# Patient Record
Sex: MALE | Race: WHITE | NOT HISPANIC OR LATINO | URBAN - METROPOLITAN AREA
[De-identification: names, ages, dates, MRNs, and addresses within clinical notes are randomized per-mention and may not be internally consistent; named-entity substitution may affect disease eponyms.]

---

## 2023-09-17 ENCOUNTER — EMERGENCY (EMERGENCY)
Facility: HOSPITAL | Age: 83
LOS: 1 days | Discharge: HOME | End: 2023-09-17
Attending: EMERGENCY MEDICINE | Admitting: EMERGENCY MEDICINE
Payer: MEDICARE

## 2023-09-17 VITALS
SYSTOLIC BLOOD PRESSURE: 107 MMHG | TEMPERATURE: 98 F | DIASTOLIC BLOOD PRESSURE: 67 MMHG | HEART RATE: 93 BPM | OXYGEN SATURATION: 99 % | RESPIRATION RATE: 16 BRPM

## 2023-09-17 VITALS — SYSTOLIC BLOOD PRESSURE: 98 MMHG | DIASTOLIC BLOOD PRESSURE: 67 MMHG

## 2023-09-17 DIAGNOSIS — Z95.0 PRESENCE OF CARDIAC PACEMAKER: Chronic | ICD-10-CM

## 2023-09-17 LAB
ALBUMIN SERPL ELPH-MCNC: 4 G/DL — SIGNIFICANT CHANGE UP (ref 3.3–5)
ALP SERPL-CCNC: 91 U/L — SIGNIFICANT CHANGE UP (ref 40–120)
ALT FLD-CCNC: 13 U/L — SIGNIFICANT CHANGE UP (ref 4–41)
ANION GAP SERPL CALC-SCNC: 12 MMOL/L — SIGNIFICANT CHANGE UP (ref 7–14)
APPEARANCE UR: CLEAR — SIGNIFICANT CHANGE UP
AST SERPL-CCNC: 20 U/L — SIGNIFICANT CHANGE UP (ref 4–40)
BACTERIA # UR AUTO: NEGATIVE /HPF — SIGNIFICANT CHANGE UP
BASOPHILS # BLD AUTO: 0.06 K/UL — SIGNIFICANT CHANGE UP (ref 0–0.2)
BASOPHILS NFR BLD AUTO: 0.8 % — SIGNIFICANT CHANGE UP (ref 0–2)
BILIRUB SERPL-MCNC: 1.1 MG/DL — SIGNIFICANT CHANGE UP (ref 0.2–1.2)
BILIRUB UR-MCNC: NEGATIVE — SIGNIFICANT CHANGE UP
BUN SERPL-MCNC: 14 MG/DL — SIGNIFICANT CHANGE UP (ref 7–23)
CALCIUM SERPL-MCNC: 9.3 MG/DL — SIGNIFICANT CHANGE UP (ref 8.4–10.5)
CAST: 0 /LPF — SIGNIFICANT CHANGE UP (ref 0–4)
CHLORIDE SERPL-SCNC: 103 MMOL/L — SIGNIFICANT CHANGE UP (ref 98–107)
CO2 SERPL-SCNC: 29 MMOL/L — SIGNIFICANT CHANGE UP (ref 22–31)
COLOR SPEC: YELLOW — SIGNIFICANT CHANGE UP
CREAT SERPL-MCNC: 1.05 MG/DL — SIGNIFICANT CHANGE UP (ref 0.5–1.3)
DIFF PNL FLD: NEGATIVE — SIGNIFICANT CHANGE UP
EGFR: 70 ML/MIN/1.73M2 — SIGNIFICANT CHANGE UP
EOSINOPHIL # BLD AUTO: 0.44 K/UL — SIGNIFICANT CHANGE UP (ref 0–0.5)
EOSINOPHIL NFR BLD AUTO: 5.9 % — SIGNIFICANT CHANGE UP (ref 0–6)
GLUCOSE SERPL-MCNC: 111 MG/DL — HIGH (ref 70–99)
GLUCOSE UR QL: >=1000 MG/DL
HCT VFR BLD CALC: 36 % — LOW (ref 39–50)
HGB BLD-MCNC: 11.6 G/DL — LOW (ref 13–17)
IANC: 4.29 K/UL — SIGNIFICANT CHANGE UP (ref 1.8–7.4)
IMM GRANULOCYTES NFR BLD AUTO: 0.3 % — SIGNIFICANT CHANGE UP (ref 0–0.9)
KETONES UR-MCNC: NEGATIVE MG/DL — SIGNIFICANT CHANGE UP
LEUKOCYTE ESTERASE UR-ACNC: NEGATIVE — SIGNIFICANT CHANGE UP
LYMPHOCYTES # BLD AUTO: 1.99 K/UL — SIGNIFICANT CHANGE UP (ref 1–3.3)
LYMPHOCYTES # BLD AUTO: 26.9 % — SIGNIFICANT CHANGE UP (ref 13–44)
MCHC RBC-ENTMCNC: 32.2 GM/DL — SIGNIFICANT CHANGE UP (ref 32–36)
MCHC RBC-ENTMCNC: 32.4 PG — SIGNIFICANT CHANGE UP (ref 27–34)
MCV RBC AUTO: 100.6 FL — HIGH (ref 80–100)
MONOCYTES # BLD AUTO: 0.61 K/UL — SIGNIFICANT CHANGE UP (ref 0–0.9)
MONOCYTES NFR BLD AUTO: 8.2 % — SIGNIFICANT CHANGE UP (ref 2–14)
NEUTROPHILS # BLD AUTO: 4.29 K/UL — SIGNIFICANT CHANGE UP (ref 1.8–7.4)
NEUTROPHILS NFR BLD AUTO: 57.9 % — SIGNIFICANT CHANGE UP (ref 43–77)
NITRITE UR-MCNC: NEGATIVE — SIGNIFICANT CHANGE UP
NRBC # BLD: 0 /100 WBCS — SIGNIFICANT CHANGE UP (ref 0–0)
NRBC # FLD: 0 K/UL — SIGNIFICANT CHANGE UP (ref 0–0)
PH UR: 7 — SIGNIFICANT CHANGE UP (ref 5–8)
PLATELET # BLD AUTO: 184 K/UL — SIGNIFICANT CHANGE UP (ref 150–400)
POTASSIUM SERPL-MCNC: 3.9 MMOL/L — SIGNIFICANT CHANGE UP (ref 3.5–5.3)
POTASSIUM SERPL-SCNC: 3.9 MMOL/L — SIGNIFICANT CHANGE UP (ref 3.5–5.3)
PROT SERPL-MCNC: 6.7 G/DL — SIGNIFICANT CHANGE UP (ref 6–8.3)
PROT UR-MCNC: NEGATIVE MG/DL — SIGNIFICANT CHANGE UP
RBC # BLD: 3.58 M/UL — LOW (ref 4.2–5.8)
RBC # FLD: 16.1 % — HIGH (ref 10.3–14.5)
RBC CASTS # UR COMP ASSIST: 0 /HPF — SIGNIFICANT CHANGE UP (ref 0–4)
SODIUM SERPL-SCNC: 144 MMOL/L — SIGNIFICANT CHANGE UP (ref 135–145)
SP GR SPEC: 1.02 — SIGNIFICANT CHANGE UP (ref 1–1.03)
SQUAMOUS # UR AUTO: 0 /HPF — SIGNIFICANT CHANGE UP (ref 0–5)
TSH SERPL-MCNC: 3.76 UIU/ML — SIGNIFICANT CHANGE UP (ref 0.27–4.2)
UROBILINOGEN FLD QL: 2 MG/DL (ref 0.2–1)
WBC # BLD: 7.41 K/UL — SIGNIFICANT CHANGE UP (ref 3.8–10.5)
WBC # FLD AUTO: 7.41 K/UL — SIGNIFICANT CHANGE UP (ref 3.8–10.5)
WBC UR QL: 0 /HPF — SIGNIFICANT CHANGE UP (ref 0–5)

## 2023-09-17 PROCEDURE — 93010 ELECTROCARDIOGRAM REPORT: CPT

## 2023-09-17 PROCEDURE — 99284 EMERGENCY DEPT VISIT MOD MDM: CPT

## 2023-09-17 NOTE — ED PROVIDER NOTE - NS ED ATTENDING STATEMENT MOD
This was a shared visit with the RAKAN. I reviewed and verified the documentation and independently performed the documented:

## 2023-09-17 NOTE — ED PROVIDER NOTE - NS ED MD DISPO DISCHARGE
Xerosis Normal Treatment: I recommended application of Cetaphil or CeraVe numerous times a day going to bed to all dry areas. Home

## 2023-09-17 NOTE — ED PROVIDER NOTE - PATIENT PORTAL LINK FT
You can access the FollowMyHealth Patient Portal offered by [facility] by registering at the following website: [email or web address]. By joining [employer]’s FollowMyHealth portal, you will also be able to view your health information using other applications (apps) compatible with our system.

## 2023-09-17 NOTE — ED PROVIDER NOTE - MDM ORDERS SUBMITTED SELECTION
[address] Cardiology - [address] [facility] [address]  [address] [place] [address]  [address] [address]  Phone: [phone]  Fax: [phone]    Dimitrios Wilkinson MD        November 30, 2017     [facility] [id number]    Patient: Alaina Giron  MR Number: [id number]  YOB: 1932  Date of Visit: 11/27/2017    Dear Dr. Evangelina Verduzco:    CELSO Giron is here at the request of the [facility] for evaluation of hypotension and falls. He was seen in the ED 11/20/17 after falling. His BB dosage was reduced. Today he denies chest pain, palpitations, syncope, worsening leg swelling and worsening dyspnea. He is participating in cardiac rehab. Assessment:       CAD (coronary artery disease)      s/p prox RCA BMS 08, severe prox Diag 1, normal LVEF. Cath 5/2017 50% LAD, 80%  Diagonal, 40% LCx, 70% mid PDA, patent RCA stent, LVEF 30-35%, moderate-severe MR, severe AS.  Diabetes      managed by PCP    Hyperlipidemia      LDL 61    HTN (hypertension)      Controlled. BP at [facility] 93/69. BB dose previously reduced        Dizziness likely secondary to medications. Mild orthostatic changes noted. Cardiomyopathy- Echo 5/2017 LVEF 25-30%, mod-severely reduced RVSF, severe MR, AS, mild TR. Aortic stenosis. S/p S/P BAV with Impella 6/8/17. TAVR 8/1/17- repeat ECHO 9/2017 EF 25-30%, mod MR,  bioprosthetic valve. On ASA. (off of plavix)       Atrial fib. EKG 5/10/17 Atrial fib with CVR, PVC, LVH, NS ST changes. On coumadin. DEMARCUS Cr 1.8 11/2017       Plan:      Mild orthostatic changes noted today in office. In view of recent fall, will stop plavix and continue ASA and coumadin. Recommend DECLAN lee. Will stop lisinopril and reduce lasix to 40mg daily. Will also reduce Toprol to 50mg bid. No clinical evidence of CHF. No angina. Atrial fib controlled. Fasting lipid profile, CMP, proBNP prior to next visit.
Labs/EKG

## 2023-09-17 NOTE — ED ADULT TRIAGE NOTE - CHIEF COMPLAINT QUOTE
pt sent for geriatric eval  son states pt is being aggressive delusional and aguementive  pt from independent living  called the residence  pt had pacemaker placed in august/  hx chf  dm

## 2023-09-17 NOTE — ED PROVIDER NOTE - OBJECTIVE STATEMENT
Patient is an 83-year-old male with past medical history of congestive heart failure, status post pacemaker 1 month ago brought to the emergency department for delusions x1 month.  Patient reports he has had dyspnea on exertion for 1 to 2 months which has improved over time.  He otherwise has no other somatic complaints.  Spoke with patient's son, Jose, who states that patient was brought to the emergency department because patient has had memory loss and delusions for the past 1 month, since having a pacemaker placed.  He states that patient moved to [place] from [place] 2 months ago.  He states that the past month, patient got lost driving.  He states that patient is not aware of the exact date.  Son states that patient has a delusion that is described as follows "he is worried that people will take his money."  Son states that patient was recently discharged after a 2-week admission for heart failure exacerbation.  He states that patient's dyspnea and lower extremity edema has significantly improved since discharge.  Son reports that patient has not reported any thoughts of self-harm, harming other people or auditory/visual hallucinations.  Son states that patient has not physically harmed anyone.  He otherwise denies any fevers, chills, chest pain, palpitations, lightheadedness, headache, abdominal pain, nausea, vomiting, dysuria, melena, bright blood per rectum, illicit drug use, alcohol abuse, suicidal ideation, homicidal ideation, auditory/visual hallucinations.

## 2023-09-17 NOTE — ED ADULT NURSE NOTE - OBJECTIVE STATEMENT
Pt received with son at bedside. Per son pt has been behaving aggressively, and is argumentative, which is uncharacteristic of him. Pt has no complaints at this time and is currently calm and cooperative, pt is alert and oriented x 2-3 at present, Pt knows he is at hospital but is unsure why. Pt denies complaints at this time. Endorses multiple recent falls, denies head-strike, pt states he landed on his R shoulder. Pt states he is on blood thinners but cannot remember what it is called. Pacemaker placed in August. History of DM and CHF. 20g IV placed in L wrist, labs drawn as ordered. Safety maintained. Stretcher in lowest position and locked in placed, appropriate side rails up, call bell within reach. Urinal provided. Son at bedside. Awaiting further orders.

## 2023-09-17 NOTE — ED PROVIDER NOTE - CLINICAL SUMMARY MEDICAL DECISION MAKING FREE TEXT BOX
Patient is an 83-year-old male with past medical history of congestive heart failure, status post pacemaker 1 month ago brought to the emergency department for delusions x1 month.  Patient reports he has had dyspnea on exertion for 1 to 2 months which has improved over time.  He otherwise has no other somatic complaints.  Spoke with patient's son, Jose, who states that patient was brought to the emergency department because patient has had memory loss and delusions for the past 1 month, since having a pacemaker placed.  He states that patient moved to [place] from [place] 2 months ago.  He states that the past month, patient got lost driving.  He states that patient is not aware of the exact date.  Son states that patient has a delusion that is described as follows "he is worried that people will take his money."  Son states that patient was recently discharged after a 2-week admission for heart failure exacerbation.  He states that patient's dyspnea and lower extremity edema has significantly improved since discharge.  Son reports that patient has not reported any thoughts of self-harm, harming other people or auditory/visual hallucinations.  Son states that patient has not physically harmed anyone.  He otherwise denies any fevers, chills, chest pain, palpitations, lightheadedness, headache, abdominal pain, nausea, vomiting, dysuria, melena, bright blood per rectum, illicit drug use, alcohol abuse, suicidal ideation, homicidal ideation, auditory/visual hallucinations.  Plan to order labs, TSH, urinalysis.  Not clinically concerning for heart failure exacerbation at this time.  There is no evidence at this time that patient is at risk of harming himself or other people.  At this time, there is no clinical indication for any emergent psychiatric consultation.

## 2023-09-17 NOTE — ED PROVIDER NOTE - NSFOLLOWUPINSTRUCTIONS_ED_ALL_ED_FT
Advance activity as tolerated.  Continue all previously prescribed medications as directed unless otherwise instructed.  Follow up with your primary care physician in 48-72 hours- bring copies of your results.  Return to the ER for worsening or persistent symptoms, including but not limited to worsening/persistent chest pain, shortness of breath, harming others, harming self, thoughts of harming others or self, hallucinations, and/or ANY NEW OR CONCERNING SYMPTOMS. If you have issues obtaining follow up, please call: [phone] to obtain a doctor or specialist who takes your insurance in your area.  You may call [phone] to make an appointment with the internal medicine clinic.     For treatment and/or connection to a community Psychiatrist, follow up at the [facility]: Monday - Friday between 9 AM - 3 PM at [phone] [place] [id number], [phone].    Or, call for an appointment with:     Ted Alexandra (psychologist)  [address] [place] [address] (Lower Level [address])  [place] [address] [phone]

## 2023-09-17 NOTE — ED PROVIDER NOTE - ATTENDING APP SHARED VISIT CONTRIBUTION OF CARE
I performed a face-to-face evaluation of the patient and performed a history and physical examination. I agree with the history and physical examination. If this was a PA visit, I personally saw the patient with the PA and performed a substantive portion of the visit including all aspects of the medical decision making.    CHF, pacemaker, HTN, HL. Recent dc from [facility]. BIB family for memory loss and delusions (that someone is trying to take his money) for the past 1 month, since having a pacemaker placed. Was placed on olanzapine at [facility]. No clinical e/o infection, trauma, or acute vascular event that might explain this. No SI/HI. Will check labs. Dc w/ [facility] f/u.

## 2023-09-17 NOTE — ED ADULT NURSE NOTE - NSFALLHARMRISKINTERV_ED_ALL_ED

## 2023-09-18 ENCOUNTER — OUTPATIENT (OUTPATIENT)
Dept: OUTPATIENT SERVICES | Facility: HOSPITAL | Age: 83
LOS: 1 days | Discharge: STILL ADMITTED OR ADMINISTRATIVE | End: 2023-09-18
Payer: COMMERCIAL

## 2023-09-18 DIAGNOSIS — Z95.0 PRESENCE OF CARDIAC PACEMAKER: Chronic | ICD-10-CM

## 2023-09-18 LAB
CULTURE RESULTS: SIGNIFICANT CHANGE UP
SPECIMEN SOURCE: SIGNIFICANT CHANGE UP

## 2023-09-18 PROCEDURE — 90833 PSYTX W PT W E/M 30 MIN: CPT

## 2023-09-18 PROCEDURE — 99214 OFFICE O/P EST MOD 30 MIN: CPT

## 2023-09-19 DIAGNOSIS — F05 DELIRIUM DUE TO KNOWN PHYSIOLOGICAL CONDITION: ICD-10-CM

## 2023-09-20 ENCOUNTER — INPATIENT (INPATIENT)
Facility: HOSPITAL | Age: 83
LOS: 18 days | Discharge: HOSPICE | DRG: 884 | End: 2023-10-09
Attending: INTERNAL MEDICINE | Admitting: INTERNAL MEDICINE
Payer: MEDICARE

## 2023-09-20 VITALS
HEIGHT: 70 IN | TEMPERATURE: 98 F | OXYGEN SATURATION: 97 % | HEART RATE: 100 BPM | SYSTOLIC BLOOD PRESSURE: 112 MMHG | RESPIRATION RATE: 18 BRPM | DIASTOLIC BLOOD PRESSURE: 76 MMHG | WEIGHT: 188.94 LBS

## 2023-09-20 DIAGNOSIS — E11.9 TYPE 2 DIABETES MELLITUS WITHOUT COMPLICATIONS: ICD-10-CM

## 2023-09-20 DIAGNOSIS — I50.20 UNSPECIFIED SYSTOLIC (CONGESTIVE) HEART FAILURE: ICD-10-CM

## 2023-09-20 DIAGNOSIS — Z29.9 ENCOUNTER FOR PROPHYLACTIC MEASURES, UNSPECIFIED: ICD-10-CM

## 2023-09-20 DIAGNOSIS — F22 DELUSIONAL DISORDERS: ICD-10-CM

## 2023-09-20 DIAGNOSIS — E78.5 HYPERLIPIDEMIA, UNSPECIFIED: ICD-10-CM

## 2023-09-20 DIAGNOSIS — I10 ESSENTIAL (PRIMARY) HYPERTENSION: ICD-10-CM

## 2023-09-20 DIAGNOSIS — Z95.0 PRESENCE OF CARDIAC PACEMAKER: Chronic | ICD-10-CM

## 2023-09-20 DIAGNOSIS — Z79.899 OTHER LONG TERM (CURRENT) DRUG THERAPY: ICD-10-CM

## 2023-09-20 DIAGNOSIS — R45.1 RESTLESSNESS AND AGITATION: ICD-10-CM

## 2023-09-20 LAB
ANION GAP SERPL CALC-SCNC: 6 MMOL/L — SIGNIFICANT CHANGE UP (ref 5–17)
APAP SERPL-MCNC: 6 UG/ML — LOW (ref 10–30)
BASOPHILS # BLD AUTO: 0.09 K/UL — SIGNIFICANT CHANGE UP (ref 0–0.2)
BASOPHILS NFR BLD AUTO: 1.1 % — SIGNIFICANT CHANGE UP (ref 0–2)
BUN SERPL-MCNC: 13 MG/DL — SIGNIFICANT CHANGE UP (ref 7–23)
CALCIUM SERPL-MCNC: 9 MG/DL — SIGNIFICANT CHANGE UP (ref 8.5–10.1)
CHLORIDE SERPL-SCNC: 104 MMOL/L — SIGNIFICANT CHANGE UP (ref 96–108)
CO2 SERPL-SCNC: 29 MMOL/L — SIGNIFICANT CHANGE UP (ref 22–31)
CREAT SERPL-MCNC: 1 MG/DL — SIGNIFICANT CHANGE UP (ref 0.5–1.3)
EGFR: 75 ML/MIN/1.73M2 — SIGNIFICANT CHANGE UP
EOSINOPHIL # BLD AUTO: 0.56 K/UL — HIGH (ref 0–0.5)
EOSINOPHIL NFR BLD AUTO: 7.1 % — HIGH (ref 0–6)
ETHANOL SERPL-MCNC: <10 MG/DL — SIGNIFICANT CHANGE UP (ref 0–10)
GLUCOSE BLDC GLUCOMTR-MCNC: 132 MG/DL — HIGH (ref 70–99)
GLUCOSE SERPL-MCNC: 111 MG/DL — HIGH (ref 70–99)
HCT VFR BLD CALC: 37.5 % — LOW (ref 39–50)
HGB BLD-MCNC: 12.1 G/DL — LOW (ref 13–17)
IMM GRANULOCYTES NFR BLD AUTO: 0.3 % — SIGNIFICANT CHANGE UP (ref 0–0.9)
LYMPHOCYTES # BLD AUTO: 1.87 K/UL — SIGNIFICANT CHANGE UP (ref 1–3.3)
LYMPHOCYTES # BLD AUTO: 23.9 % — SIGNIFICANT CHANGE UP (ref 13–44)
MCHC RBC-ENTMCNC: 32.3 GM/DL — SIGNIFICANT CHANGE UP (ref 32–36)
MCHC RBC-ENTMCNC: 32.3 PG — SIGNIFICANT CHANGE UP (ref 27–34)
MCV RBC AUTO: 100 FL — SIGNIFICANT CHANGE UP (ref 80–100)
MONOCYTES # BLD AUTO: 0.52 K/UL — SIGNIFICANT CHANGE UP (ref 0–0.9)
MONOCYTES NFR BLD AUTO: 6.6 % — SIGNIFICANT CHANGE UP (ref 2–14)
NEUTROPHILS # BLD AUTO: 4.78 K/UL — SIGNIFICANT CHANGE UP (ref 1.8–7.4)
NEUTROPHILS NFR BLD AUTO: 61 % — SIGNIFICANT CHANGE UP (ref 43–77)
NRBC # BLD: 0 /100 WBCS — SIGNIFICANT CHANGE UP (ref 0–0)
PCP SPEC-MCNC: SIGNIFICANT CHANGE UP
PLATELET # BLD AUTO: 211 K/UL — SIGNIFICANT CHANGE UP (ref 150–400)
POTASSIUM SERPL-MCNC: 4.1 MMOL/L — SIGNIFICANT CHANGE UP (ref 3.5–5.3)
POTASSIUM SERPL-SCNC: 4.1 MMOL/L — SIGNIFICANT CHANGE UP (ref 3.5–5.3)
RBC # BLD: 3.75 M/UL — LOW (ref 4.2–5.8)
RBC # FLD: 15.8 % — HIGH (ref 10.3–14.5)
SALICYLATES SERPL-MCNC: <1.7 MG/DL — LOW (ref 2.8–20)
SARS-COV-2 RNA SPEC QL NAA+PROBE: SIGNIFICANT CHANGE UP
SODIUM SERPL-SCNC: 139 MMOL/L — SIGNIFICANT CHANGE UP (ref 135–145)
TSH SERPL-MCNC: 2.66 UIU/ML — SIGNIFICANT CHANGE UP (ref 0.36–3.74)
WBC # BLD: 7.84 K/UL — SIGNIFICANT CHANGE UP (ref 3.8–10.5)
WBC # FLD AUTO: 7.84 K/UL — SIGNIFICANT CHANGE UP (ref 3.8–10.5)

## 2023-09-20 PROCEDURE — 99285 EMERGENCY DEPT VISIT HI MDM: CPT

## 2023-09-20 PROCEDURE — 93010 ELECTROCARDIOGRAM REPORT: CPT

## 2023-09-20 PROCEDURE — 71045 X-RAY EXAM CHEST 1 VIEW: CPT | Mod: 26

## 2023-09-20 RX ORDER — DEXTROSE 50 % IN WATER 50 %
25 SYRINGE (ML) INTRAVENOUS ONCE
Refills: 0 | Status: DISCONTINUED | OUTPATIENT
Start: 2023-09-20 | End: 2023-10-09

## 2023-09-20 RX ORDER — SPIRONOLACTONE 25 MG/1
25 TABLET, FILM COATED ORAL DAILY
Refills: 0 | Status: DISCONTINUED | OUTPATIENT
Start: 2023-09-20 | End: 2023-09-24

## 2023-09-20 RX ORDER — OLANZAPINE 15 MG/1
5 TABLET, FILM COATED ORAL ONCE
Refills: 0 | Status: COMPLETED | OUTPATIENT
Start: 2023-09-20 | End: 2023-09-20

## 2023-09-20 RX ORDER — ALBUTEROL 90 UG/1
2.5 AEROSOL, METERED ORAL EVERY 6 HOURS
Refills: 0 | Status: DISCONTINUED | OUTPATIENT
Start: 2023-09-20 | End: 2023-10-09

## 2023-09-20 RX ORDER — FUROSEMIDE 40 MG
20 TABLET ORAL
Refills: 0 | Status: DISCONTINUED | OUTPATIENT
Start: 2023-09-20 | End: 2023-09-21

## 2023-09-20 RX ORDER — SODIUM CHLORIDE 9 MG/ML
1000 INJECTION, SOLUTION INTRAVENOUS
Refills: 0 | Status: DISCONTINUED | OUTPATIENT
Start: 2023-09-20 | End: 2023-10-09

## 2023-09-20 RX ORDER — INSULIN LISPRO 100/ML
VIAL (ML) SUBCUTANEOUS
Refills: 0 | Status: DISCONTINUED | OUTPATIENT
Start: 2023-09-20 | End: 2023-10-09

## 2023-09-20 RX ORDER — ATORVASTATIN CALCIUM 80 MG/1
40 TABLET, FILM COATED ORAL AT BEDTIME
Refills: 0 | Status: DISCONTINUED | OUTPATIENT
Start: 2023-09-20 | End: 2023-10-09

## 2023-09-20 RX ORDER — DULOXETINE HYDROCHLORIDE 30 MG/1
30 CAPSULE, DELAYED RELEASE ORAL
Refills: 0 | Status: DISCONTINUED | OUTPATIENT
Start: 2023-09-20 | End: 2023-10-09

## 2023-09-20 RX ORDER — INSULIN LISPRO 100/ML
VIAL (ML) SUBCUTANEOUS AT BEDTIME
Refills: 0 | Status: DISCONTINUED | OUTPATIENT
Start: 2023-09-21 | End: 2023-10-09

## 2023-09-20 RX ORDER — METOPROLOL TARTRATE 50 MG
25 TABLET ORAL DAILY
Refills: 0 | Status: DISCONTINUED | OUTPATIENT
Start: 2023-09-20 | End: 2023-09-24

## 2023-09-20 RX ORDER — DEXTROSE 50 % IN WATER 50 %
15 SYRINGE (ML) INTRAVENOUS ONCE
Refills: 0 | Status: DISCONTINUED | OUTPATIENT
Start: 2023-09-20 | End: 2023-10-09

## 2023-09-20 RX ORDER — GLUCAGON INJECTION, SOLUTION 0.5 MG/.1ML
1 INJECTION, SOLUTION SUBCUTANEOUS ONCE
Refills: 0 | Status: DISCONTINUED | OUTPATIENT
Start: 2023-09-20 | End: 2023-10-09

## 2023-09-20 RX ORDER — TIOTROPIUM BROMIDE 18 UG/1
2 CAPSULE ORAL; RESPIRATORY (INHALATION) DAILY
Refills: 0 | Status: DISCONTINUED | OUTPATIENT
Start: 2023-09-20 | End: 2023-10-09

## 2023-09-20 RX ORDER — SOD,AMMONIUM,POTASSIUM LACTATE
1 CREAM (GRAM) TOPICAL
Refills: 0 | Status: DISCONTINUED | OUTPATIENT
Start: 2023-09-20 | End: 2023-10-09

## 2023-09-20 RX ORDER — HEPARIN SODIUM 5000 [USP'U]/ML
5000 INJECTION INTRAVENOUS; SUBCUTANEOUS EVERY 8 HOURS
Refills: 0 | Status: DISCONTINUED | OUTPATIENT
Start: 2023-09-20 | End: 2023-09-30

## 2023-09-20 RX ORDER — DIVALPROEX SODIUM 500 MG/1
125 TABLET, DELAYED RELEASE ORAL THREE TIMES A DAY
Refills: 0 | Status: DISCONTINUED | OUTPATIENT
Start: 2023-09-20 | End: 2023-09-21

## 2023-09-20 RX ORDER — AMITRIPTYLINE HCL 25 MG
10 TABLET ORAL DAILY
Refills: 0 | Status: DISCONTINUED | OUTPATIENT
Start: 2023-09-20 | End: 2023-09-22

## 2023-09-20 RX ORDER — DEXTROSE 50 % IN WATER 50 %
12.5 SYRINGE (ML) INTRAVENOUS ONCE
Refills: 0 | Status: DISCONTINUED | OUTPATIENT
Start: 2023-09-20 | End: 2023-10-09

## 2023-09-20 RX ORDER — SACUBITRIL AND VALSARTAN 24; 26 MG/1; MG/1
1 TABLET, FILM COATED ORAL
Refills: 0 | Status: DISCONTINUED | OUTPATIENT
Start: 2023-09-20 | End: 2023-09-21

## 2023-09-20 RX ORDER — BUDESONIDE AND FORMOTEROL FUMARATE DIHYDRATE 160; 4.5 UG/1; UG/1
2 AEROSOL RESPIRATORY (INHALATION)
Refills: 0 | Status: DISCONTINUED | OUTPATIENT
Start: 2023-09-20 | End: 2023-10-09

## 2023-09-20 RX ORDER — OLANZAPINE 15 MG/1
2.5 TABLET, FILM COATED ORAL AT BEDTIME
Refills: 0 | Status: DISCONTINUED | OUTPATIENT
Start: 2023-09-20 | End: 2023-09-21

## 2023-09-20 RX ORDER — DAPAGLIFLOZIN 10 MG/1
10 TABLET, FILM COATED ORAL EVERY 24 HOURS
Refills: 0 | Status: DISCONTINUED | OUTPATIENT
Start: 2023-09-20 | End: 2023-10-04

## 2023-09-20 RX ORDER — MIDODRINE HYDROCHLORIDE 2.5 MG/1
5 TABLET ORAL THREE TIMES A DAY
Refills: 0 | Status: DISCONTINUED | OUTPATIENT
Start: 2023-09-20 | End: 2023-10-01

## 2023-09-20 RX ORDER — ACETAMINOPHEN 500 MG
650 TABLET ORAL ONCE
Refills: 0 | Status: COMPLETED | OUTPATIENT
Start: 2023-09-20 | End: 2023-09-20

## 2023-09-20 RX ORDER — ACETAMINOPHEN 500 MG
650 TABLET ORAL EVERY 6 HOURS
Refills: 0 | Status: DISCONTINUED | OUTPATIENT
Start: 2023-09-20 | End: 2023-10-09

## 2023-09-20 RX ADMIN — Medication 650 MILLIGRAM(S): at 20:00

## 2023-09-20 RX ADMIN — OLANZAPINE 5 MILLIGRAM(S): 15 TABLET, FILM COATED ORAL at 19:10

## 2023-09-20 RX ADMIN — Medication 650 MILLIGRAM(S): at 19:14

## 2023-09-20 NOTE — ED PROVIDER NOTE - OBJECTIVE STATEMENT
Patient with past medical history of dementia, cardiac disease is presenting with concern for agitation.  Patient is coming from a [facility] and older community where he has been having sundowning episodes.  Recently traveled here from [place].  Became agitated today, so staff called 911 for patient to be evaluated.  Patient at this time denies any complaints.  Facility feels like patient needs assistance with medications and would not be safe to go back there at this time.

## 2023-09-20 NOTE — H&P ADULT - PROBLEM SELECTOR PLAN 1
Acute paranoia in setting of hx of dementia, states people are following him and trying to take his money  - Recent hospitalization [facility] for similar problem  - continue home zyprexa, depakote, amitriptyline, duloxetine    - Psych (Dr. Strange) consulted, f/u recs  - SW consulted Acute paranoia in setting of hx of dementia, states people are following him and trying to take his money  - Recent hospitalization [facility] for similar problem  - continue home ZYPREXA, Depakote, amitriptyline, duloxetine    - Psych (Dr. Strange) consulted, f/u recs  - SW consulted

## 2023-09-20 NOTE — H&P ADULT - CARDIOVASCULAR
negative +PPM, +B/L LE edema w/ crusting/normal/regular rate and rhythm/S1 S2 present/no gallops/no rub/no murmur/peripheral edema +PPM, +B/L LE edema w/ crusting/normal/regular rate and rhythm/S1 S2 present/no gallops/no rub/no murmur/no JVD/peripheral edema/vascular details…

## 2023-09-20 NOTE — H&P ADULT - PROBLEM SELECTOR PLAN 5
BP stable on admission   - Per med dispensary hx patient is on midodrine as well for possible orthostatic hypotension  -Continue home metoprolol and midodrine   -Monitor hemodynamics Chronic  - Hold home metformin inpatient  -Low ISS  -Regular finger sticks  -DASH/TLC Consistent carb diet  -Hypoglycemic protocol.  - F/u AM A1c

## 2023-09-20 NOTE — H&P ADULT - HISTORY OF PRESENT ILLNESS
82y/o M PMH dementia, CHF s/p PPM, HTN, HLD 1month ago presents with increased agitation.     He is coming from a [facility] and older community where he has been having sundowning episodes.  Recently traveled here from [place] ~2months ago.  Became agitated today, so staff called 911 for patient to be evaluated.  Patient at this time denies any complaints.  Facility feels like patient needs assistance with medications and would not be safe to go back there at this time.    Patient went to [facility] ED on 9/17 for increased paranoia  and was discharged home and told to follow up with___.    He was recently hospitalized at [facility] for CHF exacerbation. Was placed on zyprexa at [facility].     ED Course   T 97.4 F  /79 RR 18 SpO2 95% on RA    Labs H/H 12.1/37.5 Glu   UA: >1000 glucose  UTox: negaitve     ?furosemide 82y/o M PMH dementia, HFrEF s/p PPM, HTN, HLD  presents with increased agitation. History obtained from patient's daughter Arianne over phone.   Patient moved from [place] to [place] ~2months ago and has been having issues with increased paranoia for the past month. He stays at a [facility] and older assisted living community where he has been having several episodes of sundowning.   Recently he was hospitalized at [facility] for same issues from 9/1-914/23. Daughter was unable to provide sufficient history regarding hospitalization.   Patient's son, Jose has been taking care of him and knows more about his medical hx however patient's daughter states she is taking over now and did not want to bother Jose at this time.  Patient was also recently seen at [facility] ED for episodes of agitation and was told to follow up with outpatient Psychiatrist but daughter states that likely did not happened because of his increased agitation.  Patient became agitated today and staff had to call 911 for further evaluation. The facility as well as his children feel that his current psych medication regimen is not sufficient to keep him calm.   Per review of recent dispensary history, he was seeing a psychiatric NP (Barbara Bravo) in [place], [place].   Of note, he had placement of PPM in late August at [facility]. Per his daughter, his LVEF at that time was 20% before and after PPM placement.  Currently, patient states his b/l legs feel itchy but otherwise he feels fine and is agreeable.  Denies fevers, chills. sob, cp, n/v/d.    ED Course   T 97.4 F  /79 RR 18 SpO2 95% on RA  Labs H/H 12.1/37.5 Glu   UA: >1000 glucose  UTox: negaitve     In the ED, given tylenol 650mg x1 , zyprexa 5mg IVP x1

## 2023-09-20 NOTE — H&P ADULT - PROBLEM SELECTOR PLAN 2
Chronic s/p PPM (paced ~end of Aug 2023 at [place] per daughter )  - TTE (8/2023) per daughter showed EF ~20%  - Continue entresto, trellegy, metoprolol, spironolactone, Farxiga   - Increase home lasix to IV lasix 20mg q12h  - Dash/ TLC Diet Chronic s/p PPM (paced ~end of Aug 2023 at [facility] per daughter )  - TTE (8/2023) per daughter showed EF ~20%  - Continue entresto, trellegy, metoprolol, spironolactone, Farxiga   - Increase home lasix to IV lasix 20mg q12h  - AM TSH, T3 , T4  - Dash/ TLC Diet Chronic s/p PPM (paced ~end of Aug 2023 at [facility] per daughter )  - TTE (8/2023) per daughter showed EF ~20%  - Continue Entresto, Trelegy, metoprolol, spironolactone, Farxiga   - Increase home Lasix to IV lasix 20mg IVP  q12h  - AM TSH, T3 , T4  - Dash/ TLC Diet

## 2023-09-20 NOTE — H&P ADULT - NEUROLOGICAL
negative normal/sensation intact/responds to pain/responds to verbal commands details… normal/cranial nerves II-XII intact/sensation intact/responds to pain/responds to verbal commands/cranial nerves intact/no spontaneous movement

## 2023-09-20 NOTE — H&P ADULT - NSICDXPASTMEDICALHX_GEN_ALL_CORE_FT
PAST MEDICAL HISTORY:  HFrEF (heart failure with reduced ejection fraction)     HLD (hyperlipidemia)     HTN (hypertension)     Type 2 diabetes mellitus

## 2023-09-20 NOTE — ED PROVIDER NOTE - PROGRESS NOTE DETAILS
zyprexa given for agitation and to help facilitate workup. Lamont Harvey MD. Case discussed with telepsychiatry, recommend continuing patient on one-to-one and having patient be seen by inpatient psychiatry team tomorrow morning.  Discussed with Dr. Bauer, will accept patient for admission at this time for likely assisted living placement.  Lamont Harvey MD.

## 2023-09-20 NOTE — H&P ADULT - PROBLEM SELECTOR PLAN 4
Chronic  - Hold home metformin inpatient  -Low ISS  -Regular finger sticks  -DASH/TLC Consistent carb diet  -Hypoglycemic protocol.  - F/u AM A1c Discussed patient medication list with daughter (Arianne) who confirmed most medications found on surescripts recent dispensary history) however she is not sure of any changes that may have happened when he was hospitalized or when he went to the ED  - Patient has list with him, copy in chart however unclear if that list is up to date  - Current med rec completed based on recent dispensary history

## 2023-09-20 NOTE — H&P ADULT - PROBLEM SELECTOR PLAN 3
Discussed patient medication list with daughter (Arianne) who confirmed most medications found on surescripts recent dispensary history) however she is not sure of any changes that may have happened when he was hospitalized or when he went to the ED  - Patient has list with him, copy in chart however unclear if that list is up to date  - Current med rec completed based on recent dispensary history H/H 12.1/37.5 on admission  -Folate, B12  -Target Hb>7.0  -Trend hb.

## 2023-09-20 NOTE — H&P ADULT - GASTROINTESTINAL
negative normal/soft/nontender/nondistended/normal active bowel sounds details… Obese/normal/soft/nontender/nondistended/normal active bowel sounds/no guarding/no rigidity/no organomegaly/no palpable hema/no masses palpable

## 2023-09-20 NOTE — H&P ADULT - SKIN
b/l LE edema, ulcerations/swelling b/l LE edema, ulcerations on legs & hand/warm and dry/swelling/no rashes

## 2023-09-20 NOTE — ED ADULT NURSE NOTE - NSFALLUNIVINTERV_ED_ALL_ED
Bed/Stretcher in lowest position, wheels locked, appropriate side rails in place/Call bell, personal items and telephone in reach/Instruct patient to call for assistance before getting out of bed/chair/stretcher/Non-slip footwear applied when patient is off stretcher/[place] to call system/Physically safe environment - no spills, clutter or unnecessary equipment/Purposeful proactive rounding/Room/bathroom lighting operational, light cord in reach

## 2023-09-20 NOTE — ED PROVIDER NOTE - CLINICAL SUMMARY MEDICAL DECISION MAKING FREE TEXT BOX
Patient likely with dementia though concern for worsening agitation here.  Will assess for toxic/metabolic abnormalities.  Do not suspect infection as he is afebrile and otherwise without complaints.  We will check lab work here, consult psychiatry anticipate admission.  I spoke with the  from patient's facility and they are recommending evaluation for possible assisted living placement. ED RN spoke with patient's family member.

## 2023-09-20 NOTE — H&P ADULT - NSHPSOCIALHISTORY_GEN_ALL_CORE
Tobacco: former , quit >30 years ago  EtOH: social drinker  Recreational drug use: denies   Lives with: Assisted Living Facility  Ambulates: without assistance  ADLs: needs assistance

## 2023-09-20 NOTE — H&P ADULT - PROBLEM SELECTOR PLAN 6
Chronic  - Continue home statin BP stable on admission   - Per med dispensary hx patient is on midodrine as well for possible orthostatic hypotension  -Continue home metoprolol and midodrine   -Monitor hemodynamics

## 2023-09-20 NOTE — H&P ADULT - ATTENDING COMMENTS
84y/o M PMH dementia, DM2, HFrEF s/p PPM, HTN, HLD presents with increased agitation.  Admit for increased paranoia & Placement .  pt seen, examined, case & care plan d/w pt, residents at detail. d/w dtr   Consult;  Psych Dr Strange  Social service  PT Eval.  PO diet  AM labs   On 1:1 to continue

## 2023-09-20 NOTE — ED PROVIDER NOTE - PHYSICAL EXAMINATION
Constitutional: Awake, Alert, non-toxic. No acute distress.  HEAD: Normocephalic, atraumatic.   EYES: PERRL, EOM intact, conjunctiva and sclera are clear bilaterally.  ENT: External ears normal. No rhinorrhea, no tracheal deviation   NECK: Supple, non-tender  CARDIOVASCULAR: regular rate and rhythm.  RESPIRATORY: Normal respiratory effort; breath sounds CTAB, no wheezes, rhonchi, or rales. Speaking in full sentences. No accessory muscle use.   ABDOMEN: Soft; non-tender, non-distended. No rebound or guarding.   MSK:  no lower extremity edema, no deformities  SKIN: Warm, dry  NEURO: A&O x2. Sensory and motor functions are grossly intact. Speech is normal. No facial droop.  PSYCH: intermittently agitated

## 2023-09-20 NOTE — H&P ADULT - RESPIRATORY
normal/clear to auscultation bilaterally/no wheezes/no rales/no rhonchi normal/clear to auscultation bilaterally/no wheezes/no rales/no rhonchi/breath sounds equal/good air movement/respirations non-labored

## 2023-09-20 NOTE — H&P ADULT - NSHPPHYSICALEXAM_GEN_ALL_CORE
Vital Signs Last 24 Hrs  T(C): 36.6 (20 Sep 2023 23:20), Max: 36.7 (20 Sep 2023 17:11)  T(F): 97.8 (20 Sep 2023 23:20), Max: 98 (20 Sep 2023 17:11)  HR: 94 (20 Sep 2023 23:20) (94 - 100)  BP: 97/61 (20 Sep 2023 23:20) (97/61 - 114/79)  BP(mean): --  RR: 20 (20 Sep 2023 23:20) (18 - 20)  SpO2: 97% (20 Sep 2023 23:20) (95% - 97%)    Parameters below as of 20 Sep 2023 23:20  Patient On (Oxygen Delivery Method): room air

## 2023-09-20 NOTE — ED ADULT NURSE NOTE - OBJECTIVE STATEMENT
Pt. received alert/awake and confused w/ chief complaint as per daughter of increased confusion w/ agitation worsening over last 2 months. Pt. called 911 today stating "someone was stalking me and trying to take my money." As per darcie the  at [facility] she states that they are unable to care for patient w/ his incresing dementia and have been trying to place him in a facility to care for him properly. Pt. refusing all treatment in hospital. MD Harvey notified and instructed to place patient on 1:1 observation.

## 2023-09-20 NOTE — H&P ADULT - ASSESSMENT
84y/o M PMH dementia, DM2, HFrEF s/p PPM, HTN, HLD presents with increased agitation.  Admit for increased paranoia 84y/o M PMH dementia, DM2, HFrEF s/p PPM, HTN, HLD presents with increased agitation.  Admit for increased paranoia & Placement .

## 2023-09-21 ENCOUNTER — TRANSCRIPTION ENCOUNTER (OUTPATIENT)
Age: 83
End: 2023-09-21

## 2023-09-21 DIAGNOSIS — D64.9 ANEMIA, UNSPECIFIED: ICD-10-CM

## 2023-09-21 LAB
A1C WITH ESTIMATED AVERAGE GLUCOSE RESULT: 6.5 % — HIGH (ref 4–5.6)
ALBUMIN SERPL ELPH-MCNC: 3.3 G/DL — SIGNIFICANT CHANGE UP (ref 3.3–5)
ALP SERPL-CCNC: 88 U/L — SIGNIFICANT CHANGE UP (ref 40–120)
ALT FLD-CCNC: 19 U/L — SIGNIFICANT CHANGE UP (ref 12–78)
ANION GAP SERPL CALC-SCNC: 6 MMOL/L — SIGNIFICANT CHANGE UP (ref 5–17)
AST SERPL-CCNC: 17 U/L — SIGNIFICANT CHANGE UP (ref 15–37)
BASOPHILS # BLD AUTO: 0.07 K/UL — SIGNIFICANT CHANGE UP (ref 0–0.2)
BASOPHILS NFR BLD AUTO: 1.3 % — SIGNIFICANT CHANGE UP (ref 0–2)
BILIRUB SERPL-MCNC: 1.2 MG/DL — SIGNIFICANT CHANGE UP (ref 0.2–1.2)
BUN SERPL-MCNC: 11 MG/DL — SIGNIFICANT CHANGE UP (ref 7–23)
CALCIUM SERPL-MCNC: 9.1 MG/DL — SIGNIFICANT CHANGE UP (ref 8.5–10.1)
CHLORIDE SERPL-SCNC: 107 MMOL/L — SIGNIFICANT CHANGE UP (ref 96–108)
CO2 SERPL-SCNC: 30 MMOL/L — SIGNIFICANT CHANGE UP (ref 22–31)
CREAT SERPL-MCNC: 0.87 MG/DL — SIGNIFICANT CHANGE UP (ref 0.5–1.3)
EGFR: 86 ML/MIN/1.73M2 — SIGNIFICANT CHANGE UP
EOSINOPHIL # BLD AUTO: 0.52 K/UL — HIGH (ref 0–0.5)
EOSINOPHIL NFR BLD AUTO: 9.3 % — HIGH (ref 0–6)
ESTIMATED AVERAGE GLUCOSE: 140 MG/DL — HIGH (ref 68–114)
FOLATE SERPL-MCNC: 17.1 NG/ML — SIGNIFICANT CHANGE UP
GLUCOSE BLDC GLUCOMTR-MCNC: 110 MG/DL — HIGH (ref 70–99)
GLUCOSE BLDC GLUCOMTR-MCNC: 136 MG/DL — HIGH (ref 70–99)
GLUCOSE BLDC GLUCOMTR-MCNC: 142 MG/DL — HIGH (ref 70–99)
GLUCOSE BLDC GLUCOMTR-MCNC: 98 MG/DL — SIGNIFICANT CHANGE UP (ref 70–99)
GLUCOSE SERPL-MCNC: 114 MG/DL — HIGH (ref 70–99)
HCT VFR BLD CALC: 38 % — LOW (ref 39–50)
HGB BLD-MCNC: 12.2 G/DL — LOW (ref 13–17)
IMM GRANULOCYTES NFR BLD AUTO: 0.4 % — SIGNIFICANT CHANGE UP (ref 0–0.9)
LYMPHOCYTES # BLD AUTO: 1.51 K/UL — SIGNIFICANT CHANGE UP (ref 1–3.3)
LYMPHOCYTES # BLD AUTO: 27.1 % — SIGNIFICANT CHANGE UP (ref 13–44)
MCHC RBC-ENTMCNC: 32.1 GM/DL — SIGNIFICANT CHANGE UP (ref 32–36)
MCHC RBC-ENTMCNC: 32.3 PG — SIGNIFICANT CHANGE UP (ref 27–34)
MCV RBC AUTO: 100.5 FL — HIGH (ref 80–100)
MONOCYTES # BLD AUTO: 0.49 K/UL — SIGNIFICANT CHANGE UP (ref 0–0.9)
MONOCYTES NFR BLD AUTO: 8.8 % — SIGNIFICANT CHANGE UP (ref 2–14)
NEUTROPHILS # BLD AUTO: 2.96 K/UL — SIGNIFICANT CHANGE UP (ref 1.8–7.4)
NEUTROPHILS NFR BLD AUTO: 53.1 % — SIGNIFICANT CHANGE UP (ref 43–77)
NRBC # BLD: 0 /100 WBCS — SIGNIFICANT CHANGE UP (ref 0–0)
PLATELET # BLD AUTO: 173 K/UL — SIGNIFICANT CHANGE UP (ref 150–400)
POTASSIUM SERPL-MCNC: 3.5 MMOL/L — SIGNIFICANT CHANGE UP (ref 3.5–5.3)
POTASSIUM SERPL-SCNC: 3.5 MMOL/L — SIGNIFICANT CHANGE UP (ref 3.5–5.3)
PROT SERPL-MCNC: 6.9 G/DL — SIGNIFICANT CHANGE UP (ref 6–8.3)
RBC # BLD: 3.78 M/UL — LOW (ref 4.2–5.8)
RBC # FLD: 15.9 % — HIGH (ref 10.3–14.5)
SODIUM SERPL-SCNC: 143 MMOL/L — SIGNIFICANT CHANGE UP (ref 135–145)
T3 SERPL-MCNC: 104 NG/DL — SIGNIFICANT CHANGE UP (ref 80–200)
T4 AB SER-ACNC: 6.5 UG/DL — SIGNIFICANT CHANGE UP (ref 4.6–12)
TSH SERPL-MCNC: 2.74 UIU/ML — SIGNIFICANT CHANGE UP (ref 0.36–3.74)
VIT B12 SERPL-MCNC: 510 PG/ML — SIGNIFICANT CHANGE UP (ref 232–1245)
WBC # BLD: 5.57 K/UL — SIGNIFICANT CHANGE UP (ref 3.8–10.5)
WBC # FLD AUTO: 5.57 K/UL — SIGNIFICANT CHANGE UP (ref 3.8–10.5)

## 2023-09-21 PROCEDURE — 99221 1ST HOSP IP/OBS SF/LOW 40: CPT

## 2023-09-21 RX ORDER — PANTOPRAZOLE SODIUM 20 MG/1
40 TABLET, DELAYED RELEASE ORAL
Refills: 0 | Status: DISCONTINUED | OUTPATIENT
Start: 2023-09-22 | End: 2023-10-09

## 2023-09-21 RX ORDER — SIMETHICONE 80 MG/1
80 TABLET, CHEWABLE ORAL ONCE
Refills: 0 | Status: COMPLETED | OUTPATIENT
Start: 2023-09-21 | End: 2023-09-21

## 2023-09-21 RX ORDER — FUROSEMIDE 40 MG
40 TABLET ORAL ONCE
Refills: 0 | Status: COMPLETED | OUTPATIENT
Start: 2023-09-21 | End: 2023-09-21

## 2023-09-21 RX ORDER — SACUBITRIL AND VALSARTAN 24; 26 MG/1; MG/1
1 TABLET, FILM COATED ORAL
Refills: 0 | Status: DISCONTINUED | OUTPATIENT
Start: 2023-09-21 | End: 2023-09-25

## 2023-09-21 RX ORDER — PANTOPRAZOLE SODIUM 20 MG/1
40 TABLET, DELAYED RELEASE ORAL ONCE
Refills: 0 | Status: COMPLETED | OUTPATIENT
Start: 2023-09-21 | End: 2023-09-21

## 2023-09-21 RX ORDER — PANTOPRAZOLE SODIUM 20 MG/1
40 TABLET, DELAYED RELEASE ORAL
Refills: 0 | Status: DISCONTINUED | OUTPATIENT
Start: 2023-09-21 | End: 2023-09-21

## 2023-09-21 RX ORDER — LANOLIN ALCOHOL/MO/W.PET/CERES
3 CREAM (GRAM) TOPICAL ONCE
Refills: 0 | Status: COMPLETED | OUTPATIENT
Start: 2023-09-21 | End: 2023-09-21

## 2023-09-21 RX ORDER — FUROSEMIDE 40 MG
40 TABLET ORAL EVERY 12 HOURS
Refills: 0 | Status: DISCONTINUED | OUTPATIENT
Start: 2023-09-22 | End: 2023-09-24

## 2023-09-21 RX ORDER — FAMOTIDINE 10 MG/ML
20 INJECTION INTRAVENOUS ONCE
Refills: 0 | Status: DISCONTINUED | OUTPATIENT
Start: 2023-09-21 | End: 2023-09-21

## 2023-09-21 RX ORDER — OLANZAPINE 15 MG/1
5 TABLET, FILM COATED ORAL AT BEDTIME
Refills: 0 | Status: DISCONTINUED | OUTPATIENT
Start: 2023-09-21 | End: 2023-09-25

## 2023-09-21 RX ORDER — DIVALPROEX SODIUM 500 MG/1
125 TABLET, DELAYED RELEASE ORAL THREE TIMES A DAY
Refills: 0 | Status: DISCONTINUED | OUTPATIENT
Start: 2023-09-21 | End: 2023-09-22

## 2023-09-21 RX ORDER — OLANZAPINE 15 MG/1
2.5 TABLET, FILM COATED ORAL DAILY
Refills: 0 | Status: DISCONTINUED | OUTPATIENT
Start: 2023-09-21 | End: 2023-09-25

## 2023-09-21 RX ADMIN — OLANZAPINE 5 MILLIGRAM(S): 15 TABLET, FILM COATED ORAL at 20:42

## 2023-09-21 RX ADMIN — BUDESONIDE AND FORMOTEROL FUMARATE DIHYDRATE 2 PUFF(S): 160; 4.5 AEROSOL RESPIRATORY (INHALATION) at 18:33

## 2023-09-21 RX ADMIN — Medication 650 MILLIGRAM(S): at 01:34

## 2023-09-21 RX ADMIN — Medication 650 MILLIGRAM(S): at 14:41

## 2023-09-21 RX ADMIN — HEPARIN SODIUM 5000 UNIT(S): 5000 INJECTION INTRAVENOUS; SUBCUTANEOUS at 21:42

## 2023-09-21 RX ADMIN — Medication 650 MILLIGRAM(S): at 21:30

## 2023-09-21 RX ADMIN — HEPARIN SODIUM 5000 UNIT(S): 5000 INJECTION INTRAVENOUS; SUBCUTANEOUS at 14:41

## 2023-09-21 RX ADMIN — ATORVASTATIN CALCIUM 40 MILLIGRAM(S): 80 TABLET, FILM COATED ORAL at 20:42

## 2023-09-21 RX ADMIN — Medication 1 APPLICATION(S): at 17:17

## 2023-09-21 RX ADMIN — Medication 40 MILLIGRAM(S): at 16:28

## 2023-09-21 RX ADMIN — Medication 3 MILLIGRAM(S): at 00:55

## 2023-09-21 RX ADMIN — DIVALPROEX SODIUM 125 MILLIGRAM(S): 500 TABLET, DELAYED RELEASE ORAL at 20:42

## 2023-09-21 RX ADMIN — MIDODRINE HYDROCHLORIDE 5 MILLIGRAM(S): 2.5 TABLET ORAL at 17:17

## 2023-09-21 RX ADMIN — Medication 1 APPLICATION(S): at 00:34

## 2023-09-21 RX ADMIN — SACUBITRIL AND VALSARTAN 1 TABLET(S): 24; 26 TABLET, FILM COATED ORAL at 17:22

## 2023-09-21 RX ADMIN — Medication 650 MILLIGRAM(S): at 15:41

## 2023-09-21 RX ADMIN — DAPAGLIFLOZIN 10 MILLIGRAM(S): 10 TABLET, FILM COATED ORAL at 11:36

## 2023-09-21 RX ADMIN — PANTOPRAZOLE SODIUM 40 MILLIGRAM(S): 20 TABLET, DELAYED RELEASE ORAL at 11:36

## 2023-09-21 RX ADMIN — SIMETHICONE 80 MILLIGRAM(S): 80 TABLET, CHEWABLE ORAL at 09:34

## 2023-09-21 RX ADMIN — Medication 10 MILLIGRAM(S): at 11:36

## 2023-09-21 RX ADMIN — DULOXETINE HYDROCHLORIDE 30 MILLIGRAM(S): 30 CAPSULE, DELAYED RELEASE ORAL at 16:28

## 2023-09-21 RX ADMIN — Medication 650 MILLIGRAM(S): at 00:34

## 2023-09-21 RX ADMIN — MIDODRINE HYDROCHLORIDE 5 MILLIGRAM(S): 2.5 TABLET ORAL at 11:41

## 2023-09-21 RX ADMIN — Medication 650 MILLIGRAM(S): at 20:42

## 2023-09-21 NOTE — PROGRESS NOTE ADULT - PROBLEM SELECTOR PLAN 6
BP stable on admission   - Per med dispensary hx patient is on midodrine as well for possible orthostatic hypotension  -Continue home metoprolol and midodrine   -Monitor hemodynamics

## 2023-09-21 NOTE — CARE COORDINATION ASSESSMENT. - NSPASTMEDSURGHISTORY_GEN_ALL_CORE_FT
PAST MEDICAL & SURGICAL HISTORY:  Pacemaker      Type 2 diabetes mellitus      HLD (hyperlipidemia)      HTN (hypertension)      HFrEF (heart failure with reduced ejection fraction)

## 2023-09-21 NOTE — PROGRESS NOTE ADULT - ASSESSMENT
82y/o M PMH dementia, DM2, HFrEF s/p PPM, HTN, HLD presents with increased agitation.  Admit for increased paranoia

## 2023-09-21 NOTE — PROGRESS NOTE ADULT - PROBLEM SELECTOR PLAN 4
Discussed patient medication list with daughter (Arianne) who confirmed most medications found on surescripts recent dispensary history) however she is not sure of any changes that may have happened when he was hospitalized or when he went to the ED  - Patient has list with him, copy in chart however unclear if that list is up to date  - Current med rec completed based on recent dispensary history  - [facility] pharmacy called: additional medications from pharmacy include allopurinol 300qD, lisinopril 20mg qD, mirabegron 25mg qD, trazodone 50mg qD at bedtime. medications not mentioned by daughter. daughter notes pt has been to numerous different hospitals/ERs and has come back with numerous different medications with no follow up  - plan to hold these additional meds, pending psych consult for use of trazodone

## 2023-09-21 NOTE — SOCIAL WORK PROGRESS NOTE - NSSWPROGRESSNOTE_GEN_ALL_CORE
SW attempted to reach pt daughter Arianne to obtain assessment info; pt daughter Arianne advised SW calls her brother Toan. SW attempted to call pt son Toan, amairani went busy and unable to leave . SW will re attempt to reach daughter and/or son. Continuing to follow.

## 2023-09-21 NOTE — DISCHARGE NOTE PROVIDER - NSDCMRMEDTOKEN_GEN_ALL_CORE_FT
ALBUTEROL 0.083%(2.5MG/3ML) 30X3ML: USE 3 ML VIA NEBULIZER EVERY 6 HOURS AS NEEDED  AMITRIPTYLINE 10MG TABLETS: TAKE 1/2 TABLET BY MOUTH EVERY NIGHT AT BEDTIME. MAY INCREASE TO WHOLE TABLET THE 2 ND WEEK  ATORVASTATIN 40MG TABLETS: TAKE 1 TABLET BY MOUTH EVERY DAY  DIVALPROEX DELAYED RELEASE 125MG TB: TAKE 1 TABLET BY MOUTH THREE TIMES DAILY  DULOXETINE DR 30MG CAPSULES: TAKE 1 CAPSULE BY MOUTH WITH BREAKFAST AND 1 CAPSULE IN THE AFTER NOON NO LATER THAN 4 PM  ENTRESTO 24-26MG TABLETS: TAKE 1 TABLET BY MOUTH TWICE DAILY  FARXIGA 10MG TABLETS:   FUROSEMIDE 40MG TABLETS: TAKE 1 TABLET BY MOUTH EVERY DAY  METFORMIN 500MG TABLETS: TAKE 1 TABLET BY MOUTH EVERY MORNING AND EVERY EVENING WITH MEALS.  METOPROLOL ER SUCCINATE 25MG TABS: TAKE 1 TABLET BY MOUTH EVERY DAY  MIDODRINE 5MG TABLETS: TAKE 1 TABLET BY MOUTH THREE TIMES DAILY  OLANZAPINE 2.5MG TABLETS: TAKE 1 TABLET BY MOUTH AT BEDTIME  POTASSIUM CL 20MEQ ER TABLETS: TAKE 1 TABLET BY MOUTH EVERY DAY WITH FOOD FOR 4 DAYS  SPIRONOLACTONE 25MG TABLETS:   TRELEGY ELLIPTA 100-62.5MCG INH 30P: INHALE 1 PUFF BY MOUTH EVERY DAY   ALBUTEROL 0.083%(2.5MG/3ML) 30X3ML: USE 3 ML VIA NEBULIZER EVERY 6 HOURS AS NEEDED  AMITRIPTYLINE 10MG TABLETS: TAKE 1/2 TABLET BY MOUTH EVERY NIGHT AT BEDTIME. MAY INCREASE TO WHOLE TABLET THE 2 ND WEEK  ATORVASTATIN 40MG TABLETS: TAKE 1 TABLET BY MOUTH EVERY DAY  divalproex sodium 500 mg oral delayed release tablet: 1 tab(s) orally 2 times a day  DULOXETINE DR 30MG CAPSULES: TAKE 1 CAPSULE BY MOUTH WITH BREAKFAST AND 1 CAPSULE IN THE AFTER NOON NO LATER THAN 4 PM  ENTRESTO 24-26MG TABLETS: TAKE 1 TABLET BY MOUTH TWICE DAILY  FARXIGA 10MG TABLETS:   FUROSEMIDE 40MG TABLETS: TAKE 1 TABLET BY MOUTH EVERY DAY  METFORMIN 500MG TABLETS: TAKE 1 TABLET BY MOUTH EVERY MORNING AND EVERY EVENING WITH MEALS.  METOPROLOL ER SUCCINATE 25MG TABS: TAKE 1 TABLET BY MOUTH EVERY DAY  MIDODRINE 5MG TABLETS: TAKE 1 TABLET BY MOUTH THREE TIMES DAILY  OLANZAPINE 2.5MG TABLETS: TAKE 1 TABLET BY MOUTH AT BEDTIME  POTASSIUM CL 20MEQ ER TABLETS: TAKE 1 TABLET BY MOUTH EVERY DAY WITH FOOD FOR 4 DAYS  SPIRONOLACTONE 25MG TABLETS:   TRELEGY ELLIPTA 100-62.5MCG INH 30P: INHALE 1 PUFF BY MOUTH EVERY DAY   ALBUTEROL 0.083%(2.5MG/3ML) 30X3ML: USE 3 ML VIA NEBULIZER EVERY 6 HOURS AS NEEDED  ammonium lactate 12% topical lotion: 1 Apply topically to affected area 2 times a day  ATORVASTATIN 40MG TABLETS: TAKE 1 TABLET BY MOUTH EVERY DAY  dapagliflozin 10 mg oral tablet: 1 tab(s) orally every 24 hours  divalproex sodium 500 mg oral delayed release tablet: 1 tab(s) orally 2 times a day  DULOXETINE DR 30MG CAPSULES: TAKE 1 CAPSULE BY MOUTH WITH BREAKFAST AND 1 CAPSULE IN THE AFTER NOON NO LATER THAN 4 PM  ENTRESTO 24-26MG TABLETS: TAKE 1 TABLET BY MOUTH TWICE DAILY  FUROSEMIDE 40MG TABLETS: TAKE 1 TABLET BY MOUTH EVERY DAY  melatonin 5 mg oral tablet: 1 tab(s) orally once a day (at bedtime) As needed Insomnia  METFORMIN 500MG TABLETS: TAKE 1 TABLET BY MOUTH EVERY MORNING AND EVERY EVENING WITH MEALS.  methyl salicylate-menthol 15%-10% topical cream: 1 Apply topically to affected area 3 times a day  METOPROLOL ER SUCCINATE 25MG TABS: TAKE 1 TABLET BY MOUTH EVERY DAY  MIDODRINE 5MG TABLETS: TAKE 1 TABLET BY MOUTH THREE TIMES DAILY  OLANZapine 5 mg oral tablet: 1 tab(s) orally once a day (at bedtime)  pantoprazole 40 mg oral delayed release tablet: 1 tab(s) orally once a day (before a meal)  POTASSIUM CL 20MEQ ER TABLETS: TAKE 1 TABLET BY MOUTH EVERY DAY WITH FOOD FOR 4 DAYS  spironolactone 25 mg oral tablet: 1 tab(s) orally once a day  TRELEGY ELLIPTA 100-62.5MCG INH 30P: INHALE 1 PUFF BY MOUTH EVERY DAY  ZyPREXA 2.5 mg oral tablet: 1 tab(s) orally once a day   albuterol 2.5 mg/3 mL (0.083%) inhalation solution: 2.5 milligram(s) inhaled every 6 hours as needed for  shortness of breath and/or wheezing  ammonium lactate 12% topical lotion: 1 Apply topically to affected area 2 times a day  atorvastatin 40 mg oral tablet: 1 tab(s) orally once a day (at bedtime)  dapagliflozin 10 mg oral tablet: 1 tab(s) orally every 24 hours  divalproex sodium 500 mg oral delayed release tablet: 1 tab(s) orally 2 times a day  DULoxetine 30 mg oral delayed release capsule: 1 cap(s) orally 2 times a day  furosemide 40 mg oral tablet: 1 tab(s) orally every 12 hours  melatonin 5 mg oral tablet: 1 tab(s) orally once a day (at bedtime) As needed Insomnia  METFORMIN 500MG TABLETS: TAKE 1 TABLET BY MOUTH EVERY MORNING AND EVERY EVENING WITH MEALS.  methyl salicylate-menthol 15%-10% topical cream: 1 Apply topically to affected area 3 times a day  metoprolol succinate 25 mg oral tablet, extended release: 1 tab(s) orally once a day  midodrine 5 mg oral tablet: 1 tab(s) orally 3 times a day  OLANZapine 5 mg oral tablet: 1 tab(s) orally once a day (at bedtime)  pantoprazole 40 mg oral delayed release tablet: 1 tab(s) orally once a day (before a meal)  POTASSIUM CL 20MEQ ER TABLETS: TAKE 1 TABLET BY MOUTH EVERY DAY WITH FOOD FOR 4 DAYS  sacubitril-valsartan 24 mg-26 mg oral tablet: 1 tab(s) orally 2 times a day  spironolactone 25 mg oral tablet: 1 tab(s) orally once a day  TRELEGY ELLIPTA 100-62.5MCG INH 30P: INHALE 1 PUFF BY MOUTH EVERY DAY  ZyPREXA 2.5 mg oral tablet: 1 tab(s) orally once a day   albuterol 2.5 mg/3 mL (0.083%) inhalation solution: 2.5 milligram(s) inhaled every 6 hours as needed for  shortness of breath and/or wheezing  ammonium lactate 12% topical lotion: 1 Apply topically to affected area 2 times a day  atorvastatin 40 mg oral tablet: 1 tab(s) orally once a day (at bedtime)  dapagliflozin 10 mg oral tablet: 1 tab(s) orally every 24 hours  divalproex sodium 500 mg oral delayed release tablet: 1 tab(s) orally 2 times a day  DULoxetine 30 mg oral delayed release capsule: 1 cap(s) orally 2 times a day  furosemide 40 mg oral tablet: 1 tab(s) orally every 12 hours  melatonin 5 mg oral tablet: 1 tab(s) orally once a day (at bedtime) As needed Insomnia  METFORMIN 500MG TABLETS: TAKE 1 TABLET BY MOUTH EVERY MORNING AND EVERY EVENING WITH MEALS.  methyl salicylate-menthol 15%-10% topical cream: 1 Apply topically to affected area 3 times a day  metoprolol succinate 25 mg oral tablet, extended release: 1 tab(s) orally once a day  midodrine 5 mg oral tablet: 1 tab(s) orally 3 times a day  OLANZapine 10 mg oral tablet, disintegratin tab(s) orally once a day (at bedtime)  OLANZapine 5 mg oral tablet, disintegratin tab(s) orally 2 times a day  pantoprazole 40 mg oral delayed release tablet: 1 tab(s) orally once a day (before a meal)  POTASSIUM CL 20MEQ ER TABLETS: TAKE 1 TABLET BY MOUTH EVERY DAY WITH FOOD FOR 4 DAYS  sacubitril-valsartan 24 mg-26 mg oral tablet: 1 tab(s) orally 2 times a day  spironolactone 25 mg oral tablet: 1 tab(s) orally once a day  TRELEGY ELLIPTA 100-62.5MCG INH 30P: INHALE 1 PUFF BY MOUTH EVERY DAY   albuterol 2.5 mg/3 mL (0.083%) inhalation solution: 2.5 milligram(s) inhaled every 6 hours as needed for  shortness of breath and/or wheezing  Amlactin 12% topical lotion: Apply topically to affected area 2 times a day  atorvastatin 40 mg oral tablet: 1 tab(s) orally once a day (at bedtime)  bacitracin 500 units/g topical ointment: Apply topically to affected area 3 times a day  divalproex sodium 500 mg oral delayed release tablet: 1 tab(s) orally 2 times a day  DULoxetine 30 mg oral delayed release capsule: 1 cap(s) orally 2 times a day  melatonin 5 mg oral tablet: 1 tab(s) orally once a day (at bedtime) as needed for Insomnia  metFORMIN 500 mg oral tablet: 1 tab(s) orally  metoprolol succinate 25 mg oral tablet, extended release: 1 tab(s) orally once a day  midodrine 2.5 mg oral tablet: 3 tab(s) orally 3 times a day  pantoprazole 40 mg oral delayed release tablet: 1 tab(s) orally once a day (before a meal)  sacubitril-valsartan 24 mg-26 mg oral tablet: 1 tab(s) orally 2 times a day  Thera-Gesic 1%-15% topical cream: Apply topically to affected area 2 times a day  Trelegy Ellipta 100 mcg-62.5 mcg-25 mcg/inh inhalation powder: 1 puff(s) inhaled  ZyPREXA Zydis 10 mg oral tablet, disintegratin tab(s) orally once a day (at bedtime)  ZyPREXA Zydis 5 mg oral tablet, disintegratin tab(s) orally 2 times a day   albuterol 2.5 mg/3 mL (0.083%) inhalation solution: 2.5 milligram(s) inhaled every 6 hours as needed for  shortness of breath and/or wheezing  Amlactin 12% topical lotion: Apply topically to affected area 2 times a day  atorvastatin 40 mg oral tablet: 1 tab(s) orally once a day (at bedtime)  bacitracin 500 units/g topical ointment: Apply topically to affected area 3 times a day  divalproex sodium 500 mg oral delayed release tablet: 1 tab(s) orally 2 times a day  DULoxetine 30 mg oral delayed release capsule: 1 cap(s) orally 2 times a day  melatonin 5 mg oral tablet: 1 tab(s) orally once a day (at bedtime) as needed for Insomnia  metoprolol succinate 25 mg oral tablet, extended release: 1 tab(s) orally once a day  midodrine 2.5 mg oral tablet: 3 tab(s) orally 3 times a day  pantoprazole 40 mg oral delayed release tablet: 1 tab(s) orally once a day (before a meal)  sacubitril-valsartan 24 mg-26 mg oral tablet: 1 tab(s) orally 2 times a day  Thera-Gesic 1%-15% topical cream: Apply topically to affected area 2 times a day  ZyPREXA Zydis 10 mg oral tablet, disintegratin tab(s) orally once a day (at bedtime)  ZyPREXA Zydis 5 mg oral tablet, disintegratin tab(s) orally 2 times a day   albuterol 2.5 mg/3 mL (0.083%) inhalation solution: 2.5 milligram(s) inhaled every 6 hours as needed for  shortness of breath and/or wheezing  Amlactin 12% topical lotion: Apply topically to affected area 2 times a day  atorvastatin 40 mg oral tablet: 1 tab(s) orally once a day (at bedtime)  bacitracin 500 units/g topical ointment: Apply topically to affected area 3 times a day  divalproex sodium 500 mg oral delayed release tablet: 1 tab(s) orally 2 times a day  DULoxetine 30 mg oral delayed release capsule: 1 cap(s) orally 2 times a day  melatonin 5 mg oral tablet: 1 tab(s) orally once a day (at bedtime) as needed for Insomnia  metFORMIN 500 mg oral tablet: 1 tab(s) orally 2 times a day  metoprolol succinate 25 mg oral tablet, extended release: 1 tab(s) orally once a day  midodrine 2.5 mg oral tablet: 3 tab(s) orally 3 times a day  pantoprazole 40 mg oral delayed release tablet: 1 tab(s) orally once a day (before a meal)  sacubitril-valsartan 24 mg-26 mg oral tablet: 1 tab(s) orally 2 times a day  Thera-Gesic 1%-15% topical cream: Apply topically to affected area 2 times a day  Trelegy Ellipta 100 mcg-62.5 mcg-25 mcg/inh inhalation powder: 1 puff(s) inhaled once a day  ZyPREXA Zydis 10 mg oral tablet, disintegratin tab(s) orally once a day (at bedtime)  ZyPREXA Zydis 5 mg oral tablet, disintegratin tab(s) orally 2 times a day   albuterol 2.5 mg/3 mL (0.083%) inhalation solution: 2.5 milligram(s) inhaled every 6 hours as needed for  shortness of breath and/or wheezing  Amlactin 12% topical lotion: Apply topically to affected area 2 times a day  atorvastatin 40 mg oral tablet: 1 tab(s) orally once a day (at bedtime)  bacitracin 500 units/g topical ointment: Apply topically to affected area 3 times a day  divalproex sodium 500 mg oral delayed release tablet: 1 tab(s) orally 2 times a day  DULoxetine 30 mg oral delayed release capsule: 1 cap(s) orally 2 times a day  melatonin 5 mg oral tablet: 1 tab(s) orally once a day (at bedtime) as needed for Insomnia  metFORMIN 500 mg oral tablet: 1 tab(s) orally 2 times a day  metoprolol succinate 25 mg oral tablet, extended release: 1 tab(s) orally once a day  midodrine 2.5 mg oral tablet: 3 tab(s) orally 3 times a day  pantoprazole 40 mg oral delayed release tablet: 1 tab(s) orally once a day (before a meal)  sacubitril-valsartan 24 mg-26 mg oral tablet: 0.5 tab(s) orally 2 times a day  Thera-Gesic 1%-15% topical cream: Apply topically to affected area 2 times a day  Trelegy Ellipta 100 mcg-62.5 mcg-25 mcg/inh inhalation powder: 1 puff(s) inhaled once a day  ZyPREXA Zydis 10 mg oral tablet, disintegratin tab(s) orally once a day (at bedtime)  ZyPREXA Zydis 5 mg oral tablet, disintegratin tab(s) orally 2 times a day   albuterol 2.5 mg/3 mL (0.083%) inhalation solution: 2.5 milligram(s) inhaled every 6 hours as needed for  shortness of breath and/or wheezing  Amlactin 12% topical lotion: Apply topically to affected area 2 times a day  atorvastatin 40 mg oral tablet: 1 tab(s) orally once a day (at bedtime)  bacitracin 500 units/g topical ointment: Apply topically to affected area 3 times a day  budesonide-formoterol 80 mcg-4.5 mcg/inh inhalation aerosol: 1 puff(s) inhaled 2 times a day MDD: 2 puff  divalproex sodium 500 mg oral delayed release tablet: 1 tab(s) orally 2 times a day  DULoxetine 30 mg oral delayed release capsule: 1 cap(s) orally 2 times a day  melatonin 5 mg oral tablet: 1 tab(s) orally once a day (at bedtime) as needed for Insomnia  metFORMIN 500 mg oral tablet: 1 tab(s) orally 2 times a day  metoprolol succinate 25 mg oral tablet, extended release: 1 tab(s) orally once a day  midodrine 2.5 mg oral tablet: 3 tab(s) orally 3 times a day  pantoprazole 40 mg oral delayed release tablet: 1 tab(s) orally once a day (before a meal)  sacubitril-valsartan 24 mg-26 mg oral tablet: 0.5 tab(s) orally 2 times a day  Thera-Gesic 1%-15% topical cream: Apply topically to affected area 2 times a day  tiotropium 2.5 mcg/inh inhalation aerosol: 2 puff(s) inhaled once a day  Trelegy Ellipta 100 mcg-62.5 mcg-25 mcg/inh inhalation powder: 1 puff(s) inhaled once a day  ZyPREXA Zydis 10 mg oral tablet, disintegratin tab(s) orally once a day (at bedtime)  ZyPREXA Zydis 5 mg oral tablet, disintegratin tab(s) orally 2 times a day

## 2023-09-21 NOTE — DISCHARGE NOTE PROVIDER - NSDCCPCAREPLAN_GEN_ALL_CORE_FT
PRINCIPAL DISCHARGE DIAGNOSIS  Diagnosis: Agitation  Assessment and Plan of Treatment: You came into the hospital because you were acutely agitated in your living community. While in the hospital we worked together to find a safe place for you to return to. PLEASE CONTINUE TO TAKE your DULOXETINE, DIVALPROEX, and OLANZEPINE as previously prescribed. Follow up with Dr. Strange in 1 week for continued management of these medications.      SECONDARY DISCHARGE DIAGNOSES  Diagnosis: HTN (hypertension)  Assessment and Plan of Treatment: You have a chronic history of bakari blood pressure (also known as hypertension). Please continue to take your METOPROLOL and FUROSEMIDE as previously prescribed and follow up with Dr Randolph in 1 week for continued management of this condition.   You also are prescribed a medication called MIDODRINE for likely orthostatic hypotension. This is a condition that can lead to dramatic decreases in blood pressure when you stand up . Please contineu to take your midodrine as previously prescribed and follow up with Dr. Randolph in 1 week for continued management of this condition.    Diagnosis: HLD (hyperlipidemia)  Assessment and Plan of Treatment: You have a chronic history of high cholesterol (also known as hyperlipidemia). Please continue to take your ATORVASTATIN as previously prescribed and follow up with Dr Randolph in 1 week for continued management of this condition.    Diagnosis: DM2 (diabetes mellitus, type 2)  Assessment and Plan of Treatment: You have a chronic history of type 2 diabetes. Please continue to take your METFORMIN and FARXIGA as previously prescribed and follow up with Dr Randolph in 1 week for continued management of this condition.    Diagnosis: HFrEF (heart failure with reduced ejection fraction)  Assessment and Plan of Treatment: You have a chronic history of heart failure, a condition where your heart does not function as it properly should, leading to changes in your blood pressure and fluid balance in your body. Please continue to take your ENTRESTO, TRELLEGY, METOPROLOL, SPIRONOLACTONE, FUROSEMIDE, and FARXIGA as previously prescribed and follow up with Dr Randolph in 1 week for continued management of this condition.     PRINCIPAL DISCHARGE DIAGNOSIS  Diagnosis: Agitation  Assessment and Plan of Treatment: You came into the hospital because you were acutely agitated in your living community. While in the hospital we worked together to find a safe place for you to return to. PLEASE CONTINUE TO TAKE your DULOXETINE, DIVALPROEX, and OLANZEPINE as previously prescribed.   We have increased your DEPAKOTE to 500mg twice a day. Please continue this medication.  Follow up with Dr. Strange in 1 week for continued management of these medications.      SECONDARY DISCHARGE DIAGNOSES  Diagnosis: HFrEF (heart failure with reduced ejection fraction)  Assessment and Plan of Treatment: You have a chronic history of heart failure, a condition where your heart does not function as it properly should, leading to changes in your blood pressure and fluid balance in your body. Please continue to take your ENTRESTO, TRELLEGY, METOPROLOL, SPIRONOLACTONE, FUROSEMIDE, and FARXIGA as previously prescribed and follow up with Dr Randolph in 1 week for continued management of this condition.    Diagnosis: HTN (hypertension)  Assessment and Plan of Treatment: You have a chronic history of bakari blood pressure (also known as hypertension). Please continue to take your METOPROLOL and FUROSEMIDE as previously prescribed and follow up with Dr Randolph in 1 week for continued management of this condition.   You also are prescribed a medication called MIDODRINE for likely orthostatic hypotension. This is a condition that can lead to dramatic decreases in blood pressure when you stand up . Please contineu to take your midodrine as previously prescribed and follow up with Dr. Randolph in 1 week for continued management of this condition.    Diagnosis: HLD (hyperlipidemia)  Assessment and Plan of Treatment: You have a chronic history of high cholesterol (also known as hyperlipidemia). Please continue to take your ATORVASTATIN as previously prescribed and follow up with Dr Randolph in 1 week for continued management of this condition.    Diagnosis: DM2 (diabetes mellitus, type 2)  Assessment and Plan of Treatment: You have a chronic history of type 2 diabetes. Please continue to take your METFORMIN and FARXIGA as previously prescribed and follow up with Dr Randolph in 1 week for continued management of this condition.     PRINCIPAL DISCHARGE DIAGNOSIS  Diagnosis: Agitation  Assessment and Plan of Treatment: You came into the hospital because you were acutely agitated in your living community. While in the hospital we worked together to find a safe place for you to return to.   PLEASE DO NOT take your amitriptyline.  We have increased your DIVALPROEX to 500mg twice a day. Please continue this medication.  PLEASE CONTINUE ZYPREXA 2.5mg once in the morning and 5mg once at bedtime.   PLEASE CONTINUE TO TAKE your DULOXETINE,as previously prescribed.   Follow up with Dr. Strange in 1 week for continued management of these medications.      SECONDARY DISCHARGE DIAGNOSES  Diagnosis: HFrEF (heart failure with reduced ejection fraction)  Assessment and Plan of Treatment: You have a chronic history of heart failure, a condition where your heart does not function as it properly should, leading to changes in your blood pressure and fluid balance in your body. Please continue to take your ENTRESTO, TRELLEGY, METOPROLOL, SPIRONOLACTONE, FUROSEMIDE, and FARXIGA as previously prescribed and follow up with Dr Randolph in 1 week for continued management of this condition.    Diagnosis: HTN (hypertension)  Assessment and Plan of Treatment: You have a chronic history of bakari blood pressure (also known as hypertension). Please continue to take your METOPROLOL and FUROSEMIDE as previously prescribed and follow up with Dr Randolph in 1 week for continued management of this condition.   You also are prescribed a medication called MIDODRINE for likely orthostatic hypotension. This is a condition that can lead to dramatic decreases in blood pressure when you stand up . Please contineu to take your midodrine as previously prescribed and follow up with Dr. Randolph in 1 week for continued management of this condition.    Diagnosis: HLD (hyperlipidemia)  Assessment and Plan of Treatment: You have a chronic history of high cholesterol (also known as hyperlipidemia). Please continue to take your ATORVASTATIN as previously prescribed and follow up with Dr Randolph in 1 week for continued management of this condition.    Diagnosis: DM2 (diabetes mellitus, type 2)  Assessment and Plan of Treatment: You have a chronic history of type 2 diabetes. Please continue to take your METFORMIN and FARXIGA as previously prescribed and follow up with Dr Randolph in 1 week for continued management of this condition.     PRINCIPAL DISCHARGE DIAGNOSIS  Diagnosis: Agitation  Assessment and Plan of Treatment: You came into the hospital because you were acutely agitated in your living community. While in the hospital we worked together to find a safe place for you to return to.   PLEASE DO NOT take your amitriptyline.  We have increased your DIVALPROEX to 500mg twice a day. Please continue this medication.  PLEASE CONTINUE ZYPREXA 5mg twice a day (once in the morning and once at night) and 10mg once at bedtime.   PLEASE CONTINUE TO TAKE your DULOXETINE,as previously prescribed.   Follow up with Dr. Strange in 1 week for continued management of these medications.      SECONDARY DISCHARGE DIAGNOSES  Diagnosis: HFrEF (heart failure with reduced ejection fraction)  Assessment and Plan of Treatment: You have a chronic history of heart failure, a condition where your heart does not function as it properly should, leading to changes in your blood pressure and fluid balance in your body. Please continue to take your ENTRESTO, TRELLEGY, METOPROLOL, SPIRONOLACTONE, FUROSEMIDE, and FARXIGA as previously prescribed and follow up with Dr Randolph in 1 week for continued management of this condition.    Diagnosis: HTN (hypertension)  Assessment and Plan of Treatment: You have a chronic history of bakari blood pressure (also known as hypertension). Please continue to take your METOPROLOL and FUROSEMIDE as previously prescribed and follow up with Dr Randolph in 1 week for continued management of this condition.   You also are prescribed a medication called MIDODRINE for likely orthostatic hypotension. This is a condition that can lead to dramatic decreases in blood pressure when you stand up . Please contineu to take your midodrine as previously prescribed and follow up with Dr. Randolph in 1 week for continued management of this condition.    Diagnosis: HLD (hyperlipidemia)  Assessment and Plan of Treatment: You have a chronic history of high cholesterol (also known as hyperlipidemia). Please continue to take your ATORVASTATIN as previously prescribed and follow up with Dr Randolph in 1 week for continued management of this condition.    Diagnosis: DM2 (diabetes mellitus, type 2)  Assessment and Plan of Treatment: You have a chronic history of type 2 diabetes. Please continue to take your METFORMIN and FARXIGA as previously prescribed and follow up with Dr Randolph in 1 week for continued management of this condition.     PRINCIPAL DISCHARGE DIAGNOSIS  Diagnosis: Agitation  Assessment and Plan of Treatment: You came into the hospital because you were acutely agitated in your living community. While in the hospital we worked together to find a safe place for you to return to.   PLEASE DO NOT take your amitriptyline.  We have increased your DIVALPROEX to 500mg twice a day. Please continue this medication.  PLEASE CONTINUE ZYPREXA 5mg twice a day (once in the morning and once at night) and 10mg once at bedtime.   PLEASE CONTINUE TO TAKE your DULOXETINE,as previously prescribed.   Follow up with Dr. Strange in 1 week for continued management of these medications.      SECONDARY DISCHARGE DIAGNOSES  Diagnosis: HFrEF (heart failure with reduced ejection fraction)  Assessment and Plan of Treatment: You have a chronic history of heart failure, a condition where your heart does not function as it properly should, leading to changes in your blood pressure and fluid balance in your body. We have stopped your spironolactone.   Please continue to take your ENTRESTO, TRELLEGY, METOPROLOL, FUROSEMIDE, and FARXIGA as previously prescribed and follow up with Dr Randolph in 1 week for continued management of this condition.    Diagnosis: HTN (hypertension)  Assessment and Plan of Treatment: You have a chronic history of bakari blood pressure (also known as hypertension). Please continue to take your METOPROLOL and FUROSEMIDE as previously prescribed and follow up with Dr Randolph in 1 week for continued management of this condition.   You also are prescribed a medication called MIDODRINE for likely orthostatic hypotension. This is a condition that can lead to dramatic decreases in blood pressure when you stand up . Please contineu to take your midodrine as previously prescribed and follow up with Dr. Randolph in 1 week for continued management of this condition.    Diagnosis: HLD (hyperlipidemia)  Assessment and Plan of Treatment: You have a chronic history of high cholesterol (also known as hyperlipidemia). Please continue to take your ATORVASTATIN as previously prescribed and follow up with Dr Randolph in 1 week for continued management of this condition.    Diagnosis: DM2 (diabetes mellitus, type 2)  Assessment and Plan of Treatment: You have a chronic history of type 2 diabetes. Please continue to take your METFORMIN and FARXIGA as previously prescribed and follow up with Dr Randolph in 1 week for continued management of this condition.     PRINCIPAL DISCHARGE DIAGNOSIS  Diagnosis: Agitation  Assessment and Plan of Treatment: You came into the hospital because you were acutely agitated in your living community. While in the hospital we worked together to find a safe place for you to return to.   PLEASE DO NOT take your amitriptyline.  We have increased your DIVALPROEX to 500mg twice a day. Please continue this medication.  PLEASE CONTINUE ZYPREXA 5mg twice a day (once in the morning and once at night) and 10mg once at bedtime.   PLEASE CONTINUE TO TAKE your DULOXETINE,as previously prescribed.   Follow up with Dr. Strange in 1 week for continued management of these medications.      SECONDARY DISCHARGE DIAGNOSES  Diagnosis: HFrEF (heart failure with reduced ejection fraction)  Assessment and Plan of Treatment: You have a chronic history of heart failure, a condition where your heart does not function as it properly should, leading to changes in your blood pressure and fluid balance in your body. We have stopped your spironolactone.   Please STOP taking your home Lasix, Farxiga, and spirinolactone  Please continue to take your ENTRESTO andMETOPROLOL as previously prescribed and follow up with Dr Randolph in 1 week for continued management of this condition.    Diagnosis: HTN (hypertension)  Assessment and Plan of Treatment: You have a chronic history of bakari blood pressure (also known as hypertension). Please continue to take your METOPROLOL and ENTRESTOas previously prescribed and follow up with Dr Randolph in 1 week for continued management of this condition.   You also are prescribed a medication called MIDODRINE for likely orthostatic hypotension. This is a condition that can lead to dramatic decreases in blood pressure when you stand up . Please continue to take your midodrine 7.5mg 3 times a day and follow up with Dr. Randolph in 1 week for continued management of this condition.    Diagnosis: HLD (hyperlipidemia)  Assessment and Plan of Treatment: You have a chronic history of high cholesterol (also known as hyperlipidemia). Please continue to take your ATORVASTATIN as previously prescribed and follow up with Dr Randolph in 1 week for continued management of this condition.    Diagnosis: DM2 (diabetes mellitus, type 2)  Assessment and Plan of Treatment: You have a chronic history of type 2 diabetes. Please continue to take your METFORMIN as previously prescribed and follow up with Dr Randolph in 1 week for continued management of this condition.  Please stop your home Farxiga    Diagnosis: Wound, open  Assessment and Plan of Treatment: You have open wounds on your upper and lower extremities.  Please apply creams including bacitracin as prescribed.     PRINCIPAL DISCHARGE DIAGNOSIS  Diagnosis: Agitation  Assessment and Plan of Treatment: You came into the hospital because you were acutely agitated in your living community. While in the hospital we worked together to find a safe place for you to return to.   PLEASE DO NOT take your amitriptyline.  We have increased your DIVALPROEX to 500mg twice a day. Please continue this medication.  PLEASE CONTINUE ZYPREXA 5mg twice a day (once in the morning and once at night) and 10mg once at bedtime.   PLEASE CONTINUE TO TAKE your DULOXETINE,as previously prescribed.   Follow up with Dr. Strange in 1 week for continued management of these medications.      SECONDARY DISCHARGE DIAGNOSES  Diagnosis: HFrEF (heart failure with reduced ejection fraction)  Assessment and Plan of Treatment: You have a chronic history of heart failure, a condition where your heart does not function as it properly should, leading to changes in your blood pressure and fluid balance in your body. We have stopped your spironolactone.   - Please STOP taking your home Lasix, Farxiga, and spirinolactone.  - We lowered your dose of of entresto to 0.5 tabs two times a day.  - Please continue to take your home METOPROLOL as previously prescribed and follow up with Dr Randolph in 1 week for continued management of this condition.    Diagnosis: HTN (hypertension)  Assessment and Plan of Treatment: You have a chronic history of bakari blood pressure (also known as hypertension). Please continue to take your METOPROLOL and ENTRESTOas previously prescribed and follow up with Dr Randolph in 1 week for continued management of this condition.   You also are prescribed a medication called MIDODRINE for likely orthostatic hypotension. This is a condition that can lead to dramatic decreases in blood pressure when you stand up . Please continue to take midodrine 7.5mg 3 times a day and follow up with Dr. Randolph in 1 week for continued management of this condition.    Diagnosis: HLD (hyperlipidemia)  Assessment and Plan of Treatment: You have a chronic history of high cholesterol (also known as hyperlipidemia). Please continue to take your ATORVASTATIN as previously prescribed and follow up with Dr Randolph in 1 week for continued management of this condition.    Diagnosis: DM2 (diabetes mellitus, type 2)  Assessment and Plan of Treatment: You have a chronic history of type 2 diabetes. Please continue to take your METFORMIN as previously prescribed and follow up with Dr Randolph in 1 week for continued management of this condition.  Please stop your home Farxiga    Diagnosis: Wound, open  Assessment and Plan of Treatment: You have open wounds on your upper and lower extremities.  Please apply creams including bacitracin as prescribed.     PRINCIPAL DISCHARGE DIAGNOSIS  Diagnosis: Agitation  Assessment and Plan of Treatment: You came into the hospital because you were acutely agitated in your living community. While in the hospital we worked together to find a safe place for you to return to.   -Behavioral disturbance   We have increased your DIVALPROEX to 500mg twice a day. Please continue this medication.  PLEASE CONTINUE ZYPREXA 5mg twice a day (once in the morning and once at night) and  Zyprexa 10mg once at bedtime.   PLEASE CONTINUE TO TAKE your DULOXETINE 2x day ,as previously prescribed.   Follow up with Dr. Strange in 1 week for continued management of these medications.      SECONDARY DISCHARGE DIAGNOSES  Diagnosis: HFrEF (heart failure with reduced ejection fraction)  Assessment and Plan of Treatment: You have a chronic SYSTOLIC  heart failure, a condition where your heart does not function as it properly should, leading to changes in your blood pressure and fluid balance in your body. We have stopped your spironolactone.   - Continue Entresto to 0.5 tabs two times a day.  - Please continue to take your home METOPROLOL 25 XL  1 tab daily as previously prescribed and follow up with Dr Randolph in 1 week for continued management of this condition.  Fluid restriction 1.2 Lit/ 24 hrs    Diagnosis: HLD (hyperlipidemia)  Assessment and Plan of Treatment: You have a chronic history of high cholesterol (also known as hyperlipidemia). Please continue to take your ATORVASTATIN 40 mg  as previously prescribed and follow up with Dr Randolph in 1 week for continued management of this condition.    Diagnosis: DM2 (diabetes mellitus, type 2)  Assessment and Plan of Treatment: You have a chronic history of type 2 diabetes. Please continue to take your METFORMIN  500 mg 2x dayas previously prescribed and follow up with Dr Randolph in 1 week for continued management of this condition.  Please stop your home Farxiga    Diagnosis: Wound, open  Assessment and Plan of Treatment: You have open wounds on your upper and lower extremities.  LAC Hydrine Lotion apply to b/l lower EXT   Please apply creams including bacitracin on B/L Hand wound 2c dayas prescribed.    Diagnosis: H/O hypotension  Assessment and Plan of Treatment:   You  are prescribed a medication called MIDODRINE 7.5 mg 1 tab 3x day  for likely orthostatic hypotension. This is a condition that can lead to dramatic decreases in blood pressure when you stand up . Please continue to take midodrine 7.5mg 3 times a day and follow up with Dr. Randolph in 1 week for continued management of this condition.    Diagnosis: COPD, mild  Assessment and Plan of Treatment: Continue Albuterol INH  4x day As needed  Continue Trillegy Inh Daily

## 2023-09-21 NOTE — DISCHARGE NOTE PROVIDER - CARE PROVIDER_API CALL
Heriberto Strange  Psychiatry  [address] [place] [address]  Phone: [phone]  Fax: [phone]  Follow Up Time: 1 week    DERRICK PORTILLO  Phone: [phone]  Fax: ()-  Established Patient  Follow Up Time: 1 week   Heriberto Strange  Psychiatry  [address] [place] [address]  Phone: [phone]  Fax: [phone]  Follow Up Time: 1 week    DERRICK PORTILLO  Phone: [phone]  Fax: ()-  Established Patient  Follow Up Time: 1 week    Cardiologist,   Please follow up with your cardiologist at [facility]  Phone: (   )    -  Fax: (   )    -  Follow Up Time:

## 2023-09-21 NOTE — BH CONSULTATION LIAISON ASSESSMENT NOTE - NSBHCHARTREVIEWVS_PSY_A_CORE FT
Vital Signs Last 24 Hrs  T(C): 36.6 (21 Sep 2023 11:42), Max: 36.7 (20 Sep 2023 17:11)  T(F): 97.9 (21 Sep 2023 11:42), Max: 98 (20 Sep 2023 17:11)  HR: 101 (21 Sep 2023 11:42) (94 - 101)  BP: 109/65 (21 Sep 2023 11:42) (97/61 - 114/79)  BP(mean): --  RR: 20 (21 Sep 2023 11:42) (18 - 20)  SpO2: 96% (21 Sep 2023 11:42) (95% - 97%)    Parameters below as of 21 Sep 2023 11:42  Patient On (Oxygen Delivery Method): room air

## 2023-09-21 NOTE — DISCHARGE NOTE PROVIDER - HOSPITAL COURSE
HPI:  82y/o M PMH dementia, HFrEF s/p PPM, HTN, HLD  presents with increased agitation. History obtained from patient's daughter Arianne over phone.   Patient moved from [place] to [place] ~2months ago and has been having issues with increased paranoia for the past month. He stays at a [facility] assisted living community where he has been having several episodes of sundowning.   Recently he was hospitalized at [facility] for same issues from 9/1-914/23. Daughter was unable to provide sufficient history regarding hospitalization.   Patient's son, Jose has been taking care of him and knows more about his medical hx however patient's daughter states she is taking over now and did not want to bother Jose at this time.  Patient was also recently seen at [facility] ED for episodes of agitation and was told to follow up with outpatient Psychiatrist but daughter states that likely did not happened because of his increased agitation.  Patient became agitated today and staff had to call 911 for further evaluation. The facility as well as his children feel that his current psych medication regimen is not sufficient to keep him calm.   Per review of recent dispensary history, he was seeing a psychiatric NP (Barbara Bravo) in [place], [place].   Of note, he had placement of PPM in late August at [facility]. Per his daughter, his LVEF at that time was 20% before and after PPM placement.  Currently, patient states his b/l legs feel itchy but otherwise he feels fine and is agreeable.  Denies fevers, chills. sob, cp, n/v/d.    ED Course   T 97.4 F  /79 RR 18 SpO2 95% on RA  Labs H/H 12.1/37.5 Glu   UA: >1000 glucose  UTox: negaitve     In the ED, given tylenol 650mg x1 , zyprexa 5mg IVP x1  (20 Sep 2023 21:58)      ---  HOSPITAL COURSE: Patient admitted to medicine floor for management of agitation. Psychiatry was consulted who recommended increase in patients olanzapine from 2.5mg at bedtime to 2.5mg in the AM and 5mg at bedtime. Patient had persistent delusions and paranoia during admission, requiring 1 to 1 observation. Patient's prior living arrangement at a [facility] older community respectfully declined acceptance of patient back into their community due to the patient's disruptive behavior. Contact point with patient's family was the daughter. Neither daughter nor pt's sons were comfortable with patient staying with them due to his behavior. ??????    Pt seen and examined on day of discharge. Patient is medically optimized for discharge to home with close outpatient followup.    PHYSICAL EXAM ON DAY OF DISCHARGE:  The patient was seen and examined on the day of discharge. Please see progress note from day of discharge for further information.         ---  CONSULTANTS:   Psych: Dr Alexandr TOM      ---  TIME SPENT:  I, the attending physician, was physically present for the key portions of the evaluation and management (E/M) service provided. The total amount of time spent reviewing the hospital notes, laboratory values, imaging findings, assessing/counseling the patient, discussing with consultant physicians, social work, nursing staff was -- minutes    ---  Primary care provider was made aware of plan for discharge:      [  ] NO     [  ] YES   HPI:  84y/o M PMH dementia, HFrEF s/p PPM, HTN, HLD  presents with increased agitation. History obtained from patient's daughter Arianne over phone.   Patient moved from [place] to [place] ~2months ago and has been having issues with increased paranoia for the past month. He stays at a [facility] assisted living community where he has been having several episodes of sundowning.   Recently he was hospitalized at [facility] for same issues from 9/1-914/23. Daughter was unable to provide sufficient history regarding hospitalization.   Patient's son, Jose has been taking care of him and knows more about his medical hx however patient's daughter states she is taking over now and did not want to bother Jose at this time.  Patient was also recently seen at [facility] ED for episodes of agitation and was told to follow up with outpatient Psychiatrist but daughter states that likely did not happened because of his increased agitation.  Patient became agitated today and staff had to call 911 for further evaluation. The facility as well as his children feel that his current psych medication regimen is not sufficient to keep him calm.   Per review of recent dispensary history, he was seeing a psychiatric NP (Barbara Bravo) in [place], [place].   Of note, he had placement of PPM in late August at [facility]. Per his daughter, his LVEF at that time was 20% before and after PPM placement.  Currently, patient states his b/l legs feel itchy but otherwise he feels fine and is agreeable.  Denies fevers, chills. sob, cp, n/v/d.    ED Course   T 97.4 F  /79 RR 18 SpO2 95% on RA  Labs H/H 12.1/37.5 Glu   UA: >1000 glucose  UTox: negaitve     In the ED, given tylenol 650mg x1 , zyprexa 5mg IVP x1  (20 Sep 2023 21:58)      ---  HOSPITAL COURSE: Patient admitted to medicine floor for management of agitation. Psychiatry was consulted who recommended increase in patients olanzapine from 2.5mg at bedtime to 2.5mg in the AM and 5mg at bedtime. Additionally, depakote was increased to 500mg PO BID and amitriptyline was discontinued. Patient had delusions and paranoia during admission, requiring 1 to 1 observation. Patient's prior living arrangement at a [facility] and older community respectfully declined acceptance of patient back into their community due to the patient's disruptive behavior. Contact point with patient's family was the daughter. Neither daughter nor pt's sons were comfortable with patient staying with them due to his behavior. After increasing dosage of patient's olanzapine, the patient became calm and cooperative. 1 to 1 observation was continued given presenting behavior. Social work was consulted to find the patient suitable living arrangements ????    Pt seen and examined on day of discharge. Patient is medically optimized for discharge to home with close outpatient followup.    PHYSICAL EXAM ON DAY OF DISCHARGE:  The patient was seen and examined on the day of discharge. Please see progress note from day of discharge for further information.         ---  CONSULTANTS:   Psych: Dr Alexandr LOPEZ    ---  TIME SPENT:  I, the attending physician, was physically present for the key portions of the evaluation and management (E/M) service provided. The total amount of time spent reviewing the hospital notes, laboratory values, imaging findings, assessing/counseling the patient, discussing with consultant physicians, social work, nursing staff was -- minutes    ---  Primary care provider was made aware of plan for discharge:      [  ] NO     [  ] YES   HPI:  82y/o M PMH dementia, HFrEF s/p PPM, HTN, HLD  presents with increased agitation. History obtained from patient's daughter Arianne over phone.   Patient moved from [place] to [place] ~2months ago and has been having issues with increased paranoia for the past month. He stays at a [facility] assisted living community where he has been having several episodes of sundowning.   Recently he was hospitalized at [facility] for same issues from 9/1-914/23. Daughter was unable to provide sufficient history regarding hospitalization.   Patient's son, Jose has been taking care of him and knows more about his medical hx however patient's daughter states she is taking over now and did not want to bother Jose at this time.  Patient was also recently seen at [facility] ED for episodes of agitation and was told to follow up with outpatient Psychiatrist but daughter states that likely did not happened because of his increased agitation.  Patient became agitated today and staff had to call 911 for further evaluation. The facility as well as his children feel that his current psych medication regimen is not sufficient to keep him calm.   Per review of recent dispensary history, he was seeing a psychiatric NP (Barbara Bravo) in [place], [place].   Of note, he had placement of PPM in late August at [facility]. Per his daughter, his LVEF at that time was 20% before and after PPM placement.  Currently, patient states his b/l legs feel itchy but otherwise he feels fine and is agreeable.  Denies fevers, chills. sob, cp, n/v/d.    ED Course   T 97.4 F  /79 RR 18 SpO2 95% on RA  Labs H/H 12.1/37.5 Glu   UA: >1000 glucose  UTox: negaitve     In the ED, given tylenol 650mg x1 , zyprexa 5mg IVP x1  (20 Sep 2023 21:58)      ---  HOSPITAL COURSE: Patient admitted to medicine floor for management of agitation. Psychiatry was consulted who recommended increase in patients olanzapine from 2.5mg at bedtime to 2.5mg in the AM and 5mg at bedtime. Additionally, depakote was increased to 500mg PO BID and amitriptyline was discontinued. Patient had delusions and paranoia during admission, requiring 1 to 1 observation. Patient's prior living arrangement at a [facility] and older community respectfully declined acceptance of patient back into their community due to the patient's disruptive behavior. Contact point with patient's family was the daughter. Neither daughter nor pt's sons were comfortable with patient staying with them due to his behavior. After increasing dosage of patient's olanzapine, the patient became calm and cooperative. 1 to 1 observation was continued given presenting behavior. Social work was consulted to find the patient suitable living arrangements. Patient now off 1 to 1 and stable for discharge to ***    Pt seen and examined on day of discharge. Patient is medically optimized for discharge to home with close outpatient followup.    PHYSICAL EXAM ON DAY OF DISCHARGE:    T(C): 36.5 (09-25-23 @ 06:14), Max: 36.6 (09-24-23 @ 21:39)  HR: 97 (09-25-23 @ 06:14) (92 - 99)  BP: 102/68 (09-25-23 @ 06:14) (96/63 - 117/73)  RR: 17 (09-25-23 @ 06:14) (17 - 18)  SpO2: 92% (09-25-23 @ 06:14) (92% - 92%)    GENERAL: patient appears well, no acute distress, appropriate, pleasant  EYES: sclera clear, no exudates  ENMT: oropharynx clear without erythema, moist mucous membranes  NECK: supple, soft  LUNGS: good air entry bilaterally, clear to auscultation, symmetric breath sounds, no wheezing or rhonchi appreciated  HEART: soft S1/S2, regular rate and rhythm, no murmurs noted, no lower extremity edema  GASTROINTESTINAL: abdomen is soft, nontender, nondistended, normoactive bowel sounds  INTEGUMENT: good skin turgor  MUSCULOSKELETAL: no clubbing or cyanosis, no obvious deformity  NEUROLOGIC: awake, alert, oriented x3, good muscle tone in 4 extremities, no obvious sensory deficits  PSYCHIATRIC: mood is good, affect is congruent, linear and logical thought process  HEME/LYMPH: no palpable supraclavicular nodules      ---  CONSULTANTS:   Psych: Dr Alexandr LOPEZ    ---  TIME SPENT:  I, the attending physician, was physically present for the key portions of the evaluation and management (E/M) service provided. The total amount of time spent reviewing the hospital notes, laboratory values, imaging findings, assessing/counseling the patient, discussing with consultant physicians, social work, nursing staff was -- minutes    ---  Primary care provider was made aware of plan for discharge:      [  ] NO     [  ] YES   HPI:  84y/o M PMH dementia, HFrEF s/p PPM, HTN, HLD  presents with increased agitation. History obtained from patient's daughter Arianne over phone.   Patient moved from [place] to [place] ~2months ago and has been having issues with increased paranoia for the past month. He stays at a [facility] older assisted living community where he has been having several episodes of sundowning.   Recently he was hospitalized at [facility] for same issues from 9/1-914/23. Daughter was unable to provide sufficient history regarding hospitalization.   Patient's son, Jose has been taking care of him and knows more about his medical hx however patient's daughter states she is taking over now and did not want to bother Jose at this time.  Patient was also recently seen at [facility] ED for episodes of agitation and was told to follow up with outpatient Psychiatrist but daughter states that likely did not happened because of his increased agitation.  Patient became agitated today and staff had to call 911 for further evaluation. The facility as well as his children feel that his current psych medication regimen is not sufficient to keep him calm.   Per review of recent dispensary history, he was seeing a psychiatric NP (Barbara Bravo) in [place], [place].   Of note, he had placement of PPM in late August at [facility]. Per his daughter, his LVEF at that time was 20% before and after PPM placement.  Currently, patient states his b/l legs feel itchy but otherwise he feels fine and is agreeable.  Denies fevers, chills. sob, cp, n/v/d.    ED Course   T 97.4 F  /79 RR 18 SpO2 95% on RA  Labs H/H 12.1/37.5 Glu   UA: >1000 glucose  UTox: negaitve     In the ED, given tylenol 650mg x1 , zyprexa 5mg IVP x1  (20 Sep 2023 21:58)      ---  HOSPITAL COURSE: Patient admitted to medicine floor for management of agitation. Psychiatry was consulted who recommended increase in patients olanzapine from 2.5mg at bedtime to 2.5mg in the AM and 5mg at bedtime. Additionally, depakote was increased to 500mg PO BID and amitriptyline was discontinued. Patient had delusions and paranoia during admission, requiring 1 to 1 observation. Patient's prior living arrangement at a [facility] older community respectfully declined acceptance of patient back into their community due to the patient's disruptive behavior. Contact point with patient's family was the daughter. Neither daughter nor pt's sons were comfortable with patient staying with them due to his behavior. After increasing dosage of patient's olanzapine, the patient became calm and cooperative. 1 to 1 observation was continued given presenting behavior. Social work was consulted to find the patient suitable living arrangements. Psych increased Pt zyprexa to 5mg BID and 10mg at bedtime. Patient now off 1 to 1 and stable for discharge to [facility].  Pt seen and examined on day of discharge. Patient is medically optimized for discharge to home with close outpatient followup.    PHYSICAL EXAM ON DAY OF DISCHARGE:    T(C): 36.5 (09-25-23 @ 06:14), Max: 36.6 (09-24-23 @ 21:39)  HR: 97 (09-25-23 @ 06:14) (92 - 99)  BP: 102/68 (09-25-23 @ 06:14) (96/63 - 117/73)  RR: 17 (09-25-23 @ 06:14) (17 - 18)  SpO2: 92% (09-25-23 @ 06:14) (92% - 92%)    GENERAL: patient appears well, no acute distress, appropriate, pleasant  EYES: sclera clear, no exudates  ENMT: oropharynx clear without erythema, moist mucous membranes  NECK: supple, soft  LUNGS: good air entry bilaterally, clear to auscultation, symmetric breath sounds, no wheezing or rhonchi appreciated  HEART: soft S1/S2, regular rate and rhythm, no murmurs noted, no lower extremity edema  GASTROINTESTINAL: abdomen is soft, nontender, nondistended, normoactive bowel sounds  INTEGUMENT: good skin turgor  MUSCULOSKELETAL: no clubbing or cyanosis, no obvious deformity  NEUROLOGIC: awake, alert, oriented x3, good muscle tone in 4 extremities, no obvious sensory deficits  PSYCHIATRIC: mood is good, affect is congruent, linear and logical thought process  HEME/LYMPH: no palpable supraclavicular nodules      ---  CONSULTANTS:   Psych: Dr Alexandr LOPEZ    ---  TIME SPENT:  I, the attending physician, was physically present for the key portions of the evaluation and management (E/M) service provided. The total amount of time spent reviewing the hospital notes, laboratory values, imaging findings, assessing/counseling the patient, discussing with consultant physicians, social work, nursing staff was -- minutes    ---  Primary care provider was made aware of plan for discharge:      [  ] NO     [ ] YES   HPI:  82y/o M PMH dementia, HFrEF s/p PPM, HTN, HLD  presents with increased agitation. History obtained from patient's daughter Arianne over phone.   Patient moved from [place] to [place] ~2months ago and has been having issues with increased paranoia for the past month. He stays at a [facility] assisted living community where he has been having several episodes of sundowning.   Recently he was hospitalized at [facility] for same issues from 9/1-914/23. Daughter was unable to provide sufficient history regarding hospitalization.   Patient's son, Jose has been taking care of him and knows more about his medical hx however patient's daughter states she is taking over now and did not want to bother Jose at this time.  Patient was also recently seen at [facility] ED for episodes of agitation and was told to follow up with outpatient Psychiatrist but daughter states that likely did not happened because of his increased agitation.  Patient became agitated today and staff had to call 911 for further evaluation. The facility as well as his children feel that his current psych medication regimen is not sufficient to keep him calm.   Per review of recent dispensary history, he was seeing a psychiatric NP (Barbara Bravo) in [place], [place].   Of note, he had placement of PPM in late August at [facility]. Per his daughter, his LVEF at that time was 20% before and after PPM placement.  Currently, patient states his b/l legs feel itchy but otherwise he feels fine and is agreeable.  Denies fevers, chills. sob, cp, n/v/d.    ED Course   T 97.4 F  /79 RR 18 SpO2 95% on RA  Labs H/H 12.1/37.5 Glu   UA: >1000 glucose  UTox: negaitve     In the ED, given tylenol 650mg x1 , zyprexa 5mg IVP x1  (20 Sep 2023 21:58)      ---  HOSPITAL COURSE: Patient admitted to medicine floor for management of agitation. Psychiatry was consulted who recommended increase in patients olanzapine from 2.5mg at bedtime to 2.5mg in the AM and 5mg at bedtime. Additionally, depakote was increased to 500mg PO BID and amitriptyline was discontinued. Patient had delusions and paranoia during admission, requiring 1 to 1 observation. Patient's prior living arrangement at a [facility] and older community respectfully declined acceptance of patient back into their community due to the patient's disruptive behavior. Contact point with patient's family was the daughter. Neither daughter nor pt's sons were comfortable with patient staying with them due to his behavior. After increasing dosage of patient's olanzapine, the patient became calm and cooperative. 1 to 1 observation was continued given presenting behavior. Social work was consulted to find the patient suitable living arrangements. Psych increased Pt zyprexa to 5mg BID and 10mg at bedtime. Patient now off 1 to 1 and stable for discharge to [facility]. Confirmed Pt meds w/ pharmacy and lasix 40mg QD is home med. Will continue that.   Pt seen and examined on day of discharge. Patient is medically optimized for discharge to home with close outpatient followup.     PHYSICAL EXAM ON DAY OF DISCHARGE:  T(C): 36.3 (10-02-23 @ 05:57), Max: 36.7 (10-01-23 @ 20:47)  HR: 87 (10-02-23 @ 05:57) (87 - 102)  BP: 116/73 (10-02-23 @ 05:57) (89/52 - 116/73)  RR: 17 (10-02-23 @ 05:57) (17 - 17)  SpO2: 91% (10-02-23 @ 05:57) (91% - 92%)    GENERAL: patient appears well, no acute distress, appropriate, pleasant  EYES: sclera clear, no exudates  ENMT: oropharynx clear without erythema, moist mucous membranes  NECK: supple, soft  LUNGS: good air entry bilaterally, clear to auscultation, symmetric breath sounds, no wheezing or rhonchi appreciated  HEART: soft S1/S2, regular rate and rhythm, no murmurs noted, no lower extremity edema  GASTROINTESTINAL: abdomen is soft, nontender, nondistended, normoactive bowel sounds  INTEGUMENT: good skin turgor  MUSCULOSKELETAL: no clubbing or cyanosis, no obvious deformity  NEUROLOGIC: awake, alert, oriented x2, good muscle tone in 4 extremities, no obvious sensory deficits  PSYCHIATRIC: mood is good, affect is congruent, linear and logical thought process  HEME/LYMPH: no palpable supraclavicular nodules      ---  CONSULTANTS:   Psych: Dr Alexandr LOPEZ    ---  TIME SPENT:  I, the attending physician, was physically present for the key portions of the evaluation and management (E/M) service provided. The total amount of time spent reviewing the hospital notes, laboratory values, imaging findings, assessing/counseling the patient, discussing with consultant physicians, social work, nursing staff was -- minutes    ---  Primary care provider was made aware of plan for discharge:      [  ] NO     [ ] YES   HPI:  82y/o M PMH dementia, HFrEF s/p PPM, HTN, HLD  presents with increased agitation. History obtained from patient's daughter Arianne over phone.   Patient moved from [place] to [place] ~2months ago and has been having issues with increased paranoia for the past month. He stays at a [facility] older assisted living community where he has been having several episodes of sundowning.   Recently he was hospitalized at [facility] for same issues from 9/1-914/23. Daughter was unable to provide sufficient history regarding hospitalization.   Patient's son, Jose has been taking care of him and knows more about his medical hx however patient's daughter states she is taking over now and did not want to bother Jose at this time.  Patient was also recently seen at [facility] ED for episodes of agitation and was told to follow up with outpatient Psychiatrist but daughter states that likely did not happened because of his increased agitation.  Patient became agitated today and staff had to call 911 for further evaluation. The facility as well as his children feel that his current psych medication regimen is not sufficient to keep him calm.   Per review of recent dispensary history, he was seeing a psychiatric NP (Barbara Bravo) in [place], [place].   Of note, he had placement of PPM in late August at [facility]. Per his daughter, his LVEF at that time was 20% before and after PPM placement.  Currently, patient states his b/l legs feel itchy but otherwise he feels fine and is agreeable.  Denies fevers, chills. sob, cp, n/v/d.    ED Course   T 97.4 F  /79 RR 18 SpO2 95% on RA  Labs H/H 12.1/37.5 Glu   UA: >1000 glucose  UTox: negaitve     In the ED, given tylenol 650mg x1 , zyprexa 5mg IVP x1  (20 Sep 2023 21:58)      ---  HOSPITAL COURSE: Patient admitted to medicine floor for management of agitation. Psychiatry was consulted who recommended increase in patients olanzapine from 2.5mg at bedtime to 2.5mg in the AM and 5mg at bedtime. Additionally, depakote was increased to 500mg PO BID and amitriptyline was discontinued. Patient had delusions and paranoia during admission, requiring 1 to 1 observation. Patient's prior living arrangement at a [facility] and older community respectfully declined acceptance of patient back into their community due to the patient's disruptive behavior. Contact point with patient's family was the daughter. Neither daughter nor pt's sons were comfortable with patient staying with them due to his behavior. After increasing dosage of patient's olanzapine, the patient became calm and cooperative. 1 to 1 observation was continued given presenting behavior. Social work was consulted to find the patient suitable living arrangements. Psych increased Pt zyprexa to 5mg BID and 10mg at bedtime. Patient now off 1 to 1 and stable for discharge to [facility]. Confirmed Pt meds w/ pharmacy and lasix 40mg QD is home med. Will continue that.   Pt seen and examined on day of discharge. Patient is medically optimized for discharge to home with close outpatient followup.     PHYSICAL EXAM ON DAY OF DISCHARGE:  T(C): 36.3 (10-02-23 @ 05:57), Max: 36.7 (10-01-23 @ 20:47)  HR: 87 (10-02-23 @ 05:57) (87 - 102)  BP: 116/73 (10-02-23 @ 05:57) (89/52 - 116/73)  RR: 17 (10-02-23 @ 05:57) (17 - 17)  SpO2: 91% (10-02-23 @ 05:57) (91% - 92%)    GENERAL: patient appears well, no acute distress, appropriate, pleasant  EYES: sclera clear, no exudates  ENMT: oropharynx clear without erythema, moist mucous membranes  NECK: supple, soft  LUNGS: good air entry bilaterally, clear to auscultation, symmetric breath sounds, no wheezing or rhonchi appreciated  HEART: soft S1/S2, regular rate and rhythm, no murmurs noted, no lower extremity edema  GASTROINTESTINAL: abdomen is soft, nontender, nondistended, normoactive bowel sounds  INTEGUMENT: good skin turgor  MUSCULOSKELETAL: +chronic lower back pain. no clubbing or cyanosis, no obvious deformity  NEUROLOGIC: awake, alert, oriented x2, good muscle tone in 4 extremities, no obvious sensory deficits  PSYCHIATRIC: mood is good, affect is congruent, linear and logical thought process  HEME/LYMPH: no palpable supraclavicular nodules      ---  CONSULTANTS:   Psych: Dr Alexandr LOPEZ    ---  TIME SPENT:  I, the attending physician, was physically present for the key portions of the evaluation and management (E/M) service provided. The total amount of time spent reviewing the hospital notes, laboratory values, imaging findings, assessing/counseling the patient, discussing with consultant physicians, social work, nursing staff was -- minutes    ---  Primary care provider was made aware of plan for discharge:      [  ] NO     [ ] YES   HPI:  82y/o M PMH dementia, HFrEF s/p PPM, HTN, HLD  presents with increased agitation. History obtained from patient's daughter Arianne over phone.   Patient moved from [place] to [place] ~2months ago and has been having issues with increased paranoia for the past month. He stays at a [facility] assisted living community where he has been having several episodes of sundowning.   Recently he was hospitalized at [facility] for same issues from 9/1-914/23. Daughter was unable to provide sufficient history regarding hospitalization.   Patient's son, Jose has been taking care of him and knows more about his medical hx however patient's daughter states she is taking over now and did not want to bother Jose at this time.  Patient was also recently seen at [facility] ED for episodes of agitation and was told to follow up with outpatient Psychiatrist but daughter states that likely did not happened because of his increased agitation.  Patient became agitated today and staff had to call 911 for further evaluation. The facility as well as his children feel that his current psych medication regimen is not sufficient to keep him calm.   Per review of recent dispensary history, he was seeing a psychiatric NP (Barbara Bravo) in [place], [place].   Of note, he had placement of PPM in late August at [facility]. Per his daughter, his LVEF at that time was 20% before and after PPM placement.  Currently, patient states his b/l legs feel itchy but otherwise he feels fine and is agreeable.  Denies fevers, chills. sob, cp, n/v/d.    ED Course   T 97.4 F  /79 RR 18 SpO2 95% on RA  Labs H/H 12.1/37.5 Glu   UA: >1000 glucose  UTox: negaitve     In the ED, given tylenol 650mg x1 , zyprexa 5mg IVP x1  (20 Sep 2023 21:58)      ---  HOSPITAL COURSE: Patient admitted to medicine floor for management of agitation. Psychiatry was consulted who recommended increase in patients olanzapine from 2.5mg at bedtime to 2.5mg in the AM and 5mg at bedtime. Additionally, depakote was increased to 500mg PO BID and amitriptyline was discontinued. Patient had delusions and paranoia during admission, requiring 1 to 1 observation. Patient's prior living arrangement at a [facility] older community respectfully declined acceptance of patient back into their community due to the patient's disruptive behavior. Contact point with patient's family was the daughter. Neither daughter nor pt's sons were comfortable with patient staying with them due to his behavior. After increasing dosage of patient's olanzapine, the patient became calm and cooperative. 1 to 1 observation was continued given presenting behavior. Social work was consulted to find the patient suitable living arrangements. Psych increased Pt zyprexa to 5mg BID and 10mg at bedtime. Patient now off 1 to 1 and stable for discharge to custodial. Confirmed Pt meds w/ pharmacy and lasix 40mg QD is home med. Will continue that. Few episodes of hypotension during hospital  course requiring lowering hold parameters for HF meds. Dced pt spironolactone. Dced entresto and lasix***   Pt seen and examined on day of discharge. Patient is medically optimized for discharge to home with close outpatient followup.      PHYSICAL EXAM ON DAY OF DISCHARGE:  T(C): 36.9 (10-05-23 @ 13:17), Max: 36.9 (10-05-23 @ 13:17)  HR: 84 (10-05-23 @ 13:17) (82 - 85)  BP: 92/52 (10-05-23 @ 13:17) (90/57 - 92/52)  RR: 18 (10-05-23 @ 13:17) (18 - 18)  SpO2: 92% (10-05-23 @ 13:17) (91% - 94%)    GENERAL: patient appears well, no acute distress, appropriate, pleasant  EYES: sclera clear, no exudates  ENMT: oropharynx clear without erythema, moist mucous membranes  NECK: supple, soft  LUNGS: good air entry bilaterally, clear to auscultation, symmetric breath sounds, no wheezing or rhonchi appreciated  HEART: soft S1/S2, regular rate and rhythm, no murmurs noted, no lower extremity edema  GASTROINTESTINAL: abdomen is soft, nontender, nondistended, normoactive bowel sounds  INTEGUMENT: +skin lesions on B/L hands. good skin turgor  MUSCULOSKELETAL: +chronic lower back TTP over paraspinals. no clubbing or cyanosis, no obvious deformity  NEUROLOGIC: awake, alert, oriented x2 (name, place), good muscle tone in 4 extremities, no obvious sensory deficits  PSYCHIATRIC: mood is good, affect is congruent, linear and logical thought process  HEME/LYMPH: no palpable supraclavicular nodules      ---  CONSULTANTS:   Psych: Dr Alexandr TOM      ---  TIME SPENT:  I, the attending physician, was physically present for the key portions of the evaluation and management (E/M) service provided. The total amount of time spent reviewing the hospital notes, laboratory values, imaging findings, assessing/counseling the patient, discussing with consultant physicians, social work, nursing staff was -- minutes    ---  Primary care provider was made aware of plan for discharge:      [  ] NO     [ ] YES   HPI:  82y/o M PMH dementia, HFrEF s/p PPM, HTN, HLD  presents with increased agitation. History obtained from patient's daughter Arianne over phone.   Patient moved from [place] to [place] ~2months ago and has been having issues with increased paranoia for the past month. He stays at a [facility] assisted living community where he has been having several episodes of sundowning.   Recently he was hospitalized at [facility] for same issues from 9/1-914/23. Daughter was unable to provide sufficient history regarding hospitalization.   Patient's son, Jose has been taking care of him and knows more about his medical hx however patient's daughter states she is taking over now and did not want to bother Jose at this time.  Patient was also recently seen at [facility] ED for episodes of agitation and was told to follow up with outpatient Psychiatrist but daughter states that likely did not happened because of his increased agitation.  Patient became agitated today and staff had to call 911 for further evaluation. The facility as well as his children feel that his current psych medication regimen is not sufficient to keep him calm.   Per review of recent dispensary history, he was seeing a psychiatric NP (Barbara Bravo) in [place], [place].   Of note, he had placement of PPM in late August at [facility]. Per his daughter, his LVEF at that time was 20% before and after PPM placement.  Currently, patient states his b/l legs feel itchy but otherwise he feels fine and is agreeable.  Denies fevers, chills. sob, cp, n/v/d.    ED Course   T 97.4 F  /79 RR 18 SpO2 95% on RA  Labs H/H 12.1/37.5 Glu   UA: >1000 glucose  UTox: negaitve     In the ED, given tylenol 650mg x1 , zyprexa 5mg IVP x1  (20 Sep 2023 21:58)      ---  HOSPITAL COURSE: Patient admitted to medicine floor for management of agitation. Psychiatry was consulted who recommended increase in patients olanzapine from 2.5mg at bedtime to 2.5mg in the AM and 5mg at bedtime. Additionally, depakote was increased to 500mg PO BID and amitriptyline was discontinued. Patient had delusions and paranoia during admission, requiring 1 to 1 observation. Patient's prior living arrangement at a [facility] community respectfully declined acceptance of patient back into their community due to the patient's disruptive behavior. Contact point with patient's family was the daughter. Neither daughter nor pt's sons were comfortable with patient staying with them due to his behavior. After increasing dosage of patient's olanzapine, the patient became calm and cooperative. 1 to 1 observation was continued given presenting behavior. Social work was consulted to find the patient suitable living arrangements. Psych increased Pt zyprexa to 5mg BID and 10mg at bedtime. Patient now off 1 to 1 and stable for discharge to [facility]. Confirmed Pt meds w/ pharmacy and lasix 40mg QD is home med. Will continue that. Few episodes of hypotension during hospital  course requiring lowering hold parameters for HF meds. Repeat TTE showed similar results as reduced LVEF of 20%. Orthostatics were negative. Patient continue on home metoprolol and entresto. Home lasix, spirinolactone, and farxiga were stopped.     Pt seen and examined on day of discharge. Patient is medically optimized for discharge to home with close outpatient followup.      PHYSICAL EXAM ON DAY OF DISCHARGE:  T(C): 36.9 (10-05-23 @ 13:17), Max: 36.9 (10-05-23 @ 13:17)  HR: 84 (10-05-23 @ 13:17) (82 - 85)  BP: 92/52 (10-05-23 @ 13:17) (90/57 - 92/52)  RR: 18 (10-05-23 @ 13:17) (18 - 18)  SpO2: 92% (10-05-23 @ 13:17) (91% - 94%)    GENERAL: patient appears well, no acute distress, appropriate, pleasant  EYES: sclera clear, no exudates  ENMT: oropharynx clear without erythema, moist mucous membranes  NECK: supple, soft  LUNGS: good air entry bilaterally, clear to auscultation, symmetric breath sounds, no wheezing or rhonchi appreciated  HEART: soft S1/S2, regular rate and rhythm, no murmurs noted, no lower extremity edema  GASTROINTESTINAL: abdomen is soft, nontender, nondistended, normoactive bowel sounds  INTEGUMENT: +skin lesions on B/L hands. good skin turgor  MUSCULOSKELETAL: +chronic lower back TTP over paraspinals. no clubbing or cyanosis, no obvious deformity  NEUROLOGIC: awake, alert, oriented x2 (name, place), good muscle tone in 4 extremities, no obvious sensory deficits  PSYCHIATRIC: mood is good, affect is congruent, linear and logical thought process  HEME/LYMPH: no palpable supraclavicular nodules      ---  CONSULTANTS:   Psych: Dr Strange  Cardio: Dr. Johnston  [facility]    ---  TIME SPENT:  I, the attending physician, was physically present for the key portions of the evaluation and management (E/M) service provided. The total amount of time spent reviewing the hospital notes, laboratory values, imaging findings, assessing/counseling the patient, discussing with consultant physicians, social work, nursing staff was -- minutes    ---  Primary care provider was made aware of plan for discharge:      [  ] NO     [ ] YES   HPI:  82y/o M PMH dementia, HFrEF s/p PPM, HTN, HLD  presents with increased agitation. History obtained from patient's daughter Arianne over phone.   Patient moved from [place] to [place] ~2months ago and has been having issues with increased paranoia for the past month. He stays at a [facility] assisted living community where he has been having several episodes of sundowning.   Recently he was hospitalized at [facility] for same issues from 9/1-914/23. Daughter was unable to provide sufficient history regarding hospitalization.   Patient's son, Jose has been taking care of him and knows more about his medical hx however patient's daughter states she is taking over now and did not want to bother Jose at this time.  Patient was also recently seen at [facility] ED for episodes of agitation and was told to follow up with outpatient Psychiatrist but daughter states that likely did not happened because of his increased agitation.  Patient became agitated today and staff had to call 911 for further evaluation. The facility as well as his children feel that his current psych medication regimen is not sufficient to keep him calm.   Per review of recent dispensary history, he was seeing a psychiatric NP (Barbara Bravo) in [place], [place].   Of note, he had placement of PPM in late August at [facility]. Per his daughter, his LVEF at that time was 20% before and after PPM placement.  Currently, patient states his b/l legs feel itchy but otherwise he feels fine and is agreeable.  Denies fevers, chills. sob, cp, n/v/d.    ED Course   T 97.4 F  /79 RR 18 SpO2 95% on RA  Labs H/H 12.1/37.5 Glu   UA: >1000 glucose  UTox: negaitve     In the ED, given tylenol 650mg x1 , zyprexa 5mg IVP x1  (20 Sep 2023 21:58)      ---  HOSPITAL COURSE: Patient admitted to medicine floor for management of agitation. Psychiatry was consulted who recommended increase in patients olanzapine from 2.5mg at bedtime to 2.5mg in the AM and 5mg at bedtime. Additionally, depakote was increased to 500mg PO BID and amitriptyline was discontinued. Patient had delusions and paranoia during admission, requiring 1 to 1 observation. Patient's prior living arrangement at a [facility] older community respectfully declined acceptance of patient back into their community due to the patient's disruptive behavior. Contact point with patient's family was the daughter. Neither daughter nor pt's sons were comfortable with patient staying with them due to his behavior. After increasing dosage of patient's olanzapine, the patient became calm and cooperative. 1 to 1 observation was continued given presenting behavior. Social work was consulted to find the patient suitable living arrangements. Psych increased Pt zyprexa to 5mg BID and 10mg at bedtime. Patient now off 1 to 1 and stable for discharge to [facility]. Confirmed Pt meds w/ pharmacy and lasix 40mg QD is home med. Will continue that. Few episodes of hypotension during hospital  course requiring lowering hold parameters for HF meds. Repeat TTE showed similar results as reduced LVEF of 20%. Orthostatics were negative. Patient continue on home metoprolol and entresto. Home lasix, spirinolactone, and farxiga were stopped.     Pt seen and examined on day of discharge. Patient is medically optimized for discharge to [facility] with close outpatient followup.      PHYSICAL EXAM ON DAY OF DISCHARGE:  T(C): 36.9 (10-05-23 @ 13:17), Max: 36.9 (10-05-23 @ 13:17)  HR: 84 (10-05-23 @ 13:17) (82 - 85)  BP: 92/52 (10-05-23 @ 13:17) (90/57 - 92/52)  RR: 18 (10-05-23 @ 13:17) (18 - 18)  SpO2: 92% (10-05-23 @ 13:17) (91% - 94%)    GENERAL: patient appears well, no acute distress, appropriate, pleasant  EYES: sclera clear, no exudates  ENMT: oropharynx clear without erythema, moist mucous membranes  NECK: supple, soft  LUNGS: good air entry bilaterally, clear to auscultation, symmetric breath sounds, no wheezing or rhonchi appreciated  HEART: soft S1/S2, regular rate and rhythm, no murmurs noted, no lower extremity edema  GASTROINTESTINAL: abdomen is soft, nontender, nondistended, normoactive bowel sounds  INTEGUMENT: +skin lesions on B/L hands. good skin turgor  MUSCULOSKELETAL: +chronic lower back TTP over paraspinals. no clubbing or cyanosis, no obvious deformity  NEUROLOGIC: awake, alert, oriented x2 (name, place), good muscle tone in 4 extremities, no obvious sensory deficits  PSYCHIATRIC: mood is good, affect is congruent, linear and logical thought process  HEME/LYMPH: no palpable supraclavicular nodules      ---  CONSULTANTS:   Psych: Dr Strange  Cardio: Dr. Johnston  [facility]    ---  TIME SPENT:  I, the attending physician, was physically present for the key portions of the evaluation and management (E/M) service provided. The total amount of time spent reviewing the hospital notes, laboratory values, imaging findings, assessing/counseling the patient, discussing with consultant physicians, social work, nursing staff was -- minutes    ---  Primary care provider was made aware of plan for discharge:      [  ] NO     [ ] YES   HPI:  84y/o M PMH dementia, HFrEF s/p PPM, HTN, HLD  presents with increased agitation. History obtained from patient's daughter Arianne over phone.   Patient moved from [place] to [place] ~2months ago and has been having issues with increased paranoia for the past month. He stays at a [facility] assisted living community where he has been having several episodes of sundowning.   Recently he was hospitalized at [facility] for same issues from 9/1-914/23. Daughter was unable to provide sufficient history regarding hospitalization.   Patient's son, Jose has been taking care of him and knows more about his medical hx however patient's daughter states she is taking over now and did not want to bother Jose at this time.  Patient was also recently seen at [facility] ED for episodes of agitation and was told to follow up with outpatient Psychiatrist but daughter states that likely did not happened because of his increased agitation.  Patient became agitated today and staff had to call 911 for further evaluation. The facility as well as his children feel that his current psych medication regimen is not sufficient to keep him calm.   Per review of recent dispensary history, he was seeing a psychiatric NP (Barbara Bravo) in [place], [place].   Of note, he had placement of PPM in late August at [facility]. Per his daughter, his LVEF at that time was 20% before and after PPM placement.  Currently, patient states his b/l legs feel itchy but otherwise he feels fine and is agreeable.  Denies fevers, chills. sob, cp, n/v/d.    ED Course   T 97.4 F  /79 RR 18 SpO2 95% on RA  Labs H/H 12.1/37.5 Glu   UA: >1000 glucose  UTox: negaitve     In the ED, given tylenol 650mg x1 , zyprexa 5mg IVP x1  (20 Sep 2023 21:58)      ---  HOSPITAL COURSE: Patient admitted to medicine floor for management of agitation. Psychiatry was consulted who recommended increase in patients olanzapine from 2.5mg at bedtime to 2.5mg in the AM and 5mg at bedtime. Additionally, depakote was increased to 500mg PO BID and amitriptyline was discontinued. Patient had delusions and paranoia during admission, requiring 1 to 1 observation. Patient's prior living arrangement at a [facility] older community respectfully declined acceptance of patient back into their community due to the patient's disruptive behavior. Contact point with patient's family was the daughter. Neither daughter nor pt's sons were comfortable with patient staying with them due to his behavior. After increasing dosage of patient's olanzapine, the patient became calm and cooperative. 1 to 1 observation was continued given presenting behavior. Social work was consulted to find the patient suitable living arrangements. Psych increased Pt zyprexa to 5mg BID and 10mg at bedtime. Patient now off 1 to 1 and stable for discharge to [facility]. Confirmed Pt meds w/ pharmacy and lasix 40mg QD is home med. Will continue that. Few episodes of hypotension during hospital  course requiring lowering hold parameters for HF meds. Repeat TTE showed similar results as reduced LVEF of 20%. Orthostatics were negative. Patient continue on home metoprolol and entresto. Home lasix, spirinolactone, and farxiga were stopped.     Pt seen and examined on day of discharge. Patient is medically optimized for discharge to [facility] with close outpatient followup.      PHYSICAL EXAM ON DAY OF DISCHARGE:  T(C): 36.3 (10-06-23 @ 05:49), Max: 36.9 (10-05-23 @ 13:17)  HR: 91 (10-06-23 @ 05:49) (84 - 95)  BP: 110/68 (10-06-23 @ 05:49) (92/52 - 110/68)  RR: 18 (10-06-23 @ 05:49) (18 - 18)  SpO2: 91% (10-06-23 @ 05:49) (91% - 93%)    GENERAL: patient appears well, no acute distress, appropriate, pleasant  EYES: sclera clear, no exudates  ENMT: oropharynx clear without erythema, moist mucous membranes  NECK: supple, soft  LUNGS: good air entry bilaterally, clear to auscultation, symmetric breath sounds, no wheezing or rhonchi appreciated  HEART: soft S1/S2, regular rate and rhythm, no murmurs noted, no lower extremity edema  GASTROINTESTINAL: abdomen is soft, nontender, nondistended, normoactive bowel sounds  INTEGUMENT: +skin lesions on B/L hands. good skin turgor  MUSCULOSKELETAL: +chronic lower back TTP over paraspinals. no clubbing or cyanosis, no obvious deformity  NEUROLOGIC: awake, alert, oriented x2 (name, place), good muscle tone in 4 extremities, no obvious sensory deficits  PSYCHIATRIC: mood is good, affect is congruent, linear and logical thought process  HEME/LYMPH: no palpable supraclavicular nodules      ---  CONSULTANTS:   Psych: Dr Strange  Cardio: Dr. Johnston  [facility]    ---  TIME SPENT:  I, the attending physician, was physically present for the key portions of the evaluation and management (E/M) service provided. The total amount of time spent reviewing the hospital notes, laboratory values, imaging findings, assessing/counseling the patient, discussing with consultant physicians, social work, nursing staff was -- minutes    ---  Primary care provider was made aware of plan for discharge:      [  ] NO     [ ] YES   HPI:  84y/o M PMH dementia, HFrEF s/p PPM, HTN, HLD  presents with increased agitation. History obtained from patient's daughter Arianne over phone.   Patient moved from [place] to [place] ~2months ago and has been having issues with increased paranoia for the past month. He stays at a [facility] older assisted living community where he has been having several episodes of sundowning.   Recently he was hospitalized at [facility] for same issues from 9/1-914/23. Daughter was unable to provide sufficient history regarding hospitalization.   Patient's son, Jose has been taking care of him and knows more about his medical hx however patient's daughter states she is taking over now and did not want to bother Jose at this time.  Patient was also recently seen at [facility] ED for episodes of agitation and was told to follow up with outpatient Psychiatrist but daughter states that likely did not happened because of his increased agitation.  Patient became agitated today and staff had to call 911 for further evaluation. The facility as well as his children feel that his current psych medication regimen is not sufficient to keep him calm.   Per review of recent dispensary history, he was seeing a psychiatric NP (Barbara Bravo) in [place], [place].   Of note, he had placement of PPM in late August at [facility]. Per his daughter, his LVEF at that time was 20% before and after PPM placement.  Currently, patient states his b/l legs feel itchy but otherwise he feels fine and is agreeable.  Denies fevers, chills. sob, cp, n/v/d.    ED Course   T 97.4 F  /79 RR 18 SpO2 95% on RA  Labs H/H 12.1/37.5 Glu   UA: >1000 glucose  UTox: negaitve     In the ED, given tylenol 650mg x1 , zyprexa 5mg IVP x1  (20 Sep 2023 21:58)      ---  HOSPITAL COURSE: Patient admitted to medicine floor for management of agitation. Psychiatry was consulted who recommended increase in patients olanzapine from 2.5mg at bedtime to 2.5mg in the AM and 5mg at bedtime. Additionally, depakote was increased to 500mg PO BID and amitriptyline was discontinued. Patient had delusions and paranoia during admission, requiring 1 to 1 observation. Patient's prior living arrangement at a [facility] and older community respectfully declined acceptance of patient back into their community due to the patient's disruptive behavior. Contact point with patient's family was the daughter. Neither daughter nor pt's sons were comfortable with patient staying with them due to his behavior. After increasing dosage of patient's olanzapine, the patient became calm and cooperative. 1 to 1 observation was continued given presenting behavior. Social work was consulted to find the patient suitable living arrangements. Psych increased Pt zyprexa to 5mg BID and 10mg at bedtime. Patient now off 1 to 1 and stable for discharge to [facility]. Confirmed Pt meds w/ pharmacy and lasix 40mg QD is home med. Will continue that. Few episodes of hypotension during hospital  course requiring lowering hold parameters for HF meds. Repeat TTE showed similar results as reduced LVEF of 20%. Orthostatics were negative. Patient continue on home metoprolol and was started on 1/2 of his home dose of entresto. Home lasix, spirinolactone, and farxiga were stopped.     Pt seen and examined on day of discharge. Patient is medically optimized for discharge to [facility] with close outpatient followup.      PHYSICAL EXAM ON DAY OF DISCHARGE:  T(C): 36.3 (10-06-23 @ 05:49), Max: 36.9 (10-05-23 @ 13:17)  HR: 91 (10-06-23 @ 05:49) (84 - 95)  BP: 110/68 (10-06-23 @ 05:49) (92/52 - 110/68)  RR: 18 (10-06-23 @ 05:49) (18 - 18)  SpO2: 91% (10-06-23 @ 05:49) (91% - 93%)    GENERAL: patient appears well, no acute distress, appropriate, pleasant  EYES: sclera clear, no exudates  ENMT: oropharynx clear without erythema, moist mucous membranes  NECK: supple, soft  LUNGS: good air entry bilaterally, clear to auscultation, symmetric breath sounds, no wheezing or rhonchi appreciated  HEART: soft S1/S2, regular rate and rhythm, no murmurs noted, no lower extremity edema  GASTROINTESTINAL: abdomen is soft, nontender, nondistended, normoactive bowel sounds  INTEGUMENT: +skin lesions on B/L hands. good skin turgor  MUSCULOSKELETAL: +chronic lower back TTP over paraspinals. no clubbing or cyanosis, no obvious deformity  NEUROLOGIC: awake, alert, oriented x2 (name, place), good muscle tone in 4 extremities, no obvious sensory deficits  PSYCHIATRIC: mood is good, affect is congruent, linear and logical thought process  HEME/LYMPH: no palpable supraclavicular nodules      ---  CONSULTANTS:   Psych: Dr Strange  Cardio: Dr. Vickie TOM      ---  TIME SPENT:  I, the attending physician, was physically present for the key portions of the evaluation and management (E/M) service provided. The total amount of time spent reviewing the hospital notes, laboratory values, imaging findings, assessing/counseling the patient, discussing with consultant physicians, social work, nursing staff was -- minutes    ---  Primary care provider was made aware of plan for discharge:      [  ] NO     [ ] YES   HPI:  84y/o M PMH dementia, HFrEF s/p PPM, HTN, HLD  presents with increased agitation. History obtained from patient's daughter Arianne over phone.   Patient moved from [place] to [place] ~2months ago and has been having issues with increased paranoia for the past month. He stays at a [facility] assisted living community where he has been having several episodes of sundowning.   Recently he was hospitalized at [facility] for same issues from 9/1-914/23. Daughter was unable to provide sufficient history regarding hospitalization.   Patient's son, Jose has been taking care of him and knows more about his medical hx however patient's daughter states she is taking over now and did not want to bother Jose at this time.  Patient was also recently seen at [facility] ED for episodes of agitation and was told to follow up with outpatient Psychiatrist but daughter states that likely did not happened because of his increased agitation.  Patient became agitated today and staff had to call 911 for further evaluation. The facility as well as his children feel that his current psych medication regimen is not sufficient to keep him calm.   Per review of recent dispensary history, he was seeing a psychiatric NP (Barbara Bravo) in [place], [place].   Of note, he had placement of PPM in late August at [facility]. Per his daughter, his LVEF at that time was 20% before and after PPM placement.  Currently, patient states his b/l legs feel itchy but otherwise he feels fine and is agreeable.  Denies fevers, chills. sob, cp, n/v/d.    ED Course   T 97.4 F  /79 RR 18 SpO2 95% on RA  Labs H/H 12.1/37.5 Glu   UA: >1000 glucose  UTox: negaitve     In the ED, given tylenol 650mg x1 , zyprexa 5mg IVP x1  (20 Sep 2023 21:58)      ---  HOSPITAL COURSE: Patient admitted to medicine floor for management of agitation. Psychiatry was consulted who recommended increase in patients olanzapine from 2.5mg at bedtime to 2.5mg in the AM and 5mg at bedtime. Additionally, depakote was increased to 500mg PO BID and amitriptyline was discontinued. Patient had delusions and paranoia during admission, requiring 1 to 1 observation. Patient's prior living arrangement at a [facility] and older community respectfully declined acceptance of patient back into their community due to the patient's disruptive behavior. Contact point with patient's family was the daughter. Neither daughter nor pt's sons were comfortable with patient staying with them due to his behavior. After increasing dosage of patient's olanzapine, the patient became calm and cooperative. 1 to 1 observation was continued given presenting behavior. Social work was consulted to find the patient suitable living arrangements. Psych increased Pt zyprexa to 5mg BID and 10mg at bedtime. Patient now off 1 to 1 and stable for discharge to [facility]. Confirmed Pt meds w/ pharmacy and lasix 40mg QD is home med. Will continue that. Few episodes of hypotension during hospital  course requiring lowering hold parameters for HF meds. Repeat TTE showed similar results as reduced LVEF of 20%. Orthostatics were negative. Patient continue on home metoprolol and was started on 1/2 of his home dose of entresto. Home lasix, spirinolactone, and farxiga were stopped.     Pt seen and examined on day of discharge. Patient is medically optimized for discharge to [facility] with close outpatient followup.      ---  CONSULTANTS:   Psych: Dr Strange  Cardio: Dr. Vickie TOM      ---  TIME SPENT:  I, the attending physician, was physically present for the key portions of the evaluation and management (E/M) service provided. The total amount of time spent reviewing the hospital notes, laboratory values, imaging findings, assessing/counseling the patient, discussing with consultant physicians, social work, nursing staff was 60  minutes    --

## 2023-09-21 NOTE — PATIENT CHOICE NOTE. - NSPTCHOICESTATE_GEN_ALL_CORE

## 2023-09-21 NOTE — BH CONSULTATION LIAISON ASSESSMENT NOTE - CURRENT MEDICATION
MEDICATIONS  (STANDING):  amitriptyline 10 milliGRAM(s) Oral daily  ammonium lactate 12% Lotion 1 Application(s) Topical two times a day  atorvastatin 40 milliGRAM(s) Oral at bedtime  budesonide  80 MICROgram(s)/formoterol 4.5 MICROgram(s) Inhaler 2 Puff(s) Inhalation two times a day  dapagliflozin 10 milliGRAM(s) Oral every 24 hours  dextrose 5%. 1000 milliLiter(s) (100 mL/Hr) IV Continuous <Continuous>  dextrose 5%. 1000 milliLiter(s) (50 mL/Hr) IV Continuous <Continuous>  dextrose 50% Injectable 25 Gram(s) IV Push once  dextrose 50% Injectable 12.5 Gram(s) IV Push once  dextrose 50% Injectable 25 Gram(s) IV Push once  divalproex  milliGRAM(s) Oral three times a day  DULoxetine 30 milliGRAM(s) Oral <User Schedule>  furosemide   Injectable 20 milliGRAM(s) IV Push two times a day  glucagon  Injectable 1 milliGRAM(s) IntraMuscular once  heparin   Injectable 5000 Unit(s) SubCutaneous every 8 hours  insulin lispro (ADMELOG) corrective regimen sliding scale   SubCutaneous at bedtime  insulin lispro (ADMELOG) corrective regimen sliding scale   SubCutaneous three times a day before meals  metoprolol succinate ER 25 milliGRAM(s) Oral daily  midodrine. 5 milliGRAM(s) Oral three times a day  OLANZapine 2.5 milliGRAM(s) Oral at bedtime  sacubitril 24 mG/valsartan 26 mG 1 Tablet(s) Oral two times a day  spironolactone 25 milliGRAM(s) Oral daily  tiotropium 2.5 MICROgram(s) Inhaler 2 Puff(s) Inhalation daily    MEDICATIONS  (PRN):  acetaminophen     Tablet .. 650 milliGRAM(s) Oral every 6 hours PRN Temp greater or equal to 38C (100.4F), Mild Pain (1 - 3)  albuterol    0.083% 2.5 milliGRAM(s) Nebulizer every 6 hours PRN Shortness of Breath and/or Wheezing  dextrose Oral Gel 15 Gram(s) Oral once PRN Blood Glucose LESS THAN 70 milliGRAM(s)/deciliter

## 2023-09-21 NOTE — CARE COORDINATION ASSESSMENT. - OTHER PERTINENT REFERRAL INFORMATION
JOHN spoke with pt daughter Arianne via telephone; unable to engage with pt at bedside due to confusion/agitation. Pt resides at [facility]. Pt has a  there named Mahsa (P#[phone]). Pt daughter reports that she and her siblings are working with an agency called "[facility]" that assists the family with placement in the community. Pt daughter Arianne reports that her and her siblings are trying to find an [facility] with a memory care unit to place pt. Pt daughter reports that pt has become increasingly agitated at his current residence.

## 2023-09-21 NOTE — DISCHARGE NOTE PROVIDER - NSDCCAREPROVSEEN_GEN_ALL_CORE_FT
5/28/2019      Demetrio Ryder  [id number] [place] [address]        To Whom it May Concern,    Demetrio Ryder was in the clinic today for an appointment.  Please excuse him from time missed due to this.              Saul Yañez M.D.  [place] [id number] [phone]  Fax: [phone]    Dr. Andrés Bauer

## 2023-09-21 NOTE — CARE COORDINATION ASSESSMENT. - NSPTRESPFORCHILD_GEN_ALL_CORE
Third Trimester of Pregnancy  prenatal visits  You will have prenatal exams every 2 weeks until week 36. Then you will have weekly prenatal exams. During a routine prenatal prenatal visits.  · Keep all your prenatal No

## 2023-09-21 NOTE — CARE COORDINATION ASSESSMENT. - NSCAREPROVIDERS_GEN_ALL_CORE_FT
CARE PROVIDERS:  Accepting Physician: Andrés Bauer  Administration: Edison Calderón  Admitting: Andrés Bauer  Attending: Andrés Bauer  Clinical Doc. Improvement: Ana Wright  Consultant: Weil, Patricia  Consultant: Kenneth Osman  Covering Team: Raquel Anne  Covering Team: Masood Schreiber  Covering Team: Trey Alexander ED Attending: Lamont Harvey ED Nurse: Nahun Evans  Nurse: Pratibha Alfaro  Nurse: Sandra Whiteside  Nurse: Mray Jansen  Nurse: Brenda Villaseñor  Ordered: ADM, User  Ordered: ServiceAccount, SCMMLM  Override: Katt Brown  Override: Binta Winston  Override: Umm Manuel  Override: Biju Miles  Override: Daniela Richter  Override: Glenn Rodríguez  PCA/Nursing Assistant: Biju Miles  PCA/Nursing Assistant: Umm Manuel  PCA/Nursing Assistant: Katt Brown  Primary Team: Todd Shepherd  Primary Team: Blanca Swanson  Primary Team: Atilio Kaplan  Primary Team: Andrés Bauer  : Amie Mendoza  : Page Gaytan

## 2023-09-21 NOTE — PROGRESS NOTE ADULT - ATTENDING COMMENTS
84y/o M PMH dementia, DM2, HFrEF s/p PPM, HTN, HLD presents with increased agitation.  Admit for increased paranoia & Placement .  pt seen, examined, case & care plan d/w pt, residents at [facility]. d[facility] dtr Arianne [facility] about pt's condition, Care plan Meds   Consult;  Psych Dr Strange D/W Today, Increase Zyprexa -Dr Strange to d/w dtr -  Social service d/w about d/c planning  PT Eval.  PO diet  AM labs   On 1:1 to continue .   Total care time is 60 minutes

## 2023-09-21 NOTE — DISCHARGE NOTE PROVIDER - PROVIDER TOKENS
PROVIDER:[TOKEN:[[id number]],FOLLOWUP:[1 week]],PROVIDER:[TOKEN:[[id number]],FOLLOWUP:[1 week],ESTABLISHEDPATIENT:[T]] PROVIDER:[TOKEN:[[id number]],FOLLOWUP:[1 week]],PROVIDER:[TOKEN:[[id number]],FOLLOWUP:[1 week],ESTABLISHEDPATIENT:[T]],FREE:[LAST:[Cardiologist],PHONE:[(   )    -],FAX:[(   )    -],ADDRESS:[Please follow up with your cardiologist at [place]]]

## 2023-09-21 NOTE — PROGRESS NOTE ADULT - SUBJECTIVE AND OBJECTIVE BOX
Patient is a 83y old  Male who presents with a chief complaint of paranoia (20 Sep 2023 21:58)    HPI:  82y/o M PMH dementia, HFrEF s/p PPM, HTN, HLD  presents with increased agitation. History obtained from patient's daughter Arianne over phone.   Patient moved from [place] to [place] ~2months ago and has been having issues with increased paranoia for the past month. He stays at a [facility] assisted living community where he has been having several episodes of sundowning.   Recently he was hospitalized at [facility] for same issues from 9/1-914/23. Daughter was unable to provide sufficient history regarding hospitalization.   Patient's son, Jose has been taking care of him and knows more about his medical hx however patient's daughter states she is taking over now and did not want to bother Jose at this time.  Patient was also recently seen at [facility] ED for episodes of agitation and was told to follow up with outpatient Psychiatrist but daughter states that likely did not happened because of his increased agitation.  Patient became agitated today and staff had to call 911 for further evaluation. The facility as well as his children feel that his current psych medication regimen is not sufficient to keep him calm.   Per review of recent dispensary history, he was seeing a psychiatric NP (Barbara Bravo) in [place], [place].   Of note, he had placement of PPM in late August at [facility]. Per his daughter, his LVEF at that time was 20% before and after PPM placement.  Currently, patient states his b/l legs feel itchy but otherwise he feels fine and is agreeable.  Denies fevers, chills. sob, cp, n/v/d.    ED Course   T 97.4 F  /79 RR 18 SpO2 95% on RA  Labs H/H 12.1/37.5 Glu   UA: >1000 glucose  UTox: negaitve     In the ED, given tylenol 650mg x1 , zyprexa 5mg IVP x1  (20 Sep 2023 21:58)    INTERVAL HPI:  9/21: Pt seen and examined, sitting in chair with 1 to 1 present. Patient expressed concern about not receiving his pantoprazole, stating he cant digest his food without it, denies n/v, epigastric pain or burning. When asked about why pt came to hospital, states that he was being followed and he called the police to come help him. Says he lives with his son Jose. EMS brought patient in from [facility] community. Additionally states that one pill he was given this morning by staff was "phony" so he didn't take it. Contacted pt's pharmacy [facility], numerous scripts filled in [place] and [place]. Per night team sign out, daughter states that pt has been in and out of many ERs and has been started on many different medications so there are a lot of recent meds that he does not currently take to the best of her knowledge. Per daughter, her brothers are tired of assisting with their father given history of difficulties with him. She is willing to assist but does not want to be too involved and asks us to not contact her brothers. Psychiatry consulted and to see patient. SW/CM updated.     OVERNIGHT EVENTS: no acute ovn events    Home Medications:  ALBUTEROL 0.083%(2.5MG/3ML) 30X3ML: USE 3 ML VIA NEBULIZER EVERY 6 HOURS AS NEEDED (20 Sep 2023 21:58)  AMITRIPTYLINE 10MG TABLETS: TAKE 1/2 TABLET BY MOUTH EVERY NIGHT AT BEDTIME. MAY INCREASE TO WHOLE TABLET THE 2 ND WEEK (20 Sep 2023 21:58)  ATORVASTATIN 40MG TABLETS: TAKE 1 TABLET BY MOUTH EVERY DAY (20 Sep 2023 21:58)  DIVALPROEX DELAYED RELEASE 125MG TB: TAKE 1 TABLET BY MOUTH THREE TIMES DAILY (20 Sep 2023 21:58)  DULOXETINE DR 30MG CAPSULES: TAKE 1 CAPSULE BY MOUTH WITH BREAKFAST AND 1 CAPSULE IN THE AFTER NOON NO LATER THAN 4 PM (20 Sep 2023 21:58)  ENTRESTO 24-26MG TABLETS: TAKE 1 TABLET BY MOUTH TWICE DAILY (20 Sep 2023 21:58)  FARXIGA 10MG TABLETS:  (20 Sep 2023 21:58)  FUROSEMIDE 40MG TABLETS: TAKE 1 TABLET BY MOUTH EVERY DAY (20 Sep 2023 21:58)  METFORMIN 500MG TABLETS: TAKE 1 TABLET BY MOUTH EVERY MORNING AND EVERY EVENING WITH MEALS. (20 Sep 2023 21:58)  METOPROLOL ER SUCCINATE 25MG TABS: TAKE 1 TABLET BY MOUTH EVERY DAY (20 Sep 2023 21:58)  MIDODRINE 5MG TABLETS: TAKE 1 TABLET BY MOUTH THREE TIMES DAILY (20 Sep 2023 21:58)  OLANZAPINE 2.5MG TABLETS: TAKE 1 TABLET BY MOUTH AT BEDTIME (20 Sep 2023 21:58)  POTASSIUM CL 20MEQ ER TABLETS: TAKE 1 TABLET BY MOUTH EVERY DAY WITH FOOD FOR 4 DAYS (20 Sep 2023 21:58)  SPIRONOLACTONE 25MG TABLETS:  (20 Sep 2023 21:58)  TRELEGY ELLIPTA 100-62.5MCG INH 30P: INHALE 1 PUFF BY MOUTH EVERY DAY (20 Sep 2023 21:58)      MEDICATIONS  (STANDING):  amitriptyline 10 milliGRAM(s) Oral daily  ammonium lactate 12% Lotion 1 Application(s) Topical two times a day  atorvastatin 40 milliGRAM(s) Oral at bedtime  budesonide  80 MICROgram(s)/formoterol 4.5 MICROgram(s) Inhaler 2 Puff(s) Inhalation two times a day  dapagliflozin 10 milliGRAM(s) Oral every 24 hours  dextrose 5%. 1000 milliLiter(s) (100 mL/Hr) IV Continuous <Continuous>  dextrose 5%. 1000 milliLiter(s) (50 mL/Hr) IV Continuous <Continuous>  dextrose 50% Injectable 12.5 Gram(s) IV Push once  dextrose 50% Injectable 25 Gram(s) IV Push once  dextrose 50% Injectable 25 Gram(s) IV Push once  divalproex  milliGRAM(s) Oral three times a day  DULoxetine 30 milliGRAM(s) Oral <User Schedule>  furosemide   Injectable 20 milliGRAM(s) IV Push two times a day  glucagon  Injectable 1 milliGRAM(s) IntraMuscular once  heparin   Injectable 5000 Unit(s) SubCutaneous every 8 hours  insulin lispro (ADMELOG) corrective regimen sliding scale   SubCutaneous at bedtime  insulin lispro (ADMELOG) corrective regimen sliding scale   SubCutaneous three times a day before meals  metoprolol succinate ER 25 milliGRAM(s) Oral daily  midodrine. 5 milliGRAM(s) Oral three times a day  OLANZapine 2.5 milliGRAM(s) Oral at bedtime  sacubitril 24 mG/valsartan 26 mG 1 Tablet(s) Oral two times a day  spironolactone 25 milliGRAM(s) Oral daily  tiotropium 2.5 MICROgram(s) Inhaler 2 Puff(s) Inhalation daily    MEDICATIONS  (PRN):  acetaminophen     Tablet .. 650 milliGRAM(s) Oral every 6 hours PRN Temp greater or equal to 38C (100.4F), Mild Pain (1 - 3)  albuterol    0.083% 2.5 milliGRAM(s) Nebulizer every 6 hours PRN Shortness of Breath and/or Wheezing  dextrose Oral Gel 15 Gram(s) Oral once PRN Blood Glucose LESS THAN 70 milliGRAM(s)/deciliter      Allergies    No Known Allergies    Intolerances        Social History:  Tobacco: former , quit >30 years ago  EtOH: social drinker  Recreational drug use: denies   Lives with: Assisted Living Facility  Ambulates: without assistance  ADLs: needs assistance (20 Sep 2023 21:58)      REVIEW OF SYSTEMS:  CONSTITUTIONAL: No fever, No chills, No fatigue, No myalgia, No Body ache, No Weakness  EYES: No eye pain,  No visual disturbances, No discharge, NO Redness  ENMT:  No ear pain, No nose bleed, No vertigo; No sinus pain, NO throat pain, No Congestion  NECK: No pain, No stiffness  RESPIRATORY: No cough, NO wheezing, No  hemoptysis, NO  shortness of breath  CARDIOVASCULAR: No chest pain, palpitations  GASTROINTESTINAL: No abdominal pain, NO epigastric pain. No nausea, No vomiting; No diarrhea, No constipation. [  ] BM  GENITOURINARY: No dysuria, No frequency, No urgency, No hematuria, NO incontinence  NEUROLOGICAL: No headaches, No dizziness, No numbness, No tingling, No tremors, No weakness  EXT: No Swelling, No Pain, No Edema  SKIN:  [  ] No itching, burning, rashes, or lesions   MUSCULOSKELETAL: No joint pain ,No Jt swelling; No muscle pain, No back pain, No extremity pain  PSYCHIATRIC: No depression,  No anxiety,  No mood swings ,No difficulty sleeping at night  PAIN SCALE: [  ] None  [  ] Other-  ROS Unable to obtain due to - [  ] Dementia  [  ] Lethargy [  ] Drowsy [  ] Sedated [  ] non verbal [ x] dismissive and asking provider to get pt his pantoprazole  REST OF REVIEW Of SYSTEM - [  ] Normal     Vital Signs Last 24 Hrs  T(C): 36.6 (21 Sep 2023 11:42), Max: 36.7 (20 Sep 2023 17:11)  T(F): 97.9 (21 Sep 2023 11:42), Max: 98 (20 Sep 2023 17:11)  HR: 101 (21 Sep 2023 11:42) (94 - 101)  BP: 109/65 (21 Sep 2023 11:42) (97/61 - 114/79)  BP(mean): --  RR: 20 (21 Sep 2023 11:42) (18 - 20)  SpO2: 96% (21 Sep 2023 11:42) (95% - 97%)    Parameters below as of 21 Sep 2023 11:42  Patient On (Oxygen Delivery Method): room air      Finger Stick          PHYSICAL EXAM:  GENERAL:  [ x ] NAD , [ x ] well appearing, [ x ] Agitated, [  ] Drowsy,  [  ] Lethargy, [  ] confused   HEAD:  [x  ] Normal, [  ] Other  EYES:  [ x ] EOMI, [ x ] PERRLA, [ x ] conjunctiva and sclera clear normal, [  ] Other,  [  ] Pallor,[  ] Discharge  ENMT:  [x  ] Normal, [  ] Moist mucous membranes, [  ] Good dentition, [  ] No Thrush  NECK:  [ x ] Supple, [x  ] No JVD, [ x ] Normal thyroid, [  ] Lymphadenopathy [  ] Other  CHEST/LUNG:  [x  ] Clear to auscultation bilaterally, [  ] Breath Sounds equal B/L / Decrease, [  ] poor effort  [  ] No rales, [  ] No rhonchi  [  ]  No wheezing,   HEART:  [  x] Regular rate and rhythm, [  ] tachycardia, [  ] Bradycardia,  [  ] irregular  [  ] No murmurs, No rubs, No gallops, [  ] PPM in place (Mfr:  )  ABDOMEN:  [x  ] Soft, [  x] Nontender, [  ] Nondistended, [  ] No mass, [  ] Bowel sounds present, [  ] obese  NERVOUS SYSTEM:  [  ] Alert & Oriented X3, [  ] Nonfocal  [  ] Confusion  [  ] Encephalopathic [  ] Sedated [  ] Unable to assess, [  ] Dementia [ x ] Other- delusions, paranoia  EXTREMITIES: [ x ] 2+ Peripheral Pulses, No clubbing, No cyanosis,  [ x ] edema B/L lower EXT. [  ] PVD stasis skin changes B/L Lower EXT, [  ] wound  LYMPH: No lymphadenopathy noted  SKIN:  [ x ] No rashes or lesions, [  ] Pressure Ulcers, [  ] ecchymosis, [  ] Skin Tears, [  ] Other    DIET: Diet, Regular:   Consistent Carbohydrate Evening Snack  DASH/TLC Sodium & Cholesterol Restricted (09-20-23 @ 22:36)      LABS:                        12.2   5.57  )-----------( 173      ( 21 Sep 2023 06:55 )             38.0     21 Sep 2023 06:55    143    |  107    |  11     ----------------------------<  114    3.5     |  30     |  0.87     Ca    9.1        21 Sep 2023 06:55    TPro  6.9    /  Alb  3.3    /  TBili  1.2    /  DBili  x      /  AST  17     /  ALT  19     /  AlkPhos  88     21 Sep 2023 06:55      Urinalysis Basic - ( 21 Sep 2023 06:55 )    Color: x / Appearance: x / SG: x / pH: x  Gluc: 114 mg/dL / Ketone: x  / Bili: x / Urobili: x   Blood: x / Protein: x / Nitrite: x   Leuk Esterase: x / RBC: x / WBC x   Sq Epi: x / Non Sq Epi: x / Bacteria: x        Culture Results:   <10,000 CFU/mL Normal Urogenital Kayleigh (09-17 @ 13:40)                  Culture - Urine (collected 17 Sep 2023 13:40)  Source: Clean Catch Clean Catch (Midstream)  Final Report (18 Sep 2023 13:53):    <10,000 CFU/mL Normal Urogenital Kayleigh         Anemia Panel:      Thyroid Panel:  Thyroid Stimulating Hormone, Serum: 2.74 uIU/mL (09-21-23 @ 06:55)  Thyroid Stimulating Hormone, Serum: 2.66 uIU/mL (09-20-23 @ 19:50)  Thyroid Stimulating Hormone, Serum: 3.76 uIU/mL (09-17-23 @ 12:43)                RADIOLOGY & ADDITIONAL TESTS:      HEALTH ISSUES - PROBLEM Dx:  Paranoia    HFrEF (heart failure with reduced ejection fraction)    HTN (hypertension)    HLD (hyperlipidemia)    Need for prophylactic measure    Medication management    DM2 (diabetes mellitus, type 2)    Anemia            Consultant(s) Notes Reviewed:  [  ] YES     Care Discussed with [X] Consultants  [x  ] Patient  [  ] Family [  ] HCP [  ]   [  ] Social Service  [  ] RN, [  ] Physical Therapy,[  ] Palliative care team  DVT PPX: [  ] Lovenox, [ x ] S C Heparin, [  ] Coumadin, [  ] Xarelto, [  ] Eliquis, [  ] Pradaxa, [  ] IV Heparin drip, [  ] SCD [  ] Contraindication 2 to GI Bleed,[  ] Ambulation [  ] Contraindicated 2 to  bleed [  ] Contraindicated 2 to Brain Bleed  Advanced directive: [  ] None, [  ] DNR/DNI Patient is a 83y old  Male who presents with a chief complaint of paranoia (20 Sep 2023 21:58)    HPI:  82y/o M PMH dementia, HFrEF s/p PPM, HTN, HLD  presents with increased agitation. History obtained from patient's daughter Arianne over phone.   Patient moved from [place] to [place] ~2months ago and has been having issues with increased paranoia for the past month. He stays at a [facility] older assisted living community where he has been having several episodes of sundowning.   Recently he was hospitalized at [facility] for same issues from 9/1-914/23. Daughter was unable to provide sufficient history regarding hospitalization.   Patient's son, Jose has been taking care of him and knows more about his medical hx however patient's daughter states she is taking over now and did not want to bother Jose at this time.  Patient was also recently seen at [facility] ED for episodes of agitation and was told to follow up with outpatient Psychiatrist but daughter states that likely did not happened because of his increased agitation.  Patient became agitated today and staff had to call 911 for further evaluation. The facility as well as his children feel that his current psych medication regimen is not sufficient to keep him calm.   Per review of recent dispensary history, he was seeing a psychiatric NP (Barbara Bravo) in [place], [place].   Of note, he had placement of PPM in late August at [facility]. Per his daughter, his LVEF at that time was 20% before and after PPM placement.  Currently, patient states his b/l legs feel itchy but otherwise he feels fine and is agreeable.  Denies fevers, chills. sob, cp, n/v/d.    ED Course   T 97.4 F  /79 RR 18 SpO2 95% on RA  Labs H/H 12.1/37.5 Glu   UA: >1000 glucose  UTox: negaitve     In the ED, given tylenol 650mg x1 , zyprexa 5mg IVP x1  (20 Sep 2023 21:58)    INTERVAL HPI:  9/21: Pt seen and examined, sitting in chair with 1 to 1 present. Patient expressed concern about not receiving his pantoprazole, stating he cant digest his food without it, denies n/v, epigastric pain or burning. When asked about why pt came to hospital, states that he was being followed and he called the police to come help him. Says he lives with his son Jose. EMS brought patient in from [facility] community. Additionally states that one pill he was given this morning by staff was "phony" so he didn't take it. Contacted pt's pharmacy [facility], numerous scripts filled in [place] and [place]. Per night team sign out, daughter states that pt has been in and out of many ERs and has been started on many different medications so there are a lot of recent meds that he does not currently take to the best of her knowledge. Per daughter, her brothers are tired of assisting with their father given history of difficulties with him. She is willing to assist but does not want to be too involved and asks us to not contact her brothers. Psychiatry consulted and to see patient. SW/CM updated.   Soren Krishnan [phone]    OVERNIGHT EVENTS: no acute ovn events    Home Medications:  ALBUTEROL 0.083%(2.5MG/3ML) 30X3ML: USE 3 ML VIA NEBULIZER EVERY 6 HOURS AS NEEDED (20 Sep 2023 21:58)  AMITRIPTYLINE 10MG TABLETS: TAKE 1/2 TABLET BY MOUTH EVERY NIGHT AT BEDTIME. MAY INCREASE TO WHOLE TABLET THE 2 ND WEEK (20 Sep 2023 21:58)  ATORVASTATIN 40MG TABLETS: TAKE 1 TABLET BY MOUTH EVERY DAY (20 Sep 2023 21:58)  DIVALPROEX DELAYED RELEASE 125MG TB: TAKE 1 TABLET BY MOUTH THREE TIMES DAILY (20 Sep 2023 21:58)  DULOXETINE DR 30MG CAPSULES: TAKE 1 CAPSULE BY MOUTH WITH BREAKFAST AND 1 CAPSULE IN THE AFTER NOON NO LATER THAN 4 PM (20 Sep 2023 21:58)  ENTRESTO 24-26MG TABLETS: TAKE 1 TABLET BY MOUTH TWICE DAILY (20 Sep 2023 21:58)  FARXIGA 10MG TABLETS:  (20 Sep 2023 21:58)  FUROSEMIDE 40MG TABLETS: TAKE 1 TABLET BY MOUTH EVERY DAY (20 Sep 2023 21:58)  METFORMIN 500MG TABLETS: TAKE 1 TABLET BY MOUTH EVERY MORNING AND EVERY EVENING WITH MEALS. (20 Sep 2023 21:58)  METOPROLOL ER SUCCINATE 25MG TABS: TAKE 1 TABLET BY MOUTH EVERY DAY (20 Sep 2023 21:58)  MIDODRINE 5MG TABLETS: TAKE 1 TABLET BY MOUTH THREE TIMES DAILY (20 Sep 2023 21:58)  OLANZAPINE 2.5MG TABLETS: TAKE 1 TABLET BY MOUTH AT BEDTIME (20 Sep 2023 21:58)  POTASSIUM CL 20MEQ ER TABLETS: TAKE 1 TABLET BY MOUTH EVERY DAY WITH FOOD FOR 4 DAYS (20 Sep 2023 21:58)  SPIRONOLACTONE 25MG TABLETS:  (20 Sep 2023 21:58)  TRELEGY ELLIPTA 100-62.5MCG INH 30P: INHALE 1 PUFF BY MOUTH EVERY DAY (20 Sep 2023 21:58)      MEDICATIONS  (STANDING):  amitriptyline 10 milliGRAM(s) Oral daily  ammonium lactate 12% Lotion 1 Application(s) Topical two times a day  atorvastatin 40 milliGRAM(s) Oral at bedtime  budesonide  80 MICROgram(s)/formoterol 4.5 MICROgram(s) Inhaler 2 Puff(s) Inhalation two times a day  dapagliflozin 10 milliGRAM(s) Oral every 24 hours  dextrose 5%. 1000 milliLiter(s) (100 mL/Hr) IV Continuous <Continuous>  dextrose 5%. 1000 milliLiter(s) (50 mL/Hr) IV Continuous <Continuous>  dextrose 50% Injectable 12.5 Gram(s) IV Push once  dextrose 50% Injectable 25 Gram(s) IV Push once  dextrose 50% Injectable 25 Gram(s) IV Push once  divalproex  milliGRAM(s) Oral three times a day  DULoxetine 30 milliGRAM(s) Oral <User Schedule>  furosemide   Injectable 20 milliGRAM(s) IV Push two times a day  glucagon  Injectable 1 milliGRAM(s) IntraMuscular once  heparin   Injectable 5000 Unit(s) SubCutaneous every 8 hours  insulin lispro (ADMELOG) corrective regimen sliding scale   SubCutaneous at bedtime  insulin lispro (ADMELOG) corrective regimen sliding scale   SubCutaneous three times a day before meals  metoprolol succinate ER 25 milliGRAM(s) Oral daily  midodrine. 5 milliGRAM(s) Oral three times a day  OLANZapine 2.5 milliGRAM(s) Oral at bedtime  sacubitril 24 mG/valsartan 26 mG 1 Tablet(s) Oral two times a day  spironolactone 25 milliGRAM(s) Oral daily  tiotropium 2.5 MICROgram(s) Inhaler 2 Puff(s) Inhalation daily    MEDICATIONS  (PRN):  acetaminophen     Tablet .. 650 milliGRAM(s) Oral every 6 hours PRN Temp greater or equal to 38C (100.4F), Mild Pain (1 - 3)  albuterol    0.083% 2.5 milliGRAM(s) Nebulizer every 6 hours PRN Shortness of Breath and/or Wheezing  dextrose Oral Gel 15 Gram(s) Oral once PRN Blood Glucose LESS THAN 70 milliGRAM(s)/deciliter      Allergies    No Known Allergies    Intolerances        Social History:  Tobacco: former , quit >30 years ago  EtOH: social drinker  Recreational drug use: denies   Lives with: Assisted Living Facility  Ambulates: without assistance  ADLs: needs assistance (20 Sep 2023 21:58)      REVIEW OF SYSTEMS: i want to go home  CONSTITUTIONAL: No fever, No chills, No fatigue, No myalgia, No Body ache, No Weakness  EYES: No eye pain,  No visual disturbances, No discharge, NO Redness  ENMT:  No ear pain, No nose bleed, No vertigo; No sinus pain, NO throat pain, No Congestion  NECK: No pain, No stiffness  RESPIRATORY: No cough, NO wheezing, No  hemoptysis, NO  shortness of breath  CARDIOVASCULAR: No chest pain, palpitations  GASTROINTESTINAL: No abdominal pain, NO epigastric pain. No nausea, No vomiting; No diarrhea, No constipation. [  ] BM  GENITOURINARY: No dysuria, No frequency, No urgency, No hematuria, NO incontinence  NEUROLOGICAL: No headaches, No dizziness, No numbness, No tingling, No tremors, No weakness  EXT: No Swelling, No Pain, No Edema  SKIN:  [ x ] No itching, burning, rashes, or lesions   MUSCULOSKELETAL: No joint pain ,No Jt swelling; No muscle pain, No back pain, No extremity pain  PSYCHIATRIC: No depression,  No anxiety,  No mood swings ,No difficulty sleeping at night  PAIN SCALE: [x  ] None  [  ] Other-  ROS Unable to obtain due to - [  ] Dementia  [  ] Lethargy [  ] Drowsy [  ] Sedated [  ] non verbal [ x] dismissive and asking provider to get pt his pantoprazole  REST OF REVIEW Of SYSTEM - [  ] Normal     Vital Signs Last 24 Hrs  T(C): 36.6 (21 Sep 2023 11:42), Max: 36.7 (20 Sep 2023 17:11)  T(F): 97.9 (21 Sep 2023 11:42), Max: 98 (20 Sep 2023 17:11)  HR: 101 (21 Sep 2023 11:42) (94 - 101)  BP: 109/65 (21 Sep 2023 11:42) (97/61 - 114/79)  BP(mean): --  RR: 20 (21 Sep 2023 11:42) (18 - 20)  SpO2: 96% (21 Sep 2023 11:42) (95% - 97%)    Parameters below as of 21 Sep 2023 11:42  Patient On (Oxygen Delivery Method): room air      Finger Stick          PHYSICAL EXAM:  GENERAL:  [ x ] NAD , [ x ] well appearing, [ x ] Agitated, [  ] Drowsy,  [  ] Lethargy, [  ] confused   HEAD:  [x  ] Normal, [  ] Other  EYES:  [ x ] EOMI, [ x ] PERRLA, [ x ] conjunctiva and sclera clear normal, [  ] Other,  [  ] Pallor,[  ] Discharge  ENMT:  [x  ] Normal, [x  ] Moist mucous membranes, x  ] Good dentition, [x  ] No Thrush  NECK:  [ x ] Supple, [x  ] No JVD, [ x ] Normal thyroid, [  ] Lymphadenopathy [  ] Other  CHEST/LUNG:  [x  ] Clear to auscultation bilaterally, [  ] Breath Sounds equal B/L / Decrease, [  x] poor effort  [ x ] No rales, [ x ] No rhonchi  [  x]  No wheezing,   HEART:  [  x] Regular rate and rhythm, [  ] tachycardia, [  ] Bradycardia,  [  ] irregular  [x  ] No murmurs, No rubs, No gallops, [  ] PPM in place (Mfr:  )  ABDOMEN:  [x  ] Soft, [  x] Nontender, [x  ] Nondistended, [  x] No mass, [x  ] Bowel sounds present, [ x ] obese  NERVOUS SYSTEM:  [  ] Alert & Oriented X3, [  ] Nonfocal  [  ] Confusion  [  ] Encephalopathic [  ] Sedated [  ] Unable to assess, [  ] Dementia [ x ] Other- delusions, paranoia  EXTREMITIES: [ x ] 2+ Peripheral Pulses, No clubbing, No cyanosis,  [ x ]  2 +edema B/L lower EXT. [ x ] PVD stasis skin changes B/L Lower EXT, [x  ] wound-superficial wounds, Scab  on Hand & legs   LYMPH: No lymphadenopathy noted  SKIN:  [ x ] No rashes or lesions, [  ] Pressure Ulcers, [  ] ecchymosis, [  ] Skin Tears, [  ] Other    DIET: Diet, Regular:   Consistent Carbohydrate Evening Snack  DASH/TLC Sodium & Cholesterol Restricted (09-20-23 @ 22:36)      LABS:                        12.2   5.57  )-----------( 173      ( 21 Sep 2023 06:55 )             38.0     21 Sep 2023 06:55    143    |  107    |  11     ----------------------------<  114    3.5     |  30     |  0.87     Ca    9.1        21 Sep 2023 06:55    TPro  6.9    /  Alb  3.3    /  TBili  1.2    /  DBili  x      /  AST  17     /  ALT  19     /  AlkPhos  88     21 Sep 2023 06:55      Urinalysis Basic - ( 21 Sep 2023 06:55 )    Color: x / Appearance: x / SG: x / pH: x  Gluc: 114 mg/dL / Ketone: x  / Bili: x / Urobili: x   Blood: x / Protein: x / Nitrite: x   Leuk Esterase: x / RBC: x / WBC x   Sq Epi: x / Non Sq Epi: x / Bacteria: x        Culture Results:   <10,000 CFU/mL Normal Urogenital Kayleigh (09-17 @ 13:40)      Culture - Urine (collected 17 Sep 2023 13:40)  Source: Clean Catch Clean Catch (Midstream)  Final Report (18 Sep 2023 13:53):    <10,000 CFU/mL Normal Urogenital Kayleigh       Thyroid Panel:  Thyroid Stimulating Hormone, Serum: 2.74 uIU/mL (09-21-23 @ 06:55)  Thyroid Stimulating Hormone, Serum: 2.66 uIU/mL (09-20-23 @ 19:50)  Thyroid Stimulating Hormone, Serum: 3.76 uIU/mL (09-17-23 @ 12:43)    RADIOLOGY & ADDITIONAL TESTS: NONE      HEALTH ISSUES - PROBLEM Dx:  Paranoia    HFrEF (heart failure with reduced ejection fraction)    HTN (hypertension)    HLD (hyperlipidemia)    Need for prophylactic measure    Medication management    DM2 (diabetes mellitus, type 2)    Anemia            Consultant(s) Notes Reviewed:  [x  ] YES     Care Discussed with [X] Consultants  [x  ] Patient  [x  ] Family dtr  [  ] HCP [  ]   [  x] Social Service  [x  ] RN, [ x ] Physical Therapy,[  ] Palliative care team  DVT PPX: [  ] Lovenox, [ x ] S C Heparin, [  ] Coumadin, [  ] Xarelto, [  ] Eliquis, [  ] Pradaxa, [  ] IV Heparin drip, [  ] SCD [  ] Contraindication 2 to GI Bleed,[  ] Ambulation [  ] Contraindicated 2 to  bleed [  ] Contraindicated 2 to Brain Bleed  Advanced directive: [ x ] None, [  ] DNR/DNI

## 2023-09-21 NOTE — PROGRESS NOTE ADULT - PROBLEM SELECTOR PLAN 5
Chronic  - Hold home metformin, farxiga inpatient  -Low ISS  -Regular finger sticks  -DASH/TLC Consistent carb diet  -Hypoglycemic protocol.  - F/u AM A1c

## 2023-09-21 NOTE — BH CONSULTATION LIAISON ASSESSMENT NOTE - NSBHCHARTREVIEWLAB_PSY_A_CORE FT
12.2   5.57  )-----------( 173      ( 21 Sep 2023 06:55 )             38.0   09-21    143  |  107  |  11  ----------------------------<  114<H>  3.5   |  30  |  0.87    Ca    9.1      21 Sep 2023 06:55    TPro  6.9  /  Alb  3.3  /  TBili  1.2  /  DBili  x   /  AST  17  /  ALT  19  /  AlkPhos  88  09-21

## 2023-09-21 NOTE — BH CONSULTATION LIAISON ASSESSMENT NOTE - HPI (INCLUDE ILLNESS QUALITY, SEVERITY, DURATION, TIMING, CONTEXT, MODIFYING FACTORS, ASSOCIATED SIGNS AND SYMPTOMS)
Patient seen, evaluated and chart reviewed. Patient is an 84y/o WM PMH dementia, HFrEF s/p PPM, HTN, HLD  presents with increased agitation. History obtained from patient's daughter Arianne over phone. Patient moved from [place] to [place] ~2months ago and has been having issues with increased paranoia for the past month. He stays at a [facility] assisted living community where he has been having several episodes of sundowning.   Recently he was hospitalized at [facility] for same issues from 9/1-914/23. Daughter was unable to provide sufficient history regarding hospitalization.   Patient's son, Jose has been taking care of him and knows more about his medical hx however patient's daughter states she is taking over now and did not want to bother Jose at this time. Patient was also recently seen at [facility] ED for episodes of agitation and was told to follow up with outpatient Psychiatrist but daughter states that likely did not happened because of his increased agitation. Patient became agitated today and staff had to call 911 for further evaluation. The facility as well as his children feel that his current psych medication regimen is not sufficient to keep him calm. Patient presented with agitation requiring PRNs. Currently lethargic and confused,

## 2023-09-21 NOTE — PROGRESS NOTE ADULT - PROBLEM SELECTOR PLAN 1
Acute paranoia in setting of hx of dementia, states people are following him and trying to take his money  - Recent hospitalization [facility] for similar problem  - continue home zyprexa, depakote, amitriptyline, duloxetine    - Psych (Dr. Strange) consulted, f/u recs  - SW consulted Acute paranoia in setting of hx of dementia, states people are following him and trying to take his money  - Recent hospitalization [facility] for similar problem  - continue home ZYPREXA 5 mg HS  2.5 mg q AM , Depakote, amitriptyline, duloxetine    - Psych (Dr. Strange) consulted, f/u recs  - SW consulted for d/c plan   ? Psych Hx- ?Psychosis/ Agitation

## 2023-09-22 LAB
ALBUMIN SERPL ELPH-MCNC: 3.3 G/DL — SIGNIFICANT CHANGE UP (ref 3.3–5)
ALP SERPL-CCNC: 87 U/L — SIGNIFICANT CHANGE UP (ref 40–120)
ALT FLD-CCNC: 20 U/L — SIGNIFICANT CHANGE UP (ref 12–78)
ANION GAP SERPL CALC-SCNC: 5 MMOL/L — SIGNIFICANT CHANGE UP (ref 5–17)
AST SERPL-CCNC: 16 U/L — SIGNIFICANT CHANGE UP (ref 15–37)
BILIRUB SERPL-MCNC: 1.5 MG/DL — HIGH (ref 0.2–1.2)
BUN SERPL-MCNC: 16 MG/DL — SIGNIFICANT CHANGE UP (ref 7–23)
CALCIUM SERPL-MCNC: 9.6 MG/DL — SIGNIFICANT CHANGE UP (ref 8.5–10.1)
CHLORIDE SERPL-SCNC: 103 MMOL/L — SIGNIFICANT CHANGE UP (ref 96–108)
CO2 SERPL-SCNC: 35 MMOL/L — HIGH (ref 22–31)
CREAT SERPL-MCNC: 1 MG/DL — SIGNIFICANT CHANGE UP (ref 0.5–1.3)
EGFR: 75 ML/MIN/1.73M2 — SIGNIFICANT CHANGE UP
FOLATE SERPL-MCNC: 11.8 NG/ML — SIGNIFICANT CHANGE UP
GLUCOSE BLDC GLUCOMTR-MCNC: 104 MG/DL — HIGH (ref 70–99)
GLUCOSE BLDC GLUCOMTR-MCNC: 117 MG/DL — HIGH (ref 70–99)
GLUCOSE BLDC GLUCOMTR-MCNC: 120 MG/DL — HIGH (ref 70–99)
GLUCOSE BLDC GLUCOMTR-MCNC: 142 MG/DL — HIGH (ref 70–99)
GLUCOSE SERPL-MCNC: 117 MG/DL — HIGH (ref 70–99)
HCT VFR BLD CALC: 36.8 % — LOW (ref 39–50)
HGB BLD-MCNC: 12.3 G/DL — LOW (ref 13–17)
MCHC RBC-ENTMCNC: 33.2 PG — SIGNIFICANT CHANGE UP (ref 27–34)
MCHC RBC-ENTMCNC: 33.4 GM/DL — SIGNIFICANT CHANGE UP (ref 32–36)
MCV RBC AUTO: 99.2 FL — SIGNIFICANT CHANGE UP (ref 80–100)
NRBC # BLD: 0 /100 WBCS — SIGNIFICANT CHANGE UP (ref 0–0)
PLATELET # BLD AUTO: 196 K/UL — SIGNIFICANT CHANGE UP (ref 150–400)
POTASSIUM SERPL-MCNC: 3.4 MMOL/L — LOW (ref 3.5–5.3)
POTASSIUM SERPL-SCNC: 3.4 MMOL/L — LOW (ref 3.5–5.3)
PROT SERPL-MCNC: 6.8 G/DL — SIGNIFICANT CHANGE UP (ref 6–8.3)
RBC # BLD: 3.71 M/UL — LOW (ref 4.2–5.8)
RBC # FLD: 15.6 % — HIGH (ref 10.3–14.5)
SODIUM SERPL-SCNC: 143 MMOL/L — SIGNIFICANT CHANGE UP (ref 135–145)
VIT B12 SERPL-MCNC: 517 PG/ML — SIGNIFICANT CHANGE UP (ref 232–1245)
WBC # BLD: 6.5 K/UL — SIGNIFICANT CHANGE UP (ref 3.8–10.5)
WBC # FLD AUTO: 6.5 K/UL — SIGNIFICANT CHANGE UP (ref 3.8–10.5)

## 2023-09-22 PROCEDURE — 99231 SBSQ HOSP IP/OBS SF/LOW 25: CPT

## 2023-09-22 RX ORDER — LANOLIN ALCOHOL/MO/W.PET/CERES
5 CREAM (GRAM) TOPICAL AT BEDTIME
Refills: 0 | Status: DISCONTINUED | OUTPATIENT
Start: 2023-09-22 | End: 2023-10-09

## 2023-09-22 RX ORDER — POTASSIUM CHLORIDE 20 MEQ
20 PACKET (EA) ORAL ONCE
Refills: 0 | Status: COMPLETED | OUTPATIENT
Start: 2023-09-22 | End: 2023-09-22

## 2023-09-22 RX ORDER — POTASSIUM CHLORIDE 20 MEQ
20 PACKET (EA) ORAL ONCE
Refills: 0 | Status: DISCONTINUED | OUTPATIENT
Start: 2023-09-22 | End: 2023-09-22

## 2023-09-22 RX ORDER — LANOLIN ALCOHOL/MO/W.PET/CERES
3 CREAM (GRAM) TOPICAL ONCE
Refills: 0 | Status: COMPLETED | OUTPATIENT
Start: 2023-09-22 | End: 2023-09-22

## 2023-09-22 RX ORDER — DIVALPROEX SODIUM 500 MG/1
500 TABLET, DELAYED RELEASE ORAL
Refills: 0 | Status: DISCONTINUED | OUTPATIENT
Start: 2023-09-22 | End: 2023-10-09

## 2023-09-22 RX ADMIN — Medication 650 MILLIGRAM(S): at 21:25

## 2023-09-22 RX ADMIN — BUDESONIDE AND FORMOTEROL FUMARATE DIHYDRATE 2 PUFF(S): 160; 4.5 AEROSOL RESPIRATORY (INHALATION) at 06:18

## 2023-09-22 RX ADMIN — MIDODRINE HYDROCHLORIDE 5 MILLIGRAM(S): 2.5 TABLET ORAL at 17:18

## 2023-09-22 RX ADMIN — Medication 1 APPLICATION(S): at 06:17

## 2023-09-22 RX ADMIN — DIVALPROEX SODIUM 125 MILLIGRAM(S): 500 TABLET, DELAYED RELEASE ORAL at 06:16

## 2023-09-22 RX ADMIN — HEPARIN SODIUM 5000 UNIT(S): 5000 INJECTION INTRAVENOUS; SUBCUTANEOUS at 21:27

## 2023-09-22 RX ADMIN — TIOTROPIUM BROMIDE 2 PUFF(S): 18 CAPSULE ORAL; RESPIRATORY (INHALATION) at 06:18

## 2023-09-22 RX ADMIN — Medication 3 MILLIGRAM(S): at 00:24

## 2023-09-22 RX ADMIN — SACUBITRIL AND VALSARTAN 1 TABLET(S): 24; 26 TABLET, FILM COATED ORAL at 06:17

## 2023-09-22 RX ADMIN — Medication 650 MILLIGRAM(S): at 07:06

## 2023-09-22 RX ADMIN — MIDODRINE HYDROCHLORIDE 5 MILLIGRAM(S): 2.5 TABLET ORAL at 12:10

## 2023-09-22 RX ADMIN — DULOXETINE HYDROCHLORIDE 30 MILLIGRAM(S): 30 CAPSULE, DELAYED RELEASE ORAL at 06:16

## 2023-09-22 RX ADMIN — Medication 650 MILLIGRAM(S): at 06:17

## 2023-09-22 RX ADMIN — Medication 1 APPLICATION(S): at 17:18

## 2023-09-22 RX ADMIN — Medication 650 MILLIGRAM(S): at 13:06

## 2023-09-22 RX ADMIN — DULOXETINE HYDROCHLORIDE 30 MILLIGRAM(S): 30 CAPSULE, DELAYED RELEASE ORAL at 16:43

## 2023-09-22 RX ADMIN — OLANZAPINE 5 MILLIGRAM(S): 15 TABLET, FILM COATED ORAL at 21:25

## 2023-09-22 RX ADMIN — DAPAGLIFLOZIN 10 MILLIGRAM(S): 10 TABLET, FILM COATED ORAL at 12:09

## 2023-09-22 RX ADMIN — ATORVASTATIN CALCIUM 40 MILLIGRAM(S): 80 TABLET, FILM COATED ORAL at 21:26

## 2023-09-22 RX ADMIN — SPIRONOLACTONE 25 MILLIGRAM(S): 25 TABLET, FILM COATED ORAL at 06:16

## 2023-09-22 RX ADMIN — Medication 20 MILLIEQUIVALENT(S): at 12:09

## 2023-09-22 RX ADMIN — OLANZAPINE 2.5 MILLIGRAM(S): 15 TABLET, FILM COATED ORAL at 12:10

## 2023-09-22 RX ADMIN — HEPARIN SODIUM 5000 UNIT(S): 5000 INJECTION INTRAVENOUS; SUBCUTANEOUS at 06:17

## 2023-09-22 RX ADMIN — Medication 5 MILLIGRAM(S): at 21:26

## 2023-09-22 RX ADMIN — HEPARIN SODIUM 5000 UNIT(S): 5000 INJECTION INTRAVENOUS; SUBCUTANEOUS at 13:03

## 2023-09-22 RX ADMIN — Medication 40 MILLIGRAM(S): at 06:17

## 2023-09-22 RX ADMIN — MIDODRINE HYDROCHLORIDE 5 MILLIGRAM(S): 2.5 TABLET ORAL at 06:16

## 2023-09-22 RX ADMIN — Medication 650 MILLIGRAM(S): at 22:20

## 2023-09-22 RX ADMIN — Medication 650 MILLIGRAM(S): at 14:06

## 2023-09-22 RX ADMIN — DIVALPROEX SODIUM 500 MILLIGRAM(S): 500 TABLET, DELAYED RELEASE ORAL at 17:18

## 2023-09-22 RX ADMIN — PANTOPRAZOLE SODIUM 40 MILLIGRAM(S): 20 TABLET, DELAYED RELEASE ORAL at 06:16

## 2023-09-22 RX ADMIN — Medication 25 MILLIGRAM(S): at 06:16

## 2023-09-22 NOTE — SOCIAL WORK PROGRESS NOTE - NSSWPROGRESSNOTE_GEN_ALL_CORE
JOHN spoke with Karine Vasquez [phone] at [facility] to discuss pt., referral sent to her email of documents requested to [email or web address] for review.

## 2023-09-22 NOTE — PROGRESS NOTE ADULT - PROBLEM SELECTOR PLAN 5
Chronic  - Hold home metformin, farxiga inpatient  -Low ISS  -Regular finger sticks  -DASH/TLC Consistent carb diet  -Hypoglycemic protocol.  - F/u AM A1c: 6.5

## 2023-09-22 NOTE — SOCIAL WORK PROGRESS NOTE - NSSWPROGRESSNOTE_GEN_ALL_CORE
JOHN spoke with pt. son Toan to discuss d/c plan and was encouraged to speak with his sister, Arianne.  JOHN left a voicemail for pt. daughter Arianne to follow up on d/c plan for pt.

## 2023-09-22 NOTE — PROGRESS NOTE ADULT - SUBJECTIVE AND OBJECTIVE BOX
Patient is a 83y old  Male who presents with a chief complaint of paranoia (21 Sep 2023 16:19)    HPI:  82y/o M PMH dementia, HFrEF s/p PPM, HTN, HLD  presents with increased agitation. History obtained from patient's daughter Arianne over phone.   Patient moved from [place] to [place] ~2months ago and has been having issues with increased paranoia for the past month. He stays at a [facility] older assisted living community where he has been having several episodes of sundowning.   Recently he was hospitalized at [facility] for same issues from 9/1-914/23. Daughter was unable to provide sufficient history regarding hospitalization.   Patient's son, Jose has been taking care of him and knows more about his medical hx however patient's daughter states she is taking over now and did not want to bother Jose at this time.  Patient was also recently seen at [facility] ED for episodes of agitation and was told to follow up with outpatient Psychiatrist but daughter states that likely did not happened because of his increased agitation.  Patient became agitated today and staff had to call 911 for further evaluation. The facility as well as his children feel that his current psych medication regimen is not sufficient to keep him calm.   Per review of recent dispensary history, he was seeing a psychiatric NP (Barbara Bravo) in [place], [place].   Of note, he had placement of PPM in late August at [facility]. Per his daughter, his LVEF at that time was 20% before and after PPM placement.  Currently, patient states his b/l legs feel itchy but otherwise he feels fine and is agreeable.  Denies fevers, chills. sob, cp, n/v/d.    ED Course   T 97.4 F  /79 RR 18 SpO2 95% on RA  Labs H/H 12.1/37.5 Glu   UA: >1000 glucose  UTox: negaitve     In the ED, given tylenol 650mg x1 , zyprexa 5mg IVP x1  (20 Sep 2023 21:58)    INTERVAL HPI:  9/21: Pt seen and examined, sitting in chair with 1 to 1 present. Patient expressed concern about not receiving his pantoprazole, stating he cant digest his food without it, denies n/v, epigastric pain or burning. When asked about why pt came to hospital, states that he was being followed and he called the police to come help him. Says he lives with his son Jose. EMS brought patient in from  and over [facility]. Additionally states that one pill he was given this morning by staff was "phony" so he didn't take it. Contacted pt's pharmacy [facility], numerous scripts filled in [place] and [place]. Per night team sign out, daughter states that pt has been in and out of many ERs and has been started on many different medications so there are a lot of recent meds that he does not currently take to the best of her knowledge. Per daughter, her brothers are tired of assisting with their father given history of difficulties with him. She is willing to assist but does not want to be too involved and asks us to not contact her brothers. Psychiatry consulted and to see patient. JOHN/CM updated.   Soren Krishnan [phone]  9/22: Pt seen and examined at bedside. xyprexa dose increased to 5mg bedtime and 2.5mg in AM starting yesterday. Marked improvement in demeanor. A&Ox3 (person, time, place). States he is in a "police hospital" and that he came here because he asked some man in their car to call the police because he was being followed by an aide that his son hired. Said he came from "[facility]" [facility] and over [facility]. No complaints or concerns today. States he has to get things ready to move back to [place]. Psych is following. DSC planning pending placement as his previous community does not want pt to return and family does not want to take patient.    OVERNIGHT EVENTS: no acute ovn events      Home Medications:  ALBUTEROL 0.083%(2.5MG/3ML) 30X3ML: USE 3 ML VIA NEBULIZER EVERY 6 HOURS AS NEEDED (20 Sep 2023 21:58)  AMITRIPTYLINE 10MG TABLETS: TAKE 1/2 TABLET BY MOUTH EVERY NIGHT AT BEDTIME. MAY INCREASE TO WHOLE TABLET THE 2 ND WEEK (20 Sep 2023 21:58)  ATORVASTATIN 40MG TABLETS: TAKE 1 TABLET BY MOUTH EVERY DAY (20 Sep 2023 21:58)  DIVALPROEX DELAYED RELEASE 125MG TB: TAKE 1 TABLET BY MOUTH THREE TIMES DAILY (20 Sep 2023 21:58)  DULOXETINE DR 30MG CAPSULES: TAKE 1 CAPSULE BY MOUTH WITH BREAKFAST AND 1 CAPSULE IN THE AFTER NOON NO LATER THAN 4 PM (20 Sep 2023 21:58)  ENTRESTO 24-26MG TABLETS: TAKE 1 TABLET BY MOUTH TWICE DAILY (20 Sep 2023 21:58)  FARXIGA 10MG TABLETS:  (20 Sep 2023 21:58)  FUROSEMIDE 40MG TABLETS: TAKE 1 TABLET BY MOUTH EVERY DAY (20 Sep 2023 21:58)  METFORMIN 500MG TABLETS: TAKE 1 TABLET BY MOUTH EVERY MORNING AND EVERY EVENING WITH MEALS. (20 Sep 2023 21:58)  METOPROLOL ER SUCCINATE 25MG TABS: TAKE 1 TABLET BY MOUTH EVERY DAY (20 Sep 2023 21:58)  MIDODRINE 5MG TABLETS: TAKE 1 TABLET BY MOUTH THREE TIMES DAILY (20 Sep 2023 21:58)  OLANZAPINE 2.5MG TABLETS: TAKE 1 TABLET BY MOUTH AT BEDTIME (20 Sep 2023 21:58)  POTASSIUM CL 20MEQ ER TABLETS: TAKE 1 TABLET BY MOUTH EVERY DAY WITH FOOD FOR 4 DAYS (20 Sep 2023 21:58)  SPIRONOLACTONE 25MG TABLETS:  (20 Sep 2023 21:58)  TRELEGY ELLIPTA 100-62.5MCG INH 30P: INHALE 1 PUFF BY MOUTH EVERY DAY (20 Sep 2023 21:58)      MEDICATIONS  (STANDING):  ammonium lactate 12% Lotion 1 Application(s) Topical two times a day  atorvastatin 40 milliGRAM(s) Oral at bedtime  budesonide  80 MICROgram(s)/formoterol 4.5 MICROgram(s) Inhaler 2 Puff(s) Inhalation two times a day  dapagliflozin 10 milliGRAM(s) Oral every 24 hours  dextrose 5%. 1000 milliLiter(s) (100 mL/Hr) IV Continuous <Continuous>  dextrose 5%. 1000 milliLiter(s) (50 mL/Hr) IV Continuous <Continuous>  dextrose 50% Injectable 12.5 Gram(s) IV Push once  dextrose 50% Injectable 25 Gram(s) IV Push once  dextrose 50% Injectable 25 Gram(s) IV Push once  divalproex  milliGRAM(s) Oral two times a day  DULoxetine 30 milliGRAM(s) Oral <User Schedule>  furosemide    Tablet 40 milliGRAM(s) Oral every 12 hours  glucagon  Injectable 1 milliGRAM(s) IntraMuscular once  heparin   Injectable 5000 Unit(s) SubCutaneous every 8 hours  insulin lispro (ADMELOG) corrective regimen sliding scale   SubCutaneous three times a day before meals  insulin lispro (ADMELOG) corrective regimen sliding scale   SubCutaneous at bedtime  metoprolol succinate ER 25 milliGRAM(s) Oral daily  midodrine. 5 milliGRAM(s) Oral three times a day  OLANZapine 5 milliGRAM(s) Oral at bedtime  OLANZapine 2.5 milliGRAM(s) Oral daily  pantoprazole    Tablet 40 milliGRAM(s) Oral before breakfast  potassium chloride   Solution 20 milliEquivalent(s) Oral once  sacubitril 24 mG/valsartan 26 mG 1 Tablet(s) Oral two times a day  spironolactone 25 milliGRAM(s) Oral daily  tiotropium 2.5 MICROgram(s) Inhaler 2 Puff(s) Inhalation daily    MEDICATIONS  (PRN):  acetaminophen     Tablet .. 650 milliGRAM(s) Oral every 6 hours PRN Temp greater or equal to 38C (100.4F), Mild Pain (1 - 3)  albuterol    0.083% 2.5 milliGRAM(s) Nebulizer every 6 hours PRN Shortness of Breath and/or Wheezing  dextrose Oral Gel 15 Gram(s) Oral once PRN Blood Glucose LESS THAN 70 milliGRAM(s)/deciliter      Allergies    No Known Allergies    Intolerances        Social History:  Tobacco: former , quit >30 years ago  EtOH: social drinker  Recreational drug use: denies   Lives with: Assisted Living Facility  Ambulates: without assistance  ADLs: needs assistance (20 Sep 2023 21:58)      REVIEW OF SYSTEMS:  CONSTITUTIONAL: No fever, No chills, No fatigue, No myalgia, No Body ache, No Weakness  EYES: No eye pain,  No visual disturbances, No discharge, NO Redness  ENMT:  No ear pain, No nose bleed, No vertigo; No sinus pain, NO throat pain, No Congestion  NECK: No pain, No stiffness  RESPIRATORY: No cough, NO wheezing, No  hemoptysis, NO  shortness of breath  CARDIOVASCULAR: No chest pain, palpitations  GASTROINTESTINAL: No abdominal pain, NO epigastric pain. No nausea, No vomiting; No diarrhea, No constipation. [  ] BM  GENITOURINARY: No dysuria, No frequency, No urgency, No hematuria, NO incontinence  NEUROLOGICAL: No headaches, No dizziness, No numbness, No tingling, No tremors, No weakness  EXT: No Swelling, No Pain, No Edema  SKIN:  [ x ] No itching, burning, rashes, or lesions   MUSCULOSKELETAL: No joint pain ,No Jt swelling; No muscle pain, No back pain, No extremity pain  PSYCHIATRIC: No depression,  No anxiety,  No mood swings ,No difficulty sleeping at night  PAIN SCALE: [x  ] None  [  ] Other-  ROS Unable to obtain due to - [  ] Dementia  [  ] Lethargy [  ] Drowsy [  ] Sedated [  ] non verbal [ x] dismissive and asking provider to get pt his pantoprazole  REST OF REVIEW Of SYSTEM - [  ] Normal       Vital Signs Last 24 Hrs  T(C): 36.6 (22 Sep 2023 05:14), Max: 36.7 (21 Sep 2023 21:36)  T(F): 97.8 (22 Sep 2023 05:14), Max: 98 (21 Sep 2023 21:36)  HR: 98 (22 Sep 2023 05:14) (96 - 101)  BP: 110/72 (22 Sep 2023 05:14) (108/69 - 115/65)  BP(mean): --  RR: 18 (22 Sep 2023 05:14) (18 - 20)  SpO2: 96% (22 Sep 2023 05:14) (94% - 96%)    Parameters below as of 22 Sep 2023 05:14  Patient On (Oxygen Delivery Method): room air      Finger Stick        09-21 @ 07:01  -  09-22 @ 07:00  --------------------------------------------------------  IN: 0 mL / OUT: 1000 mL / NET: -1000 mL        PHYSICAL EXAM:  GENERAL:  [ x ] NAD , [ x ] well appearing, [ x ] Agitated, [  ] Drowsy,  [  ] Lethargy, [  ] confused   HEAD:  [x  ] Normal, [  ] Other  EYES:  [ x ] EOMI, [ x ] PERRLA, [ x ] conjunctiva and sclera clear normal, [  ] Other,  [  ] Pallor,[  ] Discharge  ENMT:  [x  ] Normal, [x  ] Moist mucous membranes, x  ] Good dentition, [x  ] No Thrush  NECK:  [ x ] Supple, [x  ] No JVD, [ x ] Normal thyroid, [  ] Lymphadenopathy [  ] Other  CHEST/LUNG:  [x  ] Clear to auscultation bilaterally, [  ] Breath Sounds equal B/L / Decrease, [  x] poor effort  [ x ] No rales, [ x ] No rhonchi  [  x]  No wheezing,   HEART:  [  x] Regular rate and rhythm, [  ] tachycardia, [  ] Bradycardia,  [  ] irregular  [x  ] No murmurs, No rubs, No gallops, [  ] PPM in place (Mfr:  )  ABDOMEN:  [x  ] Soft, [  x] Nontender, [x  ] Nondistended, [  x] No mass, [x  ] Bowel sounds present, [ x ] obese  NERVOUS SYSTEM:  [  ] Alert & Oriented X3, [  ] Nonfocal  [  ] Confusion  [  ] Encephalopathic [  ] Sedated [  ] Unable to assess, [  ] Dementia [ x ] Other- delusions, paranoia  EXTREMITIES: [ x ] 2+ Peripheral Pulses, No clubbing, No cyanosis,  [ x ]  2 +edema B/L lower EXT. [ x ] PVD stasis skin changes B/L Lower EXT, [x  ] wound-superficial wounds, Scab  on Hand & legs   LYMPH: No lymphadenopathy noted  SKIN:  [ x ] No rashes or lesions, [  ] Pressure Ulcers, [  ] ecchymosis, [  ] Skin Tears, [  ] Other      DIET: Diet, Regular:   Consistent Carbohydrate Evening Snack  DASH/TLC Sodium & Cholesterol Restricted (09-20-23 @ 22:36)      LABS:                        12.3   6.50  )-----------( 196      ( 22 Sep 2023 06:42 )             36.8     22 Sep 2023 06:42    143    |  103    |  16     ----------------------------<  117    3.4     |  35     |  1.00     Ca    9.6        22 Sep 2023 06:42    TPro  6.8    /  Alb  3.3    /  TBili  1.5    /  DBili  x      /  AST  16     /  ALT  20     /  AlkPhos  87     22 Sep 2023 06:42      Urinalysis Basic - ( 22 Sep 2023 06:42 )    Color: x / Appearance: x / SG: x / pH: x  Gluc: 117 mg/dL / Ketone: x  / Bili: x / Urobili: x   Blood: x / Protein: x / Nitrite: x   Leuk Esterase: x / RBC: x / WBC x   Sq Epi: x / Non Sq Epi: x / Bacteria: x        Culture Results:   <10,000 CFU/mL Normal Urogenital Kayleigh (09-17 @ 13:40)                  Culture - Urine (collected 17 Sep 2023 13:40)  Source: Clean Catch Clean Catch (Midstream)  Final Report (18 Sep 2023 13:53):    <10,000 CFU/mL Normal Urogenital Kayleigh         Anemia Panel:  Vitamin B12, Serum: 510 pg/mL (09-21-23 @ 06:55)  Folate, Serum: 17.1 ng/mL (09-21-23 @ 06:55)      Thyroid Panel:  T4, Serum: 6.5 ug/dL (09-21-23 @ 06:55)  Thyroid Stimulating Hormone, Serum: 2.74 uIU/mL (09-21-23 @ 06:55)  Thyroid Stimulating Hormone, Serum: 2.66 uIU/mL (09-20-23 @ 19:50)  Thyroid Stimulating Hormone, Serum: 3.76 uIU/mL (09-17-23 @ 12:43)                RADIOLOGY & ADDITIONAL TESTS:      HEALTH ISSUES - PROBLEM Dx:  Paranoia    Need for prophylactic measure    Medication management    Anemia    HFrEF (heart failure with reduced ejection fraction)    HTN (hypertension)    HLD (hyperlipidemia)    DM2 (diabetes mellitus, type 2)            Consultant(s) Notes Reviewed:  [ x ] YES     Care Discussed with [X] Consultants  [ x ] Patient  [x  ] Family [  ] HCP [  ]   [  ] Social Service  [  ] RN, [  ] Physical Therapy,[  ] Palliative care team  DVT PPX: [  ] Lovenox, [  x] S C Heparin, [  ] Coumadin, [  ] Xarelto, [  ] Eliquis, [  ] Pradaxa, [  ] IV Heparin drip, [  ] SCD [  ] Contraindication 2 to GI Bleed,[  ] Ambulation [  ] Contraindicated 2 to  bleed [  ] Contraindicated 2 to Brain Bleed  Advanced directive: [ x ] None, [  ] DNR/DNI Patient is a 83y old  Male who presents with a chief complaint of paranoia (21 Sep 2023 16:19)    HPI:  82y/o M PMH dementia, HFrEF s/p PPM, HTN, HLD  presents with increased agitation. History obtained from patient's daughter Arianne over phone.   Patient moved from [place] to [place] ~2months ago and has been having issues with increased paranoia for the past month. He stays at a 55 [facility] older assisted living community where he has been having several episodes of sundowning.   Recently he was hospitalized at [facility] for same issues from 9/1-914/23. Daughter was unable to provide sufficient history regarding hospitalization.   Patient's son, Jose has been taking care of him and knows more about his medical hx however patient's daughter states she is taking over now and did not want to bother Jose at this time.  Patient was also recently seen at [facility] ED for episodes of agitation and was told to follow up with outpatient Psychiatrist but daughter states that likely did not happened because of his increased agitation.  Patient became agitated today and staff had to call 911 for further evaluation. The facility as well as his children feel that his current psych medication regimen is not sufficient to keep him calm.   Per review of recent dispensary history, he was seeing a psychiatric NP (Barbara Bravo) in [place], [place].   Of note, he had placement of PPM in late August at [facility]. Per his daughter, his LVEF at that time was 20% before and after PPM placement.  Currently, patient states his b/l legs feel itchy but otherwise he feels fine and is agreeable.  Denies fevers, chills. sob, cp, n/v/d.    ED Course   T 97.4 F  /79 RR 18 SpO2 95% on RA  Labs H/H 12.1/37.5 Glu   UA: >1000 glucose  UTox: negaitve     In the ED, given tylenol 650mg x1 , zyprexa 5mg IVP x1  (20 Sep 2023 21:58)    INTERVAL HPI:  9/21: Pt seen and examined, sitting in chair with 1 to 1 present. Patient expressed concern about not receiving his pantoprazole, stating he cant digest his food without it, denies n/v, epigastric pain or burning. When asked about why pt came to hospital, states that he was being followed and he called the police to come help him. Says he lives with his son Jose. EMS brought patient in from [facility]. Additionally states that one pill he was given this morning by staff was "phony" so he didn't take it. Contacted pt's pharmacy [facility], numerous scripts filled in [place] and [place]. Per night team sign out, daughter states that pt has been in and out of many ERs and has been started on many different medications so there are a lot of recent meds that he does not currently take to the best of her knowledge. Per daughter, her brothers are tired of assisting with their father given history of difficulties with him. She is willing to assist but does not want to be too involved and asks us to not contact her brothers. Psychiatry consulted and to see patient. JOHN/CM updated.   Soren Krishnan [phone]  9/22: Pt seen and examined at bedside. xyprexa dose increased to 5mg bedtime and 2.5mg in AM starting yesterday. Marked improvement in demeanor. A&Ox3 (person, time, place). States he is in a "police hospital" and that he came here because he asked some man in their car to call the police because he was being followed by an aide that his son hired. Said he came from "[facility]" [facility]. No complaints or concerns today. States he has to get things ready to move back to [place]. Psych is following. DSC planning pending placement as his previous community does not want pt to return and family does not want to take patient. Replace PO K     OVERNIGHT EVENTS: no acute ovn events      Home Medications:  ALBUTEROL 0.083%(2.5MG/3ML) 30X3ML: USE 3 ML VIA NEBULIZER EVERY 6 HOURS AS NEEDED (20 Sep 2023 21:58)  AMITRIPTYLINE 10MG TABLETS: TAKE 1/2 TABLET BY MOUTH EVERY NIGHT AT BEDTIME. MAY INCREASE TO WHOLE TABLET THE 2 ND WEEK (20 Sep 2023 21:58)  ATORVASTATIN 40MG TABLETS: TAKE 1 TABLET BY MOUTH EVERY DAY (20 Sep 2023 21:58)  DIVALPROEX DELAYED RELEASE 125MG TB: TAKE 1 TABLET BY MOUTH THREE TIMES DAILY (20 Sep 2023 21:58)  DULOXETINE DR 30MG CAPSULES: TAKE 1 CAPSULE BY MOUTH WITH BREAKFAST AND 1 CAPSULE IN THE AFTER NOON NO LATER THAN 4 PM (20 Sep 2023 21:58)  ENTRESTO 24-26MG TABLETS: TAKE 1 TABLET BY MOUTH TWICE DAILY (20 Sep 2023 21:58)  FARXIGA 10MG TABLETS:  (20 Sep 2023 21:58)  FUROSEMIDE 40MG TABLETS: TAKE 1 TABLET BY MOUTH EVERY DAY (20 Sep 2023 21:58)  METFORMIN 500MG TABLETS: TAKE 1 TABLET BY MOUTH EVERY MORNING AND EVERY EVENING WITH MEALS. (20 Sep 2023 21:58)  METOPROLOL ER SUCCINATE 25MG TABS: TAKE 1 TABLET BY MOUTH EVERY DAY (20 Sep 2023 21:58)  MIDODRINE 5MG TABLETS: TAKE 1 TABLET BY MOUTH THREE TIMES DAILY (20 Sep 2023 21:58)  OLANZAPINE 2.5MG TABLETS: TAKE 1 TABLET BY MOUTH AT BEDTIME (20 Sep 2023 21:58)  POTASSIUM CL 20MEQ ER TABLETS: TAKE 1 TABLET BY MOUTH EVERY DAY WITH FOOD FOR 4 DAYS (20 Sep 2023 21:58)  SPIRONOLACTONE 25MG TABLETS:  (20 Sep 2023 21:58)  TRELEGY ELLIPTA 100-62.5MCG INH 30P: INHALE 1 PUFF BY MOUTH EVERY DAY (20 Sep 2023 21:58)      MEDICATIONS  (STANDING):  ammonium lactate 12% Lotion 1 Application(s) Topical two times a day  atorvastatin 40 milliGRAM(s) Oral at bedtime  budesonide  80 MICROgram(s)/formoterol 4.5 MICROgram(s) Inhaler 2 Puff(s) Inhalation two times a day  dapagliflozin 10 milliGRAM(s) Oral every 24 hours  dextrose 5%. 1000 milliLiter(s) (100 mL/Hr) IV Continuous <Continuous>  dextrose 5%. 1000 milliLiter(s) (50 mL/Hr) IV Continuous <Continuous>  dextrose 50% Injectable 12.5 Gram(s) IV Push once  dextrose 50% Injectable 25 Gram(s) IV Push once  dextrose 50% Injectable 25 Gram(s) IV Push once  divalproex  milliGRAM(s) Oral two times a day  DULoxetine 30 milliGRAM(s) Oral <User Schedule>  furosemide    Tablet 40 milliGRAM(s) Oral every 12 hours  glucagon  Injectable 1 milliGRAM(s) IntraMuscular once  heparin   Injectable 5000 Unit(s) SubCutaneous every 8 hours  insulin lispro (ADMELOG) corrective regimen sliding scale   SubCutaneous three times a day before meals  insulin lispro (ADMELOG) corrective regimen sliding scale   SubCutaneous at bedtime  metoprolol succinate ER 25 milliGRAM(s) Oral daily  midodrine. 5 milliGRAM(s) Oral three times a day  OLANZapine 5 milliGRAM(s) Oral at bedtime  OLANZapine 2.5 milliGRAM(s) Oral daily  pantoprazole    Tablet 40 milliGRAM(s) Oral before breakfast  potassium chloride   Solution 20 milliEquivalent(s) Oral once  sacubitril 24 mG/valsartan 26 mG 1 Tablet(s) Oral two times a day  spironolactone 25 milliGRAM(s) Oral daily  tiotropium 2.5 MICROgram(s) Inhaler 2 Puff(s) Inhalation daily    MEDICATIONS  (PRN):  acetaminophen     Tablet .. 650 milliGRAM(s) Oral every 6 hours PRN Temp greater or equal to 38C (100.4F), Mild Pain (1 - 3)  albuterol    0.083% 2.5 milliGRAM(s) Nebulizer every 6 hours PRN Shortness of Breath and/or Wheezing  dextrose Oral Gel 15 Gram(s) Oral once PRN Blood Glucose LESS THAN 70 milliGRAM(s)/deciliter      Allergies    No Known Allergies    Intolerances        Social History:  Tobacco: former , quit >30 years ago  EtOH: social drinker  Recreational drug use: denies   Lives with: Assisted Living Facility  Ambulates: without assistance  ADLs: needs assistance (20 Sep 2023 21:58)      REVIEW OF SYSTEMS: i am OK  CONSTITUTIONAL: No fever, No chills, No fatigue, No myalgia, No Body ache, No Weakness  EYES: No eye pain,  No visual disturbances, No discharge, NO Redness  ENMT:  No ear pain, No nose bleed, No vertigo; No sinus pain, NO throat pain, No Congestion  NECK: No pain, No stiffness  RESPIRATORY: No cough, NO wheezing, No  hemoptysis, NO  shortness of breath  CARDIOVASCULAR: No chest pain, palpitations  GASTROINTESTINAL: No abdominal pain, NO epigastric pain. No nausea, No vomiting; No diarrhea, No constipation. [  ] BM  GENITOURINARY: No dysuria, No frequency, No urgency, No hematuria, NO incontinence  NEUROLOGICAL: No headaches, No dizziness, No numbness, No tingling, No tremors, No weakness  EXT: No Swelling, No Pain, No Edema  SKIN:  [ x ] No itching, burning, rashes, or lesions   MUSCULOSKELETAL: No joint pain ,No Jt swelling; No muscle pain, No back pain, No extremity pain  PSYCHIATRIC: No depression,  No anxiety,  No mood swings ,No difficulty sleeping at night  PAIN SCALE: [x  ] None  [  ] Other-  ROS Unable to obtain due to - [  ] Dementia  [  ] Lethargy [  ] Drowsy [  ] Sedated [  ] non verbal [ x] dismissive and asking provider to get pt his pantoprazole  REST OF REVIEW Of SYSTEM - [ x ] Normal       Vital Signs Last 24 Hrs  T(C): 36.6 (22 Sep 2023 05:14), Max: 36.7 (21 Sep 2023 21:36)  T(F): 97.8 (22 Sep 2023 05:14), Max: 98 (21 Sep 2023 21:36)  HR: 98 (22 Sep 2023 05:14) (96 - 101)  BP: 110/72 (22 Sep 2023 05:14) (108/69 - 115/65)  BP(mean): --  RR: 18 (22 Sep 2023 05:14) (18 - 20)  SpO2: 96% (22 Sep 2023 05:14) (94% - 96%)    Parameters below as of 22 Sep 2023 05:14  Patient On (Oxygen Delivery Method): room air      Finger Stick        09-21 @ 07:01  -  09-22 @ 07:00  --------------------------------------------------------  IN: 0 mL / OUT: 1000 mL / NET: -1000 mL        PHYSICAL EXAM:  GENERAL:  [ x ] NAD , [ x ] well appearing, [ x ] Agitated, [  ] Drowsy,  [  ] Lethargy, [  ] confused   HEAD:  [x  ] Normal, [  ] Other  EYES:  [ x ] EOMI, [ x ] PERRLA, [ x ] conjunctiva and sclera clear normal, [  ] Other,  [  ] Pallor,[  ] Discharge  ENMT:  [x  ] Normal, [x  ] Moist mucous membranes, x  ] Good dentition, [x  ] No Thrush  NECK:  [ x ] Supple, [x  ] No JVD, [ x ] Normal thyroid, [  ] Lymphadenopathy [  ] Other  CHEST/LUNG:  [x  ] Clear to auscultation bilaterally, [  ] Breath Sounds equal B/L / Decrease, [  x] poor effort  [ x ] No rales, [ x ] No rhonchi  [  x]  No wheezing,   HEART:  [  x] Regular rate and rhythm, [  ] tachycardia, [  ] Bradycardia,  [  ] irregular  [x  ] No murmurs, No rubs, No gallops, [  ] PPM in place (Mfr:  )  ABDOMEN:  [x  ] Soft, [  x] Nontender, [x  ] Nondistended, [  x] No mass, [x  ] Bowel sounds present, [ x ] obese  NERVOUS SYSTEM:  [  ] Alert & Oriented X3, [x  ] Nonfocal  [  ] Confusion  [  ] Encephalopathic [  ] Sedated [  ] Unable to assess, [  ] Dementia [ x ] Other- delusions, paranoia  EXTREMITIES: [ x ] 2+ Peripheral Pulses, No clubbing, No cyanosis,  [ x ]  2 +edema B/L lower EXT. [ x ] PVD stasis skin changes B/L Lower EXT, [x  ] wound-superficial wounds, Scab  on Hand & legs   LYMPH: No lymphadenopathy noted  SKIN:  [ x ] No rashes or lesions, [  ] Pressure Ulcers, [  ] ecchymosis, [  ] Skin Tears, [  ] Other      DIET: Diet, Regular:   Consistent Carbohydrate Evening Snack  DASH/TLC Sodium & Cholesterol Restricted (09-20-23 @ 22:36)      LABS:                        12.3   6.50  )-----------( 196      ( 22 Sep 2023 06:42 )             36.8     22 Sep 2023 06:42    143    |  103    |  16     ----------------------------<  117    3.4     |  35     |  1.00     Ca    9.6        22 Sep 2023 06:42    TPro  6.8    /  Alb  3.3    /  TBili  1.5    /  DBili  x      /  AST  16     /  ALT  20     /  AlkPhos  87     22 Sep 2023 06:42      Urinalysis Basic - ( 22 Sep 2023 06:42 )    Color: x / Appearance: x / SG: x / pH: x  Gluc: 117 mg/dL / Ketone: x  / Bili: x / Urobili: x   Blood: x / Protein: x / Nitrite: x   Leuk Esterase: x / RBC: x / WBC x   Sq Epi: x / Non Sq Epi: x / Bacteria: x        Culture Results:   <10,000 CFU/mL Normal Urogenital Kayleigh (09-17 @ 13:40)      Culture - Urine (collected 17 Sep 2023 13:40)  Source: Clean Catch Clean Catch (Midstream)  Final Report (18 Sep 2023 13:53):    <10,000 CFU/mL Normal Urogenital Kayleigh         Anemia Panel:  Vitamin B12, Serum: 510 pg/mL (09-21-23 @ 06:55)  Folate, Serum: 17.1 ng/mL (09-21-23 @ 06:55)      Thyroid Panel:  T4, Serum: 6.5 ug/dL (09-21-23 @ 06:55)  Thyroid Stimulating Hormone, Serum: 2.74 uIU/mL (09-21-23 @ 06:55)  Thyroid Stimulating Hormone, Serum: 2.66 uIU/mL (09-20-23 @ 19:50)  Thyroid Stimulating Hormone, Serum: 3.76 uIU/mL (09-17-23 @ 12:43)      RADIOLOGY & ADDITIONAL TESTS: NONE      HEALTH ISSUES - PROBLEM Dx:  Paranoia    Need for prophylactic measure    Medication management    Anemia    HFrEF (heart failure with reduced ejection fraction)    HTN (hypertension)    HLD (hyperlipidemia)    DM2 (diabetes mellitus, type 2)            Consultant(s) Notes Reviewed:  [ x ] YES     Care Discussed with [X] Consultants  [ x ] Patient  [x  ] Family [  ] HCP [  ]   [ x ] Social Service  [ x ] RN, [  ] Physical Therapy,[  ] Palliative care team  DVT PPX: [  ] Lovenox, [  x] S C Heparin, [  ] Coumadin, [  ] Xarelto, [  ] Eliquis, [  ] Pradaxa, [  ] IV Heparin drip, [  ] SCD [  ] Contraindication 2 to GI Bleed,[  ] Ambulation [  ] Contraindicated 2 to  bleed [  ] Contraindicated 2 to Brain Bleed  Advanced directive: [ x ] None, [  ] DNR/DNI

## 2023-09-22 NOTE — PROGRESS NOTE ADULT - PROBLEM SELECTOR PLAN 4
Discussed patient medication list with daughter (Arianne) who confirmed most medications found on surescripts recent dispensary history) however she is not sure of any changes that may have happened when he was hospitalized or when he went to the ED  - Patient has list with him, copy in chart however unclear if that list is up to date  - Current med rec completed based on recent dispensary history  - [facility] pharmacy called: additional medications from pharmacy include allopurinol 300qD, lisinopril 20mg qD, mirabegron 25mg qD, trazodone 50mg qD at bedtime. medications not mentioned by daughter. daughter notes pt has been to numerous different hospitals/ERs and has come back with numerous different medications with no follow up  - plan to hold these additional meds, pending psych consult for use of trazodone

## 2023-09-22 NOTE — PROGRESS NOTE ADULT - ATTENDING COMMENTS
82y/o M PMH dementia, DM2, HFrEF s/p PPM, HTN, HLD presents with increased agitation.  Admit for increased paranoia & Placement .  pt seen, examined, case & care plan d/w pt, residents at detail. d/w dtr Arianne on 9/21 at [facility] about pt's condition, Care plan Meds   Consult;  Psych Dr Strange D/W Today, -Dr Strange to d/w dtr -& adjusted Psych meds   Social service d/w about d/c planning to assisted living   PT Eval.  PO diet  AM labs   On 1:1 to continue .   Total care time is 60 minutes .

## 2023-09-22 NOTE — PROGRESS NOTE ADULT - PROBLEM SELECTOR PLAN 6
BP stable on admission   - Per med dispensary hx patient is on midodrine as well for possible orthostatic hypotension  -Continue home metoprolol and midodrine   -Monitor hemodynamics BP stable on admission   - Per med dispensary hx patient is on midodrine as well for possible orthostatic hypotension  -Continue home metoprolol, Entresto, Spironolactone  and midodrine   -Monitor hemodynamics

## 2023-09-22 NOTE — PROGRESS NOTE ADULT - PROBLEM SELECTOR PLAN 2
Chronic s/p PPM (paced ~end of Aug 2023 at [place] per daughter )  - TTE (8/2023) per daughter showed EF ~20%  - Continue Entresto, Trelegy, metoprolol, spironolactone, Farxiga   - Increase home Lasix to IV Lasix 20mg q12h  - AM TSH, T3 , T4  - Dash/ TLC Diet Chronic s/p PPM (paced ~end of Aug 2023 at [place] per daughter )  - TTE (8/2023) per daughter showed EF ~20%  - Continue Entresto, , metoprolol XL , spironolactone, Farxiga   - Continue  Lasix 40 mg 2x day   - AM TSH, T3 , T4  - Dash/ TLC Diet

## 2023-09-22 NOTE — SOCIAL WORK PROGRESS NOTE - NSSWPROGRESSNOTE_GEN_ALL_CORE
SW spoke with pt. daughter Arianne to discuss d/c plan for pt. As per daughter, they continue to work with Laith at [facility] ([phone]) and are seeing a few ALFs this weekend for potential admission. Pt. daughter reports that she does not feel comfortable with pt. dc nor will he be accepted to an [facility] until his medications stabilize his behavior. SW to follow for support and dc planning.

## 2023-09-23 LAB
ALBUMIN SERPL ELPH-MCNC: 3.6 G/DL — SIGNIFICANT CHANGE UP (ref 3.3–5)
ALP SERPL-CCNC: 90 U/L — SIGNIFICANT CHANGE UP (ref 40–120)
ALT FLD-CCNC: 22 U/L — SIGNIFICANT CHANGE UP (ref 12–78)
ANION GAP SERPL CALC-SCNC: 8 MMOL/L — SIGNIFICANT CHANGE UP (ref 5–17)
AST SERPL-CCNC: 22 U/L — SIGNIFICANT CHANGE UP (ref 15–37)
BILIRUB SERPL-MCNC: 1.2 MG/DL — SIGNIFICANT CHANGE UP (ref 0.2–1.2)
BUN SERPL-MCNC: 17 MG/DL — SIGNIFICANT CHANGE UP (ref 7–23)
CALCIUM SERPL-MCNC: 10.1 MG/DL — SIGNIFICANT CHANGE UP (ref 8.5–10.1)
CHLORIDE SERPL-SCNC: 104 MMOL/L — SIGNIFICANT CHANGE UP (ref 96–108)
CO2 SERPL-SCNC: 32 MMOL/L — HIGH (ref 22–31)
CREAT SERPL-MCNC: 1 MG/DL — SIGNIFICANT CHANGE UP (ref 0.5–1.3)
EGFR: 75 ML/MIN/1.73M2 — SIGNIFICANT CHANGE UP
GLUCOSE BLDC GLUCOMTR-MCNC: 103 MG/DL — HIGH (ref 70–99)
GLUCOSE BLDC GLUCOMTR-MCNC: 105 MG/DL — HIGH (ref 70–99)
GLUCOSE BLDC GLUCOMTR-MCNC: 129 MG/DL — HIGH (ref 70–99)
GLUCOSE BLDC GLUCOMTR-MCNC: 84 MG/DL — SIGNIFICANT CHANGE UP (ref 70–99)
GLUCOSE SERPL-MCNC: 101 MG/DL — HIGH (ref 70–99)
HCT VFR BLD CALC: 41.8 % — SIGNIFICANT CHANGE UP (ref 39–50)
HGB BLD-MCNC: 13.7 G/DL — SIGNIFICANT CHANGE UP (ref 13–17)
MAGNESIUM SERPL-MCNC: 2 MG/DL — SIGNIFICANT CHANGE UP (ref 1.6–2.6)
MCHC RBC-ENTMCNC: 32.5 PG — SIGNIFICANT CHANGE UP (ref 27–34)
MCHC RBC-ENTMCNC: 32.8 GM/DL — SIGNIFICANT CHANGE UP (ref 32–36)
MCV RBC AUTO: 99.1 FL — SIGNIFICANT CHANGE UP (ref 80–100)
NRBC # BLD: 0 /100 WBCS — SIGNIFICANT CHANGE UP (ref 0–0)
PLATELET # BLD AUTO: 229 K/UL — SIGNIFICANT CHANGE UP (ref 150–400)
POTASSIUM SERPL-MCNC: 3.6 MMOL/L — SIGNIFICANT CHANGE UP (ref 3.5–5.3)
POTASSIUM SERPL-SCNC: 3.6 MMOL/L — SIGNIFICANT CHANGE UP (ref 3.5–5.3)
PROT SERPL-MCNC: 7.5 G/DL — SIGNIFICANT CHANGE UP (ref 6–8.3)
RBC # BLD: 4.22 M/UL — SIGNIFICANT CHANGE UP (ref 4.2–5.8)
RBC # FLD: 15.7 % — HIGH (ref 10.3–14.5)
SODIUM SERPL-SCNC: 144 MMOL/L — SIGNIFICANT CHANGE UP (ref 135–145)
WBC # BLD: 6.85 K/UL — SIGNIFICANT CHANGE UP (ref 3.8–10.5)
WBC # FLD AUTO: 6.85 K/UL — SIGNIFICANT CHANGE UP (ref 3.8–10.5)

## 2023-09-23 RX ORDER — POTASSIUM CHLORIDE 20 MEQ
40 PACKET (EA) ORAL ONCE
Refills: 0 | Status: COMPLETED | OUTPATIENT
Start: 2023-09-23 | End: 2023-09-23

## 2023-09-23 RX ORDER — MENTHOL AND METHYL SALICYLATE 10; 30 G/100G; G/100G
1 CREAM TOPICAL THREE TIMES A DAY
Refills: 0 | Status: DISCONTINUED | OUTPATIENT
Start: 2023-09-23 | End: 2023-10-09

## 2023-09-23 RX ADMIN — Medication 40 MILLIEQUIVALENT(S): at 17:36

## 2023-09-23 RX ADMIN — TIOTROPIUM BROMIDE 2 PUFF(S): 18 CAPSULE ORAL; RESPIRATORY (INHALATION) at 06:57

## 2023-09-23 RX ADMIN — SACUBITRIL AND VALSARTAN 1 TABLET(S): 24; 26 TABLET, FILM COATED ORAL at 06:58

## 2023-09-23 RX ADMIN — Medication 650 MILLIGRAM(S): at 20:01

## 2023-09-23 RX ADMIN — Medication 650 MILLIGRAM(S): at 10:27

## 2023-09-23 RX ADMIN — DAPAGLIFLOZIN 10 MILLIGRAM(S): 10 TABLET, FILM COATED ORAL at 12:02

## 2023-09-23 RX ADMIN — DULOXETINE HYDROCHLORIDE 30 MILLIGRAM(S): 30 CAPSULE, DELAYED RELEASE ORAL at 17:36

## 2023-09-23 RX ADMIN — OLANZAPINE 5 MILLIGRAM(S): 15 TABLET, FILM COATED ORAL at 21:05

## 2023-09-23 RX ADMIN — BUDESONIDE AND FORMOTEROL FUMARATE DIHYDRATE 2 PUFF(S): 160; 4.5 AEROSOL RESPIRATORY (INHALATION) at 06:57

## 2023-09-23 RX ADMIN — MENTHOL AND METHYL SALICYLATE 1 APPLICATION(S): 10; 30 CREAM TOPICAL at 21:06

## 2023-09-23 RX ADMIN — Medication 650 MILLIGRAM(S): at 19:31

## 2023-09-23 RX ADMIN — PANTOPRAZOLE SODIUM 40 MILLIGRAM(S): 20 TABLET, DELAYED RELEASE ORAL at 06:58

## 2023-09-23 RX ADMIN — Medication 1 APPLICATION(S): at 06:57

## 2023-09-23 RX ADMIN — HEPARIN SODIUM 5000 UNIT(S): 5000 INJECTION INTRAVENOUS; SUBCUTANEOUS at 14:27

## 2023-09-23 RX ADMIN — HEPARIN SODIUM 5000 UNIT(S): 5000 INJECTION INTRAVENOUS; SUBCUTANEOUS at 06:59

## 2023-09-23 RX ADMIN — Medication 1 APPLICATION(S): at 18:50

## 2023-09-23 RX ADMIN — DULOXETINE HYDROCHLORIDE 30 MILLIGRAM(S): 30 CAPSULE, DELAYED RELEASE ORAL at 06:58

## 2023-09-23 RX ADMIN — HEPARIN SODIUM 5000 UNIT(S): 5000 INJECTION INTRAVENOUS; SUBCUTANEOUS at 21:05

## 2023-09-23 RX ADMIN — Medication 650 MILLIGRAM(S): at 09:27

## 2023-09-23 RX ADMIN — SPIRONOLACTONE 25 MILLIGRAM(S): 25 TABLET, FILM COATED ORAL at 06:59

## 2023-09-23 RX ADMIN — MIDODRINE HYDROCHLORIDE 5 MILLIGRAM(S): 2.5 TABLET ORAL at 17:36

## 2023-09-23 RX ADMIN — ATORVASTATIN CALCIUM 40 MILLIGRAM(S): 80 TABLET, FILM COATED ORAL at 21:05

## 2023-09-23 RX ADMIN — MIDODRINE HYDROCHLORIDE 5 MILLIGRAM(S): 2.5 TABLET ORAL at 12:02

## 2023-09-23 RX ADMIN — BUDESONIDE AND FORMOTEROL FUMARATE DIHYDRATE 2 PUFF(S): 160; 4.5 AEROSOL RESPIRATORY (INHALATION) at 19:13

## 2023-09-23 RX ADMIN — MIDODRINE HYDROCHLORIDE 5 MILLIGRAM(S): 2.5 TABLET ORAL at 06:58

## 2023-09-23 RX ADMIN — Medication 25 MILLIGRAM(S): at 06:59

## 2023-09-23 RX ADMIN — DIVALPROEX SODIUM 500 MILLIGRAM(S): 500 TABLET, DELAYED RELEASE ORAL at 17:37

## 2023-09-23 RX ADMIN — Medication 5 MILLIGRAM(S): at 21:13

## 2023-09-23 RX ADMIN — Medication 40 MILLIGRAM(S): at 06:58

## 2023-09-23 RX ADMIN — DIVALPROEX SODIUM 500 MILLIGRAM(S): 500 TABLET, DELAYED RELEASE ORAL at 06:58

## 2023-09-23 RX ADMIN — OLANZAPINE 2.5 MILLIGRAM(S): 15 TABLET, FILM COATED ORAL at 12:02

## 2023-09-23 NOTE — PROGRESS NOTE ADULT - ATTENDING COMMENTS
82y/o M PMH dementia, DM2, HFrEF s/p PPM, HTN, HLD presents with increased agitation.  Admit for increased paranoia & Placement .  pt seen, examined, case & care plan d/w pt, residents at detail. d/w dtr Arianne on 9/21 at [facility] about pt's condition, Care plan Meds   Consult;  Psych Dr Strange on case , -Dr Strange  adjusted Psych meds   Social service d/w about d/c planning to assisted living as per dtr   PT Eval.  PO diet  AM labs   On 1:1 to continue .   Total care time is 50 minutes .

## 2023-09-23 NOTE — PROGRESS NOTE ADULT - PROBLEM SELECTOR PLAN 6
BP stable on admission   - Per med dispensary hx patient is on midodrine as well for possible orthostatic hypotension  -Continue home metoprolol, Entresto, Spironolactone  and midodrine   -Monitor hemodynamics

## 2023-09-23 NOTE — PROGRESS NOTE ADULT - PROBLEM SELECTOR PLAN 2
Chronic s/p PPM (paced ~end of Aug 2023 at [place] per daughter )  - TTE (8/2023) per daughter showed EF ~20%  - Continue Entresto, , metoprolol XL , spironolactone, Farxiga   - Continue  Lasix 40 mg 2x day   - AM TSH, T3 , T4  - Dash/ TLC Diet

## 2023-09-23 NOTE — PROGRESS NOTE ADULT - PROBLEM SELECTOR PLAN 4
Discussed patient medication list with daughter (Arianne) who confirmed most medications found on surescripts recent dispensary history) however she is not sure of any changes that may have happened when he was hospitalized or when he went to the ED  - Patient has list with him, copy in chart however unclear if that list is up to date  - Current med rec completed based on recent dispensary history  - [facility] pharmacy called: additional medications from pharmacy include allopurinol 300qD, lisinopril 20mg qD, mirabegron 25mg qD, trazodone 50mg qD at bedtime. medications not mentioned by daughter. daughter notes pt has been to numerous different hospitals/ERs and has come back with numerous different medications with no follow up  - plan to hold these additional meds, pending psych consult for use of trazodone

## 2023-09-23 NOTE — PROGRESS NOTE ADULT - PROBLEM SELECTOR PLAN 8
-heparin 5000 q8h      ##DSC planning: discussed with SW, pending recs from : referral sent to [facility] Yes

## 2023-09-23 NOTE — PROGRESS NOTE ADULT - SUBJECTIVE AND OBJECTIVE BOX
Patient is a 83y old  Male who presents with a chief complaint of paranoia (22 Sep 2023 11:24)    HPI:  84y/o M PMH dementia, HFrEF s/p PPM, HTN, HLD  presents with increased agitation. History obtained from patient's daughter Arianne over phone.   Patient moved from [place] to [place] ~2months ago and has been having issues with increased paranoia for the past month. He stays at a [facility] assisted living community where he has been having several episodes of sundowning.   Recently he was hospitalized at [facility] for same issues from 9/1-914/23. Daughter was unable to provide sufficient history regarding hospitalization.   Patient's son, Jose has been taking care of him and knows more about his medical hx however patient's daughter states she is taking over now and did not want to bother Jose at this time.  Patient was also recently seen at [facility] ED for episodes of agitation and was told to follow up with outpatient Psychiatrist but daughter states that likely did not happened because of his increased agitation.  Patient became agitated today and staff had to call 911 for further evaluation. The facility as well as his children feel that his current psych medication regimen is not sufficient to keep him calm.   Per review of recent dispensary history, he was seeing a psychiatric NP (Barbara Bravo) in [place], [place].   Of note, he had placement of PPM in late August at [facility]. Per his daughter, his LVEF at that time was 20% before and after PPM placement.  Currently, patient states his b/l legs feel itchy but otherwise he feels fine and is agreeable.  Denies fevers, chills. sob, cp, n/v/d.    ED Course   T 97.4 F  /79 RR 18 SpO2 95% on RA  Labs H/H 12.1/37.5 Glu   UA: >1000 glucose  UTox: negaitve     In the ED, given tylenol 650mg x1 , zyprexa 5mg IVP x1  (20 Sep 2023 21:58)    INTERVAL HPI:  9/21: Pt seen and examined, sitting in chair with 1 to 1 present. Patient expressed concern about not receiving his pantoprazole, stating he cant digest his food without it, denies n/v, epigastric pain or burning. When asked about why pt came to hospital, states that he was being followed and he called the police to come help him. Says he lives with his son Jose. EMS brought patient in from  and over [facility]. Additionally states that one pill he was given this morning by staff was "phony" so he didn't take it. Contacted pt's pharmacy [facility], numerous scripts filled in [place] and [place]. Per night team sign out, daughter states that pt has been in and out of many ERs and has been started on many different medications so there are a lot of recent meds that he does not currently take to the best of her knowledge. Per daughter, her brothers are tired of assisting with their father given history of difficulties with him. She is willing to assist but does not want to be too involved and asks us to not contact her brothers. Psychiatry consulted and to see patient. JOHN/VAISHALI updated.   Soren Krishnan [phone]  9/22: Pt seen and examined at bedside. xyprexa dose increased to 5mg bedtime and 2.5mg in AM starting yesterday. Marked improvement in demeanor. A&Ox3 (person, time, place). States he is in a "police hospital" and that he came here because he asked some man in their car to call the police because he was being followed by an aide that his son hired. Said he came from "[place] [id number] and over [facility]. No complaints or concerns today. States he has to get things ready to move back to [place]. Psych is following. DSC planning pending placement as his previous community does not want pt to return and family does not want to take patient. Replace PO K   9/23: Pt seen and examined at bedside. Depakote increased to 500mg PO BID, amitriptyline stopped as per psych recs. Pt with no complaints or concerns. Pleasant on encounter, says he is doing well and thanks provider for checking in on him.  Reports that he is waiting to go home. Patient informed we are working to get him out of the hospital. DSC planning pending placement. JOHN sent referral to [facility]. Awaiting decision.     OVERNIGHT EVENTS:    Home Medications:  ALBUTEROL 0.083%(2.5MG/3ML) 30X3ML: USE 3 ML VIA NEBULIZER EVERY 6 HOURS AS NEEDED (20 Sep 2023 21:58)  AMITRIPTYLINE 10MG TABLETS: TAKE 1/2 TABLET BY MOUTH EVERY NIGHT AT BEDTIME. MAY INCREASE TO WHOLE TABLET THE 2 ND WEEK (20 Sep 2023 21:58)  ATORVASTATIN 40MG TABLETS: TAKE 1 TABLET BY MOUTH EVERY DAY (20 Sep 2023 21:58)  DIVALPROEX DELAYED RELEASE 125MG TB: TAKE 1 TABLET BY MOUTH THREE TIMES DAILY (20 Sep 2023 21:58)  DULOXETINE DR 30MG CAPSULES: TAKE 1 CAPSULE BY MOUTH WITH BREAKFAST AND 1 CAPSULE IN THE AFTER NOON NO LATER THAN 4 PM (20 Sep 2023 21:58)  ENTRESTO 24-26MG TABLETS: TAKE 1 TABLET BY MOUTH TWICE DAILY (20 Sep 2023 21:58)  FARXIGA 10MG TABLETS:  (20 Sep 2023 21:58)  FUROSEMIDE 40MG TABLETS: TAKE 1 TABLET BY MOUTH EVERY DAY (20 Sep 2023 21:58)  METFORMIN 500MG TABLETS: TAKE 1 TABLET BY MOUTH EVERY MORNING AND EVERY EVENING WITH MEALS. (20 Sep 2023 21:58)  METOPROLOL ER SUCCINATE 25MG TABS: TAKE 1 TABLET BY MOUTH EVERY DAY (20 Sep 2023 21:58)  MIDODRINE 5MG TABLETS: TAKE 1 TABLET BY MOUTH THREE TIMES DAILY (20 Sep 2023 21:58)  OLANZAPINE 2.5MG TABLETS: TAKE 1 TABLET BY MOUTH AT BEDTIME (20 Sep 2023 21:58)  POTASSIUM CL 20MEQ ER TABLETS: TAKE 1 TABLET BY MOUTH EVERY DAY WITH FOOD FOR 4 DAYS (20 Sep 2023 21:58)  SPIRONOLACTONE 25MG TABLETS:  (20 Sep 2023 21:58)  TRELEGY ELLIPTA 100-62.5MCG INH 30P: INHALE 1 PUFF BY MOUTH EVERY DAY (20 Sep 2023 21:58)      MEDICATIONS  (STANDING):  ammonium lactate 12% Lotion 1 Application(s) Topical two times a day  atorvastatin 40 milliGRAM(s) Oral at bedtime  budesonide  80 MICROgram(s)/formoterol 4.5 MICROgram(s) Inhaler 2 Puff(s) Inhalation two times a day  dapagliflozin 10 milliGRAM(s) Oral every 24 hours  dextrose 5%. 1000 milliLiter(s) (100 mL/Hr) IV Continuous <Continuous>  dextrose 5%. 1000 milliLiter(s) (50 mL/Hr) IV Continuous <Continuous>  dextrose 50% Injectable 12.5 Gram(s) IV Push once  dextrose 50% Injectable 25 Gram(s) IV Push once  dextrose 50% Injectable 25 Gram(s) IV Push once  divalproex  milliGRAM(s) Oral two times a day  DULoxetine 30 milliGRAM(s) Oral <User Schedule>  furosemide    Tablet 40 milliGRAM(s) Oral every 12 hours  glucagon  Injectable 1 milliGRAM(s) IntraMuscular once  heparin   Injectable 5000 Unit(s) SubCutaneous every 8 hours  insulin lispro (ADMELOG) corrective regimen sliding scale   SubCutaneous at bedtime  insulin lispro (ADMELOG) corrective regimen sliding scale   SubCutaneous three times a day before meals  metoprolol succinate ER 25 milliGRAM(s) Oral daily  midodrine. 5 milliGRAM(s) Oral three times a day  OLANZapine 2.5 milliGRAM(s) Oral daily  OLANZapine 5 milliGRAM(s) Oral at bedtime  pantoprazole    Tablet 40 milliGRAM(s) Oral before breakfast  sacubitril 24 mG/valsartan 26 mG 1 Tablet(s) Oral two times a day  spironolactone 25 milliGRAM(s) Oral daily  tiotropium 2.5 MICROgram(s) Inhaler 2 Puff(s) Inhalation daily    MEDICATIONS  (PRN):  acetaminophen     Tablet .. 650 milliGRAM(s) Oral every 6 hours PRN Temp greater or equal to 38C (100.4F), Mild Pain (1 - 3)  albuterol    0.083% 2.5 milliGRAM(s) Nebulizer every 6 hours PRN Shortness of Breath and/or Wheezing  dextrose Oral Gel 15 Gram(s) Oral once PRN Blood Glucose LESS THAN 70 milliGRAM(s)/deciliter  melatonin 5 milliGRAM(s) Oral at bedtime PRN Insomnia      Allergies    No Known Allergies    Intolerances        Social History:  Tobacco: former , quit >30 years ago  EtOH: social drinker  Recreational drug use: denies   Lives with: Assisted Living Facility  Ambulates: without assistance  ADLs: needs assistance (20 Sep 2023 21:58)      REVIEW OF SYSTEMS:   CONSTITUTIONAL: No fever, No chills, No fatigue, No myalgia, No Body ache, No Weakness  EYES: No eye pain,  No visual disturbances, No discharge, NO Redness  ENMT:  No ear pain, No nose bleed, No vertigo; No sinus pain, NO throat pain, No Congestion  NECK: No pain, No stiffness  RESPIRATORY: No cough, NO wheezing, No  hemoptysis, NO  shortness of breath  CARDIOVASCULAR: No chest pain, palpitations  GASTROINTESTINAL: No abdominal pain, NO epigastric pain. No nausea, No vomiting; No diarrhea, No constipation. [  ] BM  GENITOURINARY: No dysuria, No frequency, No urgency, No hematuria, NO incontinence  NEUROLOGICAL: No headaches, No dizziness, No numbness, No tingling, No tremors, No weakness  EXT: No Swelling, No Pain, No Edema  SKIN:  [ x ] No itching, burning, rashes, or lesions   MUSCULOSKELETAL: No joint pain ,No Jt swelling; No muscle pain, No back pain, No extremity pain  PSYCHIATRIC: No depression,  No anxiety,  No mood swings ,No difficulty sleeping at night  PAIN SCALE: [x  ] None  [  ] Other-  ROS Unable to obtain due to - [  ] Dementia  [  ] Lethargy [  ] Drowsy [  ] Sedated [  ] non verbal   REST OF REVIEW Of SYSTEM - [ x ] Normal     Vital Signs Last 24 Hrs  T(C): 36.6 (23 Sep 2023 06:28), Max: 36.6 (23 Sep 2023 06:28)  T(F): 97.8 (23 Sep 2023 06:28), Max: 97.8 (23 Sep 2023 06:28)  HR: 97 (23 Sep 2023 06:28) (86 - 97)  BP: 106/64 (23 Sep 2023 06:28) (94/54 - 106/64)  BP(mean): --  RR: 17 (23 Sep 2023 06:28) (17 - 18)  SpO2: 92% (23 Sep 2023 06:28) (92% - 97%)    Parameters below as of 23 Sep 2023 06:28  Patient On (Oxygen Delivery Method): room air      Finger Stick          PHYSICAL EXAM:  GENERAL:  [ x ] NAD , [ x ] well appearing, [ ] Agitated, [  ] Drowsy,  [  ] Lethargy, [  ] confused   HEAD:  [x  ] Normal, [  ] Other  EYES:  [ x ] EOMI, [ x ] PERRLA, [ x ] conjunctiva and sclera clear normal, [  ] Other,  [  ] Pallor,[  ] Discharge  ENMT:  [x  ] Normal, [x  ] Moist mucous membranes, x  ] Good dentition, [x  ] No Thrush  NECK:  [ x ] Supple, [x  ] No JVD, [ x ] Normal thyroid, [  ] Lymphadenopathy [  ] Other  CHEST/LUNG:  [x  ] Clear to auscultation bilaterally, [  ] Breath Sounds equal B/L / Decrease, [  x] poor effort  [ x ] No rales, [ x ] No rhonchi  [  x]  No wheezing,   HEART:  [  x] Regular rate and rhythm, [  ] tachycardia, [  ] Bradycardia,  [  ] irregular  [x  ] No murmurs, No rubs, No gallops, [x  ] PPM in place (Mfr:  )  ABDOMEN:  [x  ] Soft, [  x] Nontender, [x  ] Nondistended, [  x] No mass, [x  ] Bowel sounds present, [ x ] obese  NERVOUS SYSTEM:  [  ] Alert & Oriented X3, [x  ] Nonfocal  [  ] Confusion  [  ] Encephalopathic [  ] Sedated [  ] Unable to assess, [  ] Dementia [ x ] Other- delusions, paranoia  EXTREMITIES: [ x ] 2+ Peripheral Pulses, No clubbing, No cyanosis,  [ x ]  2 +edema B/L lower EXT. [ x ] PVD stasis skin changes B/L Lower EXT, [x  ] wound-superficial wounds, Scab  on Hand & legs   LYMPH: No lymphadenopathy noted  SKIN:  [ x ] No rashes or lesions, [  ] Pressure Ulcers, [  ] ecchymosis, [  ] Skin Tears, [  ] Other    DIET: Diet, Regular:   Consistent Carbohydrate Evening Snack  DASH/TLC Sodium & Cholesterol Restricted (09-20-23 @ 22:36)      LABS:      Ca    9.6        22 Sep 2023 06:42        Urinalysis Basic - ( 22 Sep 2023 06:42 )    Color: x / Appearance: x / SG: x / pH: x  Gluc: 117 mg/dL / Ketone: x  / Bili: x / Urobili: x   Blood: x / Protein: x / Nitrite: x   Leuk Esterase: x / RBC: x / WBC x   Sq Epi: x / Non Sq Epi: x / Bacteria: x        Culture Results:   <10,000 CFU/mL Normal Urogenital Kayleigh (09-17 @ 13:40)                  Culture - Urine (collected 17 Sep 2023 13:40)  Source: Clean Catch Clean Catch (Midstream)  Final Report (18 Sep 2023 13:53):    <10,000 CFU/mL Normal Urogenital Kayleigh         Anemia Panel:  Folate, Serum: 11.8 ng/mL (09-22-23 @ 06:42)  Vitamin B12, Serum: 517 pg/mL (09-22-23 @ 06:42)  Vitamin B12, Serum: 510 pg/mL (09-21-23 @ 06:55)  Folate, Serum: 17.1 ng/mL (09-21-23 @ 06:55)      Thyroid Panel:  T4, Serum: 6.5 ug/dL (09-21-23 @ 06:55)  Thyroid Stimulating Hormone, Serum: 2.74 uIU/mL (09-21-23 @ 06:55)  Thyroid Stimulating Hormone, Serum: 2.66 uIU/mL (09-20-23 @ 19:50)  Thyroid Stimulating Hormone, Serum: 3.76 uIU/mL (09-17-23 @ 12:43)                RADIOLOGY & ADDITIONAL TESTS:      HEALTH ISSUES - PROBLEM Dx:  Paranoia    Need for prophylactic measure    Medication management    Anemia    HFrEF (heart failure with reduced ejection fraction)    HTN (hypertension)    HLD (hyperlipidemia)    DM2 (diabetes mellitus, type 2)            Consultant(s) Notes Reviewed:  [ x ] YES     Care Discussed with [X] Consultants  [x  ] Patient  [  ] Family [  ] HCP [  ]   [  ] Social Service  [  ] RN, [  ] Physical Therapy,[  ] Palliative care team  DVT PPX: [  ] Lovenox, [x  ] S C Heparin, [  ] Coumadin, [  ] Xarelto, [  ] Eliquis, [  ] Pradaxa, [  ] IV Heparin drip, [  ] SCD [  ] Contraindication 2 to GI Bleed,[  ] Ambulation [  ] Contraindicated 2 to  bleed [  ] Contraindicated 2 to Brain Bleed  Advanced directive: [ x ] None, [  ] DNR/DNI Patient is a 83y old  Male who presents with a chief complaint of paranoia (22 Sep 2023 11:24)    HPI:  84y/o M PMH dementia, HFrEF s/p PPM, HTN, HLD  presents with increased agitation. History obtained from patient's daughter Arianne over phone.   Patient moved from [place] to [place] ~2months ago and has been having issues with increased paranoia for the past month. He stays at a 55 [facility] older assisted living community where he has been having several episodes of sundowning.   Recently he was hospitalized at [facility] for same issues from 9/1-914/23. Daughter was unable to provide sufficient history regarding hospitalization.   Patient's son, Jose has been taking care of him and knows more about his medical hx however patient's daughter states she is taking over now and did not want to bother Jose at this time.  Patient was also recently seen at [facility] ED for episodes of agitation and was told to follow up with outpatient Psychiatrist but daughter states that likely did not happened because of his increased agitation.  Patient became agitated today and staff had to call 911 for further evaluation. The facility as well as his children feel that his current psych medication regimen is not sufficient to keep him calm.   Per review of recent dispensary history, he was seeing a psychiatric NP (Barbara Bravo) in [place], [place].   Of note, he had placement of PPM in late August at [facility]. Per his daughter, his LVEF at that time was 20% before and after PPM placement.  Currently, patient states his b/l legs feel itchy but otherwise he feels fine and is agreeable.  Denies fevers, chills. sob, cp, n/v/d.    ED Course   T 97.4 F  /79 RR 18 SpO2 95% on RA  Labs H/H 12.1/37.5 Glu   UA: >1000 glucose  UTox: negaitve     In the ED, given tylenol 650mg x1 , zyprexa 5mg IVP x1  (20 Sep 2023 21:58)    INTERVAL HPI:  9/21: Pt seen and examined, sitting in chair with 1 to 1 present. Patient expressed concern about not receiving his pantoprazole, stating he cant digest his food without it, denies n/v, epigastric pain or burning. When asked about why pt came to hospital, states that he was being followed and he called the police to come help him. Says he lives with his son Jose. EMS brought patient in from  and over [facility]. Additionally states that one pill he was given this morning by staff was "phony" so he didn't take it. Contacted pt's pharmacy [facility], numerous scripts filled in [place] and [place]. Per night team sign out, daughter states that pt has been in and out of many ERs and has been started on many different medications so there are a lot of recent meds that he does not currently take to the best of her knowledge. Per daughter, her brothers are tired of assisting with their father given history of difficulties with him. She is willing to assist but does not want to be too involved and asks us to not contact her brothers. Psychiatry consulted and to see patient. JOHN/VAISHALI updated.   Soren Krishnan [phone]  9/22: Pt seen and examined at bedside. xyprexa dose increased to 5mg bedtime and 2.5mg in AM starting yesterday. Marked improvement in demeanor. A&Ox3 (person, time, place). States he is in a "police hospital" and that he came here because he asked some man in their car to call the police because he was being followed by an aide that his son hired. Said he came from "[place] [id number] and over [facility]. No complaints or concerns today. States he has to get things ready to move back to [place]. Psych is following. DSC planning pending placement as his previous community does not want pt to return and family does not want to take patient. Replace PO K   9/23: Pt seen and examined at bedside. Depakote increased to 500mg PO BID, amitriptyline stopped as per psych recs. Pt with no complaints or concerns. Pleasant on encounter, says he is doing well and thanks provider for checking in on him.  Reports that he is waiting to go home. Patient informed we are working to get him out of the hospital. DSC planning pending placement. JOHN sent referral to [facility]. Awaiting decision.     OVERNIGHT EVENTS:  NONE    Home Medications:  ALBUTEROL 0.083%(2.5MG/3ML) 30X3ML: USE 3 ML VIA NEBULIZER EVERY 6 HOURS AS NEEDED (20 Sep 2023 21:58)  AMITRIPTYLINE 10MG TABLETS: TAKE 1/2 TABLET BY MOUTH EVERY NIGHT AT BEDTIME. MAY INCREASE TO WHOLE TABLET THE 2 ND WEEK (20 Sep 2023 21:58)  ATORVASTATIN 40MG TABLETS: TAKE 1 TABLET BY MOUTH EVERY DAY (20 Sep 2023 21:58)  DIVALPROEX DELAYED RELEASE 125MG TB: TAKE 1 TABLET BY MOUTH THREE TIMES DAILY (20 Sep 2023 21:58)  DULOXETINE DR 30MG CAPSULES: TAKE 1 CAPSULE BY MOUTH WITH BREAKFAST AND 1 CAPSULE IN THE AFTER NOON NO LATER THAN 4 PM (20 Sep 2023 21:58)  ENTRESTO 24-26MG TABLETS: TAKE 1 TABLET BY MOUTH TWICE DAILY (20 Sep 2023 21:58)  FARXIGA 10MG TABLETS:  (20 Sep 2023 21:58)  FUROSEMIDE 40MG TABLETS: TAKE 1 TABLET BY MOUTH EVERY DAY (20 Sep 2023 21:58)  METFORMIN 500MG TABLETS: TAKE 1 TABLET BY MOUTH EVERY MORNING AND EVERY EVENING WITH MEALS. (20 Sep 2023 21:58)  METOPROLOL ER SUCCINATE 25MG TABS: TAKE 1 TABLET BY MOUTH EVERY DAY (20 Sep 2023 21:58)  MIDODRINE 5MG TABLETS: TAKE 1 TABLET BY MOUTH THREE TIMES DAILY (20 Sep 2023 21:58)  OLANZAPINE 2.5MG TABLETS: TAKE 1 TABLET BY MOUTH AT BEDTIME (20 Sep 2023 21:58)  POTASSIUM CL 20MEQ ER TABLETS: TAKE 1 TABLET BY MOUTH EVERY DAY WITH FOOD FOR 4 DAYS (20 Sep 2023 21:58)  SPIRONOLACTONE 25MG TABLETS:  (20 Sep 2023 21:58)  TRELEGY ELLIPTA 100-62.5MCG INH 30P: INHALE 1 PUFF BY MOUTH EVERY DAY (20 Sep 2023 21:58)      MEDICATIONS  (STANDING):  ammonium lactate 12% Lotion 1 Application(s) Topical two times a day  atorvastatin 40 milliGRAM(s) Oral at bedtime  budesonide  80 MICROgram(s)/formoterol 4.5 MICROgram(s) Inhaler 2 Puff(s) Inhalation two times a day  dapagliflozin 10 milliGRAM(s) Oral every 24 hours  dextrose 5%. 1000 milliLiter(s) (100 mL/Hr) IV Continuous <Continuous>  dextrose 5%. 1000 milliLiter(s) (50 mL/Hr) IV Continuous <Continuous>  dextrose 50% Injectable 12.5 Gram(s) IV Push once  dextrose 50% Injectable 25 Gram(s) IV Push once  dextrose 50% Injectable 25 Gram(s) IV Push once  divalproex  milliGRAM(s) Oral two times a day  DULoxetine 30 milliGRAM(s) Oral <User Schedule>  furosemide    Tablet 40 milliGRAM(s) Oral every 12 hours  glucagon  Injectable 1 milliGRAM(s) IntraMuscular once  heparin   Injectable 5000 Unit(s) SubCutaneous every 8 hours  insulin lispro (ADMELOG) corrective regimen sliding scale   SubCutaneous at bedtime  insulin lispro (ADMELOG) corrective regimen sliding scale   SubCutaneous three times a day before meals  metoprolol succinate ER 25 milliGRAM(s) Oral daily  midodrine. 5 milliGRAM(s) Oral three times a day  OLANZapine 2.5 milliGRAM(s) Oral daily  OLANZapine 5 milliGRAM(s) Oral at bedtime  pantoprazole    Tablet 40 milliGRAM(s) Oral before breakfast  sacubitril 24 mG/valsartan 26 mG 1 Tablet(s) Oral two times a day  spironolactone 25 milliGRAM(s) Oral daily  tiotropium 2.5 MICROgram(s) Inhaler 2 Puff(s) Inhalation daily    MEDICATIONS  (PRN):  acetaminophen     Tablet .. 650 milliGRAM(s) Oral every 6 hours PRN Temp greater or equal to 38C (100.4F), Mild Pain (1 - 3)  albuterol    0.083% 2.5 milliGRAM(s) Nebulizer every 6 hours PRN Shortness of Breath and/or Wheezing  dextrose Oral Gel 15 Gram(s) Oral once PRN Blood Glucose LESS THAN 70 milliGRAM(s)/deciliter  melatonin 5 milliGRAM(s) Oral at bedtime PRN Insomnia      Allergies    No Known Allergies    Intolerances        Social History:  Tobacco: former , quit >30 years ago  EtOH: social drinker  Recreational drug use: denies   Lives with: Assisted Living Facility  Ambulates: without assistance  ADLs: needs assistance (20 Sep 2023 21:58)      REVIEW OF SYSTEMS: i am fine dear  CONSTITUTIONAL: No fever, No chills, No fatigue, No myalgia, No Body ache, No Weakness  EYES: No eye pain,  No visual disturbances, No discharge, NO Redness  ENMT:  No ear pain, No nose bleed, No vertigo; No sinus pain, NO throat pain, No Congestion  NECK: No pain, No stiffness  RESPIRATORY: No cough, NO wheezing, No  hemoptysis, NO  shortness of breath  CARDIOVASCULAR: No chest pain, palpitations  GASTROINTESTINAL: No abdominal pain, NO epigastric pain. No nausea, No vomiting; No diarrhea, No constipation. [  ] BM  GENITOURINARY: No dysuria, No frequency, No urgency, No hematuria, NO incontinence  NEUROLOGICAL: No headaches, No dizziness, No numbness, No tingling, No tremors, No weakness  EXT: No Swelling, No Pain, No Edema  SKIN:  [ x ] No itching, burning, rashes, or lesions   MUSCULOSKELETAL: No joint pain ,No Jt swelling; No muscle pain, No back pain, No extremity pain  PSYCHIATRIC: No depression,  No anxiety,  No mood swings ,No difficulty sleeping at night  PAIN SCALE: [x  ] None  [  ] Other-  ROS Unable to obtain due to - [  ] Dementia  [  ] Lethargy [  ] Drowsy [  ] Sedated [  ] non verbal   REST OF REVIEW Of SYSTEM - [ x ] Normal     Vital Signs Last 24 Hrs  T(C): 36.6 (23 Sep 2023 06:28), Max: 36.6 (23 Sep 2023 06:28)  T(F): 97.8 (23 Sep 2023 06:28), Max: 97.8 (23 Sep 2023 06:28)  HR: 97 (23 Sep 2023 06:28) (86 - 97)  BP: 106/64 (23 Sep 2023 06:28) (94/54 - 106/64)  BP(mean): --  RR: 17 (23 Sep 2023 06:28) (17 - 18)  SpO2: 92% (23 Sep 2023 06:28) (92% - 97%)    Parameters below as of 23 Sep 2023 06:28  Patient On (Oxygen Delivery Method): room air      Finger Stick          PHYSICAL EXAM:  GENERAL:  [ x ] NAD , [ x ] well appearing, [ ] Agitated, [  ] Drowsy,  [  ] Lethargy, [  ] confused   HEAD:  [x  ] Normal, [  ] Other  EYES:  [ x ] EOMI, [ x ] PERRLA, [ x ] conjunctiva and sclera clear normal, [  ] Other,  [  ] Pallor,[  ] Discharge  ENMT:  [x  ] Normal, [x  ] Moist mucous membranes, x  ] Good dentition, [x  ] No Thrush  NECK:  [ x ] Supple, [x  ] No JVD, [ x ] Normal thyroid, [  ] Lymphadenopathy [  ] Other  CHEST/LUNG:  [x  ] Clear to auscultation bilaterally, [  ] Breath Sounds equal B/L / Decrease, [  x] poor effort  [ x ] No rales, [ x ] No rhonchi  [  x]  No wheezing,   HEART:  [  x] Regular rate and rhythm, [  ] tachycardia, [  ] Bradycardia,  [  ] irregular  [x  ] No murmurs, No rubs, No gallops, [x  ] PPM in place (Mfr:  )  ABDOMEN:  [x  ] Soft, [  x] Nontender, [x  ] Nondistended, [  x] No mass, [x  ] Bowel sounds present, [ x ] obese  NERVOUS SYSTEM:  [  ] Alert & Oriented X3, [x  ] Nonfocal  [  ] Confusion  [  ] Encephalopathic [  ] Sedated [  ] Unable to assess, [  ] Dementia [ x ] Other- delusions, paranoia  EXTREMITIES: [ x ] 2+ Peripheral Pulses, No clubbing, No cyanosis,  [ x ]  2 +edema B/L lower EXT. [ x ] PVD stasis skin changes B/L Lower EXT, [x  ] wound-superficial wounds, Scab  on Hand & legs   LYMPH: No lymphadenopathy noted  SKIN:  [ x ] No rashes or lesions, [  ] Pressure Ulcers, [  ] ecchymosis, [  ] Skin Tears, [  ] Other    DIET: Diet, Regular:   Consistent Carbohydrate Evening Snack  DASH/TLC Sodium & Cholesterol Restricted (09-20-23 @ 22:36)      LABS:                        13.7   6.85  )-----------( 229      ( 23 Sep 2023 08:50 )             41.8     23 Sep 2023 08:50    144    |  104    |  17     ----------------------------<  101    3.6     |  32     |  1.00     Ca    10.1       23 Sep 2023 08:50  Mg     2.0       23 Sep 2023 08:50    TPro  7.5    /  Alb  3.6    /  TBili  1.2    /  DBili  x      /  AST  22     /  ALT  22     /  AlkPhos  90     23 Sep 2023 08:50      Ca    9.6        22 Sep 2023 06:42        Urinalysis Basic - ( 22 Sep 2023 06:42 )    Color: x / Appearance: x / SG: x / pH: x  Gluc: 117 mg/dL / Ketone: x  / Bili: x / Urobili: x   Blood: x / Protein: x / Nitrite: x   Leuk Esterase: x / RBC: x / WBC x   Sq Epi: x / Non Sq Epi: x / Bacteria: x        Culture Results:   <10,000 CFU/mL Normal Urogenital Kayleigh (09-17 @ 13:40)    Culture - Urine (collected 17 Sep 2023 13:40)  Source: Clean Catch Clean Catch (Midstream)  Final Report (18 Sep 2023 13:53):    <10,000 CFU/mL Normal Urogenital Kayleigh         Anemia Panel:  Folate, Serum: 11.8 ng/mL (09-22-23 @ 06:42)  Vitamin B12, Serum: 517 pg/mL (09-22-23 @ 06:42)  Vitamin B12, Serum: 510 pg/mL (09-21-23 @ 06:55)  Folate, Serum: 17.1 ng/mL (09-21-23 @ 06:55)      Thyroid Panel:  T4, Serum: 6.5 ug/dL (09-21-23 @ 06:55)  Thyroid Stimulating Hormone, Serum: 2.74 uIU/mL (09-21-23 @ 06:55)  Thyroid Stimulating Hormone, Serum: 2.66 uIU/mL (09-20-23 @ 19:50)  Thyroid Stimulating Hormone, Serum: 3.76 uIU/mL (09-17-23 @ 12:43)      RADIOLOGY & ADDITIONAL TESTS:      HEALTH ISSUES - PROBLEM Dx:  Paranoia    Need for prophylactic measure    Medication management    Anemia    HFrEF (heart failure with reduced ejection fraction)    HTN (hypertension)    HLD (hyperlipidemia)    DM2 (diabetes mellitus, type 2)            Consultant(s) Notes Reviewed:  [ x ] YES     Care Discussed with [X] Consultants  [x  ] Patient  [  ] Family [  ] HCP [  ]   [x  ] Social Service  [ x] RN, [  ] Physical Therapy,[  ] Palliative care team  DVT PPX: [  ] Lovenox, [x  ] S C Heparin, [  ] Coumadin, [  ] Xarelto, [  ] Eliquis, [  ] Pradaxa, [  ] IV Heparin drip, [  ] SCD [  ] Contraindication 2 to GI Bleed,[  ] Ambulation [  ] Contraindicated 2 to  bleed [  ] Contraindicated 2 to Brain Bleed  Advanced directive: [ x ] None, [  ] DNR/DNI

## 2023-09-23 NOTE — PROGRESS NOTE ADULT - PROBLEM SELECTOR PLAN 1
Acute paranoia in setting of hx of dementia, states people are following him and trying to take his money  - Recent hospitalization [facility] for similar problem  - continue home ZYPREXA 5 mg HS  2.5 mg q AM , Depakote increased to 500mg BID,   -continue duloxetine 30 mg 2x day, STOP amitriptyline, as per psych recs  - Psych (Dr. Strange) consulted, f/u recs  - SW consulted for d/c plan: referral sent to [facility], awaiting response  ? Psych Hx- ?Psychosis/ Agitation: unclear hx Acute paranoia in setting of hx of dementia, states people are following him and trying to take his money  - Recent hospitalization [facility] for similar problem  - continue home ZYPREXA 5 mg HS  2.5 mg q AM , Depakote increased to 500mg BID,   -continue duloxetine 30 mg 2x day, STOP amitriptyline, as per psych recs  - Psych (Dr. Strange) consulted, f/u recs  - SW consulted for d/c plan: referral sent to [facility], awaiting response  ? Psych Hx- ?Psychosis/ Agitation: unclear hx  - daily renewal of constant observation while admitted

## 2023-09-24 LAB
ALBUMIN SERPL ELPH-MCNC: 3.4 G/DL — SIGNIFICANT CHANGE UP (ref 3.3–5)
ALP SERPL-CCNC: 83 U/L — SIGNIFICANT CHANGE UP (ref 40–120)
ALT FLD-CCNC: 19 U/L — SIGNIFICANT CHANGE UP (ref 12–78)
ANION GAP SERPL CALC-SCNC: 6 MMOL/L — SIGNIFICANT CHANGE UP (ref 5–17)
AST SERPL-CCNC: 15 U/L — SIGNIFICANT CHANGE UP (ref 15–37)
BILIRUB SERPL-MCNC: 1.1 MG/DL — SIGNIFICANT CHANGE UP (ref 0.2–1.2)
BUN SERPL-MCNC: 22 MG/DL — SIGNIFICANT CHANGE UP (ref 7–23)
CALCIUM SERPL-MCNC: 9.5 MG/DL — SIGNIFICANT CHANGE UP (ref 8.5–10.1)
CHLORIDE SERPL-SCNC: 105 MMOL/L — SIGNIFICANT CHANGE UP (ref 96–108)
CO2 SERPL-SCNC: 31 MMOL/L — SIGNIFICANT CHANGE UP (ref 22–31)
CREAT SERPL-MCNC: 1 MG/DL — SIGNIFICANT CHANGE UP (ref 0.5–1.3)
EGFR: 75 ML/MIN/1.73M2 — SIGNIFICANT CHANGE UP
GLUCOSE BLDC GLUCOMTR-MCNC: 113 MG/DL — HIGH (ref 70–99)
GLUCOSE BLDC GLUCOMTR-MCNC: 149 MG/DL — HIGH (ref 70–99)
GLUCOSE BLDC GLUCOMTR-MCNC: 154 MG/DL — HIGH (ref 70–99)
GLUCOSE BLDC GLUCOMTR-MCNC: 98 MG/DL — SIGNIFICANT CHANGE UP (ref 70–99)
GLUCOSE SERPL-MCNC: 117 MG/DL — HIGH (ref 70–99)
HCT VFR BLD CALC: 38.8 % — LOW (ref 39–50)
HGB BLD-MCNC: 12.8 G/DL — LOW (ref 13–17)
MAGNESIUM SERPL-MCNC: 1.9 MG/DL — SIGNIFICANT CHANGE UP (ref 1.6–2.6)
MCHC RBC-ENTMCNC: 33 GM/DL — SIGNIFICANT CHANGE UP (ref 32–36)
MCHC RBC-ENTMCNC: 33 PG — SIGNIFICANT CHANGE UP (ref 27–34)
MCV RBC AUTO: 100 FL — SIGNIFICANT CHANGE UP (ref 80–100)
NRBC # BLD: 0 /100 WBCS — SIGNIFICANT CHANGE UP (ref 0–0)
PLATELET # BLD AUTO: 166 K/UL — SIGNIFICANT CHANGE UP (ref 150–400)
POTASSIUM SERPL-MCNC: 3.8 MMOL/L — SIGNIFICANT CHANGE UP (ref 3.5–5.3)
POTASSIUM SERPL-SCNC: 3.8 MMOL/L — SIGNIFICANT CHANGE UP (ref 3.5–5.3)
PROT SERPL-MCNC: 7.1 G/DL — SIGNIFICANT CHANGE UP (ref 6–8.3)
RBC # BLD: 3.88 M/UL — LOW (ref 4.2–5.8)
RBC # FLD: 15.4 % — HIGH (ref 10.3–14.5)
SODIUM SERPL-SCNC: 142 MMOL/L — SIGNIFICANT CHANGE UP (ref 135–145)
WBC # BLD: 7.1 K/UL — SIGNIFICANT CHANGE UP (ref 3.8–10.5)
WBC # FLD AUTO: 7.1 K/UL — SIGNIFICANT CHANGE UP (ref 3.8–10.5)

## 2023-09-24 RX ORDER — FUROSEMIDE 40 MG
40 TABLET ORAL EVERY 12 HOURS
Refills: 0 | Status: DISCONTINUED | OUTPATIENT
Start: 2023-09-24 | End: 2023-10-02

## 2023-09-24 RX ORDER — SPIRONOLACTONE 25 MG/1
25 TABLET, FILM COATED ORAL DAILY
Refills: 0 | Status: DISCONTINUED | OUTPATIENT
Start: 2023-09-24 | End: 2023-10-03

## 2023-09-24 RX ORDER — SOD,AMMONIUM,POTASSIUM LACTATE
1 CREAM (GRAM) TOPICAL EVERY 12 HOURS
Refills: 0 | Status: DISCONTINUED | OUTPATIENT
Start: 2023-09-24 | End: 2023-09-24

## 2023-09-24 RX ORDER — METOPROLOL TARTRATE 50 MG
25 TABLET ORAL DAILY
Refills: 0 | Status: DISCONTINUED | OUTPATIENT
Start: 2023-09-24 | End: 2023-10-03

## 2023-09-24 RX ADMIN — MENTHOL AND METHYL SALICYLATE 1 APPLICATION(S): 10; 30 CREAM TOPICAL at 13:22

## 2023-09-24 RX ADMIN — ATORVASTATIN CALCIUM 40 MILLIGRAM(S): 80 TABLET, FILM COATED ORAL at 21:34

## 2023-09-24 RX ADMIN — MENTHOL AND METHYL SALICYLATE 1 APPLICATION(S): 10; 30 CREAM TOPICAL at 21:37

## 2023-09-24 RX ADMIN — HEPARIN SODIUM 5000 UNIT(S): 5000 INJECTION INTRAVENOUS; SUBCUTANEOUS at 21:34

## 2023-09-24 RX ADMIN — DIVALPROEX SODIUM 500 MILLIGRAM(S): 500 TABLET, DELAYED RELEASE ORAL at 17:38

## 2023-09-24 RX ADMIN — Medication 1 APPLICATION(S): at 18:10

## 2023-09-24 RX ADMIN — Medication 1: at 08:38

## 2023-09-24 RX ADMIN — PANTOPRAZOLE SODIUM 40 MILLIGRAM(S): 20 TABLET, DELAYED RELEASE ORAL at 06:04

## 2023-09-24 RX ADMIN — HEPARIN SODIUM 5000 UNIT(S): 5000 INJECTION INTRAVENOUS; SUBCUTANEOUS at 13:23

## 2023-09-24 RX ADMIN — DULOXETINE HYDROCHLORIDE 30 MILLIGRAM(S): 30 CAPSULE, DELAYED RELEASE ORAL at 06:03

## 2023-09-24 RX ADMIN — BUDESONIDE AND FORMOTEROL FUMARATE DIHYDRATE 2 PUFF(S): 160; 4.5 AEROSOL RESPIRATORY (INHALATION) at 18:32

## 2023-09-24 RX ADMIN — DIVALPROEX SODIUM 500 MILLIGRAM(S): 500 TABLET, DELAYED RELEASE ORAL at 06:04

## 2023-09-24 RX ADMIN — MIDODRINE HYDROCHLORIDE 5 MILLIGRAM(S): 2.5 TABLET ORAL at 06:03

## 2023-09-24 RX ADMIN — TIOTROPIUM BROMIDE 2 PUFF(S): 18 CAPSULE ORAL; RESPIRATORY (INHALATION) at 06:04

## 2023-09-24 RX ADMIN — BUDESONIDE AND FORMOTEROL FUMARATE DIHYDRATE 2 PUFF(S): 160; 4.5 AEROSOL RESPIRATORY (INHALATION) at 06:04

## 2023-09-24 RX ADMIN — MIDODRINE HYDROCHLORIDE 5 MILLIGRAM(S): 2.5 TABLET ORAL at 11:43

## 2023-09-24 RX ADMIN — DULOXETINE HYDROCHLORIDE 30 MILLIGRAM(S): 30 CAPSULE, DELAYED RELEASE ORAL at 17:39

## 2023-09-24 RX ADMIN — HEPARIN SODIUM 5000 UNIT(S): 5000 INJECTION INTRAVENOUS; SUBCUTANEOUS at 06:03

## 2023-09-24 RX ADMIN — Medication 40 MILLIGRAM(S): at 17:39

## 2023-09-24 RX ADMIN — MENTHOL AND METHYL SALICYLATE 1 APPLICATION(S): 10; 30 CREAM TOPICAL at 06:05

## 2023-09-24 RX ADMIN — Medication 5 MILLIGRAM(S): at 21:34

## 2023-09-24 RX ADMIN — OLANZAPINE 2.5 MILLIGRAM(S): 15 TABLET, FILM COATED ORAL at 11:44

## 2023-09-24 RX ADMIN — DAPAGLIFLOZIN 10 MILLIGRAM(S): 10 TABLET, FILM COATED ORAL at 11:43

## 2023-09-24 RX ADMIN — MIDODRINE HYDROCHLORIDE 5 MILLIGRAM(S): 2.5 TABLET ORAL at 17:38

## 2023-09-24 RX ADMIN — Medication 1 APPLICATION(S): at 06:05

## 2023-09-24 RX ADMIN — OLANZAPINE 5 MILLIGRAM(S): 15 TABLET, FILM COATED ORAL at 21:37

## 2023-09-24 NOTE — PROGRESS NOTE ADULT - PROBLEM SELECTOR PLAN 2
Chronic s/p PPM (paced ~end of Aug 2023 at [place] per daughter )  - TTE (8/2023) per daughter showed EF ~20%  - No volume overload.   - Continue Entresto, metoprolol XL, spironolactone, Farxiga   - Continue Lasix 40 mg 2x day   - Dash/ TLC Diet Chronic s/p PPM (paced ~end of Aug 2023 at [place] per daughter )  - TTE (8/2023) per daughter showed EF ~20%  - Pt on Lasix 40 mg 2x day Entresto, metoprolol XL, spironolactone, Farxiga. Due to soft BP and med hold parameters pt has not received all doses.    - Dash/ TLC Diet

## 2023-09-24 NOTE — PROGRESS NOTE ADULT - PROBLEM SELECTOR PLAN 4
Discussed patient medication list with daughter (Arianne) who confirmed most medications found on surescripts recent dispensary history) however she is not sure of any changes that may have happened when he was hospitalized or when he went to the ED  - Patient has list with him, copy in chart however unclear if that list is up to date  - Current med rec completed based on recent dispensary history  - [facility] pharmacy called: additional medications from pharmacy include allopurinol 300qD, lisinopril 20mg qD, mirabegron 25mg qD, trazodone 50mg qD at bedtime. medications not mentioned by daughter. daughter notes pt has been to numerous different hospitals/ERs and has come back with numerous different medications with no follow up  - plan to hold these additional meds, per psych consult for use of trazodone

## 2023-09-24 NOTE — PROGRESS NOTE ADULT - PROBLEM SELECTOR PLAN 1
OFFICE VISIT    Patient: Maria Teresa Flores   : 10/17/1933 MRN: [id number]    SUBJECTIVE:  Chief Complaint   Patient presents with   • Office Visit     Hip pain   Knot on knees  Lab work needed  Medication concern     PRIMARY CARE PROVIDER NOTE     Note to the patient: The  Cure Act makes medical notes like this available to patients in the interest of transparency. However, be advised, this is a medical document. It is intended as peer to peer communication and is written in medical language and may contain abbreviations of verbiage that are unfamiliar for general public. It may appear blunt or direct. Medical documents are intended to carry relevant information, facts as evident, and the clinical opinion of the practitioner. This note may have been transcribed using a voice dictation system. Voice recognition errors may occur. This not be taken to alter the content or meaning of this note.    Patient has given consent to record this visit for documentation in their clinical record.    A 89 year old female presents for a follow-up of hip pain.      HISTORY OF PRESENT ILLNESS:  Historian: Self, and accompanied by her daughter    89yoF with HTN, HLD, HFrEF, DM2, alzheimer's dementia, hx of CVA who presents for follow up. She is accompanied by her daughter today.  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 Presents today to followup chronic right hip pain. Underwent right hip replacement several years ago. States pain has been chronic for several years. She periodically sees her orthopedist. Her last Xray on chart note is  showed heterotopic ossification. Prosthesis is normal at that time. Denies recent falls or injuries. Denies numbness, tingling,  paresthesia, loss of sensation, chest pain, shortness of breath. Patient has severe dementia she is accompanied by her daughter. Patient is ambulatory with cane, and with a walker. She has posture impairment, osteoporosis, and kyphosis. Tries TYLENOL rarely as needed. Blood pressure measured today is 161/76 mmHg.        PAST MEDICAL HISTORY:  Past Medical History:   Diagnosis Date   • Advance directive in chart 02/08/2017   • Cataract    • Congestive cardiac failure (CMD)    • Contusion of scalp 9/14/2020   • Diabetes mellitus (CMD)    • Essential (primary) hypertension    • Fall 9/14/2020     MEDICATIONS:  Current Outpatient Medications   Medication Sig Dispense Refill   • acetaminophen (TYLENOL) 500 MG tablet Take 2 tablets by mouth 3 times daily as needed for Pain. 60 tablet 1   • donepezil (ARICEPT) 10 MG tablet Take 1 tablet by mouth nightly. 90 tablet 3   • amLODIPine (NORVASC) 10 MG tablet TAKE 1 TABLET BY MOUTH DAILY 90 tablet 3   • potassium chloride (K-TAB) 20 MEQ ER tablet Take 2 tablets by mouth daily 60 tablet 3   • metoPROLOL succinate (TOPROL-XL) 100 MG 24 hr tablet TAKE 1 TABLET BY MOUTH DAILY 30 tablet 5   • furosemide (LASIX) 20 MG tablet Take 1 tablet by mouth daily. 90 tablet 3     No current facility-administered medications for this visit.     ALLERGIES:  Allergies as of 05/11/2023   • (No Known Allergies)     FAMILY HISTORY:  Family History   Problem Relation Age of Onset   • Heart disease Mother    • Hypertension Mother    • Diabetes Sister    • Heart disease Brother      SOCIAL HISTORY:  Social History     Tobacco Use   • Smoking status: Never   • Smokeless tobacco: Never   Substance Use Topics   • Alcohol use: No   • Drug use: No     Past Surgical HISTORY  Past Surgical History:   Procedure Laterality Date   • Eye surgery     • Total hip replacement Right        REVIEW OF SYSTEMS:  Respiratory: As Per HPI.   Cardiovascular: As Per HPI.   Musculoskeletal: As Per HPI.   ROS was obtained as  per above and HPI and all other systems reviewed otherwise negative.     OBJECTIVE:  Visit Vitals  BP (!) 161/76 (BP Location: LUE - Left upper extremity, Patient Position: Sitting)   Pulse 63   Temp 97.1 °F (36.2 °C)   Ht 5' 5\"   Wt 52.5 kg (115 lb 11.9 oz)   SpO2 100%   BMI 19.26 kg/m²       PHYSICAL EXAM:  Constitutional: In no acute distress. Well-developed.  Eyes: The conjunctiva exhibited no abnormalities. The sclera was normal.  Musculoskeletal: On exam her right hip is with decent range of motion. Normal pulses. No tenderness.  Patient is concerned about bony knees. We discuss with her that, this is arthritis. Rest of exam is generally normal.  Psychiatric: Oriented to time, place, and person. The mood was normal. The effect was normal. The memory was unimpaired.   Skin: Skin moisture and turgor normal.      DIAGNOSTIC STUDIES:  LAB RESULTS:  No visits with results within 1 Month(s) from this visit.   Latest known visit with results is:   Lab Services on 02/28/2023   Component Date Value Ref Range Status   • Sodium 02/28/2023 140  135 - 145 mmol/L Final   • Potassium 02/28/2023 4.2  3.4 - 5.1 mmol/L Final   • Chloride 02/28/2023 105  97 - 110 mmol/L Final   • Carbon Dioxide 02/28/2023 30  21 - 32 mmol/L Final   • Anion Gap 02/28/2023 9  7 - 19 mmol/L Final   • Glucose 02/28/2023 100 (H)  70 - 99 mg/dL Final   • BUN 02/28/2023 14  6 - 20 mg/dL Final   • Creatinine 02/28/2023 1.17 (H)  0.51 - 0.95 mg/dL Final   • Glomerular Filtration Rate 02/28/2023 45 (L)  >=60 Final   • BUN/Cr 02/28/2023 12  7 - 25 Final   • Calcium 02/28/2023 9.8  8.4 - 10.2 mg/dL Final       ASSESSMENT AND PLAN:  This 89 year old female presents with :  1. Chronic right hip pain    2. Status post right hip replacement        Orders Placed This Encounter   • XRAY HIP 2 VW RIGHT   • SERVICE TO ORTHOPEDICS   • SERVICE TO PHYSICAL THERAPY   • acetaminophen (TYLENOL) 500 MG tablet       PLAN:  Chronic right hip pain, and status post right hip  replacement:  Reviewed, and discussed previous lab reports.  Ordered XRAY HIP 2 VW RIGHT; Future.  She is going to see pharm D today, to followup CHF.  Discussed orthopedic surgery in this age, pain is likely to be expected. So, she needs to follow up with her orthopedist. However, PT can be helpful.   Referred SERVICE TO ORTHOPEDICS  Referred SERVICE TO PHYSICAL THERAPY, 10 sessions.  Prescribed Acetaminophen (TYLENOL) 500 MG tablet; Take 2 tablets by mouth 3 times daily as needed for Pain.  Dispense: 60 tablet; Refill: 1      Follow-up with her PCP, or sooner if needed.    Refer to orders.  Medical compliance with plan discussed and risks of non-compliance reviewed.  Patient education completed on disease process, etiology & prognosis.  Proper usage and side effects of medications reviewed & discussed.  Return to clinic as clinically indicated as discussed with the patient who verbalized understanding of the plan and is in agreement with the plan.    Time spent 20 minutes, including medication and chart review, patient assessment and counseling.    I,  Dr. Shelia Oviedo, have created a visit summary document based on the audio recording between Dr. Liseth Vo CNP and this patient for the physician to review, edit as needed, and authenticate.    Creation Date: 5/12/2023     Scribe Attestation Addendum-Brief   I personally performed the patient's care, the medical history, the physical exam, the medical decision making.  I have reviewed and edited the visit summary above and attest that it is accurate.      Liseth Vo, ANP       Acute paranoia in setting of hx of dementia, ?baseline mental disorder ? Psych Hx- ?Psychosis/ Agitation: unclear hx  - Recent hospitalization at [facility] for similar problem. Unknown if patient was taking his medications prior to admission.    - No infectious or metabolic concomitant disorder. Normal thyroid profile.   - Pt currently A&O x3, stable.   - Continue home ZYPREXA 5 mg qhs and 2.5 mg qAM.   - Continue Depakote, increased to 500mg BID yesterday per Dr. Strange.    - Continue duloxetine 30 mg 2x day,   - STOP amitriptyline, as per psych recs  - Psych (Dr. Strange) consulted, f/u recs  - SW consulted for d/c plan: referral sent to [facility], awaiting response  - Constant observation while admitted Acute paranoia in setting of hx of dementia, ?baseline mental disorder ? Psych Hx- ?Psychosis/ Agitation: unclear hx  - Recent hospitalization at [facility] for similar problem. Unknown if patient was taking his medications prior to admission.    - No infectious or metabolic concomitant disorder. Normal thyroid profile.   - Continue home ZYPREXA 5 mg qhs and 2.5 mg qAM.   - Continue Depakote, increased to 500 mg BID yesterday per Dr. Strange.    - Continue duloxetine 30 mg 2x day,   - STOP amitriptyline, as per psych recs  - Psych (Dr. Strange) follow up  -  consulted for d/c plan: referral sent to [facility], awaiting response  - Constant observation while admitted

## 2023-09-24 NOTE — PROGRESS NOTE ADULT - PROBLEM SELECTOR PLAN 8
-heparin 5000 q8h    #DSC planning: discussed with SW, pending recs from SW: referral sent to [facility]

## 2023-09-24 NOTE — PROGRESS NOTE ADULT - PROBLEM SELECTOR PLAN 5
Chronic, controlled with A1c 6.5  - Hold home metformin, farxiga inpatient  - Low ISS  - Regular finger sticks  - DASH/TLC Consistent carb diet  - Hypoglycemic protocol. Chronic, controlled with A1c 6.5  - Hold home metformin, FARXIGA inpatient  - Low ISS  - Regular finger sticks  - DASH/TLC Consistent carb diet  - Hypoglycemic protocol.

## 2023-09-24 NOTE — PROGRESS NOTE ADULT - PROBLEM SELECTOR PLAN 6
BP stable on admission   - Per med dispensary hx patient is on midodrine as well for possible orthostatic hypotension  - Continue home metoprolol, Entresto, Spironolactone and midodrine    -Monitor hemodynamics BP stable on admission   - Per med dispensary hx patient is on midodrine as well for possible orthostatic hypotension  - BP has been soft, pt asymptomatic.   - Continue home Lasix, metoprolol, Entresto, Spironolactone, hold parameters changed.    - Continue home midodrine   - Monitor hemodynamics

## 2023-09-24 NOTE — PROGRESS NOTE ADULT - SUBJECTIVE AND OBJECTIVE BOX
Patient is a 83y old  Male who presents with a chief complaint of paranoia (23 Sep 2023 10:01)    HPI:  84y/o M PMH dementia, HFrEF s/p PPM, HTN, HLD  presents with increased agitation. History obtained from patient's daughter Arianne over phone.   Patient moved from [place] to [place] ~2months ago and has been having issues with increased paranoia for the past month. He stays at a [facility] older assisted living community where he has been having several episodes of sundowning.   Recently he was hospitalized at [facility] for same issues from 9/1-914/23. Daughter was unable to provide sufficient history regarding hospitalization.   Patient's son, Jose has been taking care of him and knows more about his medical hx however patient's daughter states she is taking over now and did not want to bother Jose at this time.  Patient was also recently seen at [facility] ED for episodes of agitation and was told to follow up with outpatient Psychiatrist but daughter states that likely did not happened because of his increased agitation.  Patient became agitated today and staff had to call 911 for further evaluation. The facility as well as his children feel that his current psych medication regimen is not sufficient to keep him calm.   Per review of recent dispensary history, he was seeing a psychiatric NP (Barbara Bravo) in [place], [place].   Of note, he had placement of PPM in late August at [facility]. Per his daughter, his LVEF at that time was 20% before and after PPM placement.  Currently, patient states his b/l legs feel itchy but otherwise he feels fine and is agreeable.  Denies fevers, chills. sob, cp, n/v/d.    ED Course   T 97.4 F  /79 RR 18 SpO2 95% on RA  Labs H/H 12.1/37.5 Glu   UA: >1000 glucose  UTox: negaitve     In the ED, given tylenol 650mg x1 , zyprexa 5mg IVP x1  (20 Sep 2023 21:58)    INTERVAL HPI:  9/21: Pt seen and examined, sitting in chair with 1 to 1 present. Patient expressed concern about not receiving his pantoprazole, stating he cant digest his food without it, denies n/v, epigastric pain or burning. When asked about why pt came to hospital, states that he was being followed and he called the police to come help him. Says he lives with his son Jose. EMS brought patient in from  and over [facility]. Additionally states that one pill he was given this morning by staff was "phony" so he didn't take it. Contacted pt's pharmacy [facility], numerous scripts filled in [place] and [place]. Per night team sign out, daughter states that pt has been in and out of many ERs and has been started on many different medications so there are a lot of recent meds that he does not currently take to the best of her knowledge. Per daughter, her brothers are tired of assisting with their father given history of difficulties with him. She is willing to assist but does not want to be too involved and asks us to not contact her brothers. Psychiatry consulted and to see patient. JOHN/VAISHALI updated.   Soren Krishnan [phone]  9/22: Pt seen and examined at bedside. xyprexa dose increased to 5mg bedtime and 2.5mg in AM starting yesterday. Marked improvement in demeanor. A&Ox3 (person, time, place). States he is in a "police hospital" and that he came here because he asked some man in their car to call the police because he was being followed by an aide that his son hired. Said he came from "[place] [id number] and over [facility]. No complaints or concerns today. States he has to get things ready to move back to [place]. Psych is following. DSC planning pending placement as his previous community does not want pt to return and family does not want to take patient. Replace PO K   9/23: Pt seen and examined at bedside. Depakote increased to 500mg PO BID, amitriptyline stopped as per psych recs. Pt with no complaints or concerns. Pleasant on encounter, says he is doing well and thanks provider for checking in on him.  Reports that he is waiting to go home. Patient informed we are working to get him out of the hospital. DSC planning pending placement. JOHN sent referral to [facility]. Awaiting decision.   9/24:  Pt seen and examined at bedside. Pt reports that is feeling well and does not have any symptoms, pain or concern. Pt is tolerating PO and eating well.  Pt is cooperative with evaluation and pleasant.  Waiting for placement for dc.       OVERNIGHT EVENTS: no overnight events.     Home Medications:  ALBUTEROL 0.083%(2.5MG/3ML) 30X3ML: USE 3 ML VIA NEBULIZER EVERY 6 HOURS AS NEEDED (20 Sep 2023 21:58)  AMITRIPTYLINE 10MG TABLETS: TAKE 1/2 TABLET BY MOUTH EVERY NIGHT AT BEDTIME. MAY INCREASE TO WHOLE TABLET THE 2 ND WEEK (20 Sep 2023 21:58)  ATORVASTATIN 40MG TABLETS: TAKE 1 TABLET BY MOUTH EVERY DAY (20 Sep 2023 21:58)  DIVALPROEX DELAYED RELEASE 125MG TB: TAKE 1 TABLET BY MOUTH THREE TIMES DAILY (20 Sep 2023 21:58)  DULOXETINE DR 30MG CAPSULES: TAKE 1 CAPSULE BY MOUTH WITH BREAKFAST AND 1 CAPSULE IN THE AFTER NOON NO LATER THAN 4 PM (20 Sep 2023 21:58)  ENTRESTO 24-26MG TABLETS: TAKE 1 TABLET BY MOUTH TWICE DAILY (20 Sep 2023 21:58)  FARXIGA 10MG TABLETS:  (20 Sep 2023 21:58)  FUROSEMIDE 40MG TABLETS: TAKE 1 TABLET BY MOUTH EVERY DAY (20 Sep 2023 21:58)  METFORMIN 500MG TABLETS: TAKE 1 TABLET BY MOUTH EVERY MORNING AND EVERY EVENING WITH MEALS. (20 Sep 2023 21:58)  METOPROLOL ER SUCCINATE 25MG TABS: TAKE 1 TABLET BY MOUTH EVERY DAY (20 Sep 2023 21:58)  MIDODRINE 5MG TABLETS: TAKE 1 TABLET BY MOUTH THREE TIMES DAILY (20 Sep 2023 21:58)  OLANZAPINE 2.5MG TABLETS: TAKE 1 TABLET BY MOUTH AT BEDTIME (20 Sep 2023 21:58)  POTASSIUM CL 20MEQ ER TABLETS: TAKE 1 TABLET BY MOUTH EVERY DAY WITH FOOD FOR 4 DAYS (20 Sep 2023 21:58)  SPIRONOLACTONE 25MG TABLETS:  (20 Sep 2023 21:58)  TRELEGY ELLIPTA 100-62.5MCG INH 30P: INHALE 1 PUFF BY MOUTH EVERY DAY (20 Sep 2023 21:58)      MEDICATIONS  (STANDING):  ammonium lactate 12% Lotion 1 Application(s) Topical two times a day  atorvastatin 40 milliGRAM(s) Oral at bedtime  budesonide  80 MICROgram(s)/formoterol 4.5 MICROgram(s) Inhaler 2 Puff(s) Inhalation two times a day  dapagliflozin 10 milliGRAM(s) Oral every 24 hours  dextrose 5%. 1000 milliLiter(s) (100 mL/Hr) IV Continuous <Continuous>  dextrose 5%. 1000 milliLiter(s) (50 mL/Hr) IV Continuous <Continuous>  dextrose 50% Injectable 12.5 Gram(s) IV Push once  dextrose 50% Injectable 25 Gram(s) IV Push once  dextrose 50% Injectable 25 Gram(s) IV Push once  divalproex  milliGRAM(s) Oral two times a day  DULoxetine 30 milliGRAM(s) Oral <User Schedule>  furosemide    Tablet 40 milliGRAM(s) Oral every 12 hours  glucagon  Injectable 1 milliGRAM(s) IntraMuscular once  heparin   Injectable 5000 Unit(s) SubCutaneous every 8 hours  insulin lispro (ADMELOG) corrective regimen sliding scale   SubCutaneous at bedtime  insulin lispro (ADMELOG) corrective regimen sliding scale   SubCutaneous three times a day before meals  methyl salicylate 15%/menthol 10% Topical Cream 1 Application(s) Topical three times a day  metoprolol succinate ER 25 milliGRAM(s) Oral daily  midodrine. 5 milliGRAM(s) Oral three times a day  OLANZapine 2.5 milliGRAM(s) Oral daily  OLANZapine 5 milliGRAM(s) Oral at bedtime  pantoprazole    Tablet 40 milliGRAM(s) Oral before breakfast  sacubitril 24 mG/valsartan 26 mG 1 Tablet(s) Oral two times a day  spironolactone 25 milliGRAM(s) Oral daily  tiotropium 2.5 MICROgram(s) Inhaler 2 Puff(s) Inhalation daily    MEDICATIONS  (PRN):  acetaminophen     Tablet .. 650 milliGRAM(s) Oral every 6 hours PRN Temp greater or equal to 38C (100.4F), Mild Pain (1 - 3)  albuterol    0.083% 2.5 milliGRAM(s) Nebulizer every 6 hours PRN Shortness of Breath and/or Wheezing  dextrose Oral Gel 15 Gram(s) Oral once PRN Blood Glucose LESS THAN 70 milliGRAM(s)/deciliter  melatonin 5 milliGRAM(s) Oral at bedtime PRN Insomnia      Allergies  No Known Allergies    Intolerances      Social History:  Tobacco: former , quit >30 years ago  EtOH: social drinker  Recreational drug use: denies   Lives with: Assisted Living Facility  Ambulates: without assistance  ADLs: needs assistance (20 Sep 2023 21:58)      REVIEW OF SYSTEMS:  CONSTITUTIONAL: No fever, No chills, No fatigue, No myalgia, No Body ache, No Weakness  EYES: No eye pain,  No visual disturbances, No discharge, NO Redness  ENMT:  No ear pain, No nose bleed, No vertigo; No sinus pain, NO throat pain, No Congestion  NECK: No pain, No stiffness  RESPIRATORY: No cough, NO wheezing, No  hemoptysis, NO  shortness of breath  CARDIOVASCULAR: No chest pain, palpitations  GASTROINTESTINAL: No abdominal pain, NO epigastric pain. No nausea, No vomiting; No diarrhea, No constipation. [  ] BM  GENITOURINARY: No dysuria, No frequency, No urgency, No hematuria, NO incontinence  NEUROLOGICAL: No headaches, No dizziness, No numbness, No tingling, No tremors, No weakness  EXT: No Swelling, No Pain, No Edema  SKIN:  [ x ] No itching, burning, rashes, or lesions   MUSCULOSKELETAL: No joint pain ,No Jt swelling; No muscle pain, No back pain, No extremity pain  PSYCHIATRIC: No depression,  No anxiety,  No mood swings ,No difficulty sleeping at night  PAIN SCALE: [x  ] None  [  ] Other-  ROS Unable to obtain due to - [  ] Dementia  [  ] Lethargy [  ] Drowsy [  ] Sedated [  ] non verbal   REST OF REVIEW Of SYSTEM - [ x ] Normal       Vital Signs Last 24 Hrs  T(C): 36.6 (24 Sep 2023 05:51), Max: 37 (23 Sep 2023 20:18)  T(F): 97.9 (24 Sep 2023 05:51), Max: 98.6 (23 Sep 2023 20:18)  HR: 95 (24 Sep 2023 05:51) (91 - 95)  BP: 102/64 (24 Sep 2023 05:51) (93/57 - 102/64)  RR: 17 (24 Sep 2023 05:51) (17 - 17)  SpO2: 92% (24 Sep 2023 05:51) (92% - 95%)    Parameters below as of 24 Sep 2023 05:51  Patient On (Oxygen Delivery Method): room air      PHYSICAL EXAM:  GENERAL:  [ x ] NAD , [ x ] well appearing, [ ] Agitated, [  ] Drowsy,  [  ] Lethargy, [  ] confused   HEAD:  [x  ] Normal, [  ] Other  EYES:  [ x ] EOMI, [ x ] PERRLA, [ x ] conjunctiva and sclera clear normal, [  ] Other,  [  ] Pallor,[  ] Discharge  ENMT:  [x  ] Normal, [x  ] Moist mucous membranes, x  ] Good dentition, [x  ] No Thrush  NECK:  [ x ] Supple, [x  ] No JVD, [ x ] Normal thyroid, [  ] Lymphadenopathy [  ] Other  CHEST/LUNG:  [x  ] Clear to auscultation bilaterally, [ x ] Breath Sounds equal B/L, [  x] poor effort  [ x ] No rales, [ x ] No rhonchi  [  x]  No wheezing,   HEART:  [  x] Regular rate and rhythm, [  ] tachycardia, [  ] Bradycardia,  [  ] irregular  [x  ] No murmurs, No rubs, No gallops, [x  ] PPM in place (Mfr:  )  ABDOMEN:  [x  ] Soft, [  x] Nontender, [x  ] Nondistended, [  x] No mass, [x  ] Bowel sounds present, [ x ] obese  NERVOUS SYSTEM:  [ x ] Alert & Oriented X3, [x  ] Nonfocal  [  ] Confusion  [  ] Encephalopathic [  ] Sedated [  ] Unable to assess, [  ] Dementia [ x ] Other- delusions, paranoia  EXTREMITIES: [ x ] 2+ Peripheral Pulses, No clubbing, No cyanosis,  [ x ]  2 +edema B/L lower EXT. [ x ] PVD stasis skin changes B/L Lower EXT, [x  ] wound-superficial wounds, Scab  on Hand & legs   LYMPH: No lymphadenopathy noted  SKIN:  [ x ] No rashes or lesions, [  ] Pressure Ulcers, [  ] ecchymosis, [  ] Skin Tears, [  ] Other    DIET: Diet, Regular:   Consistent Carbohydrate Evening Snack  DASH/TLC Sodium & Cholesterol Restricted (09-20-23 @ 22:36)      LABS:                        12.8   7.10  )-----------( 166      ( 24 Sep 2023 09:40 )             38.8     24 Sep 2023 09:40    142    |  105    |  22     ----------------------------<  117    3.8     |  31     |  1.00     Ca    9.5        24 Sep 2023 09:40  Mg     1.9       24 Sep 2023 09:40    TPro  7.1    /  Alb  3.4    /  TBili  1.1    /  DBili  x      /  AST  15     /  ALT  19     /  AlkPhos  83     24 Sep 2023 09:40      Urinalysis Basic - ( 24 Sep 2023 09:40 )    Color: x / Appearance: x / SG: x / pH: x  Gluc: 117 mg/dL / Ketone: x  / Bili: x / Urobili: x   Blood: x / Protein: x / Nitrite: x   Leuk Esterase: x / RBC: x / WBC x   Sq Epi: x / Non Sq Epi: x / Bacteria: x        Culture Results:   <10,000 CFU/mL Normal Urogenital Kayleigh (09-17 @ 13:40)        Culture - Urine (collected 17 Sep 2023 13:40)  Source: Clean Catch Clean Catch (Midstream)  Final Report (18 Sep 2023 13:53):    <10,000 CFU/mL Normal Urogenital Kayleigh         Anemia Panel:  Folate, Serum: 11.8 ng/mL (09-22-23 @ 06:42)  Vitamin B12, Serum: 517 pg/mL (09-22-23 @ 06:42)  Vitamin B12, Serum: 510 pg/mL (09-21-23 @ 06:55)  Folate, Serum: 17.1 ng/mL (09-21-23 @ 06:55)      Thyroid Panel:  T4, Serum: 6.5 ug/dL (09-21-23 @ 06:55)  Thyroid Stimulating Hormone, Serum: 2.74 uIU/mL (09-21-23 @ 06:55)  Thyroid Stimulating Hormone, Serum: 2.66 uIU/mL (09-20-23 @ 19:50)        HEALTH ISSUES - PROBLEM Dx:  Paranoia    HFrEF (heart failure with reduced ejection fraction)    HTN (hypertension)    HLD (hyperlipidemia)    Need for prophylactic measure    Medication management    DM2 (diabetes mellitus, type 2)    Anemia        Consultant(s) Notes Reviewed:  [  ] YES     Care Discussed with [X] Consultants  [  ] Patient  [  ] Family [  ] HCP [  ]   [  ] Social Service  [  ] RN, [  ] Physical Therapy,[  ] Palliative care team  DVT PPX: [  ] Lovenox, [ x ] S C Heparin, [  ] Coumadin, [  ] Xarelto, [  ] Eliquis, [  ] Pradaxa, [  ] IV Heparin drip, [  ] SCD [  ] Contraindication 2 to GI Bleed,[  ] Ambulation [  ] Contraindicated 2 to  bleed [  ] Contraindicated 2 to Brain Bleed  Advanced directive: [  ] None, [  ] DNR/DNI Patient is a 83y old  Male who presents with a chief complaint of paranoia (23 Sep 2023 10:01)    HPI:  84y/o M PMH dementia, HFrEF s/p PPM, HTN, HLD  presents with increased agitation. History obtained from patient's daughter Arianne over phone.   Patient moved from [place] to [place] ~2months ago and has been having issues with increased paranoia for the past month. He stays at a 55 [facility] older assisted living community where he has been having several episodes of sundowning.   Recently he was hospitalized at [facility] for same issues from 9/1-914/23. Daughter was unable to provide sufficient history regarding hospitalization.   Patient's son, Jose has been taking care of him and knows more about his medical hx however patient's daughter states she is taking over now and did not want to bother Jose at this time.  Patient was also recently seen at [facility] ED for episodes of agitation and was told to follow up with outpatient Psychiatrist but daughter states that likely did not happened because of his increased agitation.  Patient became agitated today and staff had to call 911 for further evaluation. The facility as well as his children feel that his current psych medication regimen is not sufficient to keep him calm.   Per review of recent dispensary history, he was seeing a psychiatric NP (Barbara Bravo) in [place], [place].   Of note, he had placement of PPM in late August at [facility]. Per his daughter, his LVEF at that time was 20% before and after PPM placement.  Currently, patient states his b/l legs feel itchy but otherwise he feels fine and is agreeable.  Denies fevers, chills. sob, cp, n/v/d.    ED Course   T 97.4 F  /79 RR 18 SpO2 95% on RA  Labs H/H 12.1/37.5 Glu   UA: >1000 glucose  UTox: negaitve     In the ED, given tylenol 650mg x1 , zyprexa 5mg IVP x1  (20 Sep 2023 21:58)    INTERVAL HPI:  9/21: Pt seen and examined, sitting in chair with 1 to 1 present. Patient expressed concern about not receiving his pantoprazole, stating he cant digest his food without it, denies n/v, epigastric pain or burning. When asked about why pt came to hospital, states that he was being followed and he called the police to come help him. Says he lives with his son Jose. EMS brought patient in from  and over [facility]. Additionally states that one pill he was given this morning by staff was "phony" so he didn't take it. Contacted pt's pharmacy [facility], numerous scripts filled in [place] and [place]. Per night team sign out, daughter states that pt has been in and out of many ERs and has been started on many different medications so there are a lot of recent meds that he does not currently take to the best of her knowledge. Per daughter, her brothers are tired of assisting with their father given history of difficulties with him. She is willing to assist but does not want to be too involved and asks us to not contact her brothers. Psychiatry consulted and to see patient. JOHN/VAISHALI updated.   Soren Krishnan [phone]  9/22: Pt seen and examined at bedside. xyprexa dose increased to 5mg bedtime and 2.5mg in AM starting yesterday. Marked improvement in demeanor. A&Ox3 (person, time, place). States he is in a "police hospital" and that he came here because he asked some man in their car to call the police because he was being followed by an aide that his son hired. Said he came from "[place] [id number] and over [facility]. No complaints or concerns today. States he has to get things ready to move back to [place]. Psych is following. DSC planning pending placement as his previous community does not want pt to return and family does not want to take patient. Replace PO K   9/23: Pt seen and examined at bedside. Depakote increased to 500mg PO BID, amitriptyline stopped as per psych recs. Pt with no complaints or concerns. Pleasant on encounter, says he is doing well and thanks provider for checking in on him.  Reports that he is waiting to go home. Patient informed we are working to get him out of the hospital. DSC planning pending placement. JOHN sent referral to [facility]. Awaiting decision.   9/24:  Pt seen and examined at bedside. Pt reports that is feeling well and does not have any symptoms, pain or concern. Pt is tolerating PO and eating well.  Pt is cooperative with evaluation and pleasant.  Waiting for placement for dc. Awaiting placement.       OVERNIGHT EVENTS: no overnight events.     Home Medications:  ALBUTEROL 0.083%(2.5MG/3ML) 30X3ML: USE 3 ML VIA NEBULIZER EVERY 6 HOURS AS NEEDED (20 Sep 2023 21:58)  AMITRIPTYLINE 10MG TABLETS: TAKE 1/2 TABLET BY MOUTH EVERY NIGHT AT BEDTIME. MAY INCREASE TO WHOLE TABLET THE 2 ND WEEK (20 Sep 2023 21:58)  ATORVASTATIN 40MG TABLETS: TAKE 1 TABLET BY MOUTH EVERY DAY (20 Sep 2023 21:58)  DIVALPROEX DELAYED RELEASE 125MG TB: TAKE 1 TABLET BY MOUTH THREE TIMES DAILY (20 Sep 2023 21:58)  DULOXETINE DR 30MG CAPSULES: TAKE 1 CAPSULE BY MOUTH WITH BREAKFAST AND 1 CAPSULE IN THE AFTER NOON NO LATER THAN 4 PM (20 Sep 2023 21:58)  ENTRESTO 24-26MG TABLETS: TAKE 1 TABLET BY MOUTH TWICE DAILY (20 Sep 2023 21:58)  FARXIGA 10MG TABLETS:  (20 Sep 2023 21:58)  FUROSEMIDE 40MG TABLETS: TAKE 1 TABLET BY MOUTH EVERY DAY (20 Sep 2023 21:58)  METFORMIN 500MG TABLETS: TAKE 1 TABLET BY MOUTH EVERY MORNING AND EVERY EVENING WITH MEALS. (20 Sep 2023 21:58)  METOPROLOL ER SUCCINATE 25MG TABS: TAKE 1 TABLET BY MOUTH EVERY DAY (20 Sep 2023 21:58)  MIDODRINE 5MG TABLETS: TAKE 1 TABLET BY MOUTH THREE TIMES DAILY (20 Sep 2023 21:58)  OLANZAPINE 2.5MG TABLETS: TAKE 1 TABLET BY MOUTH AT BEDTIME (20 Sep 2023 21:58)  POTASSIUM CL 20MEQ ER TABLETS: TAKE 1 TABLET BY MOUTH EVERY DAY WITH FOOD FOR 4 DAYS (20 Sep 2023 21:58)  SPIRONOLACTONE 25MG TABLETS:  (20 Sep 2023 21:58)  TRELEGY ELLIPTA 100-62.5MCG INH 30P: INHALE 1 PUFF BY MOUTH EVERY DAY (20 Sep 2023 21:58)      MEDICATIONS  (STANDING):  ammonium lactate 12% Lotion 1 Application(s) Topical two times a day  atorvastatin 40 milliGRAM(s) Oral at bedtime  budesonide  80 MICROgram(s)/formoterol 4.5 MICROgram(s) Inhaler 2 Puff(s) Inhalation two times a day  dapagliflozin 10 milliGRAM(s) Oral every 24 hours  dextrose 5%. 1000 milliLiter(s) (100 mL/Hr) IV Continuous <Continuous>  dextrose 5%. 1000 milliLiter(s) (50 mL/Hr) IV Continuous <Continuous>  dextrose 50% Injectable 12.5 Gram(s) IV Push once  dextrose 50% Injectable 25 Gram(s) IV Push once  dextrose 50% Injectable 25 Gram(s) IV Push once  divalproex  milliGRAM(s) Oral two times a day  DULoxetine 30 milliGRAM(s) Oral <User Schedule>  furosemide    Tablet 40 milliGRAM(s) Oral every 12 hours  glucagon  Injectable 1 milliGRAM(s) IntraMuscular once  heparin   Injectable 5000 Unit(s) SubCutaneous every 8 hours  insulin lispro (ADMELOG) corrective regimen sliding scale   SubCutaneous at bedtime  insulin lispro (ADMELOG) corrective regimen sliding scale   SubCutaneous three times a day before meals  methyl salicylate 15%/menthol 10% Topical Cream 1 Application(s) Topical three times a day  metoprolol succinate ER 25 milliGRAM(s) Oral daily  midodrine. 5 milliGRAM(s) Oral three times a day  OLANZapine 2.5 milliGRAM(s) Oral daily  OLANZapine 5 milliGRAM(s) Oral at bedtime  pantoprazole    Tablet 40 milliGRAM(s) Oral before breakfast  sacubitril 24 mG/valsartan 26 mG 1 Tablet(s) Oral two times a day  spironolactone 25 milliGRAM(s) Oral daily  tiotropium 2.5 MICROgram(s) Inhaler 2 Puff(s) Inhalation daily    MEDICATIONS  (PRN):  acetaminophen     Tablet .. 650 milliGRAM(s) Oral every 6 hours PRN Temp greater or equal to 38C (100.4F), Mild Pain (1 - 3)  albuterol    0.083% 2.5 milliGRAM(s) Nebulizer every 6 hours PRN Shortness of Breath and/or Wheezing  dextrose Oral Gel 15 Gram(s) Oral once PRN Blood Glucose LESS THAN 70 milliGRAM(s)/deciliter  melatonin 5 milliGRAM(s) Oral at bedtime PRN Insomnia      Allergies  No Known Allergies    Intolerances      Social History:  Tobacco: former , quit >30 years ago  EtOH: social drinker  Recreational drug use: denies   Lives with: Assisted Living Facility  Ambulates: without assistance  ADLs: needs assistance (20 Sep 2023 21:58)      REVIEW OF SYSTEMS: i feel fine  CONSTITUTIONAL: No fever, No chills, No fatigue, No myalgia, No Body ache, No Weakness  EYES: No eye pain,  No visual disturbances, No discharge, NO Redness  ENMT:  No ear pain, No nose bleed, No vertigo; No sinus pain, NO throat pain, No Congestion  NECK: No pain, No stiffness  RESPIRATORY: No cough, NO wheezing, No  hemoptysis, NO  shortness of breath  CARDIOVASCULAR: No chest pain, palpitations  GASTROINTESTINAL: No abdominal pain, NO epigastric pain. No nausea, No vomiting; No diarrhea, No constipation. [  ] BM  GENITOURINARY: No dysuria, No frequency, No urgency, No hematuria, NO incontinence  NEUROLOGICAL: No headaches, No dizziness, No numbness, No tingling, No tremors, No weakness  EXT: No Swelling, No Pain, No Edema  SKIN:  [ x ] No itching, burning, rashes, or lesions   MUSCULOSKELETAL: No joint pain ,No Jt swelling; No muscle pain, No back pain, No extremity pain  PSYCHIATRIC: No depression,  No anxiety,  No mood swings ,No difficulty sleeping at night  PAIN SCALE: [x  ] None  [  ] Other-  ROS Unable to obtain due to - [  ] Dementia  [  ] Lethargy [  ] Drowsy [  ] Sedated [  ] non verbal   REST OF REVIEW Of SYSTEM - [ x ] Normal       Vital Signs Last 24 Hrs  T(C): 36.6 (24 Sep 2023 05:51), Max: 37 (23 Sep 2023 20:18)  T(F): 97.9 (24 Sep 2023 05:51), Max: 98.6 (23 Sep 2023 20:18)  HR: 95 (24 Sep 2023 05:51) (91 - 95)  BP: 102/64 (24 Sep 2023 05:51) (93/57 - 102/64)  RR: 17 (24 Sep 2023 05:51) (17 - 17)  SpO2: 92% (24 Sep 2023 05:51) (92% - 95%)    Parameters below as of 24 Sep 2023 05:51  Patient On (Oxygen Delivery Method): room air      PHYSICAL EXAM:  GENERAL:  [ x ] NAD , [ x ] well appearing, [ ] Agitated, [  ] Drowsy,  [  ] Lethargy, [  ] confused   HEAD:  [x  ] Normal, [  ] Other  EYES:  [ x ] EOMI, [ x ] PERRLA, [ x ] conjunctiva and sclera clear normal, [  ] Other,  [  ] Pallor,[  ] Discharge  ENMT:  [x  ] Normal, [x  ] Moist mucous membranes, x  ] Good dentition, [x  ] No Thrush  NECK:  [ x ] Supple, [x  ] No JVD, [ x ] Normal thyroid, [  ] Lymphadenopathy [  ] Other  CHEST/LUNG:  [x  ] Clear to auscultation bilaterally, [ x ] Breath Sounds equal B/L, [  x] poor effort  [ x ] No rales, [ x ] No rhonchi  [  x]  No wheezing,   HEART:  [  x] Regular rate and rhythm, [  ] tachycardia, [  ] Bradycardia,  [  ] irregular  [x  ] No murmurs, No rubs, No gallops, [x  ] PPM in place (Mfr:  )  ABDOMEN:  [x  ] Soft, [  x] Nontender, [x  ] Nondistended, [  x] No mass, [x  ] Bowel sounds present, [ x ] obese  NERVOUS SYSTEM:  [ x ] Alert & Oriented X3, [x  ] Nonfocal  [  ] Confusion  [  ] Encephalopathic [  ] Sedated [  ] Unable to assess, [  ] Dementia [ x ] Other- delusions, paranoia  EXTREMITIES: [ x ] 2+ Peripheral Pulses, No clubbing, No cyanosis,  [ x ]  2 +edema B/L lower EXT. [ x ] PVD stasis skin changes B/L Lower EXT, [x  ] wound-superficial wounds, Scab  on Hand & legs   LYMPH: No lymphadenopathy noted  SKIN:  [ x ] No rashes or lesions, [  ] Pressure Ulcers, [  ] ecchymosis, [  ] Skin Tears, [  ] Other    DIET: Diet, Regular:   Consistent Carbohydrate Evening Snack  DASH/TLC Sodium & Cholesterol Restricted (09-20-23 @ 22:36)      LABS:                        12.8   7.10  )-----------( 166      ( 24 Sep 2023 09:40 )             38.8     24 Sep 2023 09:40    142    |  105    |  22     ----------------------------<  117    3.8     |  31     |  1.00     Ca    9.5        24 Sep 2023 09:40  Mg     1.9       24 Sep 2023 09:40    TPro  7.1    /  Alb  3.4    /  TBili  1.1    /  DBili  x      /  AST  15     /  ALT  19     /  AlkPhos  83     24 Sep 2023 09:40      Urinalysis Basic - ( 24 Sep 2023 09:40 )    Color: x / Appearance: x / SG: x / pH: x  Gluc: 117 mg/dL / Ketone: x  / Bili: x / Urobili: x   Blood: x / Protein: x / Nitrite: x   Leuk Esterase: x / RBC: x / WBC x   Sq Epi: x / Non Sq Epi: x / Bacteria: x      Culture Results:   <10,000 CFU/mL Normal Urogenital Kayleigh (09-17 @ 13:40)        Culture - Urine (collected 17 Sep 2023 13:40)  Source: Clean Catch Clean Catch (Midstream)  Final Report (18 Sep 2023 13:53):    <10,000 CFU/mL Normal Urogenital Kayleigh      Anemia Panel:  Folate, Serum: 11.8 ng/mL (09-22-23 @ 06:42)  Vitamin B12, Serum: 517 pg/mL (09-22-23 @ 06:42)  Vitamin B12, Serum: 510 pg/mL (09-21-23 @ 06:55)  Folate, Serum: 17.1 ng/mL (09-21-23 @ 06:55)      Thyroid Panel:  T4, Serum: 6.5 ug/dL (09-21-23 @ 06:55)  Thyroid Stimulating Hormone, Serum: 2.74 uIU/mL (09-21-23 @ 06:55)  Thyroid Stimulating Hormone, Serum: 2.66 uIU/mL (09-20-23 @ 19:50)        HEALTH ISSUES - PROBLEM Dx:  Paranoia    HFrEF (heart failure with reduced ejection fraction)    HTN (hypertension)    HLD (hyperlipidemia)    Need for prophylactic measure    Medication management    DM2 (diabetes mellitus, type 2)    Anemia        Consultant(s) Notes Reviewed:  [x  ] YES     Care Discussed with [X] Consultants  [ x ] Patient  [  ] Family [  ] HCP [  ]   [ x ] Social Service  [ x ] RN, [  ] Physical Therapy,[  ] Palliative care team  DVT PPX: [  ] Lovenox, [ x ] S C Heparin, [  ] Coumadin, [  ] Xarelto, [  ] Eliquis, [  ] Pradaxa, [  ] IV Heparin drip, [  ] SCD [  ] Contraindication 2 to GI Bleed,[  ] Ambulation [  ] Contraindicated 2 to  bleed [  ] Contraindicated 2 to Brain Bleed  Advanced directive: [ x ] None, [  ] DNR/DNI

## 2023-09-24 NOTE — PROGRESS NOTE ADULT - ATTENDING COMMENTS
84y/o M PMH dementia, DM2, HFrEF s/p PPM, HTN, HLD presents with increased agitation.  Admit for increased paranoia & Placement .  pt seen, examined, case & care plan d/w pt, residents at detail. d/w dtr Arianne on 9/21 at [facility] about pt's condition, Care plan Meds   Consult;  Psych Dr Strange on case , -Dr Strange  adjusted Psych meds   Social service d/w about d/c planning to assisted living as per dtr   PT Eval.  PO diet  AM labs   On 1:1 to continue .   Total care time is 50 minutes.

## 2023-09-24 NOTE — PROGRESS NOTE ADULT - ASSESSMENT
84y/o M PMH dementia, DM2, HFrEF s/p PPM, HTN, HLD presents with increased agitation.  Admit for increased paranoia

## 2023-09-25 LAB
ANION GAP SERPL CALC-SCNC: 7 MMOL/L — SIGNIFICANT CHANGE UP (ref 5–17)
BUN SERPL-MCNC: 21 MG/DL — SIGNIFICANT CHANGE UP (ref 7–23)
CALCIUM SERPL-MCNC: 9.7 MG/DL — SIGNIFICANT CHANGE UP (ref 8.5–10.1)
CHLORIDE SERPL-SCNC: 103 MMOL/L — SIGNIFICANT CHANGE UP (ref 96–108)
CO2 SERPL-SCNC: 31 MMOL/L — SIGNIFICANT CHANGE UP (ref 22–31)
CREAT SERPL-MCNC: 1.1 MG/DL — SIGNIFICANT CHANGE UP (ref 0.5–1.3)
EGFR: 67 ML/MIN/1.73M2 — SIGNIFICANT CHANGE UP
GLUCOSE BLDC GLUCOMTR-MCNC: 160 MG/DL — HIGH (ref 70–99)
GLUCOSE BLDC GLUCOMTR-MCNC: 174 MG/DL — HIGH (ref 70–99)
GLUCOSE BLDC GLUCOMTR-MCNC: 184 MG/DL — HIGH (ref 70–99)
GLUCOSE BLDC GLUCOMTR-MCNC: 85 MG/DL — SIGNIFICANT CHANGE UP (ref 70–99)
GLUCOSE SERPL-MCNC: 122 MG/DL — HIGH (ref 70–99)
HCT VFR BLD CALC: 38.8 % — LOW (ref 39–50)
HGB BLD-MCNC: 12.9 G/DL — LOW (ref 13–17)
MCHC RBC-ENTMCNC: 32.7 PG — SIGNIFICANT CHANGE UP (ref 27–34)
MCHC RBC-ENTMCNC: 33.2 GM/DL — SIGNIFICANT CHANGE UP (ref 32–36)
MCV RBC AUTO: 98.2 FL — SIGNIFICANT CHANGE UP (ref 80–100)
NRBC # BLD: 0 /100 WBCS — SIGNIFICANT CHANGE UP (ref 0–0)
PLATELET # BLD AUTO: 204 K/UL — SIGNIFICANT CHANGE UP (ref 150–400)
POTASSIUM SERPL-MCNC: 3.7 MMOL/L — SIGNIFICANT CHANGE UP (ref 3.5–5.3)
POTASSIUM SERPL-SCNC: 3.7 MMOL/L — SIGNIFICANT CHANGE UP (ref 3.5–5.3)
RBC # BLD: 3.95 M/UL — LOW (ref 4.2–5.8)
RBC # FLD: 15.3 % — HIGH (ref 10.3–14.5)
SODIUM SERPL-SCNC: 141 MMOL/L — SIGNIFICANT CHANGE UP (ref 135–145)
WBC # BLD: 6.67 K/UL — SIGNIFICANT CHANGE UP (ref 3.8–10.5)
WBC # FLD AUTO: 6.67 K/UL — SIGNIFICANT CHANGE UP (ref 3.8–10.5)

## 2023-09-25 PROCEDURE — 99231 SBSQ HOSP IP/OBS SF/LOW 25: CPT

## 2023-09-25 RX ORDER — MENTHOL AND METHYL SALICYLATE 10; 30 G/100G; G/100G
1 CREAM TOPICAL
Qty: 0 | Refills: 0 | DISCHARGE
Start: 2023-09-25

## 2023-09-25 RX ORDER — ALBUTEROL 90 UG/1
2.5 AEROSOL, METERED ORAL
Qty: 0 | Refills: 0 | DISCHARGE
Start: 2023-09-25

## 2023-09-25 RX ORDER — AMITRIPTYLINE HCL 25 MG
0 TABLET ORAL
Qty: 0 | Refills: 1 | DISCHARGE

## 2023-09-25 RX ORDER — DULOXETINE HYDROCHLORIDE 30 MG/1
0 CAPSULE, DELAYED RELEASE ORAL
Qty: 0 | Refills: 1 | DISCHARGE

## 2023-09-25 RX ORDER — SPIRONOLACTONE 25 MG/1
1 TABLET, FILM COATED ORAL
Qty: 0 | Refills: 0 | DISCHARGE
Start: 2023-09-25

## 2023-09-25 RX ORDER — OLANZAPINE 15 MG/1
5 TABLET, FILM COATED ORAL
Refills: 0 | Status: DISCONTINUED | OUTPATIENT
Start: 2023-09-25 | End: 2023-10-09

## 2023-09-25 RX ORDER — DIVALPROEX SODIUM 500 MG/1
0 TABLET, DELAYED RELEASE ORAL
Qty: 0 | Refills: 0 | DISCHARGE

## 2023-09-25 RX ORDER — ATORVASTATIN CALCIUM 80 MG/1
0 TABLET, FILM COATED ORAL
Qty: 0 | Refills: 0 | DISCHARGE

## 2023-09-25 RX ORDER — DAPAGLIFLOZIN 10 MG/1
1 TABLET, FILM COATED ORAL
Qty: 0 | Refills: 0 | DISCHARGE
Start: 2023-09-25

## 2023-09-25 RX ORDER — OLANZAPINE 15 MG/1
1 TABLET, FILM COATED ORAL
Qty: 0 | Refills: 0 | DISCHARGE
Start: 2023-09-25

## 2023-09-25 RX ORDER — SACUBITRIL AND VALSARTAN 24; 26 MG/1; MG/1
1 TABLET, FILM COATED ORAL
Refills: 0 | Status: DISCONTINUED | OUTPATIENT
Start: 2023-09-25 | End: 2023-10-03

## 2023-09-25 RX ORDER — OLANZAPINE 15 MG/1
0 TABLET, FILM COATED ORAL
Qty: 0 | Refills: 0 | DISCHARGE

## 2023-09-25 RX ORDER — SACUBITRIL AND VALSARTAN 24; 26 MG/1; MG/1
0 TABLET, FILM COATED ORAL
Qty: 0 | Refills: 0 | DISCHARGE

## 2023-09-25 RX ORDER — SPIRONOLACTONE 25 MG/1
0 TABLET, FILM COATED ORAL
Qty: 0 | Refills: 0 | DISCHARGE

## 2023-09-25 RX ORDER — SACUBITRIL AND VALSARTAN 24; 26 MG/1; MG/1
1 TABLET, FILM COATED ORAL
Qty: 0 | Refills: 0 | DISCHARGE
Start: 2023-09-25

## 2023-09-25 RX ORDER — FUROSEMIDE 40 MG
0 TABLET ORAL
Qty: 0 | Refills: 0 | DISCHARGE

## 2023-09-25 RX ORDER — DULOXETINE HYDROCHLORIDE 30 MG/1
1 CAPSULE, DELAYED RELEASE ORAL
Qty: 0 | Refills: 0 | DISCHARGE
Start: 2023-09-25

## 2023-09-25 RX ORDER — MIDODRINE HYDROCHLORIDE 2.5 MG/1
0 TABLET ORAL
Qty: 0 | Refills: 0 | DISCHARGE

## 2023-09-25 RX ORDER — MIDODRINE HYDROCHLORIDE 2.5 MG/1
1 TABLET ORAL
Qty: 0 | Refills: 0 | DISCHARGE
Start: 2023-09-25

## 2023-09-25 RX ORDER — PANTOPRAZOLE SODIUM 20 MG/1
1 TABLET, DELAYED RELEASE ORAL
Qty: 0 | Refills: 0 | DISCHARGE
Start: 2023-09-25

## 2023-09-25 RX ORDER — METOPROLOL TARTRATE 50 MG
1 TABLET ORAL
Qty: 0 | Refills: 0 | DISCHARGE
Start: 2023-09-25

## 2023-09-25 RX ORDER — SOD,AMMONIUM,POTASSIUM LACTATE
1 CREAM (GRAM) TOPICAL
Qty: 0 | Refills: 0 | DISCHARGE
Start: 2023-09-25

## 2023-09-25 RX ORDER — DAPAGLIFLOZIN 10 MG/1
0 TABLET, FILM COATED ORAL
Qty: 0 | Refills: 0 | DISCHARGE

## 2023-09-25 RX ORDER — LANOLIN ALCOHOL/MO/W.PET/CERES
1 CREAM (GRAM) TOPICAL
Qty: 0 | Refills: 0 | DISCHARGE
Start: 2023-09-25

## 2023-09-25 RX ORDER — OLANZAPINE 15 MG/1
10 TABLET, FILM COATED ORAL AT BEDTIME
Refills: 0 | Status: DISCONTINUED | OUTPATIENT
Start: 2023-09-25 | End: 2023-10-09

## 2023-09-25 RX ORDER — METOPROLOL TARTRATE 50 MG
0 TABLET ORAL
Qty: 0 | Refills: 0 | DISCHARGE

## 2023-09-25 RX ORDER — FUROSEMIDE 40 MG
1 TABLET ORAL
Qty: 0 | Refills: 0 | DISCHARGE
Start: 2023-09-25

## 2023-09-25 RX ORDER — ATORVASTATIN CALCIUM 80 MG/1
1 TABLET, FILM COATED ORAL
Qty: 0 | Refills: 0 | DISCHARGE
Start: 2023-09-25

## 2023-09-25 RX ORDER — DIVALPROEX SODIUM 500 MG/1
1 TABLET, DELAYED RELEASE ORAL
Qty: 0 | Refills: 0 | DISCHARGE
Start: 2023-09-25

## 2023-09-25 RX ORDER — ALBUTEROL 90 UG/1
0 AEROSOL, METERED ORAL
Qty: 0 | Refills: 0 | DISCHARGE

## 2023-09-25 RX ADMIN — Medication 1: at 17:13

## 2023-09-25 RX ADMIN — HEPARIN SODIUM 5000 UNIT(S): 5000 INJECTION INTRAVENOUS; SUBCUTANEOUS at 12:10

## 2023-09-25 RX ADMIN — MIDODRINE HYDROCHLORIDE 5 MILLIGRAM(S): 2.5 TABLET ORAL at 17:16

## 2023-09-25 RX ADMIN — DAPAGLIFLOZIN 10 MILLIGRAM(S): 10 TABLET, FILM COATED ORAL at 12:10

## 2023-09-25 RX ADMIN — HEPARIN SODIUM 5000 UNIT(S): 5000 INJECTION INTRAVENOUS; SUBCUTANEOUS at 21:06

## 2023-09-25 RX ADMIN — HEPARIN SODIUM 5000 UNIT(S): 5000 INJECTION INTRAVENOUS; SUBCUTANEOUS at 06:17

## 2023-09-25 RX ADMIN — SPIRONOLACTONE 25 MILLIGRAM(S): 25 TABLET, FILM COATED ORAL at 06:17

## 2023-09-25 RX ADMIN — DIVALPROEX SODIUM 500 MILLIGRAM(S): 500 TABLET, DELAYED RELEASE ORAL at 06:17

## 2023-09-25 RX ADMIN — DIVALPROEX SODIUM 500 MILLIGRAM(S): 500 TABLET, DELAYED RELEASE ORAL at 17:14

## 2023-09-25 RX ADMIN — OLANZAPINE 2.5 MILLIGRAM(S): 15 TABLET, FILM COATED ORAL at 12:10

## 2023-09-25 RX ADMIN — Medication 5 MILLIGRAM(S): at 21:06

## 2023-09-25 RX ADMIN — MENTHOL AND METHYL SALICYLATE 1 APPLICATION(S): 10; 30 CREAM TOPICAL at 21:13

## 2023-09-25 RX ADMIN — MENTHOL AND METHYL SALICYLATE 1 APPLICATION(S): 10; 30 CREAM TOPICAL at 16:38

## 2023-09-25 RX ADMIN — Medication 1 APPLICATION(S): at 06:18

## 2023-09-25 RX ADMIN — SACUBITRIL AND VALSARTAN 1 TABLET(S): 24; 26 TABLET, FILM COATED ORAL at 17:14

## 2023-09-25 RX ADMIN — Medication 650 MILLIGRAM(S): at 17:30

## 2023-09-25 RX ADMIN — MENTHOL AND METHYL SALICYLATE 1 APPLICATION(S): 10; 30 CREAM TOPICAL at 06:19

## 2023-09-25 RX ADMIN — MIDODRINE HYDROCHLORIDE 5 MILLIGRAM(S): 2.5 TABLET ORAL at 12:10

## 2023-09-25 RX ADMIN — DULOXETINE HYDROCHLORIDE 30 MILLIGRAM(S): 30 CAPSULE, DELAYED RELEASE ORAL at 17:14

## 2023-09-25 RX ADMIN — Medication 1 APPLICATION(S): at 17:15

## 2023-09-25 RX ADMIN — Medication 25 MILLIGRAM(S): at 06:18

## 2023-09-25 RX ADMIN — Medication 1: at 08:47

## 2023-09-25 RX ADMIN — ATORVASTATIN CALCIUM 40 MILLIGRAM(S): 80 TABLET, FILM COATED ORAL at 21:06

## 2023-09-25 RX ADMIN — BUDESONIDE AND FORMOTEROL FUMARATE DIHYDRATE 2 PUFF(S): 160; 4.5 AEROSOL RESPIRATORY (INHALATION) at 06:19

## 2023-09-25 RX ADMIN — DULOXETINE HYDROCHLORIDE 30 MILLIGRAM(S): 30 CAPSULE, DELAYED RELEASE ORAL at 06:18

## 2023-09-25 RX ADMIN — OLANZAPINE 10 MILLIGRAM(S): 15 TABLET, FILM COATED ORAL at 21:06

## 2023-09-25 RX ADMIN — Medication 40 MILLIGRAM(S): at 17:15

## 2023-09-25 RX ADMIN — PANTOPRAZOLE SODIUM 40 MILLIGRAM(S): 20 TABLET, DELAYED RELEASE ORAL at 06:18

## 2023-09-25 RX ADMIN — Medication 650 MILLIGRAM(S): at 16:37

## 2023-09-25 RX ADMIN — MIDODRINE HYDROCHLORIDE 5 MILLIGRAM(S): 2.5 TABLET ORAL at 06:18

## 2023-09-25 RX ADMIN — Medication 40 MILLIGRAM(S): at 06:18

## 2023-09-25 RX ADMIN — OLANZAPINE 5 MILLIGRAM(S): 15 TABLET, FILM COATED ORAL at 17:59

## 2023-09-25 NOTE — PROGRESS NOTE ADULT - PROBLEM SELECTOR PLAN 6
BP stable on admission   - Per med dispensary hx patient is on midodrine as well for possible orthostatic hypotension  - BP has been soft, pt asymptomatic.   - Continue home Lasix, metoprolol, Entresto, Spironolactone, hold parameters changed.    - Continue home midodrine   - Monitor hemodynamics

## 2023-09-25 NOTE — PROGRESS NOTE ADULT - PROBLEM SELECTOR PLAN 4
Discussed patient medication list with daughter (Arianne) who confirmed most medications found on surescripts recent dispensary history) however she is not sure of any changes that may have happened when he was hospitalized or when he went to the ED  - Patient has list with him, copy in chart however unclear if that list is up to date  - Current med rec completed based on recent dispensary history  - [facility] pharmacy called: additional medications from pharmacy include allopurinol 300qD, lisinopril 20mg qD, mirabegron 25mg qD, trazodone 50mg qD at bedtime. medications not mentioned by daughter. daughter notes pt has been to numerous different hospitals/ERs and has come back with numerous different medications with no follow up  - plan to hold these additional meds, per psych consult for use of trazodone

## 2023-09-25 NOTE — PROGRESS NOTE ADULT - PROBLEM SELECTOR PLAN 2
Chronic s/p PPM (paced ~end of Aug 2023 at [facility] per daughter )  - TTE (8/2023) per daughter showed EF ~20%  - Pt on Lasix 40 mg 2x day Entresto, metoprolol XL, spironolactone, Farxiga. Due to soft BP lowered hold parameters to ensure Pt recieves meds.  - Dash/ TLC Diet

## 2023-09-25 NOTE — SOCIAL WORK PROGRESS NOTE - NSSWPROGRESSNOTE_GEN_ALL_CORE
Pt is medically ready to go to [facility]. SW to inform [facility]. Pt is a new resident of this skilled nursing and paperwork will need to be reviewed. SW to remain available.

## 2023-09-25 NOTE — PROGRESS NOTE ADULT - PROBLEM SELECTOR PLAN 8
-heparin 5000 q8h    #DSC planning: plan for [facility] (will need to send meds to pharmacy) -heparin 5000 q8h    #DSC planning: awaiting placement to new [facility]

## 2023-09-25 NOTE — PROGRESS NOTE ADULT - ATTENDING COMMENTS
82y/o M PMH dementia, DM2, HFrEF s/p PPM, HTN, HLD presents with increased agitation.  Admit for increased paranoia & Placement .  pt seen, examined, case & care plan d/w pt, residents at detail. d/w dtr Arianne on 9/21 at [facility] about pt's condition, Care plan Meds   Consult;  Psych Dr Strange on case , -Dr Strange  adjusted Psych meds today, increase [facility] [place]    Social service d/w about d/c planning to assisted living as per dtr   PT Eval.  PO diet  AM labs   On 1:1 to continue .   Total care time is 50 minutes. 82y/o M PMH dementia, DM2, HFrEF s/p PPM, HTN, HLD presents with increased agitation.  Admit for increased paranoia & Placement .  pt seen, examined, case & care plan d/w pt, residents at detail. d/w dtr Arianne on 9/21 at [facility] about pt's condition, Care plan Meds   Consult;  Psych Dr Strange on case , -Dr Strange  adjusted Psych meds today, increase [facility]    Social service d/w about d/c planning to assisted living as per dtr   PT Eval.  PO diet  AM labs   D/C 1:1  as d/w DR Strange   Total care time is 50 minutes.

## 2023-09-25 NOTE — PROGRESS NOTE ADULT - PROBLEM SELECTOR PLAN 1
Acute paranoia in setting of hx of dementia, ?baseline mental disorder ? Psych Hx- ?Psychosis/ Agitation: unclear hx  - Recent hospitalization at [facility] for similar problem. Unknown if patient was taking his medications prior to admission.    - No infectious or metabolic concomitant disorder. Normal thyroid profile.   - zyprexa increased to 5mg BID & 10mg at bedtime as per psych.  - Continue Depakote 500mg BID as per Dr. Strange.    - Continue duloxetine 30 mg 2x day,   - STOP amitriptyline, as per psych recs  - Psych (Dr. Strange) recs appreciated  - SW consulted for d/c plan: [facility], must be off constant observation for 24 hours.   - Now off constant observation Acute paranoia in setting of hx of dementia, ?baseline mental disorder ? Psych Hx- ?Psychosis/ Agitation: unclear hx  - Recent hospitalization at [facility] for similar problem. Unknown if patient was taking his medications prior to admission.    - No infectious or metabolic concomitant disorder. Normal thyroid profile.   - zyprexa increased to 5mg BID & 10mg at bedtime as per psych.  - Continue Depakote 500mg BID as per Dr. Strange.    - Continue duloxetine 30 mg 2x day,   - STOP amitriptyline, as per psych recs  - Psych (Dr. Strange) recs appreciated  - SW consulted for d/c plan: awaiting new facility  - Now off constant observation Acute paranoia in setting of hx of dementia, ?baseline mental disorder ? Psych Hx- ?Psychosis/ Agitation: unclear hx  - Recent hospitalization at [facility] for similar problem. Unknown if patient was taking his medications prior to admission.    - No infectious or metabolic concomitant disorder. Normal thyroid profile.   - Zyprexa increased to 5mg BID & 10mg at bedtime as per psych.  - Continue Depakote 500mg BID as per Dr. Strange.    - Continue duloxetine 30 mg 2x day,   - STOP amitriptyline, as per psych recs  - Psych (Dr. Strange) recs appreciated  - SW consulted for d/c plan: awaiting new facility  - Now off constant observation

## 2023-09-25 NOTE — PROGRESS NOTE ADULT - PROBLEM SELECTOR PLAN 5
Chronic, controlled with A1c 6.5  - Hold home metformin, FARXIGA inpatient  - Low ISS  - Regular finger sticks  - DASH/TLC Consistent carb diet  - Hypoglycemic protocol.

## 2023-09-25 NOTE — PROGRESS NOTE ADULT - SUBJECTIVE AND OBJECTIVE BOX
Patient is a 83y old  Male who presents with a chief complaint of paranoia (24 Sep 2023 13:01)    HPI:  82y/o M PMH dementia, HFrEF s/p PPM, HTN, HLD  presents with increased agitation. History obtained from patient's daughter Arianne over phone.   Patient moved from [place] to [place] ~2months ago and has been having issues with increased paranoia for the past month. He stays at a [facility] older assisted living community where he has been having several episodes of sundowning.   Recently he was hospitalized at [facility] for same issues from 9/1-914/23. Daughter was unable to provide sufficient history regarding hospitalization.   Patient's son, Jose has been taking care of him and knows more about his medical hx however patient's daughter states she is taking over now and did not want to bother Jose at this time.  Patient was also recently seen at [facility] ED for episodes of agitation and was told to follow up with outpatient Psychiatrist but daughter states that likely did not happened because of his increased agitation.  Patient became agitated today and staff had to call 911 for further evaluation. The facility as well as his children feel that his current psych medication regimen is not sufficient to keep him calm.   Per review of recent dispensary history, he was seeing a psychiatric NP (Barbara Bravo) in [place], [place].   Of note, he had placement of PPM in late August at [facility]. Per his daughter, his LVEF at that time was 20% before and after PPM placement.  Currently, patient states his b/l legs feel itchy but otherwise he feels fine and is agreeable.  Denies fevers, chills. sob, cp, n/v/d.    ED Course   T 97.4 F  /79 RR 18 SpO2 95% on RA  Labs H/H 12.1/37.5 Glu   UA: >1000 glucose  UTox: negaitve     In the ED, given tylenol 650mg x1 , zyprexa 5mg IVP x1  (20 Sep 2023 21:58)    INTERVAL HPI:  9/21: Pt seen and examined, sitting in chair with 1 to 1 present. Patient expressed concern about not receiving his pantoprazole, stating he cant digest his food without it, denies n/v, epigastric pain or burning. When asked about why pt came to hospital, states that he was being followed and he called the police to come help him. Says he lives with his son Jose. EMS brought patient in from  and over [facility]. Additionally states that one pill he was given this morning by staff was "phony" so he didn't take it. Contacted pt's pharmacy [facility], numerous scripts filled in [place] and [place]. Per night team sign out, daughter states that pt has been in and out of many ERs and has been started on many different medications so there are a lot of recent meds that he does not currently take to the best of her knowledge. Per daughter, her brothers are tired of assisting with their father given history of difficulties with him. She is willing to assist but does not want to be too involved and asks us to not contact her brothers. Psychiatry consulted and to see patient. JOHN/VAISHALI updated.   Soren Krishnan [phone]  9/22: Pt seen and examined at bedside. xyprexa dose increased to 5mg bedtime and 2.5mg in AM starting yesterday. Marked improvement in demeanor. A&Ox3 (person, time, place). States he is in a "police hospital" and that he came here because he asked some man in their car to call the police because he was being followed by an aide that his son hired. Said he came from "[place] [address] and over [facility]. No complaints or concerns today. States he has to get things ready to move back to [place]. Psych is following. DSC planning pending placement as his previous community does not want pt to return and family does not want to take patient. Replace PO K   9/23: Pt seen and examined at bedside. Depakote increased to 500mg PO BID, amitriptyline stopped as per psych recs. Pt with no complaints or concerns. Pleasant on encounter, says he is doing well and thanks provider for checking in on him.  Reports that he is waiting to go home. Patient informed we are working to get him out of the hospital. DSC planning pending placement. JOHN sent referral to [facility]. Awaiting decision.   9/24:  Pt seen and examined at bedside. Pt reports that is feeling well and does not have any symptoms, pain or concern. Pt is tolerating PO and eating well.  Pt is cooperative with evaluation and pleasant.  Waiting for placement for dc. Awaiting placement.   9/25: Pt seen and examined at bedside. Reports last night he was looking for 3 medications that were started in the hospital at night. Reassured that he is receiving all the proper medications he needs. Appetite is good. Pt has no complaints at this time. Now off 1:1.    OVERNIGHT EVENTS: Overnight Pt was a little agitated asking for his 3 bedtime medications, which he was unsure about. reports having some pain and difficulty sleeping. Given tylenol and melatonin, Pt fell asleep.    Home Medications:  albuterol 2.5 mg/3 mL (0.083%) inhalation solution: 2.5 milligram(s) inhaled every 6 hours as needed for  shortness of breath and/or wheezing (25 Sep 2023 11:00)  ammonium lactate 12% topical lotion: 1 Apply topically to affected area 2 times a day (25 Sep 2023 10:46)  atorvastatin 40 mg oral tablet: 1 tab(s) orally once a day (at bedtime) (25 Sep 2023 11:00)  dapagliflozin 10 mg oral tablet: 1 tab(s) orally every 24 hours (25 Sep 2023 10:46)  divalproex sodium 500 mg oral delayed release tablet: 1 tab(s) orally 2 times a day (25 Sep 2023 10:37)  DULoxetine 30 mg oral delayed release capsule: 1 cap(s) orally 2 times a day (25 Sep 2023 11:00)  furosemide 40 mg oral tablet: 1 tab(s) orally every 12 hours (25 Sep 2023 11:00)  melatonin 5 mg oral tablet: 1 tab(s) orally once a day (at bedtime) As needed Insomnia (25 Sep 2023 10:46)  METFORMIN 500MG TABLETS: TAKE 1 TABLET BY MOUTH EVERY MORNING AND EVERY EVENING WITH MEALS. (20 Sep 2023 21:58)  methyl salicylate-menthol 15%-10% topical cream: 1 Apply topically to affected area 3 times a day (25 Sep 2023 10:46)  metoprolol succinate 25 mg oral tablet, extended release: 1 tab(s) orally once a day (25 Sep 2023 11:00)  midodrine 5 mg oral tablet: 1 tab(s) orally 3 times a day (25 Sep 2023 11:00)  OLANZapine 5 mg oral tablet: 1 tab(s) orally once a day (at bedtime) (25 Sep 2023 10:46)  pantoprazole 40 mg oral delayed release tablet: 1 tab(s) orally once a day (before a meal) (25 Sep 2023 10:46)  POTASSIUM CL 20MEQ ER TABLETS: TAKE 1 TABLET BY MOUTH EVERY DAY WITH FOOD FOR 4 DAYS (20 Sep 2023 21:58)  sacubitril-valsartan 24 mg-26 mg oral tablet: 1 tab(s) orally 2 times a day (25 Sep 2023 11:00)  spironolactone 25 mg oral tablet: 1 tab(s) orally once a day (25 Sep 2023 10:46)  TRELEGY ELLIPTA 100-62.5MCG INH 30P: INHALE 1 PUFF BY MOUTH EVERY DAY (20 Sep 2023 21:58)  ZyPREXA 2.5 mg oral tablet: 1 tab(s) orally once a day (25 Sep 2023 10:46)      MEDICATIONS  (STANDING):  ammonium lactate 12% Lotion 1 Application(s) Topical two times a day  atorvastatin 40 milliGRAM(s) Oral at bedtime  budesonide  80 MICROgram(s)/formoterol 4.5 MICROgram(s) Inhaler 2 Puff(s) Inhalation two times a day  dapagliflozin 10 milliGRAM(s) Oral every 24 hours  dextrose 5%. 1000 milliLiter(s) (100 mL/Hr) IV Continuous <Continuous>  dextrose 5%. 1000 milliLiter(s) (50 mL/Hr) IV Continuous <Continuous>  dextrose 50% Injectable 12.5 Gram(s) IV Push once  dextrose 50% Injectable 25 Gram(s) IV Push once  dextrose 50% Injectable 25 Gram(s) IV Push once  divalproex  milliGRAM(s) Oral two times a day  DULoxetine 30 milliGRAM(s) Oral <User Schedule>  furosemide    Tablet 40 milliGRAM(s) Oral every 12 hours  glucagon  Injectable 1 milliGRAM(s) IntraMuscular once  heparin   Injectable 5000 Unit(s) SubCutaneous every 8 hours  insulin lispro (ADMELOG) corrective regimen sliding scale   SubCutaneous at bedtime  insulin lispro (ADMELOG) corrective regimen sliding scale   SubCutaneous three times a day before meals  methyl salicylate 15%/menthol 10% Topical Cream 1 Application(s) Topical three times a day  metoprolol succinate ER 25 milliGRAM(s) Oral daily  midodrine. 5 milliGRAM(s) Oral three times a day  OLANZapine Disintegrating Tablet 5 milliGRAM(s) Oral two times a day  OLANZapine Disintegrating Tablet 10 milliGRAM(s) Oral at bedtime  pantoprazole    Tablet 40 milliGRAM(s) Oral before breakfast  sacubitril 24 mG/valsartan 26 mG 1 Tablet(s) Oral two times a day  spironolactone 25 milliGRAM(s) Oral daily  tiotropium 2.5 MICROgram(s) Inhaler 2 Puff(s) Inhalation daily    MEDICATIONS  (PRN):  acetaminophen     Tablet .. 650 milliGRAM(s) Oral every 6 hours PRN Temp greater or equal to 38C (100.4F), Mild Pain (1 - 3)  albuterol    0.083% 2.5 milliGRAM(s) Nebulizer every 6 hours PRN Shortness of Breath and/or Wheezing  dextrose Oral Gel 15 Gram(s) Oral once PRN Blood Glucose LESS THAN 70 milliGRAM(s)/deciliter  melatonin 5 milliGRAM(s) Oral at bedtime PRN Insomnia      Allergies    No Known Allergies    Intolerances        Social History:  Tobacco: former , quit >30 years ago  EtOH: social drinker  Recreational drug use: denies   Lives with: Assisted Living Facility  Ambulates: without assistance  ADLs: needs assistance (20 Sep 2023 21:58)      REVIEW OF SYSTEMS:  CONSTITUTIONAL: No fever, No chills, No fatigue, No myalgia, No Body ache, No Weakness  EYES: No eye pain,  No visual disturbances, No discharge, NO Redness  ENMT:  No ear pain, No nose bleed, No vertigo; No sinus pain, NO throat pain, No Congestion  NECK: No pain, No stiffness  RESPIRATORY: No cough, NO wheezing, No  hemoptysis, NO  shortness of breath  CARDIOVASCULAR: No chest pain, palpitations  GASTROINTESTINAL: No abdominal pain, NO epigastric pain. No nausea, No vomiting; No diarrhea, No constipation. [ x ] BM  GENITOURINARY: No dysuria, No frequency, No urgency, No hematuria, NO incontinence  NEUROLOGICAL: No headaches, No dizziness, No numbness, No tingling, No tremors, No weakness  EXT: No Swelling, No Pain, No Edema  SKIN:  [ x ] No itching, burning, rashes, or lesions   MUSCULOSKELETAL: No joint pain ,No Jt swelling; No muscle pain, No back pain, No extremity pain  PSYCHIATRIC: No depression,  No anxiety,  No mood swings ,No difficulty sleeping at night  PAIN SCALE: [ x ] None  [  ] Other-  ROS Unable to obtain due to - [  ] Dementia  [  ] Lethargy [  ] Drowsy [  ] Sedated [  ] non verbal  REST OF REVIEW Of SYSTEM - [ x ] Normal     Vital Signs Last 24 Hrs  T(C): 36.3 (25 Sep 2023 13:45), Max: 36.6 (24 Sep 2023 21:39)  T(F): 97.4 (25 Sep 2023 13:45), Max: 97.9 (24 Sep 2023 21:39)  HR: 95 (25 Sep 2023 13:45) (95 - 99)  BP: 100/64 (25 Sep 2023 13:45) (100/64 - 117/73)  BP(mean): --  RR: 17 (25 Sep 2023 13:45) (17 - 18)  SpO2: 99% (25 Sep 2023 13:45) (92% - 99%)    Parameters below as of 25 Sep 2023 13:45  Patient On (Oxygen Delivery Method): room air      Finger Stick          PHYSICAL EXAM:  GENERAL:  [ x ] NAD , [ x ] well appearing, [  ] Agitated, [  ] Drowsy,  [  ] Lethargy, [  ] confused   HEAD:  [ x ] Normal, [  ] Other  EYES:  [ x ] EOMI, [  ] PERRLA, [ x ] conjunctiva and sclera clear normal, [  ] Other,  [  ] Pallor,[  ] Discharge  ENMT:  [ x ] Normal, [ x ] Moist mucous membranes, [  ] Good dentition, [  ] No Thrush  NECK:  [ x ] Supple, [  ] No JVD, [  ] Normal thyroid, [  ] Lymphadenopathy [  ] Other  CHEST/LUNG:  [ x ] Clear to auscultation bilaterally, [ x ] Breath Sounds equal B/L / Decrease, [  ] poor effort  [ x ] No rales, [ x ] No rhonchi  [ x ]  No wheezing,   HEART:  [ x ] Regular rate and rhythm, [  ] tachycardia, [  ] Bradycardia,  [  ] irregular  [ x ] No murmurs, No rubs, No gallops, [  ] PPM in place (Mfr:  )  ABDOMEN:  [ x ] Soft, [ x ] Nontender, [ x ] Nondistended, [  ] No mass, [ x ] Bowel sounds present, [  ] obese  NERVOUS SYSTEM:  [ x ] Alert & Oriented X3, [ x ] Nonfocal  [  ] Confusion  [  ] Encephalopathic [  ] Sedated [  ] Unable to assess, [  ] Dementia [  ] Other-  EXTREMITIES: [ x ] 2+ Peripheral Pulses, No clubbing, No cyanosis,  [  ] edema B/L lower EXT. [  ] PVD stasis skin changes B/L Lower EXT, [  ] wound  LYMPH: No lymphadenopathy noted  SKIN:  [ x ] No rashes or lesions, [  ] Pressure Ulcers, [  ] ecchymosis, [  ] Skin Tears, [  ] Other    DIET: Diet, Regular:   Consistent Carbohydrate Evening Snack  DASH/TLC Sodium & Cholesterol Restricted (09-20-23 @ 22:36)      LABS:                        12.9   6.67  )-----------( 204      ( 25 Sep 2023 10:30 )             38.8     25 Sep 2023 10:30    141    |  103    |  21     ----------------------------<  122    3.7     |  31     |  1.10     Ca    9.7        25 Sep 2023 10:30        Urinalysis Basic - ( 25 Sep 2023 10:30 )    Color: x / Appearance: x / SG: x / pH: x  Gluc: 122 mg/dL / Ketone: x  / Bili: x / Urobili: x   Blood: x / Protein: x / Nitrite: x   Leuk Esterase: x / RBC: x / WBC x   Sq Epi: x / Non Sq Epi: x / Bacteria: x                           Anemia Panel:  Folate, Serum: 11.8 ng/mL (09-22-23 @ 06:42)  Vitamin B12, Serum: 517 pg/mL (09-22-23 @ 06:42)  Vitamin B12, Serum: 510 pg/mL (09-21-23 @ 06:55)  Folate, Serum: 17.1 ng/mL (09-21-23 @ 06:55)      Thyroid Panel:  T4, Serum: 6.5 ug/dL (09-21-23 @ 06:55)  Thyroid Stimulating Hormone, Serum: 2.74 uIU/mL (09-21-23 @ 06:55)  Thyroid Stimulating Hormone, Serum: 2.66 uIU/mL (09-20-23 @ 19:50)                RADIOLOGY & ADDITIONAL TESTS:  None.    HEALTH ISSUES - PROBLEM Dx:  Paranoia    HFrEF (heart failure with reduced ejection fraction)    HTN (hypertension)    HLD (hyperlipidemia)    Need for prophylactic measure    Medication management    DM2 (diabetes mellitus, type 2)    Anemia            Consultant(s) Notes Reviewed:  [ x ] YES     Care Discussed with [X] Consultants  [ x ] Patient  [  ] Family [  ] HCP [ x ]   [  ] Social Service  [  ] RN, [  ] Physical Therapy,[  ] Palliative care team  DVT PPX: [  ] Lovenox, [ x ] S C Heparin, [  ] Coumadin, [  ] Xarelto, [  ] Eliquis, [  ] Pradaxa, [  ] IV Heparin drip, [  ] SCD [  ] Contraindication 2 to GI Bleed,[  ] Ambulation [  ] Contraindicated 2 to  bleed [  ] Contraindicated 2 to Brain Bleed  Advanced directive: [ x ] None, [  ] DNR/DNI Patient is a 83y old  Male who presents with a chief complaint of paranoia (24 Sep 2023 13:01)    HPI:  82y/o M PMH dementia, HFrEF s/p PPM, HTN, HLD  presents with increased agitation. History obtained from patient's daughter Arianne over phone.   Patient moved from [place] to [place] ~2months ago and has been having issues with increased paranoia for the past month. He stays at a 55 [facility] older assisted living community where he has been having several episodes of sundowning.   Recently he was hospitalized at [facility] for same issues from 9/1-914/23. Daughter was unable to provide sufficient history regarding hospitalization.   Patient's son, Jose has been taking care of him and knows more about his medical hx however patient's daughter states she is taking over now and did not want to bother Jose at this time.  Patient was also recently seen at [facility] ED for episodes of agitation and was told to follow up with outpatient Psychiatrist but daughter states that likely did not happened because of his increased agitation.  Patient became agitated today and staff had to call 911 for further evaluation. The facility as well as his children feel that his current psych medication regimen is not sufficient to keep him calm.   Per review of recent dispensary history, he was seeing a psychiatric NP (Barbara Bravo) in [place], [place].   Of note, he had placement of PPM in late August at [facility]. Per his daughter, his LVEF at that time was 20% before and after PPM placement.  Currently, patient states his b/l legs feel itchy but otherwise he feels fine and is agreeable.  Denies fevers, chills. sob, cp, n/v/d.    ED Course   T 97.4 F  /79 RR 18 SpO2 95% on RA  Labs H/H 12.1/37.5 Glu   UA: >1000 glucose  UTox: negaitve     In the ED, given tylenol 650mg x1 , zyprexa 5mg IVP x1  (20 Sep 2023 21:58)    INTERVAL HPI:  9/21: Pt seen and examined, sitting in chair with 1 to 1 present. Patient expressed concern about not receiving his pantoprazole, stating he cant digest his food without it, denies n/v, epigastric pain or burning. When asked about why pt came to hospital, states that he was being followed and he called the police to come help him. Says he lives with his son Jose. EMS brought patient in from  and over [facility]. Additionally states that one pill he was given this morning by staff was "phony" so he didn't take it. Contacted pt's pharmacy [facility], numerous scripts filled in [place] and [place]. Per night team sign out, daughter states that pt has been in and out of many ERs and has been started on many different medications so there are a lot of recent meds that he does not currently take to the best of her knowledge. Per daughter, her brothers are tired of assisting with their father given history of difficulties with him. She is willing to assist but does not want to be too involved and asks us to not contact her brothers. Psychiatry consulted and to see patient. JOHN/VAISHALI updated.   Soren Krishnan [phone]  9/22: Pt seen and examined at bedside. xyprexa dose increased to 5mg bedtime and 2.5mg in AM starting yesterday. Marked improvement in demeanor. A&Ox3 (person, time, place). States he is in a "police hospital" and that he came here because he asked some man in their car to call the police because he was being followed by an aide that his son hired. Said he came from "[place] [address] and over [facility]. No complaints or concerns today. States he has to get things ready to move back to [place]. Psych is following. DSC planning pending placement as his previous community does not want pt to return and family does not want to take patient. Replace PO K   9/23: Pt seen and examined at bedside. Depakote increased to 500mg PO BID, amitriptyline stopped as per psych recs. Pt with no complaints or concerns. Pleasant on encounter, says he is doing well and thanks provider for checking in on him.  Reports that he is waiting to go home. Patient informed we are working to get him out of the hospital. DSC planning pending placement. JOHN sent referral to [facility]. Awaiting decision.   9/24:  Pt seen and examined at bedside. Pt reports that is feeling well and does not have any symptoms, pain or concern. Pt is tolerating PO and eating well.  Pt is cooperative with evaluation and pleasant.  Waiting for placement for dc. Awaiting placement.   9/25: Pt seen and examined at bedside. Reports last night he was looking for 3 medications that were started in the hospital at night. Reassured that he is receiving all the proper medications he needs. Appetite is good. Pt has no complaints at this time. Now off 1:1.    OVERNIGHT EVENTS: Overnight Pt was a little agitated asking for his 3 bedtime medications, which he was unsure about. reports having some pain and difficulty sleeping. Given tylenol and melatonin, Pt fell asleep.    Home Medications:  albuterol 2.5 mg/3 mL (0.083%) inhalation solution: 2.5 milligram(s) inhaled every 6 hours as needed for  shortness of breath and/or wheezing (25 Sep 2023 11:00)  ammonium lactate 12% topical lotion: 1 Apply topically to affected area 2 times a day (25 Sep 2023 10:46)  atorvastatin 40 mg oral tablet: 1 tab(s) orally once a day (at bedtime) (25 Sep 2023 11:00)  dapagliflozin 10 mg oral tablet: 1 tab(s) orally every 24 hours (25 Sep 2023 10:46)  divalproex sodium 500 mg oral delayed release tablet: 1 tab(s) orally 2 times a day (25 Sep 2023 10:37)  DULoxetine 30 mg oral delayed release capsule: 1 cap(s) orally 2 times a day (25 Sep 2023 11:00)  furosemide 40 mg oral tablet: 1 tab(s) orally every 12 hours (25 Sep 2023 11:00)  melatonin 5 mg oral tablet: 1 tab(s) orally once a day (at bedtime) As needed Insomnia (25 Sep 2023 10:46)  METFORMIN 500MG TABLETS: TAKE 1 TABLET BY MOUTH EVERY MORNING AND EVERY EVENING WITH MEALS. (20 Sep 2023 21:58)  methyl salicylate-menthol 15%-10% topical cream: 1 Apply topically to affected area 3 times a day (25 Sep 2023 10:46)  metoprolol succinate 25 mg oral tablet, extended release: 1 tab(s) orally once a day (25 Sep 2023 11:00)  midodrine 5 mg oral tablet: 1 tab(s) orally 3 times a day (25 Sep 2023 11:00)  OLANZapine 5 mg oral tablet: 1 tab(s) orally once a day (at bedtime) (25 Sep 2023 10:46)  pantoprazole 40 mg oral delayed release tablet: 1 tab(s) orally once a day (before a meal) (25 Sep 2023 10:46)  POTASSIUM CL 20MEQ ER TABLETS: TAKE 1 TABLET BY MOUTH EVERY DAY WITH FOOD FOR 4 DAYS (20 Sep 2023 21:58)  sacubitril-valsartan 24 mg-26 mg oral tablet: 1 tab(s) orally 2 times a day (25 Sep 2023 11:00)  spironolactone 25 mg oral tablet: 1 tab(s) orally once a day (25 Sep 2023 10:46)  TRELEGY ELLIPTA 100-62.5MCG INH 30P: INHALE 1 PUFF BY MOUTH EVERY DAY (20 Sep 2023 21:58)  ZyPREXA 2.5 mg oral tablet: 1 tab(s) orally once a day (25 Sep 2023 10:46)      MEDICATIONS  (STANDING):  ammonium lactate 12% Lotion 1 Application(s) Topical two times a day  atorvastatin 40 milliGRAM(s) Oral at bedtime  budesonide  80 MICROgram(s)/formoterol 4.5 MICROgram(s) Inhaler 2 Puff(s) Inhalation two times a day  dapagliflozin 10 milliGRAM(s) Oral every 24 hours  dextrose 5%. 1000 milliLiter(s) (100 mL/Hr) IV Continuous <Continuous>  dextrose 5%. 1000 milliLiter(s) (50 mL/Hr) IV Continuous <Continuous>  dextrose 50% Injectable 12.5 Gram(s) IV Push once  dextrose 50% Injectable 25 Gram(s) IV Push once  dextrose 50% Injectable 25 Gram(s) IV Push once  divalproex  milliGRAM(s) Oral two times a day  DULoxetine 30 milliGRAM(s) Oral <User Schedule>  furosemide    Tablet 40 milliGRAM(s) Oral every 12 hours  glucagon  Injectable 1 milliGRAM(s) IntraMuscular once  heparin   Injectable 5000 Unit(s) SubCutaneous every 8 hours  insulin lispro (ADMELOG) corrective regimen sliding scale   SubCutaneous at bedtime  insulin lispro (ADMELOG) corrective regimen sliding scale   SubCutaneous three times a day before meals  methyl salicylate 15%/menthol 10% Topical Cream 1 Application(s) Topical three times a day  metoprolol succinate ER 25 milliGRAM(s) Oral daily  midodrine. 5 milliGRAM(s) Oral three times a day  OLANZapine Disintegrating Tablet 5 milliGRAM(s) Oral two times a day  OLANZapine Disintegrating Tablet 10 milliGRAM(s) Oral at bedtime  pantoprazole    Tablet 40 milliGRAM(s) Oral before breakfast  sacubitril 24 mG/valsartan 26 mG 1 Tablet(s) Oral two times a day  spironolactone 25 milliGRAM(s) Oral daily  tiotropium 2.5 MICROgram(s) Inhaler 2 Puff(s) Inhalation daily    MEDICATIONS  (PRN):  acetaminophen     Tablet .. 650 milliGRAM(s) Oral every 6 hours PRN Temp greater or equal to 38C (100.4F), Mild Pain (1 - 3)  albuterol    0.083% 2.5 milliGRAM(s) Nebulizer every 6 hours PRN Shortness of Breath and/or Wheezing  dextrose Oral Gel 15 Gram(s) Oral once PRN Blood Glucose LESS THAN 70 milliGRAM(s)/deciliter  melatonin 5 milliGRAM(s) Oral at bedtime PRN Insomnia      Allergies    No Known Allergies    Intolerances        Social History:  Tobacco: former , quit >30 years ago  EtOH: social drinker  Recreational drug use: denies   Lives with: Assisted Living Facility  Ambulates: without assistance  ADLs: needs assistance (20 Sep 2023 21:58)      REVIEW OF SYSTEMS:  CONSTITUTIONAL: No fever, No chills, No fatigue, No myalgia, No Body ache, No Weakness  EYES: No eye pain,  No visual disturbances, No discharge, NO Redness  ENMT:  No ear pain, No nose bleed, No vertigo; No sinus pain, NO throat pain, No Congestion  NECK: No pain, No stiffness  RESPIRATORY: No cough, NO wheezing, No  hemoptysis, NO  shortness of breath  CARDIOVASCULAR: No chest pain, palpitations  GASTROINTESTINAL: No abdominal pain, NO epigastric pain. No nausea, No vomiting; No diarrhea, No constipation. [ x ] BM  GENITOURINARY: No dysuria, No frequency, No urgency, No hematuria, NO incontinence  NEUROLOGICAL: No headaches, No dizziness, No numbness, No tingling, No tremors, No weakness  EXT: No Swelling, No Pain, No Edema  SKIN:  [ x ] No itching, burning, rashes, or lesions   MUSCULOSKELETAL: No joint pain ,No Jt swelling; No muscle pain, No back pain, No extremity pain  PSYCHIATRIC: No depression,  No anxiety,  No mood swings ,No difficulty sleeping at night  PAIN SCALE: [ x ] None  [  ] Other-  ROS Unable to obtain due to - [  ] Dementia  [  ] Lethargy [  ] Drowsy [  ] Sedated [  ] non verbal  REST OF REVIEW Of SYSTEM - [ x ] Normal     Vital Signs Last 24 Hrs  T(C): 36.3 (25 Sep 2023 13:45), Max: 36.6 (24 Sep 2023 21:39)  T(F): 97.4 (25 Sep 2023 13:45), Max: 97.9 (24 Sep 2023 21:39)  HR: 95 (25 Sep 2023 13:45) (95 - 99)  BP: 100/64 (25 Sep 2023 13:45) (100/64 - 117/73)  BP(mean): --  RR: 17 (25 Sep 2023 13:45) (17 - 18)  SpO2: 99% (25 Sep 2023 13:45) (92% - 99%)    Parameters below as of 25 Sep 2023 13:45  Patient On (Oxygen Delivery Method): room air      Finger Stick          PHYSICAL EXAM:  GENERAL:  [ x ] NAD , [ x ] well appearing, [  ] Agitated, [  ] Drowsy,  [  ] Lethargy, [  ] confused   HEAD:  [ x ] Normal, [  ] Other  EYES:  [ x ] EOMI, [  ] PERRLA, [ x ] conjunctiva and sclera clear normal, [  ] Other,  [  ] Pallor,[  ] Discharge  ENMT:  [ x ] Normal, [ x ] Moist mucous membranes, [  ] Good dentition, [ x ] No Thrush  NECK:  [ x ] Supple, [ x ] No JVD, [ x ] Normal thyroid, [  ] Lymphadenopathy [  ] Other  CHEST/LUNG:  [ x ] Clear to auscultation bilaterally, [ x ] Breath Sounds equal B/L / Decrease, [ x ] poor effort  [ x ] No rales, [ x ] No rhonchi  [ x ]  No wheezing,   HEART:  [ x ] Regular rate and rhythm, [  ] tachycardia, [  ] Bradycardia,  [  ] irregular  [ x ] No murmurs, No rubs, No gallops, [  ] PPM in place (Mfr:  )  ABDOMEN:  [ x ] Soft, [ x ] Nontender, [ x ] Nondistended, [  ] No mass, [ x ] Bowel sounds present, [ x ] obese  NERVOUS SYSTEM:  [ x ] Alert & Oriented X3, [ x ] Nonfocal  [  ] Confusion  [  ] Encephalopathic [  ] Sedated [  ] Unable to assess, [  ] Dementia [  ] Other-  EXTREMITIES: [ x ] 2+ Peripheral Pulses, No clubbing, No cyanosis,  [  ] edema B/L lower EXT. [  ] PVD stasis skin changes B/L Lower EXT, [  ] wound  LYMPH: No lymphadenopathy noted  SKIN:  [ x ] No rashes or lesions, [  ] Pressure Ulcers, [  ] ecchymosis, [  ] Skin Tears, [  ] Other    DIET: Diet, Regular:   Consistent Carbohydrate Evening Snack  DASH/TLC Sodium & Cholesterol Restricted (09-20-23 @ 22:36)      LABS:                        12.9   6.67  )-----------( 204      ( 25 Sep 2023 10:30 )             38.8     25 Sep 2023 10:30    141    |  103    |  21     ----------------------------<  122    3.7     |  31     |  1.10     Ca    9.7        25 Sep 2023 10:30        Urinalysis Basic - ( 25 Sep 2023 10:30 )    Color: x / Appearance: x / SG: x / pH: x  Gluc: 122 mg/dL / Ketone: x  / Bili: x / Urobili: x   Blood: x / Protein: x / Nitrite: x   Leuk Esterase: x / RBC: x / WBC x   Sq Epi: x / Non Sq Epi: x / Bacteria: x                           Anemia Panel:  Folate, Serum: 11.8 ng/mL (09-22-23 @ 06:42)  Vitamin B12, Serum: 517 pg/mL (09-22-23 @ 06:42)  Vitamin B12, Serum: 510 pg/mL (09-21-23 @ 06:55)  Folate, Serum: 17.1 ng/mL (09-21-23 @ 06:55)      Thyroid Panel:  T4, Serum: 6.5 ug/dL (09-21-23 @ 06:55)  Thyroid Stimulating Hormone, Serum: 2.74 uIU/mL (09-21-23 @ 06:55)  Thyroid Stimulating Hormone, Serum: 2.66 uIU/mL (09-20-23 @ 19:50)          RADIOLOGY & ADDITIONAL TESTS:  None.    HEALTH ISSUES - PROBLEM Dx:  Paranoia    HFrEF (heart failure with reduced ejection fraction)    HTN (hypertension)    HLD (hyperlipidemia)    Need for prophylactic measure    Medication management    DM2 (diabetes mellitus, type 2)    Anemia            Consultant(s) Notes Reviewed:  [ x ] YES     Care Discussed with [X] Consultants  [ x ] Patient  [  ] Family [  ] HCP [ x ]   [x  ] Social Service  [ x ] RN, [  ] Physical Therapy,[  ] Palliative care team  DVT PPX: [  ] Lovenox, [ x ] S C Heparin, [  ] Coumadin, [  ] Xarelto, [  ] Eliquis, [  ] Pradaxa, [  ] IV Heparin drip, [  ] SCD [  ] Contraindication 2 to GI Bleed,[  ] Ambulation [  ] Contraindicated 2 to  bleed [  ] Contraindicated 2 to Brain Bleed  Advanced directive: [ x ] None, [  ] DNR/DNI

## 2023-09-25 NOTE — SOCIAL WORK PROGRESS NOTE - NSSWPROGRESSNOTE_GEN_ALL_CORE
SW resent all clinical to both [facility] and [facility] as both said they had not received it. SW to remain available. Son Mykel states no preference at this time.

## 2023-09-26 LAB
ANION GAP SERPL CALC-SCNC: 9 MMOL/L — SIGNIFICANT CHANGE UP (ref 5–17)
BUN SERPL-MCNC: 24 MG/DL — HIGH (ref 7–23)
CALCIUM SERPL-MCNC: 9.9 MG/DL — SIGNIFICANT CHANGE UP (ref 8.5–10.1)
CHLORIDE SERPL-SCNC: 102 MMOL/L — SIGNIFICANT CHANGE UP (ref 96–108)
CO2 SERPL-SCNC: 31 MMOL/L — SIGNIFICANT CHANGE UP (ref 22–31)
CREAT SERPL-MCNC: 1.1 MG/DL — SIGNIFICANT CHANGE UP (ref 0.5–1.3)
EGFR: 67 ML/MIN/1.73M2 — SIGNIFICANT CHANGE UP
GLUCOSE BLDC GLUCOMTR-MCNC: 110 MG/DL — HIGH (ref 70–99)
GLUCOSE BLDC GLUCOMTR-MCNC: 111 MG/DL — HIGH (ref 70–99)
GLUCOSE BLDC GLUCOMTR-MCNC: 119 MG/DL — HIGH (ref 70–99)
GLUCOSE BLDC GLUCOMTR-MCNC: 151 MG/DL — HIGH (ref 70–99)
GLUCOSE SERPL-MCNC: 123 MG/DL — HIGH (ref 70–99)
HCT VFR BLD CALC: 43.9 % — SIGNIFICANT CHANGE UP (ref 39–50)
HGB BLD-MCNC: 14.3 G/DL — SIGNIFICANT CHANGE UP (ref 13–17)
MCHC RBC-ENTMCNC: 32.3 PG — SIGNIFICANT CHANGE UP (ref 27–34)
MCHC RBC-ENTMCNC: 32.6 GM/DL — SIGNIFICANT CHANGE UP (ref 32–36)
MCV RBC AUTO: 99.1 FL — SIGNIFICANT CHANGE UP (ref 80–100)
NRBC # BLD: 0 /100 WBCS — SIGNIFICANT CHANGE UP (ref 0–0)
PLATELET # BLD AUTO: 216 K/UL — SIGNIFICANT CHANGE UP (ref 150–400)
POTASSIUM SERPL-MCNC: 3.8 MMOL/L — SIGNIFICANT CHANGE UP (ref 3.5–5.3)
POTASSIUM SERPL-SCNC: 3.8 MMOL/L — SIGNIFICANT CHANGE UP (ref 3.5–5.3)
RBC # BLD: 4.43 M/UL — SIGNIFICANT CHANGE UP (ref 4.2–5.8)
RBC # FLD: 15.3 % — HIGH (ref 10.3–14.5)
SODIUM SERPL-SCNC: 142 MMOL/L — SIGNIFICANT CHANGE UP (ref 135–145)
VALPROATE SERPL-MCNC: 68 UG/ML — SIGNIFICANT CHANGE UP (ref 50–100)
WBC # BLD: 7.21 K/UL — SIGNIFICANT CHANGE UP (ref 3.8–10.5)
WBC # FLD AUTO: 7.21 K/UL — SIGNIFICANT CHANGE UP (ref 3.8–10.5)

## 2023-09-26 PROCEDURE — 99231 SBSQ HOSP IP/OBS SF/LOW 25: CPT

## 2023-09-26 RX ADMIN — Medication 1 APPLICATION(S): at 06:27

## 2023-09-26 RX ADMIN — Medication 40 MILLIGRAM(S): at 17:02

## 2023-09-26 RX ADMIN — SACUBITRIL AND VALSARTAN 1 TABLET(S): 24; 26 TABLET, FILM COATED ORAL at 06:28

## 2023-09-26 RX ADMIN — Medication 5 MILLIGRAM(S): at 21:03

## 2023-09-26 RX ADMIN — HEPARIN SODIUM 5000 UNIT(S): 5000 INJECTION INTRAVENOUS; SUBCUTANEOUS at 06:28

## 2023-09-26 RX ADMIN — DIVALPROEX SODIUM 500 MILLIGRAM(S): 500 TABLET, DELAYED RELEASE ORAL at 17:02

## 2023-09-26 RX ADMIN — DULOXETINE HYDROCHLORIDE 30 MILLIGRAM(S): 30 CAPSULE, DELAYED RELEASE ORAL at 06:28

## 2023-09-26 RX ADMIN — BUDESONIDE AND FORMOTEROL FUMARATE DIHYDRATE 2 PUFF(S): 160; 4.5 AEROSOL RESPIRATORY (INHALATION) at 18:54

## 2023-09-26 RX ADMIN — SPIRONOLACTONE 25 MILLIGRAM(S): 25 TABLET, FILM COATED ORAL at 06:28

## 2023-09-26 RX ADMIN — Medication 1: at 12:41

## 2023-09-26 RX ADMIN — MIDODRINE HYDROCHLORIDE 5 MILLIGRAM(S): 2.5 TABLET ORAL at 17:03

## 2023-09-26 RX ADMIN — Medication 25 MILLIGRAM(S): at 06:28

## 2023-09-26 RX ADMIN — TIOTROPIUM BROMIDE 2 PUFF(S): 18 CAPSULE ORAL; RESPIRATORY (INHALATION) at 06:29

## 2023-09-26 RX ADMIN — PANTOPRAZOLE SODIUM 40 MILLIGRAM(S): 20 TABLET, DELAYED RELEASE ORAL at 06:40

## 2023-09-26 RX ADMIN — Medication 1 APPLICATION(S): at 17:05

## 2023-09-26 RX ADMIN — DAPAGLIFLOZIN 10 MILLIGRAM(S): 10 TABLET, FILM COATED ORAL at 12:41

## 2023-09-26 RX ADMIN — MIDODRINE HYDROCHLORIDE 5 MILLIGRAM(S): 2.5 TABLET ORAL at 12:41

## 2023-09-26 RX ADMIN — Medication 40 MILLIGRAM(S): at 06:29

## 2023-09-26 RX ADMIN — HEPARIN SODIUM 5000 UNIT(S): 5000 INJECTION INTRAVENOUS; SUBCUTANEOUS at 13:35

## 2023-09-26 RX ADMIN — MENTHOL AND METHYL SALICYLATE 1 APPLICATION(S): 10; 30 CREAM TOPICAL at 14:56

## 2023-09-26 RX ADMIN — HEPARIN SODIUM 5000 UNIT(S): 5000 INJECTION INTRAVENOUS; SUBCUTANEOUS at 21:02

## 2023-09-26 RX ADMIN — OLANZAPINE 5 MILLIGRAM(S): 15 TABLET, FILM COATED ORAL at 06:28

## 2023-09-26 RX ADMIN — OLANZAPINE 5 MILLIGRAM(S): 15 TABLET, FILM COATED ORAL at 17:02

## 2023-09-26 RX ADMIN — DIVALPROEX SODIUM 500 MILLIGRAM(S): 500 TABLET, DELAYED RELEASE ORAL at 06:28

## 2023-09-26 RX ADMIN — MENTHOL AND METHYL SALICYLATE 1 APPLICATION(S): 10; 30 CREAM TOPICAL at 06:41

## 2023-09-26 RX ADMIN — Medication 650 MILLIGRAM(S): at 21:02

## 2023-09-26 RX ADMIN — SACUBITRIL AND VALSARTAN 1 TABLET(S): 24; 26 TABLET, FILM COATED ORAL at 17:02

## 2023-09-26 RX ADMIN — BUDESONIDE AND FORMOTEROL FUMARATE DIHYDRATE 2 PUFF(S): 160; 4.5 AEROSOL RESPIRATORY (INHALATION) at 06:29

## 2023-09-26 RX ADMIN — MIDODRINE HYDROCHLORIDE 5 MILLIGRAM(S): 2.5 TABLET ORAL at 06:28

## 2023-09-26 RX ADMIN — DULOXETINE HYDROCHLORIDE 30 MILLIGRAM(S): 30 CAPSULE, DELAYED RELEASE ORAL at 17:02

## 2023-09-26 RX ADMIN — OLANZAPINE 10 MILLIGRAM(S): 15 TABLET, FILM COATED ORAL at 21:02

## 2023-09-26 RX ADMIN — ATORVASTATIN CALCIUM 40 MILLIGRAM(S): 80 TABLET, FILM COATED ORAL at 21:02

## 2023-09-26 NOTE — PROGRESS NOTE ADULT - PROBLEM SELECTOR PLAN 2
Chronic s/p PPM (paced ~end of Aug 2023 at [facility] per daughter )  - TTE (8/2023) per daughter showed EF ~20%  - Pt on Lasix 40 mg 2x day Entresto, metoprolol XL, spironolactone, Farxiga. Due to soft BP, lowered hold parameters to ensure Pt recieves meds.  - Dash/ TLC Diet

## 2023-09-26 NOTE — PROGRESS NOTE ADULT - PROBLEM SELECTOR PLAN 1
Acute paranoia in setting of hx of dementia, ?baseline mental disorder ? Psych Hx- ?Psychosis/ Agitation: unclear hx  - Recent hospitalization at [facility] for similar problem. Unknown if patient was taking his medications prior to admission.    - No infectious or metabolic concomitant disorder. Normal thyroid profile.   - continue current zyprexa regimen as per psych.  - Continue Depakote 500mg BID as per Dr. Strange.    - Continue duloxetine 30 mg 2x day,   - STOP amitriptyline, as per psych recs  - Psych (Dr. Strange) recs appreciated  - SW consulted for d/c plan: awaiting new facility  - Now off constant observation - no acute agitation observed

## 2023-09-26 NOTE — SOCIAL WORK PROGRESS NOTE - NSSWPROGRESSNOTE_GEN_ALL_CORE
pt info faxed this am to Magdalena Olmstead, director of [facility] for review. fax # [phone]  phone # [phone]. sw to continue to follow for transition to new [facility].

## 2023-09-26 NOTE — PROGRESS NOTE ADULT - SUBJECTIVE AND OBJECTIVE BOX
Patient is a 83y old  Male who presents with a chief complaint of paranoia (25 Sep 2023 14:40)    HPI:  84y/o M PMH dementia, HFrEF s/p PPM, HTN, HLD  presents with increased agitation. History obtained from patient's daughter Arianne over phone.   Patient moved from [place] to [place] ~2months ago and has been having issues with increased paranoia for the past month. He stays at a [facility] assisted living community where he has been having several episodes of sundowning.   Recently he was hospitalized at [facility] for same issues from 9/1-914/23. Daughter was unable to provide sufficient history regarding hospitalization.   Patient's son, Jose has been taking care of him and knows more about his medical hx however patient's daughter states she is taking over now and did not want to bother Jose at this time.  Patient was also recently seen at [facility] ED for episodes of agitation and was told to follow up with outpatient Psychiatrist but daughter states that likely did not happened because of his increased agitation.  Patient became agitated today and staff had to call 911 for further evaluation. The facility as well as his children feel that his current psych medication regimen is not sufficient to keep him calm.   Per review of recent dispensary history, he was seeing a psychiatric NP (Barbara Bravo) in [place], [place].   Of note, he had placement of PPM in late August at [facility]. Per his daughter, his LVEF at that time was 20% before and after PPM placement.  Currently, patient states his b/l legs feel itchy but otherwise he feels fine and is agreeable.  Denies fevers, chills. sob, cp, n/v/d.    ED Course   T 97.4 F  /79 RR 18 SpO2 95% on RA  Labs H/H 12.1/37.5 Glu   UA: >1000 glucose  UTox: negaitve     In the ED, given tylenol 650mg x1 , zyprexa 5mg IVP x1  (20 Sep 2023 21:58)    INTERVAL HPI:  9/21: Pt seen and examined, sitting in chair with 1 to 1 present. Patient expressed concern about not receiving his pantoprazole, stating he cant digest his food without it, denies n/v, epigastric pain or burning. When asked about why pt came to hospital, states that he was being followed and he called the police to come help him. Says he lives with his son Jose. EMS brought patient in from  and over [facility]. Additionally states that one pill he was given this morning by staff was "phony" so he didn't take it. Contacted pt's pharmacy [facility], numerous scripts filled in [place] and [place]. Per night team sign out, daughter states that pt has been in and out of many ERs and has been started on many different medications so there are a lot of recent meds that he does not currently take to the best of her knowledge. Per daughter, her brothers are tired of assisting with their father given history of difficulties with him. She is willing to assist but does not want to be too involved and asks us to not contact her brothers. Psychiatry consulted and to see patient. JOHN/VAISHALI updated.   Soren Krishnan [phone]  9/22: Pt seen and examined at bedside. xyprexa dose increased to 5mg bedtime and 2.5mg in AM starting yesterday. Marked improvement in demeanor. A&Ox3 (person, time, place). States he is in a "police hospital" and that he came here because he asked some man in their car to call the police because he was being followed by an aide that his son hired. Said he came from "[place] [id number] and over [facility]. No complaints or concerns today. States he has to get things ready to move back to [place]. Psych is following. DSC planning pending placement as his previous community does not want pt to return and family does not want to take patient. Replace PO K   9/23: Pt seen and examined at bedside. Depakote increased to 500mg PO BID, amitriptyline stopped as per psych recs. Pt with no complaints or concerns. Pleasant on encounter, says he is doing well and thanks provider for checking in on him.  Reports that he is waiting to go home. Patient informed we are working to get him out of the hospital. DSC planning pending placement. JOHN sent referral to [facility]. Awaiting decision.   9/24:  Pt seen and examined at bedside. Pt reports that is feeling well and does not have any symptoms, pain or concern. Pt is tolerating PO and eating well.  Pt is cooperative with evaluation and pleasant.  Waiting for placement for dc. Awaiting placement.   9/25: Pt seen and examined at bedside. Reports last night he was looking for 3 medications that were started in the hospital at night. Reassured that he is receiving all the proper medications he needs. Appetite is good. Pt has no complaints at this time. Now off 1:1.  9/26: Pt seen and examined at bedside. Was not agitated overnight. Pt is very tired this morning. Reports chronic low back pain, but is not concerned. Otherwise he has no active complaints at  this time. Denies chest pain, abdominal pain, SOB.      OVERNIGHT EVENTS: No acute overnight events.     Home Medications:  albuterol 2.5 mg/3 mL (0.083%) inhalation solution: 2.5 milligram(s) inhaled every 6 hours as needed for  shortness of breath and/or wheezing (25 Sep 2023 11:00)  ammonium lactate 12% topical lotion: 1 Apply topically to affected area 2 times a day (25 Sep 2023 10:46)  atorvastatin 40 mg oral tablet: 1 tab(s) orally once a day (at bedtime) (25 Sep 2023 11:00)  dapagliflozin 10 mg oral tablet: 1 tab(s) orally every 24 hours (25 Sep 2023 10:46)  divalproex sodium 500 mg oral delayed release tablet: 1 tab(s) orally 2 times a day (25 Sep 2023 10:37)  DULoxetine 30 mg oral delayed release capsule: 1 cap(s) orally 2 times a day (25 Sep 2023 11:00)  furosemide 40 mg oral tablet: 1 tab(s) orally every 12 hours (25 Sep 2023 11:00)  melatonin 5 mg oral tablet: 1 tab(s) orally once a day (at bedtime) As needed Insomnia (25 Sep 2023 10:46)  METFORMIN 500MG TABLETS: TAKE 1 TABLET BY MOUTH EVERY MORNING AND EVERY EVENING WITH MEALS. (20 Sep 2023 21:58)  methyl salicylate-menthol 15%-10% topical cream: 1 Apply topically to affected area 3 times a day (25 Sep 2023 10:46)  metoprolol succinate 25 mg oral tablet, extended release: 1 tab(s) orally once a day (25 Sep 2023 11:00)  midodrine 5 mg oral tablet: 1 tab(s) orally 3 times a day (25 Sep 2023 11:00)  OLANZapine 5 mg oral tablet: 1 tab(s) orally once a day (at bedtime) (25 Sep 2023 10:46)  pantoprazole 40 mg oral delayed release tablet: 1 tab(s) orally once a day (before a meal) (25 Sep 2023 10:46)  POTASSIUM CL 20MEQ ER TABLETS: TAKE 1 TABLET BY MOUTH EVERY DAY WITH FOOD FOR 4 DAYS (20 Sep 2023 21:58)  sacubitril-valsartan 24 mg-26 mg oral tablet: 1 tab(s) orally 2 times a day (25 Sep 2023 11:00)  spironolactone 25 mg oral tablet: 1 tab(s) orally once a day (25 Sep 2023 10:46)  TRELEGY ELLIPTA 100-62.5MCG INH 30P: INHALE 1 PUFF BY MOUTH EVERY DAY (20 Sep 2023 21:58)  ZyPREXA 2.5 mg oral tablet: 1 tab(s) orally once a day (25 Sep 2023 10:46)      MEDICATIONS  (STANDING):  ammonium lactate 12% Lotion 1 Application(s) Topical two times a day  atorvastatin 40 milliGRAM(s) Oral at bedtime  budesonide  80 MICROgram(s)/formoterol 4.5 MICROgram(s) Inhaler 2 Puff(s) Inhalation two times a day  dapagliflozin 10 milliGRAM(s) Oral every 24 hours  dextrose 5%. 1000 milliLiter(s) (100 mL/Hr) IV Continuous <Continuous>  dextrose 5%. 1000 milliLiter(s) (50 mL/Hr) IV Continuous <Continuous>  dextrose 50% Injectable 12.5 Gram(s) IV Push once  dextrose 50% Injectable 25 Gram(s) IV Push once  dextrose 50% Injectable 25 Gram(s) IV Push once  divalproex  milliGRAM(s) Oral two times a day  DULoxetine 30 milliGRAM(s) Oral <User Schedule>  furosemide    Tablet 40 milliGRAM(s) Oral every 12 hours  glucagon  Injectable 1 milliGRAM(s) IntraMuscular once  heparin   Injectable 5000 Unit(s) SubCutaneous every 8 hours  insulin lispro (ADMELOG) corrective regimen sliding scale   SubCutaneous three times a day before meals  insulin lispro (ADMELOG) corrective regimen sliding scale   SubCutaneous at bedtime  methyl salicylate 15%/menthol 10% Topical Cream 1 Application(s) Topical three times a day  metoprolol succinate ER 25 milliGRAM(s) Oral daily  midodrine. 5 milliGRAM(s) Oral three times a day  OLANZapine Disintegrating Tablet 5 milliGRAM(s) Oral two times a day  OLANZapine Disintegrating Tablet 10 milliGRAM(s) Oral at bedtime  pantoprazole    Tablet 40 milliGRAM(s) Oral before breakfast  sacubitril 24 mG/valsartan 26 mG 1 Tablet(s) Oral two times a day  spironolactone 25 milliGRAM(s) Oral daily  tiotropium 2.5 MICROgram(s) Inhaler 2 Puff(s) Inhalation daily    MEDICATIONS  (PRN):  acetaminophen     Tablet .. 650 milliGRAM(s) Oral every 6 hours PRN Temp greater or equal to 38C (100.4F), Mild Pain (1 - 3)  albuterol    0.083% 2.5 milliGRAM(s) Nebulizer every 6 hours PRN Shortness of Breath and/or Wheezing  dextrose Oral Gel 15 Gram(s) Oral once PRN Blood Glucose LESS THAN 70 milliGRAM(s)/deciliter  melatonin 5 milliGRAM(s) Oral at bedtime PRN Insomnia      Allergies    No Known Allergies    Intolerances        Social History:  Tobacco: former , quit >30 years ago  EtOH: social drinker  Recreational drug use: denies   Lives with: Assisted Living Facility  Ambulates: without assistance  ADLs: needs assistance (20 Sep 2023 21:58)      REVIEW OF SYSTEMS:  CONSTITUTIONAL: No fever, No chills, No fatigue, No myalgia, No Body ache, No Weakness  EYES: No eye pain,  No visual disturbances, No discharge, NO Redness  ENMT:  No ear pain, No nose bleed, No vertigo; No sinus pain, NO throat pain, No Congestion  NECK: No pain, No stiffness  RESPIRATORY: No cough, NO wheezing, No  hemoptysis, NO  shortness of breath  CARDIOVASCULAR: No chest pain, palpitations  GASTROINTESTINAL: No abdominal pain, NO epigastric pain. No nausea, No vomiting; No diarrhea, No constipation. [ x ] BM   GENITOURINARY: No dysuria, No frequency, No urgency, No hematuria, NO incontinence  NEUROLOGICAL: No headaches, No dizziness, No numbness, No tingling, No tremors, No weakness  EXT: No Swelling, No Pain, No Edema  SKIN:  [ x ] No itching, burning, rashes, or lesions   MUSCULOSKELETAL: No joint pain ,No Jt swelling; No muscle pain, No back pain, No extremity pain  PSYCHIATRIC: No depression,  No anxiety,  No mood swings ,No difficulty sleeping at night  PAIN SCALE: [ x ] None  [  ] Other-  ROS Unable to obtain due to - [  ] Dementia  [  ] Lethargy [  ] Drowsy [  ] Sedated [  ] non verbal  REST OF REVIEW Of SYSTEM - [ x ] Normal     Vital Signs Last 24 Hrs  T(C): 36.4 (26 Sep 2023 06:26), Max: 36.5 (25 Sep 2023 21:40)  T(F): 97.5 (26 Sep 2023 06:26), Max: 97.7 (25 Sep 2023 21:40)  HR: 97 (26 Sep 2023 06:26) (93 - 97)  BP: 108/73 (26 Sep 2023 06:26) (98/61 - 108/73)  BP(mean): --  RR: 18 (26 Sep 2023 06:26) (17 - 18)  SpO2: 92% (26 Sep 2023 06:26) (92% - 99%)    Parameters below as of 26 Sep 2023 06:26  Patient On (Oxygen Delivery Method): room air      Finger Stick          PHYSICAL EXAM:  GENERAL:  [ x ] NAD , [ x ] well appearing, [  ] Agitated, [  ] Drowsy,  [  ] Lethargy, [  ] confused   HEAD:  [ x ] Normal, [  ] Other  EYES:  [ x ] EOMI, [  ] PERRLA, [ x ] conjunctiva and sclera clear normal, [  ] Other,  [  ] Pallor,[  ] Discharge  ENMT:  [ x ] Normal, [ x ] Moist mucous membranes, [  ] Good dentition, [ x ] No Thrush  NECK:  [ x ] Supple, [ x ] No JVD, [ x ] Normal thyroid, [  ] Lymphadenopathy [  ] Other  CHEST/LUNG:  [ x ] Clear to auscultation bilaterally, [ x ] Breath Sounds equal B/L / Decrease, [ x ] poor effort  [ x ] No rales, [ x ] No rhonchi  [ x ]  No wheezing,   HEART:  [ x ] Regular rate and rhythm, [  ] tachycardia, [  ] Bradycardia,  [  ] irregular  [ x ] No murmurs, No rubs, No gallops, [  ] PPM in place (Mfr:  )  ABDOMEN:  [ x ] Soft, [ x ] Nontender, [ x ] Nondistended, [  ] No mass, [ x ] Bowel sounds present, [ x ] obese  NERVOUS SYSTEM:  [ x ] Alert & Oriented X3, [ x ] Nonfocal  [  ] Confusion  [  ] Encephalopathic [  ] Sedated [  ] Unable to assess, [  ] Dementia [ x ] Other- responds appropriately to commands and questions. Tired  EXTREMITIES: [ x ] 2+ Peripheral Pulses, No clubbing, No cyanosis,  [  ] edema B/L lower EXT. [  ] PVD stasis skin changes B/L Lower EXT, [  ] wound  LYMPH: No lymphadenopathy noted  SKIN:  [ x ] No rashes or lesions, [  ] Pressure Ulcers, [  ] ecchymosis, [  ] Skin Tears, [  ] Other    DIET: Diet, Regular:   Consistent Carbohydrate Evening Snack  DASH/TLC Sodium & Cholesterol Restricted (09-20-23 @ 22:36)      LABS:                        14.3   7.21  )-----------( 216      ( 26 Sep 2023 09:10 )             43.9     26 Sep 2023 09:10    142    |  102    |  24     ----------------------------<  123    3.8     |  31     |  1.10     Ca    9.9        26 Sep 2023 09:10        Urinalysis Basic - ( 26 Sep 2023 09:10 )    Color: x / Appearance: x / SG: x / pH: x  Gluc: 123 mg/dL / Ketone: x  / Bili: x / Urobili: x   Blood: x / Protein: x / Nitrite: x   Leuk Esterase: x / RBC: x / WBC x   Sq Epi: x / Non Sq Epi: x / Bacteria: x                           Anemia Panel:  Folate, Serum: 11.8 ng/mL (09-22-23 @ 06:42)  Vitamin B12, Serum: 517 pg/mL (09-22-23 @ 06:42)  Vitamin B12, Serum: 510 pg/mL (09-21-23 @ 06:55)  Folate, Serum: 17.1 ng/mL (09-21-23 @ 06:55)      Thyroid Panel:  T4, Serum: 6.5 ug/dL (09-21-23 @ 06:55)  Thyroid Stimulating Hormone, Serum: 2.74 uIU/mL (09-21-23 @ 06:55)  Thyroid Stimulating Hormone, Serum: 2.66 uIU/mL (09-20-23 @ 19:50)                RADIOLOGY & ADDITIONAL TESTS:  None.    HEALTH ISSUES - PROBLEM Dx:  Paranoia    HFrEF (heart failure with reduced ejection fraction)    HTN (hypertension)    HLD (hyperlipidemia)    Need for prophylactic measure    Medication management    DM2 (diabetes mellitus, type 2)    Anemia            Consultant(s) Notes Reviewed:  [ x ] YES     Care Discussed with [X] Consultants  [ x ] Patient  [  ] Family [  ] HCP [  ]   [  ] Social Service  [  ] RN, [  ] Physical Therapy,[  ] Palliative care team  DVT PPX: [  ] Lovenox, [ x ] S C Heparin, [  ] Coumadin, [  ] Xarelto, [  ] Eliquis, [  ] Pradaxa, [  ] IV Heparin drip, [  ] SCD [  ] Contraindication 2 to GI Bleed,[  ] Ambulation [  ] Contraindicated 2 to  bleed [  ] Contraindicated 2 to Brain Bleed  Advanced directive: [ x ] None, [  ] DNR/DNI

## 2023-09-26 NOTE — BH CONSULTATION LIAISON PROGRESS NOTE - NSBHCHARTREVIEWLAB_PSY_A_CORE FT
14.3   7.21  )-----------( 216      ( 26 Sep 2023 09:10 )             43.9   09-26    142  |  102  |  24<H>  ----------------------------<  123<H>  3.8   |  31  |  1.10    Ca    9.9      26 Sep 2023 09:10    
                      12.3   6.50  )-----------( 196      ( 22 Sep 2023 06:42 )             36.8   09-22    143  |  103  |  16  ----------------------------<  117<H>  3.4<L>   |  35<H>  |  1.00    Ca    9.6      22 Sep 2023 06:42    TPro  6.8  /  Alb  3.3  /  TBili  1.5<H>  /  DBili  x   /  AST  16  /  ALT  20  /  AlkPhos  87  09-22  
                      12.9   6.67  )-----------( 204      ( 25 Sep 2023 10:30 )             38.8   09-25    141  |  103  |  21  ----------------------------<  122<H>  3.7   |  31  |  1.10    Ca    9.7      25 Sep 2023 10:30  Mg     1.9     09-24    TPro  7.1  /  Alb  3.4  /  TBili  1.1  /  DBili  x   /  AST  15  /  ALT  19  /  AlkPhos  83  09-24

## 2023-09-26 NOTE — PROGRESS NOTE ADULT - PROBLEM SELECTOR PLAN 4
Discussed patient medication list with daughter (Arianne) who confirmed most medications found on surescripts recent dispensary history) however she is not sure of any changes that may have happened when he was hospitalized or when he went to the ED  - Patient has list with him, copy in chart however unclear if that list is up to date  - Current med rec completed based on recent dispensary history  - [facility] pharmacy called: additional medications from pharmacy include allopurinol 300qD, lisinopril 20mg qD, mirabegron 25mg qD, trazodone 50mg qD at bedtime. medications not mentioned by daughter. daughter notes pt has been to numerous different hospitals/ERs and has come back with numerous different medications with no follow up  - plan to hold these additional meds, per psych consult for use of trazodone

## 2023-09-26 NOTE — PROGRESS NOTE ADULT - ATTENDING COMMENTS
82y/o M PMH dementia, DM2, HFrEF s/p PPM, HTN, HLD presents with increased agitation.  Admit for increased paranoia & Placement .  pt seen, examined, case & care plan d/w pt, residents at detail. d/w dtr Arianne on 9/21 at [facility] Detail about pt's condition, Care plan Meds   Consult;  Psych Dr Strange on case , -Dr Strange  adjusted Psych meds today, increase [facility]    Social service d/w about d/c planning to assisted living as per Son today   PT Eval.  PO diet  AM labs   Total care time is 50 minutes.

## 2023-09-27 LAB
GLUCOSE BLDC GLUCOMTR-MCNC: 104 MG/DL — HIGH (ref 70–99)
GLUCOSE BLDC GLUCOMTR-MCNC: 116 MG/DL — HIGH (ref 70–99)
GLUCOSE BLDC GLUCOMTR-MCNC: 120 MG/DL — HIGH (ref 70–99)
GLUCOSE BLDC GLUCOMTR-MCNC: 162 MG/DL — HIGH (ref 70–99)

## 2023-09-27 RX ORDER — OLANZAPINE 15 MG/1
1 TABLET, FILM COATED ORAL
Qty: 0 | Refills: 0 | DISCHARGE
Start: 2023-09-27

## 2023-09-27 RX ADMIN — BUDESONIDE AND FORMOTEROL FUMARATE DIHYDRATE 2 PUFF(S): 160; 4.5 AEROSOL RESPIRATORY (INHALATION) at 07:32

## 2023-09-27 RX ADMIN — OLANZAPINE 10 MILLIGRAM(S): 15 TABLET, FILM COATED ORAL at 21:31

## 2023-09-27 RX ADMIN — SACUBITRIL AND VALSARTAN 1 TABLET(S): 24; 26 TABLET, FILM COATED ORAL at 05:56

## 2023-09-27 RX ADMIN — MENTHOL AND METHYL SALICYLATE 1 APPLICATION(S): 10; 30 CREAM TOPICAL at 21:32

## 2023-09-27 RX ADMIN — Medication 5 MILLIGRAM(S): at 21:31

## 2023-09-27 RX ADMIN — OLANZAPINE 5 MILLIGRAM(S): 15 TABLET, FILM COATED ORAL at 17:02

## 2023-09-27 RX ADMIN — Medication 650 MILLIGRAM(S): at 19:18

## 2023-09-27 RX ADMIN — DAPAGLIFLOZIN 10 MILLIGRAM(S): 10 TABLET, FILM COATED ORAL at 11:42

## 2023-09-27 RX ADMIN — DULOXETINE HYDROCHLORIDE 30 MILLIGRAM(S): 30 CAPSULE, DELAYED RELEASE ORAL at 17:02

## 2023-09-27 RX ADMIN — Medication 1 APPLICATION(S): at 17:16

## 2023-09-27 RX ADMIN — Medication 1: at 12:33

## 2023-09-27 RX ADMIN — SACUBITRIL AND VALSARTAN 1 TABLET(S): 24; 26 TABLET, FILM COATED ORAL at 17:02

## 2023-09-27 RX ADMIN — MENTHOL AND METHYL SALICYLATE 1 APPLICATION(S): 10; 30 CREAM TOPICAL at 14:11

## 2023-09-27 RX ADMIN — HEPARIN SODIUM 5000 UNIT(S): 5000 INJECTION INTRAVENOUS; SUBCUTANEOUS at 13:52

## 2023-09-27 RX ADMIN — TIOTROPIUM BROMIDE 2 PUFF(S): 18 CAPSULE ORAL; RESPIRATORY (INHALATION) at 07:32

## 2023-09-27 RX ADMIN — BUDESONIDE AND FORMOTEROL FUMARATE DIHYDRATE 2 PUFF(S): 160; 4.5 AEROSOL RESPIRATORY (INHALATION) at 19:11

## 2023-09-27 RX ADMIN — HEPARIN SODIUM 5000 UNIT(S): 5000 INJECTION INTRAVENOUS; SUBCUTANEOUS at 21:31

## 2023-09-27 RX ADMIN — Medication 1 APPLICATION(S): at 05:54

## 2023-09-27 RX ADMIN — Medication 650 MILLIGRAM(S): at 19:48

## 2023-09-27 RX ADMIN — Medication 40 MILLIGRAM(S): at 17:03

## 2023-09-27 RX ADMIN — MIDODRINE HYDROCHLORIDE 5 MILLIGRAM(S): 2.5 TABLET ORAL at 17:02

## 2023-09-27 RX ADMIN — PANTOPRAZOLE SODIUM 40 MILLIGRAM(S): 20 TABLET, DELAYED RELEASE ORAL at 05:56

## 2023-09-27 RX ADMIN — HEPARIN SODIUM 5000 UNIT(S): 5000 INJECTION INTRAVENOUS; SUBCUTANEOUS at 05:58

## 2023-09-27 RX ADMIN — ATORVASTATIN CALCIUM 40 MILLIGRAM(S): 80 TABLET, FILM COATED ORAL at 21:31

## 2023-09-27 RX ADMIN — OLANZAPINE 5 MILLIGRAM(S): 15 TABLET, FILM COATED ORAL at 05:58

## 2023-09-27 RX ADMIN — MIDODRINE HYDROCHLORIDE 5 MILLIGRAM(S): 2.5 TABLET ORAL at 05:55

## 2023-09-27 RX ADMIN — MIDODRINE HYDROCHLORIDE 5 MILLIGRAM(S): 2.5 TABLET ORAL at 11:42

## 2023-09-27 RX ADMIN — SPIRONOLACTONE 25 MILLIGRAM(S): 25 TABLET, FILM COATED ORAL at 05:56

## 2023-09-27 RX ADMIN — DIVALPROEX SODIUM 500 MILLIGRAM(S): 500 TABLET, DELAYED RELEASE ORAL at 17:02

## 2023-09-27 RX ADMIN — Medication 25 MILLIGRAM(S): at 05:56

## 2023-09-27 RX ADMIN — DIVALPROEX SODIUM 500 MILLIGRAM(S): 500 TABLET, DELAYED RELEASE ORAL at 05:57

## 2023-09-27 RX ADMIN — MENTHOL AND METHYL SALICYLATE 1 APPLICATION(S): 10; 30 CREAM TOPICAL at 05:55

## 2023-09-27 RX ADMIN — DULOXETINE HYDROCHLORIDE 30 MILLIGRAM(S): 30 CAPSULE, DELAYED RELEASE ORAL at 05:57

## 2023-09-27 NOTE — DIETITIAN INITIAL EVALUATION ADULT - PROBLEM SELECTOR PLAN 5
Chronic  - Hold home metformin inpatient  -Low ISS  -Regular finger sticks  -DASH/TLC Consistent carb diet  -Hypoglycemic protocol.  - F/u AM A1c

## 2023-09-27 NOTE — PROGRESS NOTE ADULT - ATTENDING COMMENTS
84y/o M PMH dementia, DM2, HFrEF s/p PPM, HTN, HLD presents with increased agitation.  Admit for increased paranoia & Placement .  pt seen, examined, case & care plan d/w pt, residents at detail. d/w dtr Arianne on 9/21 at [facility] about pt's condition, Care plan Meds   Consult;  Psych Dr Strange on case , -Dr Strange  adjusted Psych meds today, increase Zyprexa  at bed time   Social service d/w about d/c planning to assisted living, D/W Son Angelito today   PT Eval.-Pt is ambulating   D/W Social work - [facility] came to review pt & denied admission  D/W Son,, Reapply to [facility] assisted [facility].  PO diet  AM labs   Total care time is 45 minutes.

## 2023-09-27 NOTE — DIETITIAN INITIAL EVALUATION ADULT - ADD RECOMMEND
1) Continue current diet as ordered; honor food preferences as able to optimize po intake/tolerance  2) Monitor po intake, diet tolerance, weight trends, labs, GI function, skin integrity

## 2023-09-27 NOTE — PROGRESS NOTE ADULT - SUBJECTIVE AND OBJECTIVE BOX
Patient is a 83y old  Male who presents with a chief complaint of paranoia (26 Sep 2023 11:08)    HPI:  84y/o M PMH dementia, HFrEF s/p PPM, HTN, HLD  presents with increased agitation. History obtained from patient's daughter rAianne over phone.   Patient moved from [place] to [place] ~2months ago and has been having issues with increased paranoia for the past month. He stays at a [facility] older assisted living community where he has been having several episodes of sundowning.   Recently he was hospitalized at [facility] for same issues from 9/1-914/23. Daughter was unable to provide sufficient history regarding hospitalization.   Patient's son, Jose has been taking care of him and knows more about his medical hx however patient's daughter states she is taking over now and did not want to bother Jose at this time.  Patient was also recently seen at [facility] ED for episodes of agitation and was told to follow up with outpatient Psychiatrist but daughter states that likely did not happened because of his increased agitation.  Patient became agitated today and staff had to call 911 for further evaluation. The facility as well as his children feel that his current psych medication regimen is not sufficient to keep him calm.   Per review of recent dispensary history, he was seeing a psychiatric NP (Barbara Bravo) in [place], [place].   Of note, he had placement of PPM in late August at [facility]. Per his daughter, his LVEF at that time was 20% before and after PPM placement.  Currently, patient states his b/l legs feel itchy but otherwise he feels fine and is agreeable.  Denies fevers, chills. sob, cp, n/v/d.    ED Course   T 97.4 F  /79 RR 18 SpO2 95% on RA  Labs H/H 12.1/37.5 Glu   UA: >1000 glucose  UTox: negaitve     In the ED, given tylenol 650mg x1 , zyprexa 5mg IVP x1  (20 Sep 2023 21:58)    INTERVAL HPI:   9/21: Pt seen and examined, sitting in chair with 1 to 1 present. Patient expressed concern about not receiving his pantoprazole, stating he cant digest his food without it, denies n/v, epigastric pain or burning. When asked about why pt came to hospital, states that he was being followed and he called the police to come help him. Says he lives with his son Jose. EMS brought patient in from  and over [facility]. Additionally states that one pill he was given this morning by staff was "phony" so he didn't take it. Contacted pt's pharmacy [facility], numerous scripts filled in [place] and [place]. Per night team sign out, daughter states that pt has been in and out of many ERs and has been started on many different medications so there are a lot of recent meds that he does not currently take to the best of her knowledge. Per daughter, her brothers are tired of assisting with their father given history of difficulties with him. She is willing to assist but does not want to be too involved and asks us to not contact her brothers. Psychiatry consulted and to see patient. JOHN/VAISHALI updated.   Soren Krishnan [phone]  9/22: Pt seen and examined at bedside. xyprexa dose increased to 5mg bedtime and 2.5mg in AM starting yesterday. Marked improvement in demeanor. A&Ox3 (person, time, place). States he is in a "police hospital" and that he came here because he asked some man in their car to call the police because he was being followed by an aide that his son hired. Said he came from "[place] [id number] and over [facility]. No complaints or concerns today. States he has to get things ready to move back to [place]. Psych is following. DSC planning pending placement as his previous community does not want pt to return and family does not want to take patient. Replace PO K   9/23: Pt seen and examined at bedside. Depakote increased to 500mg PO BID, amitriptyline stopped as per psych recs. Pt with no complaints or concerns. Pleasant on encounter, says he is doing well and thanks provider for checking in on him.  Reports that he is waiting to go home. Patient informed we are working to get him out of the hospital. DSC planning pending placement. JOHN sent referral to [facility]. Awaiting decision.   9/24:  Pt seen and examined at bedside. Pt reports that is feeling well and does not have any symptoms, pain or concern. Pt is tolerating PO and eating well.  Pt is cooperative with evaluation and pleasant.  Waiting for placement for dc. Awaiting placement.   9/25: Pt seen and examined at bedside. Reports last night he was looking for 3 medications that were started in the hospital at night. Reassured that he is receiving all the proper medications he needs. Appetite is good. Pt has no complaints at this time. Now off 1:1.  9/26: Pt seen and examined at bedside. Was not agitated overnight. Pt is very tired this morning. Reports chronic low back pain, but is not concerned. Otherwise he has no active complaints at  this time. Denies chest pain, abdominal pain, SOB.        OVERNIGHT EVENTS: No acute overnight events.     Home Medications:  albuterol 2.5 mg/3 mL (0.083%) inhalation solution: 2.5 milligram(s) inhaled every 6 hours as needed for  shortness of breath and/or wheezing (25 Sep 2023 11:00)  ammonium lactate 12% topical lotion: 1 Apply topically to affected area 2 times a day (25 Sep 2023 10:46)  atorvastatin 40 mg oral tablet: 1 tab(s) orally once a day (at bedtime) (25 Sep 2023 11:00)  dapagliflozin 10 mg oral tablet: 1 tab(s) orally every 24 hours (25 Sep 2023 10:46)  divalproex sodium 500 mg oral delayed release tablet: 1 tab(s) orally 2 times a day (25 Sep 2023 10:37)  DULoxetine 30 mg oral delayed release capsule: 1 cap(s) orally 2 times a day (25 Sep 2023 11:00)  furosemide 40 mg oral tablet: 1 tab(s) orally every 12 hours (25 Sep 2023 11:00)  melatonin 5 mg oral tablet: 1 tab(s) orally once a day (at bedtime) As needed Insomnia (25 Sep 2023 10:46)  METFORMIN 500MG TABLETS: TAKE 1 TABLET BY MOUTH EVERY MORNING AND EVERY EVENING WITH MEALS. (20 Sep 2023 21:58)  methyl salicylate-menthol 15%-10% topical cream: 1 Apply topically to affected area 3 times a day (25 Sep 2023 10:46)  metoprolol succinate 25 mg oral tablet, extended release: 1 tab(s) orally once a day (25 Sep 2023 11:00)  midodrine 5 mg oral tablet: 1 tab(s) orally 3 times a day (25 Sep 2023 11:00)  OLANZapine 5 mg oral tablet: 1 tab(s) orally once a day (at bedtime) (25 Sep 2023 10:46)  pantoprazole 40 mg oral delayed release tablet: 1 tab(s) orally once a day (before a meal) (25 Sep 2023 10:46)  POTASSIUM CL 20MEQ ER TABLETS: TAKE 1 TABLET BY MOUTH EVERY DAY WITH FOOD FOR 4 DAYS (20 Sep 2023 21:58)  sacubitril-valsartan 24 mg-26 mg oral tablet: 1 tab(s) orally 2 times a day (25 Sep 2023 11:00)  spironolactone 25 mg oral tablet: 1 tab(s) orally once a day (25 Sep 2023 10:46)  TRELEGY ELLIPTA 100-62.5MCG INH 30P: INHALE 1 PUFF BY MOUTH EVERY DAY (20 Sep 2023 21:58)  ZyPREXA 2.5 mg oral tablet: 1 tab(s) orally once a day (25 Sep 2023 10:46)      MEDICATIONS  (STANDING):  ammonium lactate 12% Lotion 1 Application(s) Topical two times a day  atorvastatin 40 milliGRAM(s) Oral at bedtime  budesonide  80 MICROgram(s)/formoterol 4.5 MICROgram(s) Inhaler 2 Puff(s) Inhalation two times a day  dapagliflozin 10 milliGRAM(s) Oral every 24 hours  dextrose 5%. 1000 milliLiter(s) (50 mL/Hr) IV Continuous <Continuous>  dextrose 5%. 1000 milliLiter(s) (100 mL/Hr) IV Continuous <Continuous>  dextrose 50% Injectable 25 Gram(s) IV Push once  dextrose 50% Injectable 12.5 Gram(s) IV Push once  dextrose 50% Injectable 25 Gram(s) IV Push once  divalproex  milliGRAM(s) Oral two times a day  DULoxetine 30 milliGRAM(s) Oral <User Schedule>  furosemide    Tablet 40 milliGRAM(s) Oral every 12 hours  glucagon  Injectable 1 milliGRAM(s) IntraMuscular once  heparin   Injectable 5000 Unit(s) SubCutaneous every 8 hours  insulin lispro (ADMELOG) corrective regimen sliding scale   SubCutaneous three times a day before meals  insulin lispro (ADMELOG) corrective regimen sliding scale   SubCutaneous at bedtime  methyl salicylate 15%/menthol 10% Topical Cream 1 Application(s) Topical three times a day  metoprolol succinate ER 25 milliGRAM(s) Oral daily  midodrine. 5 milliGRAM(s) Oral three times a day  OLANZapine Disintegrating Tablet 5 milliGRAM(s) Oral two times a day  OLANZapine Disintegrating Tablet 10 milliGRAM(s) Oral at bedtime  pantoprazole    Tablet 40 milliGRAM(s) Oral before breakfast  sacubitril 24 mG/valsartan 26 mG 1 Tablet(s) Oral two times a day  spironolactone 25 milliGRAM(s) Oral daily  tiotropium 2.5 MICROgram(s) Inhaler 2 Puff(s) Inhalation daily    MEDICATIONS  (PRN):  acetaminophen     Tablet .. 650 milliGRAM(s) Oral every 6 hours PRN Temp greater or equal to 38C (100.4F), Mild Pain (1 - 3)  albuterol    0.083% 2.5 milliGRAM(s) Nebulizer every 6 hours PRN Shortness of Breath and/or Wheezing  dextrose Oral Gel 15 Gram(s) Oral once PRN Blood Glucose LESS THAN 70 milliGRAM(s)/deciliter  melatonin 5 milliGRAM(s) Oral at bedtime PRN Insomnia      Allergies    No Known Allergies    Intolerances        Social History:  Tobacco: former , quit >30 years ago  EtOH: social drinker  Recreational drug use: denies   Lives with: Assisted Living Facility  Ambulates: without assistance  ADLs: needs assistance (20 Sep 2023 21:58)      REVIEW OF SYSTEMS:  CONSTITUTIONAL: No fever, No chills, No fatigue, No myalgia, No Body ache, No Weakness  EYES: No eye pain,  No visual disturbances, No discharge, NO Redness  ENMT:  No ear pain, No nose bleed, No vertigo; No sinus pain, NO throat pain, No Congestion  NECK: No pain, No stiffness  RESPIRATORY: No cough, NO wheezing, No  hemoptysis, NO  shortness of breath  CARDIOVASCULAR: No chest pain, palpitations  GASTROINTESTINAL: No abdominal pain, NO epigastric pain. No nausea, No vomiting; No diarrhea, No constipation. [  ] BM  GENITOURINARY: No dysuria, No frequency, No urgency, No hematuria, NO incontinence  NEUROLOGICAL: No headaches, No dizziness, No numbness, No tingling, No tremors, No weakness  EXT: No Swelling, No Pain, No Edema  SKIN:  [  ] No itching, burning, rashes, or lesions   MUSCULOSKELETAL: No joint pain ,No Jt swelling; No muscle pain, No back pain, No extremity pain  PSYCHIATRIC: No depression,  No anxiety,  No mood swings ,No difficulty sleeping at night  PAIN SCALE: [  ] None  [  ] Other-  ROS Unable to obtain due to - [  ] Dementia  [  ] Lethargy [  ] Drowsy [  ] Sedated [  ] non verbal  REST OF REVIEW Of SYSTEM - [  ] Normal     Vital Signs Last 24 Hrs  T(C): 36.8 (27 Sep 2023 05:57), Max: 36.8 (27 Sep 2023 05:57)  T(F): 98.2 (27 Sep 2023 05:57), Max: 98.2 (27 Sep 2023 05:57)  HR: 71 (27 Sep 2023 05:57) (71 - 95)  BP: 99/65 (27 Sep 2023 05:57) (85/53 - 113/67)  BP(mean): --  RR: 17 (27 Sep 2023 05:57) (17 - 18)  SpO2: 99% (27 Sep 2023 05:57) (94% - 99%)    Parameters below as of 27 Sep 2023 05:57  Patient On (Oxygen Delivery Method): room air      Finger Stick          PHYSICAL EXAM:  GENERAL:  [  ] NAD , [  ] well appearing, [  ] Agitated, [  ] Drowsy,  [  ] Lethargy, [  ] confused   HEAD:  [  ] Normal, [  ] Other  EYES:  [  ] EOMI, [  ] PERRLA, [  ] conjunctiva and sclera clear normal, [  ] Other,  [  ] Pallor,[  ] Discharge  ENMT:  [  ] Normal, [  ] Moist mucous membranes, [  ] Good dentition, [  ] No Thrush  NECK:  [  ] Supple, [  ] No JVD, [  ] Normal thyroid, [  ] Lymphadenopathy [  ] Other  CHEST/LUNG:  [  ] Clear to auscultation bilaterally, [  ] Breath Sounds equal B/L / Decrease, [  ] poor effort  [  ] No rales, [  ] No rhonchi  [  ]  No wheezing,   HEART:  [  ] Regular rate and rhythm, [  ] tachycardia, [  ] Bradycardia,  [  ] irregular  [  ] No murmurs, No rubs, No gallops, [  ] PPM in place (Mfr:  )  ABDOMEN:  [  ] Soft, [  ] Nontender, [  ] Nondistended, [  ] No mass, [  ] Bowel sounds present, [  ] obese  NERVOUS SYSTEM:  [  ] Alert & Oriented X3, [  ] Nonfocal  [  ] Confusion  [  ] Encephalopathic [  ] Sedated [  ] Unable to assess, [  ] Dementia [  ] Other-  EXTREMITIES: [  ] 2+ Peripheral Pulses, No clubbing, No cyanosis,  [  ] edema B/L lower EXT. [  ] PVD stasis skin changes B/L Lower EXT, [  ] wound  LYMPH: No lymphadenopathy noted  SKIN:  [  ] No rashes or lesions, [  ] Pressure Ulcers, [  ] ecchymosis, [  ] Skin Tears, [  ] Other    DIET: Diet, Regular:   Consistent Carbohydrate Evening Snack  DASH/TLC Sodium & Cholesterol Restricted (09-20-23 @ 22:36)      LABS:                        14.3   7.21  )-----------( 216      ( 26 Sep 2023 09:10 )             43.9     26 Sep 2023 09:10    142    |  102    |  24     ----------------------------<  123    3.8     |  31     |  1.10     Ca    9.9        26 Sep 2023 09:10        Urinalysis Basic - ( 26 Sep 2023 09:10 )    Color: x / Appearance: x / SG: x / pH: x  Gluc: 123 mg/dL / Ketone: x  / Bili: x / Urobili: x   Blood: x / Protein: x / Nitrite: x   Leuk Esterase: x / RBC: x / WBC x   Sq Epi: x / Non Sq Epi: x / Bacteria: x                           Anemia Panel:  Folate, Serum: 11.8 ng/mL (09-22-23 @ 06:42)  Vitamin B12, Serum: 517 pg/mL (09-22-23 @ 06:42)  Vitamin B12, Serum: 510 pg/mL (09-21-23 @ 06:55)  Folate, Serum: 17.1 ng/mL (09-21-23 @ 06:55)      Thyroid Panel:  T4, Serum: 6.5 ug/dL (09-21-23 @ 06:55)  Thyroid Stimulating Hormone, Serum: 2.74 uIU/mL (09-21-23 @ 06:55)  Thyroid Stimulating Hormone, Serum: 2.66 uIU/mL (09-20-23 @ 19:50)                RADIOLOGY & ADDITIONAL TESTS:      HEALTH ISSUES - PROBLEM Dx:  Paranoia    HFrEF (heart failure with reduced ejection fraction)    HTN (hypertension)    HLD (hyperlipidemia)    Need for prophylactic measure    Medication management    DM2 (diabetes mellitus, type 2)    Anemia            Consultant(s) Notes Reviewed:  [  ] YES     Care Discussed with [X] Consultants  [  ] Patient  [  ] Family [  ] HCP [  ]   [  ] Social Service  [  ] RN, [  ] Physical Therapy,[  ] Palliative care team  DVT PPX: [  ] Lovenox, [  ] S C Heparin, [  ] Coumadin, [  ] Xarelto, [  ] Eliquis, [  ] Pradaxa, [  ] IV Heparin drip, [  ] SCD [  ] Contraindication 2 to GI Bleed,[  ] Ambulation [  ] Contraindicated 2 to  bleed [  ] Contraindicated 2 to Brain Bleed  Advanced directive: [  ] None, [  ] DNR/DNI Patient is a 83y old  Male who presents with a chief complaint of paranoia (26 Sep 2023 11:08)    HPI:  82y/o M PMH dementia, HFrEF s/p PPM, HTN, HLD  presents with increased agitation. History obtained from patient's daughter Arianne over phone.   Patient moved from [place] to [place] ~2months ago and has been having issues with increased paranoia for the past month. He stays at a 55 [facility] older assisted living community where he has been having several episodes of sundowning.   Recently he was hospitalized at [facility] for same issues from 9/1-914/23. Daughter was unable to provide sufficient history regarding hospitalization.   Patient's son, Jose has been taking care of him and knows more about his medical hx however patient's daughter states she is taking over now and did not want to bother Jose at this time.  Patient was also recently seen at [facility] ED for episodes of agitation and was told to follow up with outpatient Psychiatrist but daughter states that likely did not happened because of his increased agitation.  Patient became agitated today and staff had to call 911 for further evaluation. The facility as well as his children feel that his current psych medication regimen is not sufficient to keep him calm.   Per review of recent dispensary history, he was seeing a psychiatric NP (Barbara Bravo) in [place], [place].   Of note, he had placement of PPM in late August at [facility]. Per his daughter, his LVEF at that time was 20% before and after PPM placement.  Currently, patient states his b/l legs feel itchy but otherwise he feels fine and is agreeable.  Denies fevers, chills. sob, cp, n/v/d.    ED Course   T 97.4 F  /79 RR 18 SpO2 95% on RA  Labs H/H 12.1/37.5 Glu   UA: >1000 glucose  UTox: negaitve     In the ED, given tylenol 650mg x1 , zyprexa 5mg IVP x1  (20 Sep 2023 21:58)    INTERVAL HPI:   9/21: Pt seen and examined, sitting in chair with 1 to 1 present. Patient expressed concern about not receiving his pantoprazole, stating he cant digest his food without it, denies n/v, epigastric pain or burning. When asked about why pt came to hospital, states that he was being followed and he called the police to come help him. Says he lives with his son Jose. EMS brought patient in from  and over [facility]. Additionally states that one pill he was given this morning by staff was "phony" so he didn't take it. Contacted pt's pharmacy [facility], numerous scripts filled in [place] and [place]. Per night team sign out, daughter states that pt has been in and out of many ERs and has been started on many different medications so there are a lot of recent meds that he does not currently take to the best of her knowledge. Per daughter, her brothers are tired of assisting with their father given history of difficulties with him. She is willing to assist but does not want to be too involved and asks us to not contact her brothers. Psychiatry consulted and to see patient. JOHN/VAISHALI updated.   Soren Krishnan [phone]  9/22: Pt seen and examined at bedside. xyprexa dose increased to 5mg bedtime and 2.5mg in AM starting yesterday. Marked improvement in demeanor. A&Ox3 (person, time, place). States he is in a "police hospital" and that he came here because he asked some man in their car to call the police because he was being followed by an aide that his son hired. Said he came from "[place] [id number] and over [facility]. No complaints or concerns today. States he has to get things ready to move back to [place]. Psych is following. DSC planning pending placement as his previous community does not want pt to return and family does not want to take patient. Replace PO K   9/23: Pt seen and examined at bedside. Depakote increased to 500mg PO BID, amitriptyline stopped as per psych recs. Pt with no complaints or concerns. Pleasant on encounter, says he is doing well and thanks provider for checking in on him.  Reports that he is waiting to go home. Patient informed we are working to get him out of the hospital. DSC planning pending placement. JOHN sent referral to [facility]. Awaiting decision.   9/24:  Pt seen and examined at bedside. Pt reports that is feeling well and does not have any symptoms, pain or concern. Pt is tolerating PO and eating well.  Pt is cooperative with evaluation and pleasant.  Waiting for placement for dc. Awaiting placement.   9/25: Pt seen and examined at bedside. Reports last night he was looking for 3 medications that were started in the hospital at night. Reassured that he is receiving all the proper medications he needs. Appetite is good. Pt has no complaints at this time. Now off 1:1.  9/26: Pt seen and examined at bedside. Was not agitated overnight. Pt is very tired this morning. Reports chronic low back pain, but is not concerned. Otherwise he has no active complaints at  this time. Denies chest pain, abdominal pain, SOB.  9/27: Pt seen and examined at bedside. No agitation overnight. Pt is tired this morning. Has no complaints other than his chronic lower back pain. Denies chest pain, abdominal pain SOB. Pt wants to go home.        OVERNIGHT EVENTS: No acute overnight events.     Home Medications:  albuterol 2.5 mg/3 mL (0.083%) inhalation solution: 2.5 milligram(s) inhaled every 6 hours as needed for  shortness of breath and/or wheezing (25 Sep 2023 11:00)  ammonium lactate 12% topical lotion: 1 Apply topically to affected area 2 times a day (25 Sep 2023 10:46)  atorvastatin 40 mg oral tablet: 1 tab(s) orally once a day (at bedtime) (25 Sep 2023 11:00)  dapagliflozin 10 mg oral tablet: 1 tab(s) orally every 24 hours (25 Sep 2023 10:46)  divalproex sodium 500 mg oral delayed release tablet: 1 tab(s) orally 2 times a day (25 Sep 2023 10:37)  DULoxetine 30 mg oral delayed release capsule: 1 cap(s) orally 2 times a day (25 Sep 2023 11:00)  furosemide 40 mg oral tablet: 1 tab(s) orally every 12 hours (25 Sep 2023 11:00)  melatonin 5 mg oral tablet: 1 tab(s) orally once a day (at bedtime) As needed Insomnia (25 Sep 2023 10:46)  METFORMIN 500MG TABLETS: TAKE 1 TABLET BY MOUTH EVERY MORNING AND EVERY EVENING WITH MEALS. (20 Sep 2023 21:58)  methyl salicylate-menthol 15%-10% topical cream: 1 Apply topically to affected area 3 times a day (25 Sep 2023 10:46)  metoprolol succinate 25 mg oral tablet, extended release: 1 tab(s) orally once a day (25 Sep 2023 11:00)  midodrine 5 mg oral tablet: 1 tab(s) orally 3 times a day (25 Sep 2023 11:00)  OLANZapine 5 mg oral tablet: 1 tab(s) orally once a day (at bedtime) (25 Sep 2023 10:46)  pantoprazole 40 mg oral delayed release tablet: 1 tab(s) orally once a day (before a meal) (25 Sep 2023 10:46)  POTASSIUM CL 20MEQ ER TABLETS: TAKE 1 TABLET BY MOUTH EVERY DAY WITH FOOD FOR 4 DAYS (20 Sep 2023 21:58)  sacubitril-valsartan 24 mg-26 mg oral tablet: 1 tab(s) orally 2 times a day (25 Sep 2023 11:00)  spironolactone 25 mg oral tablet: 1 tab(s) orally once a day (25 Sep 2023 10:46)  TRELEGY ELLIPTA 100-62.5MCG INH 30P: INHALE 1 PUFF BY MOUTH EVERY DAY (20 Sep 2023 21:58)  ZyPREXA 2.5 mg oral tablet: 1 tab(s) orally once a day (25 Sep 2023 10:46)      MEDICATIONS  (STANDING):  ammonium lactate 12% Lotion 1 Application(s) Topical two times a day  atorvastatin 40 milliGRAM(s) Oral at bedtime  budesonide  80 MICROgram(s)/formoterol 4.5 MICROgram(s) Inhaler 2 Puff(s) Inhalation two times a day  dapagliflozin 10 milliGRAM(s) Oral every 24 hours  dextrose 5%. 1000 milliLiter(s) (50 mL/Hr) IV Continuous <Continuous>  dextrose 5%. 1000 milliLiter(s) (100 mL/Hr) IV Continuous <Continuous>  dextrose 50% Injectable 25 Gram(s) IV Push once  dextrose 50% Injectable 12.5 Gram(s) IV Push once  dextrose 50% Injectable 25 Gram(s) IV Push once  divalproex  milliGRAM(s) Oral two times a day  DULoxetine 30 milliGRAM(s) Oral <User Schedule>  furosemide    Tablet 40 milliGRAM(s) Oral every 12 hours  glucagon  Injectable 1 milliGRAM(s) IntraMuscular once  heparin   Injectable 5000 Unit(s) SubCutaneous every 8 hours  insulin lispro (ADMELOG) corrective regimen sliding scale   SubCutaneous three times a day before meals  insulin lispro (ADMELOG) corrective regimen sliding scale   SubCutaneous at bedtime  methyl salicylate 15%/menthol 10% Topical Cream 1 Application(s) Topical three times a day  metoprolol succinate ER 25 milliGRAM(s) Oral daily  midodrine. 5 milliGRAM(s) Oral three times a day  OLANZapine Disintegrating Tablet 5 milliGRAM(s) Oral two times a day  OLANZapine Disintegrating Tablet 10 milliGRAM(s) Oral at bedtime  pantoprazole    Tablet 40 milliGRAM(s) Oral before breakfast  sacubitril 24 mG/valsartan 26 mG 1 Tablet(s) Oral two times a day  spironolactone 25 milliGRAM(s) Oral daily  tiotropium 2.5 MICROgram(s) Inhaler 2 Puff(s) Inhalation daily    MEDICATIONS  (PRN):  acetaminophen     Tablet .. 650 milliGRAM(s) Oral every 6 hours PRN Temp greater or equal to 38C (100.4F), Mild Pain (1 - 3)  albuterol    0.083% 2.5 milliGRAM(s) Nebulizer every 6 hours PRN Shortness of Breath and/or Wheezing  dextrose Oral Gel 15 Gram(s) Oral once PRN Blood Glucose LESS THAN 70 milliGRAM(s)/deciliter  melatonin 5 milliGRAM(s) Oral at bedtime PRN Insomnia      Allergies    No Known Allergies    Intolerances        Social History:  Tobacco: former , quit >30 years ago  EtOH: social drinker  Recreational drug use: denies   Lives with: Assisted Living Facility  Ambulates: without assistance  ADLs: needs assistance (20 Sep 2023 21:58)      REVIEW OF SYSTEMS:  CONSTITUTIONAL: No fever, No chills, No fatigue, No myalgia, No Body ache, No Weakness  EYES: No eye pain,  No visual disturbances, No discharge, NO Redness  ENMT:  No ear pain, No nose bleed, No vertigo; No sinus pain, NO throat pain, No Congestion  NECK: No pain, No stiffness  RESPIRATORY: No cough, NO wheezing, No  hemoptysis, NO  shortness of breath  CARDIOVASCULAR: No chest pain, palpitations  GASTROINTESTINAL: No abdominal pain, NO epigastric pain. No nausea, No vomiting; No diarrhea, No constipation. [ x ] BM   GENITOURINARY: No dysuria, No frequency, No urgency, No hematuria, NO incontinence  NEUROLOGICAL: No headaches, No dizziness, No numbness, No tingling, No tremors, No weakness  EXT: No Swelling, No Pain, No Edema  SKIN:  [ x ] No itching, burning, rashes, or lesions   MUSCULOSKELETAL: No joint pain ,No Jt swelling; No muscle pain, No extremity pain  PSYCHIATRIC: No depression,  No anxiety,  No mood swings ,No difficulty sleeping at night  PAIN SCALE: [  ] None  [ x ] Other- chronic lower back pain.  REST OF REVIEW Of SYSTEM - [ x ] Normal     Vital Signs Last 24 Hrs  T(C): 36.8 (27 Sep 2023 05:57), Max: 36.8 (27 Sep 2023 05:57)  T(F): 98.2 (27 Sep 2023 05:57), Max: 98.2 (27 Sep 2023 05:57)  HR: 71 (27 Sep 2023 05:57) (71 - 95)  BP: 99/65 (27 Sep 2023 05:57) (85/53 - 113/67)  BP(mean): --  RR: 17 (27 Sep 2023 05:57) (17 - 18)  SpO2: 99% (27 Sep 2023 05:57) (94% - 99%)    Parameters below as of 27 Sep 2023 05:57  Patient On (Oxygen Delivery Method): room air      Finger Stick          PHYSICAL EXAM:  ENMT:  [ x ] Normal, [ x ] Moist mucous membranes, [  ] Good dentition, [ x ] No Thrush  NECK:  [ x ] Supple, [ x ] No JVD, [ x ] Normal thyroid, [  ] Lymphadenopathy [  ] Other  CHEST/LUNG:  [ x ] Clear to auscultation bilaterally, [ x ] Breath Sounds equal B/L / Decrease, [ x ] poor effort  [ x ] No rales, [ x ] No rhonchi  [ x ]  No wheezing,   HEART:  [ x ] Regular rate and rhythm, [  ] tachycardia, [  ] Bradycardia,  [  ] irregular  [ x ] No murmurs, No rubs, No gallops, [  ] PPM in place (Mfr:  )  ABDOMEN:  [ x ] Soft, [ x ] Nontender, [ x ] Nondistended, [  ] No mass, [ x ] Bowel sounds present, [ x ] obese  NERVOUS SYSTEM:  [ x ] Alert & Oriented X3, [ x ] Nonfocal  [  ] Confusion  [  ] Encephalopathic [  ] Sedated [  ] Unable to assess, [  ] Dementia [ x ] Other- responds appropriately to commands and questions. Tired  EXTREMITIES: [ x ] 2+ Peripheral Pulses, No clubbing, No cyanosis,  [  ] edema B/L lower EXT. [  ] PVD stasis skin changes B/L Lower EXT, [  ] wound  LYMPH: No lymphadenopathy noted  SKIN:  [ x ] No rashes or lesions, [  ] Pressure Ulcers, [  ] ecchymosis, [  ] Skin Tears, [  ] Other    DIET: Diet, Regular:   Consistent Carbohydrate Evening Snack  DASH/TLC Sodium & Cholesterol Restricted (09-20-23 @ 22:36)      LABS:                        14.3   7.21  )-----------( 216      ( 26 Sep 2023 09:10 )             43.9     26 Sep 2023 09:10    142    |  102    |  24     ----------------------------<  123    3.8     |  31     |  1.10     Ca    9.9        26 Sep 2023 09:10        Urinalysis Basic - ( 26 Sep 2023 09:10 )    Color: x / Appearance: x / SG: x / pH: x  Gluc: 123 mg/dL / Ketone: x  / Bili: x / Urobili: x   Blood: x / Protein: x / Nitrite: x   Leuk Esterase: x / RBC: x / WBC x   Sq Epi: x / Non Sq Epi: x / Bacteria: x                           Anemia Panel:  Folate, Serum: 11.8 ng/mL (09-22-23 @ 06:42)  Vitamin B12, Serum: 517 pg/mL (09-22-23 @ 06:42)  Vitamin B12, Serum: 510 pg/mL (09-21-23 @ 06:55)  Folate, Serum: 17.1 ng/mL (09-21-23 @ 06:55)      Thyroid Panel:  T4, Serum: 6.5 ug/dL (09-21-23 @ 06:55)  Thyroid Stimulating Hormone, Serum: 2.74 uIU/mL (09-21-23 @ 06:55)  Thyroid Stimulating Hormone, Serum: 2.66 uIU/mL (09-20-23 @ 19:50)                RADIOLOGY & ADDITIONAL TESTS:  None.    HEALTH ISSUES - PROBLEM Dx:  Paranoia    HFrEF (heart failure with reduced ejection fraction)    HTN (hypertension)    HLD (hyperlipidemia)    Need for prophylactic measure    Medication management    DM2 (diabetes mellitus, type 2)    Anemia            Consultant(s) Notes Reviewed:  [ x ] YES     Care Discussed with [X] Consultants  [ x ] Patient  [  ] Family [  ] HCP [  ]   [  ] Social Service  [  ] RN, [  ] Physical Therapy,[  ] Palliative care team  DVT PPX: [  ] Lovenox, [ x ] S C Heparin, [  ] Coumadin, [  ] Xarelto, [  ] Eliquis, [  ] Pradaxa, [  ] IV Heparin drip, [  ] SCD [  ] Contraindication 2 to GI Bleed,[  ] Ambulation [  ] Contraindicated 2 to  bleed [  ] Contraindicated 2 to Brain Bleed  Advanced directive: [ x ] None, [  ] DNR/DNI Patient is a 83y old  Male who presents with a chief complaint of paranoia (26 Sep 2023 11:08)    HPI:  82y/o M PMH dementia, HFrEF s/p PPM, HTN, HLD  presents with increased agitation. History obtained from patient's daughter Arianne over phone.   Patient moved from [place] to [place] ~2months ago and has been having issues with increased paranoia for the past month. He stays at a [facility] assisted living community where he has been having several episodes of sundowning.   Recently he was hospitalized at [facility] for same issues from 9/1-914/23. Daughter was unable to provide sufficient history regarding hospitalization.   Patient's son, Jose has been taking care of him and knows more about his medical hx however patient's daughter states she is taking over now and did not want to bother Jose at this time.  Patient was also recently seen at [facility] ED for episodes of agitation and was told to follow up with outpatient Psychiatrist but daughter states that likely did not happened because of his increased agitation.  Patient became agitated today and staff had to call 911 for further evaluation. The facility as well as his children feel that his current psych medication regimen is not sufficient to keep him calm.   Per review of recent dispensary history, he was seeing a psychiatric NP (Barbara Bravo) in [place], [place].   Of note, he had placement of PPM in late August at [facility]. Per his daughter, his LVEF at that time was 20% before and after PPM placement.  Currently, patient states his b/l legs feel itchy but otherwise he feels fine and is agreeable.  Denies fevers, chills. sob, cp, n/v/d.    ED Course   T 97.4 F  /79 RR 18 SpO2 95% on RA  Labs H/H 12.1/37.5 Glu   UA: >1000 glucose  UTox: negaitve     In the ED, given tylenol 650mg x1 , zyprexa 5mg IVP x1  (20 Sep 2023 21:58)    INTERVAL HPI:   9/21: Pt seen and examined, sitting in chair with 1 to 1 present. Patient expressed concern about not receiving his pantoprazole, stating he cant digest his food without it, denies n/v, epigastric pain or burning. When asked about why pt came to hospital, states that he was being followed and he called the police to come help him. Says he lives with his son Jose. EMS brought patient in from  and over [facility]. Additionally states that one pill he was given this morning by staff was "phony" so he didn't take it. Contacted pt's pharmacy [facility], numerous scripts filled in [place] and [place]. Per night team sign out, daughter states that pt has been in and out of many ERs and has been started on many different medications so there are a lot of recent meds that he does not currently take to the best of her knowledge. Per daughter, her brothers are tired of assisting with their father given history of difficulties with him. She is willing to assist but does not want to be too involved and asks us to not contact her brothers. Psychiatry consulted and to see patient. JOHN/VAISHALI updated.   Soren Krishnan [phone]  9/22: Pt seen and examined at bedside. xyprexa dose increased to 5mg bedtime and 2.5mg in AM starting yesterday. Marked improvement in demeanor. A&Ox3 (person, time, place). States he is in a "police hospital" and that he came here because he asked some man in their car to call the police because he was being followed by an aide that his son hired. Said he came from "[place] [id number] and over [facility]. No complaints or concerns today. States he has to get things ready to move back to [place]. Psych is following. DSC planning pending placement as his previous community does not want pt to return and family does not want to take patient. Replace PO K   9/23: Pt seen and examined at bedside. Depakote increased to 500mg PO BID, amitriptyline stopped as per psych recs. Pt with no complaints or concerns. Pleasant on encounter, says he is doing well and thanks provider for checking in on him.  Reports that he is waiting to go home. Patient informed we are working to get him out of the hospital. DSC planning pending placement. JOHN sent referral to [facility]. Awaiting decision.   9/24:  Pt seen and examined at bedside. Pt reports that is feeling well and does not have any symptoms, pain or concern. Pt is tolerating PO and eating well.  Pt is cooperative with evaluation and pleasant.  Waiting for placement for dc. Awaiting placement.   9/25: Pt seen and examined at bedside. Reports last night he was looking for 3 medications that were started in the hospital at night. Reassured that he is receiving all the proper medications he needs. Appetite is good. Pt has no complaints at this time. Now off 1:1.  9/26: Pt seen and examined at bedside. Was not agitated overnight. Pt is very tired this morning. Reports chronic low back pain, but is not concerned. Otherwise he has no active complaints at  this time. Denies chest pain, abdominal pain, SOB. D/W Son Mykel today   9/27: Pt seen and examined at bedside. No agitation overnight. Pt is tired this morning. Has no complaints other than his chronic lower back pain. Denies chest pain, abdominal pain SOB. Pt wants to go home. D/W Son Angelito today         OVERNIGHT EVENTS: No acute overnight events.     Home Medications:  albuterol 2.5 mg/3 mL (0.083%) inhalation solution: 2.5 milligram(s) inhaled every 6 hours as needed for  shortness of breath and/or wheezing (25 Sep 2023 11:00)  ammonium lactate 12% topical lotion: 1 Apply topically to affected area 2 times a day (25 Sep 2023 10:46)  atorvastatin 40 mg oral tablet: 1 tab(s) orally once a day (at bedtime) (25 Sep 2023 11:00)  dapagliflozin 10 mg oral tablet: 1 tab(s) orally every 24 hours (25 Sep 2023 10:46)  divalproex sodium 500 mg oral delayed release tablet: 1 tab(s) orally 2 times a day (25 Sep 2023 10:37)  DULoxetine 30 mg oral delayed release capsule: 1 cap(s) orally 2 times a day (25 Sep 2023 11:00)  furosemide 40 mg oral tablet: 1 tab(s) orally every 12 hours (25 Sep 2023 11:00)  melatonin 5 mg oral tablet: 1 tab(s) orally once a day (at bedtime) As needed Insomnia (25 Sep 2023 10:46)  METFORMIN 500MG TABLETS: TAKE 1 TABLET BY MOUTH EVERY MORNING AND EVERY EVENING WITH MEALS. (20 Sep 2023 21:58)  methyl salicylate-menthol 15%-10% topical cream: 1 Apply topically to affected area 3 times a day (25 Sep 2023 10:46)  metoprolol succinate 25 mg oral tablet, extended release: 1 tab(s) orally once a day (25 Sep 2023 11:00)  midodrine 5 mg oral tablet: 1 tab(s) orally 3 times a day (25 Sep 2023 11:00)  OLANZapine 5 mg oral tablet: 1 tab(s) orally once a day (at bedtime) (25 Sep 2023 10:46)  pantoprazole 40 mg oral delayed release tablet: 1 tab(s) orally once a day (before a meal) (25 Sep 2023 10:46)  POTASSIUM CL 20MEQ ER TABLETS: TAKE 1 TABLET BY MOUTH EVERY DAY WITH FOOD FOR 4 DAYS (20 Sep 2023 21:58)  sacubitril-valsartan 24 mg-26 mg oral tablet: 1 tab(s) orally 2 times a day (25 Sep 2023 11:00)  spironolactone 25 mg oral tablet: 1 tab(s) orally once a day (25 Sep 2023 10:46)  TRELEGY ELLIPTA 100-62.5MCG INH 30P: INHALE 1 PUFF BY MOUTH EVERY DAY (20 Sep 2023 21:58)  ZyPREXA 2.5 mg oral tablet: 1 tab(s) orally once a day (25 Sep 2023 10:46)      MEDICATIONS  (STANDING):  ammonium lactate 12% Lotion 1 Application(s) Topical two times a day  atorvastatin 40 milliGRAM(s) Oral at bedtime  budesonide  80 MICROgram(s)/formoterol 4.5 MICROgram(s) Inhaler 2 Puff(s) Inhalation two times a day  dapagliflozin 10 milliGRAM(s) Oral every 24 hours  dextrose 5%. 1000 milliLiter(s) (50 mL/Hr) IV Continuous <Continuous>  dextrose 5%. 1000 milliLiter(s) (100 mL/Hr) IV Continuous <Continuous>  dextrose 50% Injectable 25 Gram(s) IV Push once  dextrose 50% Injectable 12.5 Gram(s) IV Push once  dextrose 50% Injectable 25 Gram(s) IV Push once  divalproex  milliGRAM(s) Oral two times a day  DULoxetine 30 milliGRAM(s) Oral <User Schedule>  furosemide    Tablet 40 milliGRAM(s) Oral every 12 hours  glucagon  Injectable 1 milliGRAM(s) IntraMuscular once  heparin   Injectable 5000 Unit(s) SubCutaneous every 8 hours  insulin lispro (ADMELOG) corrective regimen sliding scale   SubCutaneous three times a day before meals  insulin lispro (ADMELOG) corrective regimen sliding scale   SubCutaneous at bedtime  methyl salicylate 15%/menthol 10% Topical Cream 1 Application(s) Topical three times a day  metoprolol succinate ER 25 milliGRAM(s) Oral daily  midodrine. 5 milliGRAM(s) Oral three times a day  OLANZapine Disintegrating Tablet 5 milliGRAM(s) Oral two times a day  OLANZapine Disintegrating Tablet 10 milliGRAM(s) Oral at bedtime  pantoprazole    Tablet 40 milliGRAM(s) Oral before breakfast  sacubitril 24 mG/valsartan 26 mG 1 Tablet(s) Oral two times a day  spironolactone 25 milliGRAM(s) Oral daily  tiotropium 2.5 MICROgram(s) Inhaler 2 Puff(s) Inhalation daily    MEDICATIONS  (PRN):  acetaminophen     Tablet .. 650 milliGRAM(s) Oral every 6 hours PRN Temp greater or equal to 38C (100.4F), Mild Pain (1 - 3)  albuterol    0.083% 2.5 milliGRAM(s) Nebulizer every 6 hours PRN Shortness of Breath and/or Wheezing  dextrose Oral Gel 15 Gram(s) Oral once PRN Blood Glucose LESS THAN 70 milliGRAM(s)/deciliter  melatonin 5 milliGRAM(s) Oral at bedtime PRN Insomnia      Allergies    No Known Allergies    Intolerances        Social History:  Tobacco: former , quit >30 years ago  EtOH: social drinker  Recreational drug use: denies   Lives with: Assisted Living Facility  Ambulates: without assistance  ADLs: needs assistance (20 Sep 2023 21:58)      REVIEW OF SYSTEMS: i want to go home  CONSTITUTIONAL: No fever, No chills, No fatigue, No myalgia, No Body ache, No Weakness  EYES: No eye pain,  No visual disturbances, No discharge, NO Redness  ENMT:  No ear pain, No nose bleed, No vertigo; No sinus pain, NO throat pain, No Congestion  NECK: No pain, No stiffness  RESPIRATORY: No cough, NO wheezing, No  hemoptysis, NO  shortness of breath  CARDIOVASCULAR: No chest pain, palpitations  GASTROINTESTINAL: No abdominal pain, NO epigastric pain. No nausea, No vomiting; No diarrhea, No constipation. [ x ] BM   GENITOURINARY: No dysuria, No frequency, No urgency, No hematuria, NO incontinence  NEUROLOGICAL: No headaches, No dizziness, No numbness, No tingling, No tremors, No weakness  EXT: No Swelling, No Pain, No Edema  SKIN:  [ x ] No itching, burning, rashes, or lesions   MUSCULOSKELETAL: No joint pain ,No Jt swelling; No muscle pain, No extremity pain  PSYCHIATRIC: No depression,  No anxiety,  No mood swings ,No difficulty sleeping at night  PAIN SCALE: [  ] None  [ x ] Other- chronic lower back pain.  REST OF REVIEW Of SYSTEM - [ x ] Normal     Vital Signs Last 24 Hrs  T(C): 36.8 (27 Sep 2023 05:57), Max: 36.8 (27 Sep 2023 05:57)  T(F): 98.2 (27 Sep 2023 05:57), Max: 98.2 (27 Sep 2023 05:57)  HR: 71 (27 Sep 2023 05:57) (71 - 95)  BP: 99/65 (27 Sep 2023 05:57) (85/53 - 113/67)  BP(mean): --  RR: 17 (27 Sep 2023 05:57) (17 - 18)  SpO2: 99% (27 Sep 2023 05:57) (94% - 99%)    Parameters below as of 27 Sep 2023 05:57  Patient On (Oxygen Delivery Method): room air      Finger Stick      PHYSICAL EXAM:   GENERAL:  [ x ] NAD , [ x ] well appearing, [  ] Agitated, [  ] Drowsy,  [  ] Lethargy, [  ] confused   HEAD:  [ x ] Normal, [  ] Other  EYES:  [ x ] EOMI, [  ] PERRLA, [ x ] conjunctiva and sclera clear normal, [  ] Other,  [  ] Pallor,[  ] Discharge  ENMT:  [ x ] Normal, [ x ] Moist mucous membranes, [ x ] Good dentition, [ x ] No Thrush  NECK:  [ x ] Supple, [ x ] No JVD, [ x ] Normal thyroid, [  ] Lymphadenopathy [  ] Other  CHEST/LUNG:  [ x ] Clear to auscultation bilaterally, [ x ] Breath Sounds equal B/L / Decrease, [ x ] poor effort  [ x ] No rales, [ x ] No rhonchi  [ x ]  No wheezing,   HEART:  [ x ] Regular rate and rhythm, [  ] tachycardia, [  ] Bradycardia,  [  ] irregular  [ x ] No murmurs, No rubs, No gallops, [  ] PPM in place (Mfr:  )  ABDOMEN:  [ x ] Soft, [ x ] Nontender, [ x ] Nondistended, [ x ] No mass, [ x ] Bowel sounds present, [ x ] obese  NERVOUS SYSTEM:  [ x ] Alert & Oriented X3, [ x ] Nonfocal  [  ] Confusion  [  ] Encephalopathic [  ] Sedated [  ] Unable to assess, [  ] Dementia [ x ] Other- responds appropriately to commands and questions. Tired  EXTREMITIES: [ x ] 2+ Peripheral Pulses, No clubbing, No cyanosis,  [x  ] pitting 2+ edema B/L lower EXT. [ x ]  severe PVD stasis skin changes B/L Lower EXT, + Scabs  [ x ] wound-Dry Scabs on b/l Lower Ext , B/L Hand small open scabs/Wound   LYMPH: No lymphadenopathy noted  SKIN:  [ x ] No rashes or lesions, [  ] Pressure Ulcers, [  ] ecchymosis, [  ] Skin Tears, [  ] Other      DIET: Diet, Regular:   Consistent Carbohydrate Evening Snack  DASH/TLC Sodium & Cholesterol Restricted (09-20-23 @ 22:36)      LABS:                        14.3   7.21  )-----------( 216      ( 26 Sep 2023 09:10 )             43.9     26 Sep 2023 09:10    142    |  102    |  24     ----------------------------<  123    3.8     |  31     |  1.10     Ca    9.9        26 Sep 2023 09:10        Urinalysis Basic - ( 26 Sep 2023 09:10 )    Color: x / Appearance: x / SG: x / pH: x  Gluc: 123 mg/dL / Ketone: x  / Bili: x / Urobili: x   Blood: x / Protein: x / Nitrite: x   Leuk Esterase: x / RBC: x / WBC x   Sq Epi: x / Non Sq Epi: x / Bacteria: x        Anemia Panel:  Folate, Serum: 11.8 ng/mL (09-22-23 @ 06:42)  Vitamin B12, Serum: 517 pg/mL (09-22-23 @ 06:42)  Vitamin B12, Serum: 510 pg/mL (09-21-23 @ 06:55)  Folate, Serum: 17.1 ng/mL (09-21-23 @ 06:55)      Thyroid Panel:  T4, Serum: 6.5 ug/dL (09-21-23 @ 06:55)  Thyroid Stimulating Hormone, Serum: 2.74 uIU/mL (09-21-23 @ 06:55)  Thyroid Stimulating Hormone, Serum: 2.66 uIU/mL (09-20-23 @ 19:50)      RADIOLOGY & ADDITIONAL TESTS:  None.    HEALTH ISSUES - PROBLEM Dx:  Paranoia    HFrEF (heart failure with reduced ejection fraction)    HTN (hypertension)    HLD (hyperlipidemia)    Need for prophylactic measure    Medication management    DM2 (diabetes mellitus, type 2)    Anemia      Consultant(s) Notes Reviewed:  [ x ] YES     Care Discussed with [X] Consultants  [ x ] Patient  [ x ] Family- son  [  ] HCP [  ]   [x  ] Social Service  [ x ] RN, [  ] Physical Therapy,[  ] Palliative care team  DVT PPX: [  ] Lovenox, [ x ] S C Heparin, [  ] Coumadin, [  ] Xarelto, [  ] Eliquis, [  ] Pradaxa, [  ] IV Heparin drip, [  ] SCD [  ] Contraindication 2 to GI Bleed,[  ] Ambulation [  ] Contraindicated 2 to  bleed [  ] Contraindicated 2 to Brain Bleed  Advanced directive: [ x ] None, [  ] DNR/DNI

## 2023-09-27 NOTE — DIETITIAN INITIAL EVALUATION ADULT - ORAL INTAKE PTA/DIET HISTORY
Pt admitted from shelter. Reviewed transfer documents, pt was on a consistent carbohydrate, DASH/TLC diet. Independent with feeding. Pt reports good appetite/intake PTA. Typically consumes 2-3 meals/day.

## 2023-09-27 NOTE — SOCIAL WORK PROGRESS NOTE - NSSWPROGRESSNOTE_GEN_ALL_CORE
JOHN spoke with Magdalena at the [facility] to discuss pt. referral, nurse notes received and she will confirm with pt. family they are interested in moving forward to sign paperwork and to set up hospital visit.    JOHN spoke with pt. son Mykel to discuss Sandy, pt. son also interested in penitentiary. TIN requested and referral sent. JOHN continues to follow.

## 2023-09-27 NOTE — DIETITIAN INITIAL EVALUATION ADULT - PERTINENT LABORATORY DATA
09-26    142  |  102  |  24<H>  ----------------------------<  123<H>  3.8   |  31  |  1.10    Ca    9.9      26 Sep 2023 09:10    POCT Blood Glucose.: 120 mg/dL (09-27-23 @ 07:56)  A1C with Estimated Average Glucose Result: 6.5 % (09-21-23 @ 06:55)

## 2023-09-27 NOTE — SOCIAL WORK PROGRESS NOTE - NSSWPROGRESSNOTE_GEN_ALL_CORE
[facility]. SW to continue to explore dc plans with family and medical team.

## 2023-09-27 NOTE — DIETITIAN INITIAL EVALUATION ADULT - PERTINENT MEDS FT
MEDICATIONS  (STANDING):  ammonium lactate 12% Lotion 1 Application(s) Topical two times a day  atorvastatin 40 milliGRAM(s) Oral at bedtime  budesonide  80 MICROgram(s)/formoterol 4.5 MICROgram(s) Inhaler 2 Puff(s) Inhalation two times a day  dapagliflozin 10 milliGRAM(s) Oral every 24 hours  dextrose 5%. 1000 milliLiter(s) (50 mL/Hr) IV Continuous <Continuous>  dextrose 5%. 1000 milliLiter(s) (100 mL/Hr) IV Continuous <Continuous>  dextrose 50% Injectable 25 Gram(s) IV Push once  dextrose 50% Injectable 12.5 Gram(s) IV Push once  dextrose 50% Injectable 25 Gram(s) IV Push once  divalproex  milliGRAM(s) Oral two times a day  DULoxetine 30 milliGRAM(s) Oral <User Schedule>  furosemide    Tablet 40 milliGRAM(s) Oral every 12 hours  glucagon  Injectable 1 milliGRAM(s) IntraMuscular once  heparin   Injectable 5000 Unit(s) SubCutaneous every 8 hours  insulin lispro (ADMELOG) corrective regimen sliding scale   SubCutaneous three times a day before meals  insulin lispro (ADMELOG) corrective regimen sliding scale   SubCutaneous at bedtime  methyl salicylate 15%/menthol 10% Topical Cream 1 Application(s) Topical three times a day  metoprolol succinate ER 25 milliGRAM(s) Oral daily  midodrine. 5 milliGRAM(s) Oral three times a day  OLANZapine Disintegrating Tablet 5 milliGRAM(s) Oral two times a day  OLANZapine Disintegrating Tablet 10 milliGRAM(s) Oral at bedtime  pantoprazole    Tablet 40 milliGRAM(s) Oral before breakfast  sacubitril 24 mG/valsartan 26 mG 1 Tablet(s) Oral two times a day  spironolactone 25 milliGRAM(s) Oral daily  tiotropium 2.5 MICROgram(s) Inhaler 2 Puff(s) Inhalation daily    MEDICATIONS  (PRN):  acetaminophen     Tablet .. 650 milliGRAM(s) Oral every 6 hours PRN Temp greater or equal to 38C (100.4F), Mild Pain (1 - 3)  albuterol    0.083% 2.5 milliGRAM(s) Nebulizer every 6 hours PRN Shortness of Breath and/or Wheezing  dextrose Oral Gel 15 Gram(s) Oral once PRN Blood Glucose LESS THAN 70 milliGRAM(s)/deciliter  melatonin 5 milliGRAM(s) Oral at bedtime PRN Insomnia

## 2023-09-27 NOTE — PROGRESS NOTE ADULT - PROBLEM SELECTOR PLAN 4
Discussed patient medication list with daughter (Arianne) who confirmed most medications found on surescripts recent dispensary history) however she is not sure of any changes that may have happened when he was hospitalized or when he went to the ED  - Patient has list with him, copy in chart however unclear if that list is up to date  - Current med rec completed based on recent dispensary history  - [facility] pharmacy called: additional medications from pharmacy include allopurinol 300qD, lisinopril 20mg qD, mirabegron 25mg qD, trazodone 50mg qD at bedtime. medications not mentioned by daughter. daughter notes pt has been to numerous different hospitals/ERs and has come back with numerous different medications with no follow up  - plan to hold these additional meds, per psych consult for use of trazodone

## 2023-09-27 NOTE — PROGRESS NOTE ADULT - PROBLEM SELECTOR PLAN 1
Acute paranoia in setting of hx of dementia, ?baseline mental disorder ? Psych Hx- ?Psychosis/ Agitation: unclear hx  - Recent hospitalization at [facility] for similar problem. Unknown if patient was taking his medications prior to admission.    - No infectious or metabolic concomitant disorder. Normal thyroid profile.   - continue current zyprexa regimen as per psych.  - Continue Depakote 500mg BID as per Dr. Strange.    - Continue duloxetine 30 mg 2x day,   - STOP amitriptyline, as per psych recs  - Psych (Dr. Strange) recs appreciated  - SW consulted for d/c plan: awaiting new facility  - Now off constant observation - no acute agitation observed Acute paranoia in setting of hx of dementia, ?baseline mental disorder ? Psych Hx- ?Psychosis/ Agitation: unclear hx  - Recent hospitalization at [facility] for similar problem. Unknown if patient was taking his medications prior to admission.    - No infectious or metabolic concomitant disorder. Normal thyroid profile.   - continue current zyprexa regimen as per psych.  - Continue Depakote 500mg BID as per Dr. Strange.    - Continue duloxetine 30 mg 2x day.  - STOP amitriptyline, as per psych recs  - Psych (Dr. Strange) recs appreciated  - SW consulted for d/c plan: awaiting new facility  - Now off constant observation - no acute agitation observed Acute paranoia in setting of hx of dementia, ?baseline mental disorder ? Psych Hx- ?Psychosis/ Agitation: unclear hx  - Recent hospitalization at [place] for similar problem. Unknown if patient was taking his medications prior to admission.    - No infectious or metabolic concomitant disorder. Normal thyroid profile.   - continue current Zyrexal  5 mg 2x day , Zyprexa  10 mg Q HS regimen as per psych.  - Continue Depakote 500mg BID as per Dr. Strange.    - Continue duloxetine 30 mg 2x day.  - STOP amitriptyline, as per psych recs  - Psych (Dr. Strange) D/C 1:1  - SW consulted for d/c plan: awaiting new facility  - Now off constant observation - no acute agitation observed

## 2023-09-27 NOTE — DIETITIAN INITIAL EVALUATION ADULT - OTHER INFO
Pt is a "84 y/o M PMH dementia, DM2, HFrEF s/p PPM, HTN, HLD presents with increased agitation. Admit for increased paranoia."    Visited pt at bedside this morning. Pt reports good appetite/intake. Tolerating diet well. No food allergies. No chewing/swallowing difficulties. Denies N/V/D/C. +BM 9/21. Recommend bowel regimen. Encouraged adequate intake of fluids/fiber. CBW on admission 188#. Pt reports UBW of ~190#. Stable weight. 1+ BL ankle edema noted. Skin: ecchymotic. Pt currently on consistent carbohydrate, DASH/TLC diet. Pt with hx of T2DM, A1c 6.5% obtained. Diet education not appropriate at this time. Honor food preferences as able to optimize po intake/tolerance. Awaiting placement to half-way.

## 2023-09-27 NOTE — PROGRESS NOTE ADULT - PROBLEM SELECTOR PROBLEM 4
Chronic Back Pain  When back pain lasts longer than 3 months, it is called chronic back pain. Pain may get worse at certain times (flare-ups). There are things you can do at home to manage your pain.  Follow these instructions at home:  Activity         · Avoid bending and other activities that make pain worse.  · When standing:  ? Keep your upper back and neck straight.  ? Keep your shoulders pulled back.  ? Avoid slouching.  · When sitting:  ? Keep your back straight.  ? Relax your shoulders. Do not round your shoulders or pull them backward.  · Do not sit or  one place for long periods of time.  · Take short rest breaks during the day. Lying down or standing is usually better than sitting. Resting can help relieve pain.  · When sitting or lying down for a long time, do some mild activity or stretching. This will help to prevent stiffness and pain.  · Get regular exercise. Ask your doctor what activities are safe for you.  · Do not lift anything that is heavier than 10 lb (4.5 kg). To prevent injury when you lift things:  ? Bend your knees.  ? Keep the weight close to your body.  ? Avoid twisting.  Managing pain  · If told, put ice on the painful area. Your doctor may tell you to use ice for 24-48 hours after a flare-up starts.  ? Put ice in a plastic bag.  ? Place a towel between your skin and the bag.  ? Leave the ice on for 20 minutes, 2-3 times a day.  · If told, put heat on the painful area as often as told by your doctor. Use the heat source that your doctor recommends, such as a moist heat pack or a heating pad.  ? Place a towel between your skin and the heat source.  ? Leave the heat on for 20-30 minutes.  ? Remove the heat if your skin turns bright red. This is especially important if you are unable to feel pain, heat, or cold. You may have a greater risk of getting burned.  · Soak in a warm bath. This can help relieve pain.  · Take over-the-counter and prescription medicines only as told by your  doctor.  General instructions  · Sleep on a firm mattress. Try lying on your side with your knees slightly bent. If you lie on your back, put a pillow under your knees.  · Keep all follow-up visits as told by your doctor. This is important.  Contact a doctor if:  · You have pain that does not get better with rest or medicine.  Get help right away if:  · One or both of your arms or legs feel weak.  · One or both of your arms or legs lose feeling (numbness).  · You have trouble controlling when you poop (bowel movement) or pee (urinate).  · You feel sick to your stomach (nauseous).  · You throw up (vomit).  · You have belly (abdominal) pain.  · You have shortness of breath.  · You pass out (faint).  Summary  · When back pain lasts longer than 3 months, it is called chronic back pain.  · Pain may get worse at certain times (flare-ups).  · Use ice and heat as told by your doctor. Your doctor may tell you to use ice after flare-ups.  This information is not intended to replace advice given to you by your health care provider. Make sure you discuss any questions you have with your health care provider.  Document Released: 06/05/2009 Document Revised: 04/09/2020 Document Reviewed: 08/02/2018  Elsevier Patient Education © 2020 [employer]     Medication management

## 2023-09-27 NOTE — DIETITIAN INITIAL EVALUATION ADULT - PROBLEM SELECTOR PLAN 2
Chronic s/p PPM (paced ~end of Aug 2023 at [place] per daughter )  - TTE (8/2023) per daughter showed EF ~20%  - Continue Entresto, Trelegy, metoprolol, spironolactone, Farxiga   - Increase home Lasix to IV lasix 20mg IVP  q12h  - AM TSH, T3 , T4  - Dash/ TLC Diet

## 2023-09-27 NOTE — DIETITIAN INITIAL EVALUATION ADULT - PROBLEM SELECTOR PLAN 4
Discussed patient medication list with daughter (Arianne) who confirmed most medications found on surescripts recent dispensary history) however she is not sure of any changes that may have happened when he was hospitalized or when he went to the ED  - Patient has list with him, copy in chart however unclear if that list is up to date  - Current med rec completed based on recent dispensary history

## 2023-09-27 NOTE — DIETITIAN INITIAL EVALUATION ADULT - PROBLEM SELECTOR PLAN 1
Acute paranoia in setting of hx of dementia, states people are following him and trying to take his money  - Recent hospitalization [facility] for similar problem  - continue home ZYPREXA, Depakote, amitriptyline, duloxetine    - Psych (Dr. Strange) consulted, f/u recs  - SW consulted

## 2023-09-27 NOTE — PROGRESS NOTE ADULT - PROBLEM SELECTOR PLAN 2
Chronic s/p PPM (paced ~end of Aug 2023 at [place] per daughter )  - TTE (8/2023) per daughter showed EF ~20%  - Pt on Lasix 40 mg 2x day Entresto, metoprolol XL, spironolactone, Farxiga. Due to soft BP, lowered hold parameters to ensure Pt recieves meds.  - Dash/ TLC Diet Chronic s/p PPM (paced ~end of Aug 2023 at [place] per daughter )  - TTE (8/2023) per daughter showed EF ~20%  - Pt on Lasix 40 mg 2x day Entresto, metoprolol XL, spironolactone, Farxiga. Due to soft BP, lowered hold parameters to ensure Pt receives meds.  - Dash/ TLC Diet

## 2023-09-28 LAB
GAMMA INTERFERON BACKGROUND BLD IA-ACNC: 0.01 IU/ML — SIGNIFICANT CHANGE UP
GLUCOSE BLDC GLUCOMTR-MCNC: 100 MG/DL — HIGH (ref 70–99)
GLUCOSE BLDC GLUCOMTR-MCNC: 109 MG/DL — HIGH (ref 70–99)
GLUCOSE BLDC GLUCOMTR-MCNC: 129 MG/DL — HIGH (ref 70–99)
GLUCOSE BLDC GLUCOMTR-MCNC: 88 MG/DL — SIGNIFICANT CHANGE UP (ref 70–99)
M TB IFN-G BLD-IMP: NEGATIVE — SIGNIFICANT CHANGE UP
M TB IFN-G CD4+ BCKGRND COR BLD-ACNC: 0 IU/ML — SIGNIFICANT CHANGE UP
M TB IFN-G CD4+CD8+ BCKGRND COR BLD-ACNC: 0 IU/ML — SIGNIFICANT CHANGE UP
QUANT TB PLUS MITOGEN MINUS NIL: 3.92 IU/ML — SIGNIFICANT CHANGE UP

## 2023-09-28 PROCEDURE — 99231 SBSQ HOSP IP/OBS SF/LOW 25: CPT

## 2023-09-28 RX ADMIN — Medication 1 APPLICATION(S): at 18:52

## 2023-09-28 RX ADMIN — MENTHOL AND METHYL SALICYLATE 1 APPLICATION(S): 10; 30 CREAM TOPICAL at 05:36

## 2023-09-28 RX ADMIN — DIVALPROEX SODIUM 500 MILLIGRAM(S): 500 TABLET, DELAYED RELEASE ORAL at 05:35

## 2023-09-28 RX ADMIN — HEPARIN SODIUM 5000 UNIT(S): 5000 INJECTION INTRAVENOUS; SUBCUTANEOUS at 14:22

## 2023-09-28 RX ADMIN — DAPAGLIFLOZIN 10 MILLIGRAM(S): 10 TABLET, FILM COATED ORAL at 11:39

## 2023-09-28 RX ADMIN — MIDODRINE HYDROCHLORIDE 5 MILLIGRAM(S): 2.5 TABLET ORAL at 17:27

## 2023-09-28 RX ADMIN — TIOTROPIUM BROMIDE 2 PUFF(S): 18 CAPSULE ORAL; RESPIRATORY (INHALATION) at 05:35

## 2023-09-28 RX ADMIN — DIVALPROEX SODIUM 500 MILLIGRAM(S): 500 TABLET, DELAYED RELEASE ORAL at 17:27

## 2023-09-28 RX ADMIN — MENTHOL AND METHYL SALICYLATE 1 APPLICATION(S): 10; 30 CREAM TOPICAL at 21:21

## 2023-09-28 RX ADMIN — ATORVASTATIN CALCIUM 40 MILLIGRAM(S): 80 TABLET, FILM COATED ORAL at 21:23

## 2023-09-28 RX ADMIN — PANTOPRAZOLE SODIUM 40 MILLIGRAM(S): 20 TABLET, DELAYED RELEASE ORAL at 05:34

## 2023-09-28 RX ADMIN — Medication 650 MILLIGRAM(S): at 21:08

## 2023-09-28 RX ADMIN — BUDESONIDE AND FORMOTEROL FUMARATE DIHYDRATE 2 PUFF(S): 160; 4.5 AEROSOL RESPIRATORY (INHALATION) at 05:35

## 2023-09-28 RX ADMIN — MENTHOL AND METHYL SALICYLATE 1 APPLICATION(S): 10; 30 CREAM TOPICAL at 14:19

## 2023-09-28 RX ADMIN — BUDESONIDE AND FORMOTEROL FUMARATE DIHYDRATE 2 PUFF(S): 160; 4.5 AEROSOL RESPIRATORY (INHALATION) at 18:52

## 2023-09-28 RX ADMIN — Medication 5 MILLIGRAM(S): at 21:21

## 2023-09-28 RX ADMIN — OLANZAPINE 5 MILLIGRAM(S): 15 TABLET, FILM COATED ORAL at 05:34

## 2023-09-28 RX ADMIN — OLANZAPINE 5 MILLIGRAM(S): 15 TABLET, FILM COATED ORAL at 17:27

## 2023-09-28 RX ADMIN — Medication 650 MILLIGRAM(S): at 20:38

## 2023-09-28 RX ADMIN — SACUBITRIL AND VALSARTAN 1 TABLET(S): 24; 26 TABLET, FILM COATED ORAL at 17:28

## 2023-09-28 RX ADMIN — HEPARIN SODIUM 5000 UNIT(S): 5000 INJECTION INTRAVENOUS; SUBCUTANEOUS at 05:35

## 2023-09-28 RX ADMIN — MIDODRINE HYDROCHLORIDE 5 MILLIGRAM(S): 2.5 TABLET ORAL at 05:35

## 2023-09-28 RX ADMIN — DULOXETINE HYDROCHLORIDE 30 MILLIGRAM(S): 30 CAPSULE, DELAYED RELEASE ORAL at 05:45

## 2023-09-28 RX ADMIN — DULOXETINE HYDROCHLORIDE 30 MILLIGRAM(S): 30 CAPSULE, DELAYED RELEASE ORAL at 17:27

## 2023-09-28 RX ADMIN — OLANZAPINE 10 MILLIGRAM(S): 15 TABLET, FILM COATED ORAL at 21:21

## 2023-09-28 RX ADMIN — Medication 1 APPLICATION(S): at 05:35

## 2023-09-28 RX ADMIN — Medication 40 MILLIGRAM(S): at 17:27

## 2023-09-28 RX ADMIN — MIDODRINE HYDROCHLORIDE 5 MILLIGRAM(S): 2.5 TABLET ORAL at 11:39

## 2023-09-28 RX ADMIN — HEPARIN SODIUM 5000 UNIT(S): 5000 INJECTION INTRAVENOUS; SUBCUTANEOUS at 21:21

## 2023-09-28 NOTE — PROGRESS NOTE ADULT - ATTENDING COMMENTS
82y/o M PMH dementia, DM2, HFrEF s/p PPM, HTN, HLD presents with increased agitation.  Admit for increased paranoia & Placement .  pt seen, examined, case & care plan d/w pt, residents at detail. d/w dtr Arianne on 9/21 at [facility] about pt's condition, Care plan Meds   Consult;  Psych Dr Strange on case , -Dr Strange  adjusted Psych meds  stable for d/c   Social service d/w about d/c planning to assisted living,   PT Eval.-Pt is ambulating   D/W Social work -follow up   PO diet  AM labs   Total care time is 40 minutes.

## 2023-09-28 NOTE — PROGRESS NOTE ADULT - PROBLEM SELECTOR PLAN 4
Discussed patient medication list with daughter (Arianne) who confirmed most medications found on surescripts recent dispensary history) however she is not sure of any changes that may have happened when he was hospitalized or when he went to the ED  - Patient has list with him, copy in chart however unclear if that list is up to date  - Current med rec completed based on recent dispensary history  - [facility] pharmacy called: additional medications from pharmacy include allopurinol 300qD, lisinopril 20mg qD, mirabegron 25mg qD, trazodone 50mg qD at bedtime. medications not mentioned by daughter. daughter notes pt has been to numerous different hospitals/ERs and has come back with numerous different medications with no follow up  - plan to hold these additional meds, per psych consult for use of trazodone

## 2023-09-28 NOTE — PROGRESS NOTE ADULT - PROBLEM SELECTOR PLAN 1
Acute paranoia in setting of hx of dementia, ?baseline mental disorder ? Psych Hx- ?Psychosis/ Agitation: unclear hx  - Previous hospitalization at [facility] for similar problem. Unknown if Pt was taking meds prior to admission.    - No infectious or metabolic concomitant disorder. Normal thyroid profile.   - continue current Zyrexa  5 mg 2x day , Zyprexa  10 mg Q HS regimen as per psych.  - Continue Depakote 500mg BID as per Dr. Strange.    - Continue duloxetine 30 mg 2x day.  - STOP amitriptyline, as per psych recs  - Psych (Dr. Strange) D/C 1:1  - SW consulted for d/c plan: awaiting new facility  - Now off constant observation - no acute agitation observed

## 2023-09-28 NOTE — PHYSICAL THERAPY INITIAL EVALUATION ADULT - ADDITIONAL COMMENTS
Patient lives in independent living facility.  Patient was independent in ADLs and ambulated independently with rollator, but noted he was able to ambulate without device for short distances.

## 2023-09-28 NOTE — PROGRESS NOTE ADULT - SUBJECTIVE AND OBJECTIVE BOX
Patient is a 83y old  Male who presents with a chief complaint of Restlessness and agitation     (27 Sep 2023 10:16)    HPI:  84y/o M PMH dementia, HFrEF s/p PPM, HTN, HLD  presents with increased agitation. History obtained from patient's daughter Arianne over phone.   Patient moved from [place] to [place] ~2months ago and has been having issues with increased paranoia for the past month. He stays at a [facility] older assisted living community where he has been having several episodes of sundowning.   Recently he was hospitalized at [facility] for same issues from -. Daughter was unable to provide sufficient history regarding hospitalization.   Patient's son, Jose has been taking care of him and knows more about his medical hx however patient's daughter states she is taking over now and did not want to bother Jose at this time.  Patient was also recently seen at [facility] ED for episodes of agitation and was told to follow up with outpatient Psychiatrist but daughter states that likely did not happened because of his increased agitation.  Patient became agitated today and staff had to call 911 for further evaluation. The facility as well as his children feel that his current psych medication regimen is not sufficient to keep him calm.   Per review of recent dispensary history, he was seeing a psychiatric NP (Barbara Bravo) in [place], [place].   Of note, he had placement of PPM in late August at [facility]. Per his daughter, his LVEF at that time was 20% before and after PPM placement.  Currently, patient states his b/l legs feel itchy but otherwise he feels fine and is agreeable.  Denies fevers, chills. sob, cp, n/v/d.    ED Course   T 97.4 F  /79 RR 18 SpO2 95% on RA  Labs H/H 12.1/37.5 Glu   UA: >1000 glucose  UTox: negaitve     In the ED, given tylenol 650mg x1 , zyprexa 5mg IVP x1  (20 Sep 2023 21:58)    INTERVAL HPI:  : Pt seen and examined, sitting in chair with 1 to 1 present. Patient expressed concern about not receiving his pantoprazole, stating he cant digest his food without it, denies n/v, epigastric pain or burning. When asked about why pt came to hospital, states that he was being followed and he called the police to come help him. Says he lives with his son Jose. EMS brought patient in from  and over [facility]. Additionally states that one pill he was given this morning by staff was "phony" so he didn't take it. Contacted pt's pharmacy [facility], numerous scripts filled in [place] and [place]. Per night team sign out, daughter states that pt has been in and out of many ERs and has been started on many different medications so there are a lot of recent meds that he does not currently take to the best of her knowledge. Per daughter, her brothers are tired of assisting with their father given history of difficulties with him. She is willing to assist but does not want to be too involved and asks us to not contact her brothers. Psychiatry consulted and to see patient. JOHN/VAISHALI updated.   Soren Krishnan [phone]  : Pt seen and examined at bedside. xyprexa dose increased to 5mg bedtime and 2.5mg in AM starting yesterday. Marked improvement in demeanor. A&Ox3 (person, time, place). States he is in a "police hospital" and that he came here because he asked some man in their car to call the police because he was being followed by an aide that his son hired. Said he came from "[place] [id number] and over [facility]. No complaints or concerns today. States he has to get things ready to move back to [place]. Psych is following. DSC planning pending placement as his previous community does not want pt to return and family does not want to take patient. Replace PO K   : Pt seen and examined at bedside. Depakote increased to 500mg PO BID, amitriptyline stopped as per psych recs. Pt with no complaints or concerns. Pleasant on encounter, says he is doing well and thanks provider for checking in on him.  Reports that he is waiting to go home. Patient informed we are working to get him out of the hospital. DSC planning pending placement. JOHN sent referral to [facility]. Awaiting decision.   :  Pt seen and examined at bedside. Pt reports that is feeling well and does not have any symptoms, pain or concern. Pt is tolerating PO and eating well.  Pt is cooperative with evaluation and pleasant.  Waiting for placement for dc. Awaiting placement.   : Pt seen and examined at bedside. Reports last night he was looking for 3 medications that were started in the hospital at night. Reassured that he is receiving all the proper medications he needs. Appetite is good. Pt has no complaints at this time. Now off 1:1.  : Pt seen and examined at bedside. Was not agitated overnight. Pt is very tired this morning. Reports chronic low back pain, but is not concerned. Otherwise he has no active complaints at  this time. Denies chest pain, abdominal pain, SOB. D/W Son Mykel today   : Pt seen and examined at bedside. No agitation overnight. Pt is tired this morning. Has no complaints other than his chronic lower back pain. Denies chest pain, abdominal pain SOB. Pt wants to go home. D/W Son Angelito today       OVERNIGHT EVENTS:    Home Medications:  albuterol 2.5 mg/3 mL (0.083%) inhalation solution: 2.5 milligram(s) inhaled every 6 hours as needed for  shortness of breath and/or wheezing (25 Sep 2023 11:00)  ammonium lactate 12% topical lotion: 1 Apply topically to affected area 2 times a day (25 Sep 2023 10:46)  atorvastatin 40 mg oral tablet: 1 tab(s) orally once a day (at bedtime) (25 Sep 2023 11:00)  dapagliflozin 10 mg oral tablet: 1 tab(s) orally every 24 hours (25 Sep 2023 10:46)  divalproex sodium 500 mg oral delayed release tablet: 1 tab(s) orally 2 times a day (25 Sep 2023 10:37)  DULoxetine 30 mg oral delayed release capsule: 1 cap(s) orally 2 times a day (25 Sep 2023 11:00)  furosemide 40 mg oral tablet: 1 tab(s) orally every 12 hours (25 Sep 2023 11:00)  melatonin 5 mg oral tablet: 1 tab(s) orally once a day (at bedtime) As needed Insomnia (25 Sep 2023 10:46)  METFORMIN 500MG TABLETS: TAKE 1 TABLET BY MOUTH EVERY MORNING AND EVERY EVENING WITH MEALS. (20 Sep 2023 21:58)  methyl salicylate-menthol 15%-10% topical cream: 1 Apply topically to affected area 3 times a day (25 Sep 2023 10:46)  metoprolol succinate 25 mg oral tablet, extended release: 1 tab(s) orally once a day (25 Sep 2023 11:00)  midodrine 5 mg oral tablet: 1 tab(s) orally 3 times a day (25 Sep 2023 11:00)  OLANZapine 10 mg oral tablet, disintegratin tab(s) orally once a day (at bedtime) (27 Sep 2023 08:38)  OLANZapine 5 mg oral tablet, disintegratin tab(s) orally 2 times a day (27 Sep 2023 08:38)  pantoprazole 40 mg oral delayed release tablet: 1 tab(s) orally once a day (before a meal) (25 Sep 2023 10:46)  POTASSIUM CL 20MEQ ER TABLETS: TAKE 1 TABLET BY MOUTH EVERY DAY WITH FOOD FOR 4 DAYS (20 Sep 2023 21:58)  sacubitril-valsartan 24 mg-26 mg oral tablet: 1 tab(s) orally 2 times a day (25 Sep 2023 11:00)  spironolactone 25 mg oral tablet: 1 tab(s) orally once a day (25 Sep 2023 10:46)  TRELEGY ELLIPTA 100-62.5MCG INH 30P: INHALE 1 PUFF BY MOUTH EVERY DAY (20 Sep 2023 21:58)      MEDICATIONS  (STANDING):  ammonium lactate 12% Lotion 1 Application(s) Topical two times a day  atorvastatin 40 milliGRAM(s) Oral at bedtime  budesonide  80 MICROgram(s)/formoterol 4.5 MICROgram(s) Inhaler 2 Puff(s) Inhalation two times a day  dapagliflozin 10 milliGRAM(s) Oral every 24 hours  dextrose 5%. 1000 milliLiter(s) (100 mL/Hr) IV Continuous <Continuous>  dextrose 5%. 1000 milliLiter(s) (50 mL/Hr) IV Continuous <Continuous>  dextrose 50% Injectable 25 Gram(s) IV Push once  dextrose 50% Injectable 12.5 Gram(s) IV Push once  dextrose 50% Injectable 25 Gram(s) IV Push once  divalproex  milliGRAM(s) Oral two times a day  DULoxetine 30 milliGRAM(s) Oral <User Schedule>  furosemide    Tablet 40 milliGRAM(s) Oral every 12 hours  glucagon  Injectable 1 milliGRAM(s) IntraMuscular once  heparin   Injectable 5000 Unit(s) SubCutaneous every 8 hours  insulin lispro (ADMELOG) corrective regimen sliding scale   SubCutaneous three times a day before meals  insulin lispro (ADMELOG) corrective regimen sliding scale   SubCutaneous at bedtime  methyl salicylate 15%/menthol 10% Topical Cream 1 Application(s) Topical three times a day  metoprolol succinate ER 25 milliGRAM(s) Oral daily  midodrine. 5 milliGRAM(s) Oral three times a day  OLANZapine Disintegrating Tablet 5 milliGRAM(s) Oral two times a day  OLANZapine Disintegrating Tablet 10 milliGRAM(s) Oral at bedtime  pantoprazole    Tablet 40 milliGRAM(s) Oral before breakfast  sacubitril 24 mG/valsartan 26 mG 1 Tablet(s) Oral two times a day  spironolactone 25 milliGRAM(s) Oral daily  tiotropium 2.5 MICROgram(s) Inhaler 2 Puff(s) Inhalation daily    MEDICATIONS  (PRN):  acetaminophen     Tablet .. 650 milliGRAM(s) Oral every 6 hours PRN Temp greater or equal to 38C (100.4F), Mild Pain (1 - 3)  albuterol    0.083% 2.5 milliGRAM(s) Nebulizer every 6 hours PRN Shortness of Breath and/or Wheezing  dextrose Oral Gel 15 Gram(s) Oral once PRN Blood Glucose LESS THAN 70 milliGRAM(s)/deciliter  melatonin 5 milliGRAM(s) Oral at bedtime PRN Insomnia      Allergies    No Known Allergies    Intolerances        Social History:  Tobacco: former , quit >30 years ago  EtOH: social drinker  Recreational drug use: denies   Lives with: Assisted Living Facility  Ambulates: without assistance  ADLs: needs assistance (20 Sep 2023 21:58)      REVIEW OF SYSTEMS:  CONSTITUTIONAL: No fever, No chills, No fatigue, No myalgia, No Body ache, No Weakness  EYES: No eye pain,  No visual disturbances, No discharge, NO Redness  ENMT:  No ear pain, No nose bleed, No vertigo; No sinus pain, NO throat pain, No Congestion  NECK: No pain, No stiffness  RESPIRATORY: No cough, NO wheezing, No  hemoptysis, NO  shortness of breath  CARDIOVASCULAR: No chest pain, palpitations  GASTROINTESTINAL: No abdominal pain, NO epigastric pain. No nausea, No vomiting; No diarrhea, No constipation. [  ] BM  GENITOURINARY: No dysuria, No frequency, No urgency, No hematuria, NO incontinence  NEUROLOGICAL: No headaches, No dizziness, No numbness, No tingling, No tremors, No weakness  EXT: No Swelling, No Pain, No Edema  SKIN:  [  ] No itching, burning, rashes, or lesions   MUSCULOSKELETAL: No joint pain ,No Jt swelling; No muscle pain, No back pain, No extremity pain  PSYCHIATRIC: No depression,  No anxiety,  No mood swings ,No difficulty sleeping at night  PAIN SCALE: [  ] None  [  ] Other-  ROS Unable to obtain due to - [  ] Dementia  [  ] Lethargy [  ] Drowsy [  ] Sedated [  ] non verbal  REST OF REVIEW Of SYSTEM - [  ] Normal     Vital Signs Last 24 Hrs  T(C): 36.3 (28 Sep 2023 12:30), Max: 36.8 (28 Sep 2023 05:26)  T(F): 97.3 (28 Sep 2023 12:30), Max: 98.3 (28 Sep 2023 05:26)  HR: 95 (28 Sep 2023 12:30) (90 - 95)  BP: 91/48 (28 Sep 2023 12:30) (91/48 - 117/63)  BP(mean): --  RR: 17 (28 Sep 2023 12:30) (17 - 18)  SpO2: 94% (28 Sep 2023 12:30) (94% - 95%)    Parameters below as of 28 Sep 2023 12:30  Patient On (Oxygen Delivery Method): room air      Finger Stick          PHYSICAL EXAM:  GENERAL:  [  ] NAD , [  ] well appearing, [  ] Agitated, [  ] Drowsy,  [  ] Lethargy, [  ] confused   HEAD:  [  ] Normal, [  ] Other  EYES:  [  ] EOMI, [  ] PERRLA, [  ] conjunctiva and sclera clear normal, [  ] Other,  [  ] Pallor,[  ] Discharge  ENMT:  [  ] Normal, [  ] Moist mucous membranes, [  ] Good dentition, [  ] No Thrush  NECK:  [  ] Supple, [  ] No JVD, [  ] Normal thyroid, [  ] Lymphadenopathy [  ] Other  CHEST/LUNG:  [  ] Clear to auscultation bilaterally, [  ] Breath Sounds equal B/L / Decrease, [  ] poor effort  [  ] No rales, [  ] No rhonchi  [  ]  No wheezing,   HEART:  [  ] Regular rate and rhythm, [  ] tachycardia, [  ] Bradycardia,  [  ] irregular  [  ] No murmurs, No rubs, No gallops, [  ] PPM in place (Mfr:  )  ABDOMEN:  [  ] Soft, [  ] Nontender, [  ] Nondistended, [  ] No mass, [  ] Bowel sounds present, [  ] obese  NERVOUS SYSTEM:  [  ] Alert & Oriented X3, [  ] Nonfocal  [  ] Confusion  [  ] Encephalopathic [  ] Sedated [  ] Unable to assess, [  ] Dementia [  ] Other-  EXTREMITIES: [  ] 2+ Peripheral Pulses, No clubbing, No cyanosis,  [  ] edema B/L lower EXT. [  ] PVD stasis skin changes B/L Lower EXT, [  ] wound  LYMPH: No lymphadenopathy noted  SKIN:  [  ] No rashes or lesions, [  ] Pressure Ulcers, [  ] ecchymosis, [  ] Skin Tears, [  ] Other    DIET: Diet, Regular:   Consistent Carbohydrate Evening Snack  DASH/TLC Sodium & Cholesterol Restricted (23 @ 22:36)      LABS:                                   Anemia Panel:  Folate, Serum: 11.8 ng/mL (23 @ 06:42)  Vitamin B12, Serum: 517 pg/mL (23 @ 06:42)      Thyroid Panel:                RADIOLOGY & ADDITIONAL TESTS:      HEALTH ISSUES - PROBLEM Dx:  Paranoia    HFrEF (heart failure with reduced ejection fraction)    HTN (hypertension)    HLD (hyperlipidemia)    Need for prophylactic measure    Medication management    DM2 (diabetes mellitus, type 2)    Anemia            Consultant(s) Notes Reviewed:  [  ] YES     Care Discussed with [X] Consultants  [  ] Patient  [  ] Family [  ] HCP [  ]   [  ] Social Service  [  ] RN, [  ] Physical Therapy,[  ] Palliative care team  DVT PPX: [  ] Lovenox, [  ] S C Heparin, [  ] Coumadin, [  ] Xarelto, [  ] Eliquis, [  ] Pradaxa, [  ] IV Heparin drip, [  ] SCD [  ] Contraindication 2 to GI Bleed,[  ] Ambulation [  ] Contraindicated 2 to  bleed [  ] Contraindicated 2 to Brain Bleed  Advanced directive: [  ] None, [  ] DNR/DNI Patient is a 83y old  Male who presents with a chief complaint of Restlessness and agitation     (27 Sep 2023 10:16)    HPI:  82y/o M PMH dementia, HFrEF s/p PPM, HTN, HLD  presents with increased agitation. History obtained from patient's daughter Arianne over phone.   Patient moved from [place] to [place] ~2months ago and has been having issues with increased paranoia for the past month. He stays at a 55 [facility] older assisted living community where he has been having several episodes of sundowning.   Recently he was hospitalized at [facility] for same issues from -. Daughter was unable to provide sufficient history regarding hospitalization.   Patient's son, Jose has been taking care of him and knows more about his medical hx however patient's daughter states she is taking over now and did not want to bother Jose at this time.  Patient was also recently seen at [facility] ED for episodes of agitation and was told to follow up with outpatient Psychiatrist but daughter states that likely did not happened because of his increased agitation.  Patient became agitated today and staff had to call 911 for further evaluation. The facility as well as his children feel that his current psych medication regimen is not sufficient to keep him calm.   Per review of recent dispensary history, he was seeing a psychiatric NP (Barbara Bravo) in [place], [place].   Of note, he had placement of PPM in late August at [facility]. Per his daughter, his LVEF at that time was 20% before and after PPM placement.  Currently, patient states his b/l legs feel itchy but otherwise he feels fine and is agreeable.  Denies fevers, chills. sob, cp, n/v/d.    ED Course   T 97.4 F  /79 RR 18 SpO2 95% on RA  Labs H/H 12.1/37.5 Glu   UA: >1000 glucose  UTox: negaitve     In the ED, given tylenol 650mg x1 , zyprexa 5mg IVP x1  (20 Sep 2023 21:58)    INTERVAL HPI:  : Pt seen and examined, sitting in chair with 1 to 1 present. Patient expressed concern about not receiving his pantoprazole, stating he cant digest his food without it, denies n/v, epigastric pain or burning. When asked about why pt came to hospital, states that he was being followed and he called the police to come help him. Says he lives with his son Jose. EMS brought patient in from  and over [facility]. Additionally states that one pill he was given this morning by staff was "phony" so he didn't take it. Contacted pt's pharmacy [facility], numerous scripts filled in [place] and [place]. Per night team sign out, daughter states that pt has been in and out of many ERs and has been started on many different medications so there are a lot of recent meds that he does not currently take to the best of her knowledge. Per daughter, her brothers are tired of assisting with their father given history of difficulties with him. She is willing to assist but does not want to be too involved and asks us to not contact her brothers. Psychiatry consulted and to see patient. JOHN/VAISHALI updated.   Soren Krishnan [phone]  : Pt seen and examined at bedside. xyprexa dose increased to 5mg bedtime and 2.5mg in AM starting yesterday. Marked improvement in demeanor. A&Ox3 (person, time, place). States he is in a "police hospital" and that he came here because he asked some man in their car to call the police because he was being followed by an aide that his son hired. Said he came from "[place] [id number] and over [facility]. No complaints or concerns today. States he has to get things ready to move back to [place]. Psych is following. DSC planning pending placement as his previous community does not want pt to return and family does not want to take patient. Replace PO K   : Pt seen and examined at bedside. Depakote increased to 500mg PO BID, amitriptyline stopped as per psych recs. Pt with no complaints or concerns. Pleasant on encounter, says he is doing well and thanks provider for checking in on him.  Reports that he is waiting to go home. Patient informed we are working to get him out of the hospital. DSC planning pending placement. JOHN sent referral to [facility]. Awaiting decision.   :  Pt seen and examined at bedside. Pt reports that is feeling well and does not have any symptoms, pain or concern. Pt is tolerating PO and eating well.  Pt is cooperative with evaluation and pleasant.  Waiting for placement for dc. Awaiting placement.   : Pt seen and examined at bedside. Reports last night he was looking for 3 medications that were started in the hospital at night. Reassured that he is receiving all the proper medications he needs. Appetite is good. Pt has no complaints at this time. Now off 1:1.  : Pt seen and examined at bedside. Was not agitated overnight. Pt is very tired this morning. Reports chronic low back pain, but is not concerned. Otherwise he has no active complaints at  this time. Denies chest pain, abdominal pain, SOB. D/W Son Mykel today   : Pt seen and examined at bedside. No agitation overnight. Pt is tired this morning. Has no complaints other than his chronic lower back pain. Denies chest pain, abdominal pain SOB. Pt wants to go home. D/W Son Angelito today   : Pt seen and examined at bedside. No agitation overnight. Pt is tired this morning and wants to sleep. No complaints other than his chronic low back pain. Denies chest pain, SOB, abdominal pain. Pt wants to go.      OVERNIGHT EVENTS: No acute overnight events.     Home Medications:  albuterol 2.5 mg/3 mL (0.083%) inhalation solution: 2.5 milligram(s) inhaled every 6 hours as needed for  shortness of breath and/or wheezing (25 Sep 2023 11:00)  ammonium lactate 12% topical lotion: 1 Apply topically to affected area 2 times a day (25 Sep 2023 10:46)  atorvastatin 40 mg oral tablet: 1 tab(s) orally once a day (at bedtime) (25 Sep 2023 11:00)  dapagliflozin 10 mg oral tablet: 1 tab(s) orally every 24 hours (25 Sep 2023 10:46)  divalproex sodium 500 mg oral delayed release tablet: 1 tab(s) orally 2 times a day (25 Sep 2023 10:37)  DULoxetine 30 mg oral delayed release capsule: 1 cap(s) orally 2 times a day (25 Sep 2023 11:00)  furosemide 40 mg oral tablet: 1 tab(s) orally every 12 hours (25 Sep 2023 11:00)  melatonin 5 mg oral tablet: 1 tab(s) orally once a day (at bedtime) As needed Insomnia (25 Sep 2023 10:46)  METFORMIN 500MG TABLETS: TAKE 1 TABLET BY MOUTH EVERY MORNING AND EVERY EVENING WITH MEALS. (20 Sep 2023 21:58)  methyl salicylate-menthol 15%-10% topical cream: 1 Apply topically to affected area 3 times a day (25 Sep 2023 10:46)  metoprolol succinate 25 mg oral tablet, extended release: 1 tab(s) orally once a day (25 Sep 2023 11:00)  midodrine 5 mg oral tablet: 1 tab(s) orally 3 times a day (25 Sep 2023 11:00)  OLANZapine 10 mg oral tablet, disintegratin tab(s) orally once a day (at bedtime) (27 Sep 2023 08:38)  OLANZapine 5 mg oral tablet, disintegratin tab(s) orally 2 times a day (27 Sep 2023 08:38)  pantoprazole 40 mg oral delayed release tablet: 1 tab(s) orally once a day (before a meal) (25 Sep 2023 10:46)  POTASSIUM CL 20MEQ ER TABLETS: TAKE 1 TABLET BY MOUTH EVERY DAY WITH FOOD FOR 4 DAYS (20 Sep 2023 21:58)  sacubitril-valsartan 24 mg-26 mg oral tablet: 1 tab(s) orally 2 times a day (25 Sep 2023 11:00)  spironolactone 25 mg oral tablet: 1 tab(s) orally once a day (25 Sep 2023 10:46)  TRELEGY ELLIPTA 100-62.5MCG INH 30P: INHALE 1 PUFF BY MOUTH EVERY DAY (20 Sep 2023 21:58)      MEDICATIONS  (STANDING):  ammonium lactate 12% Lotion 1 Application(s) Topical two times a day  atorvastatin 40 milliGRAM(s) Oral at bedtime  budesonide  80 MICROgram(s)/formoterol 4.5 MICROgram(s) Inhaler 2 Puff(s) Inhalation two times a day  dapagliflozin 10 milliGRAM(s) Oral every 24 hours  dextrose 5%. 1000 milliLiter(s) (100 mL/Hr) IV Continuous <Continuous>  dextrose 5%. 1000 milliLiter(s) (50 mL/Hr) IV Continuous <Continuous>  dextrose 50% Injectable 25 Gram(s) IV Push once  dextrose 50% Injectable 12.5 Gram(s) IV Push once  dextrose 50% Injectable 25 Gram(s) IV Push once  divalproex  milliGRAM(s) Oral two times a day  DULoxetine 30 milliGRAM(s) Oral <User Schedule>  furosemide    Tablet 40 milliGRAM(s) Oral every 12 hours  glucagon  Injectable 1 milliGRAM(s) IntraMuscular once  heparin   Injectable 5000 Unit(s) SubCutaneous every 8 hours  insulin lispro (ADMELOG) corrective regimen sliding scale   SubCutaneous three times a day before meals  insulin lispro (ADMELOG) corrective regimen sliding scale   SubCutaneous at bedtime  methyl salicylate 15%/menthol 10% Topical Cream 1 Application(s) Topical three times a day  metoprolol succinate ER 25 milliGRAM(s) Oral daily  midodrine. 5 milliGRAM(s) Oral three times a day  OLANZapine Disintegrating Tablet 5 milliGRAM(s) Oral two times a day  OLANZapine Disintegrating Tablet 10 milliGRAM(s) Oral at bedtime  pantoprazole    Tablet 40 milliGRAM(s) Oral before breakfast  sacubitril 24 mG/valsartan 26 mG 1 Tablet(s) Oral two times a day  spironolactone 25 milliGRAM(s) Oral daily  tiotropium 2.5 MICROgram(s) Inhaler 2 Puff(s) Inhalation daily    MEDICATIONS  (PRN):  acetaminophen     Tablet .. 650 milliGRAM(s) Oral every 6 hours PRN Temp greater or equal to 38C (100.4F), Mild Pain (1 - 3)  albuterol    0.083% 2.5 milliGRAM(s) Nebulizer every 6 hours PRN Shortness of Breath and/or Wheezing  dextrose Oral Gel 15 Gram(s) Oral once PRN Blood Glucose LESS THAN 70 milliGRAM(s)/deciliter  melatonin 5 milliGRAM(s) Oral at bedtime PRN Insomnia      Allergies    No Known Allergies    Intolerances        Social History:  Tobacco: former , quit >30 years ago  EtOH: social drinker  Recreational drug use: denies   Lives with: Assisted Living Facility  Ambulates: without assistance  ADLs: needs assistance (20 Sep 2023 21:58)      REVIEW OF SYSTEMS:  CONSTITUTIONAL: No fever, No chills, No fatigue, No myalgia, No Body ache, No Weakness  EYES: No eye pain,  No visual disturbances, No discharge, NO Redness  ENMT:  No ear pain, No nose bleed, No vertigo; No sinus pain, NO throat pain, No Congestion  NECK: No pain, No stiffness  RESPIRATORY: No cough, NO wheezing, No  hemoptysis, NO  shortness of breath  CARDIOVASCULAR: No chest pain, palpitations  GASTROINTESTINAL: No abdominal pain, NO epigastric pain. No nausea, No vomiting; No diarrhea, No constipation. [ x ] BM   GENITOURINARY: No dysuria, No frequency, No urgency, No hematuria, NO incontinence  NEUROLOGICAL: No headaches, No dizziness, No numbness, No tingling, No tremors, No weakness  EXT: No Swelling, No Pain, No Edema  SKIN:  [ x ] No itching, burning, rashes, or lesions   MUSCULOSKELETAL: No joint pain ,No Jt swelling; No muscle pain, No extremity pain  PSYCHIATRIC: No depression,  No anxiety,  No mood swings ,No difficulty sleeping at night  PAIN SCALE: [  ] None  [ x ] Other- chronic lower back pain.    Vital Signs Last 24 Hrs  T(C): 36.3 (28 Sep 2023 12:30), Max: 36.8 (28 Sep 2023 05:26)  T(F): 97.3 (28 Sep 2023 12:30), Max: 98.3 (28 Sep 2023 05:26)  HR: 95 (28 Sep 2023 12:30) (90 - 95)  BP: 91/48 (28 Sep 2023 12:30) (91/48 - 117/63)  BP(mean): --  RR: 17 (28 Sep 2023 12:30) (17 - 18)  SpO2: 94% (28 Sep 2023 12:30) (94% - 95%)    Parameters below as of 28 Sep 2023 12:30  Patient On (Oxygen Delivery Method): room air      Finger Stick          PHYSICAL EXAM:  GENERAL:  [ x ] NAD , [ x ] well appearing, [  ] Agitated, [  ] Drowsy,  [  ] Lethargy, [  ] confused   HEAD:  [ x ] Normal, [  ] Other  EYES:  [ x ] EOMI, [  ] PERRLA, [ x ] conjunctiva and sclera clear normal, [  ] Other,  [  ] Pallor,[  ] Discharge  ENMT:  [ x ] Normal, [ x ] Moist mucous membranes, [ x ] Good dentition, [ x ] No Thrush  NECK:  [ x ] Supple, [ x ] No JVD, [ x ] Normal thyroid, [  ] Lymphadenopathy [  ] Other  CHEST/LUNG:  [ x ] Clear to auscultation bilaterally, [ x ] Breath Sounds equal B/L / Decrease, [ x ] poor effort  [ x ] No rales, [ x ] No rhonchi  [ x ]  No wheezing,   HEART:  [ x ] Regular rate and rhythm, [  ] tachycardia, [  ] Bradycardia,  [  ] irregular  [ x ] No murmurs, No rubs, No gallops, [  ] PPM in place (Mfr:  )  ABDOMEN:  [ x ] Soft, [ x ] Nontender, [ x ] Nondistended, [ x ] No mass, [ x ] Bowel sounds present, [ x ] obese  NERVOUS SYSTEM:  [ x ] Alert & Oriented X3, [ x ] Nonfocal  [  ] Confusion  [  ] Encephalopathic [  ] Sedated [  ] Unable to assess, [  ] Dementia [ x ] Other- responds appropriately to commands and questions. Tired  EXTREMITIES: [ x ] 2+ Peripheral Pulses, No clubbing, No cyanosis,  [x  ] pitting 2+ edema B/L lower EXT. [ x ]  severe PVD stasis skin changes B/L Lower EXT, + Scabs  [ x ] wound-Dry Scabs on b/l Lower Ext , B/L Hand small Wound   LYMPH: No lymphadenopathy noted  SKIN:  [ x ] No rashes or lesions, [  ] Pressure Ulcers, [  ] ecchymosis, [  ] Skin Tears, [  ] Other    DIET: Diet, Regular:   Consistent Carbohydrate Evening Snack  DASH/TLC Sodium & Cholesterol Restricted (23 @ 22:36)      LABS:  none.                                 Anemia Panel:  Folate, Serum: 11.8 ng/mL (23 @ 06:42)  Vitamin B12, Serum: 517 pg/mL (23 @ 06:42)      Thyroid Panel:                RADIOLOGY & ADDITIONAL TESTS:  None.    HEALTH ISSUES - PROBLEM Dx:  Paranoia    HFrEF (heart failure with reduced ejection fraction)    HTN (hypertension)    HLD (hyperlipidemia)    Need for prophylactic measure    Medication management    DM2 (diabetes mellitus, type 2)    Anemia            Consultant(s) Notes Reviewed:  [ x ] YES     Care Discussed with [X] Consultants  [ x ] Patient  [  ] Family [  ] HCP [  ]   [  ] Social Service  [  ] RN, [  ] Physical Therapy,[  ] Palliative care team  DVT PPX: [  ] Lovenox, [ x ] S C Heparin, [  ] Coumadin, [  ] Xarelto, [  ] Eliquis, [  ] Pradaxa, [  ] IV Heparin drip, [  ] SCD [  ] Contraindication 2 to GI Bleed,[  ] Ambulation [  ] Contraindicated 2 to  bleed [  ] Contraindicated 2 to Brain Bleed  Advanced directive: [ x ] None, [  ] DNR/DNI Patient is a 83y old  Male who presents with a chief complaint of Restlessness and agitation     (27 Sep 2023 10:16)    HPI:  84y/o M PMH dementia, HFrEF s/p PPM, HTN, HLD  presents with increased agitation. History obtained from patient's daughter Arianne over phone.   Patient moved from [place] to [place] ~2months ago and has been having issues with increased paranoia for the past month. He stays at a [facility] assisted living community where he has been having several episodes of sundowning.   Recently he was hospitalized at [facility] for same issues from -. Daughter was unable to provide sufficient history regarding hospitalization.   Patient's son, Jose has been taking care of him and knows more about his medical hx however patient's daughter states she is taking over now and did not want to bother Jose at this time.  Patient was also recently seen at [facility] ED for episodes of agitation and was told to follow up with outpatient Psychiatrist but daughter states that likely did not happened because of his increased agitation.  Patient became agitated today and staff had to call 911 for further evaluation. The facility as well as his children feel that his current psych medication regimen is not sufficient to keep him calm.   Per review of recent dispensary history, he was seeing a psychiatric NP (Barbara Bravo) in [place], [place].   Of note, he had placement of PPM in late August at [facility]. Per his daughter, his LVEF at that time was 20% before and after PPM placement.  Currently, patient states his b/l legs feel itchy but otherwise he feels fine and is agreeable.  Denies fevers, chills. sob, cp, n/v/d.    ED Course   T 97.4 F  /79 RR 18 SpO2 95% on RA  Labs H/H 12.1/37.5 Glu   UA: >1000 glucose  UTox: negaitve     In the ED, given tylenol 650mg x1 , zyprexa 5mg IVP x1  (20 Sep 2023 21:58)    INTERVAL HPI:  : Pt seen and examined, sitting in chair with 1 to 1 present. Patient expressed concern about not receiving his pantoprazole, stating he cant digest his food without it, denies n/v, epigastric pain or burning. When asked about why pt came to hospital, states that he was being followed and he called the police to come help him. Says he lives with his son Jose. EMS brought patient in from  and over [facility]. Additionally states that one pill he was given this morning by staff was "phony" so he didn't take it. Contacted pt's pharmacy [facility], numerous scripts filled in [place] and [place]. Per night team sign out, daughter states that pt has been in and out of many ERs and has been started on many different medications so there are a lot of recent meds that he does not currently take to the best of her knowledge. Per daughter, her brothers are tired of assisting with their father given history of difficulties with him. She is willing to assist but does not want to be too involved and asks us to not contact her brothers. Psychiatry consulted and to see patient. JOHN/VAISHALI updated.   Soren Krishnan [phone]  : Pt seen and examined at bedside. xyprexa dose increased to 5mg bedtime and 2.5mg in AM starting yesterday. Marked improvement in demeanor. A&Ox3 (person, time, place). States he is in a "police hospital" and that he came here because he asked some man in their car to call the police because he was being followed by an aide that his son hired. Said he came from "[place] [id number] and over [facility]. No complaints or concerns today. States he has to get things ready to move back to [place]. Psych is following. DSC planning pending placement as his previous community does not want pt to return and family does not want to take patient. Replace PO K   : Pt seen and examined at bedside. Depakote increased to 500mg PO BID, amitriptyline stopped as per psych recs. Pt with no complaints or concerns. Pleasant on encounter, says he is doing well and thanks provider for checking in on him.  Reports that he is waiting to go home. Patient informed we are working to get him out of the hospital. DSC planning pending placement. JOHN sent referral to [facility]. Awaiting decision.   :  Pt seen and examined at bedside. Pt reports that is feeling well and does not have any symptoms, pain or concern. Pt is tolerating PO and eating well.  Pt is cooperative with evaluation and pleasant.  Waiting for placement for dc. Awaiting placement.   : Pt seen and examined at bedside. Reports last night he was looking for 3 medications that were started in the hospital at night. Reassured that he is receiving all the proper medications he needs. Appetite is good. Pt has no complaints at this time. Now off 1:1.  : Pt seen and examined at bedside. Was not agitated overnight. Pt is very tired this morning. Reports chronic low back pain, but is not concerned. Otherwise he has no active complaints at  this time. Denies chest pain, abdominal pain, SOB. D/W Son Mykel today   : Pt seen and examined at bedside. No agitation overnight. Pt is tired this morning. Has no complaints other than his chronic lower back pain. Denies chest pain, abdominal pain SOB. Pt wants to go home. D/W Son Angelito today   : Pt seen and examined at bedside. No agitation overnight. Pt is tired this morning and wants to sleep. No complaints other than his chronic low back pain. Denies chest pain, SOB, abdominal pain. Pt wants to go.      OVERNIGHT EVENTS: No acute overnight events.     Home Medications:  albuterol 2.5 mg/3 mL (0.083%) inhalation solution: 2.5 milligram(s) inhaled every 6 hours as needed for  shortness of breath and/or wheezing (25 Sep 2023 11:00)  ammonium lactate 12% topical lotion: 1 Apply topically to affected area 2 times a day (25 Sep 2023 10:46)  atorvastatin 40 mg oral tablet: 1 tab(s) orally once a day (at bedtime) (25 Sep 2023 11:00)  dapagliflozin 10 mg oral tablet: 1 tab(s) orally every 24 hours (25 Sep 2023 10:46)  divalproex sodium 500 mg oral delayed release tablet: 1 tab(s) orally 2 times a day (25 Sep 2023 10:37)  DULoxetine 30 mg oral delayed release capsule: 1 cap(s) orally 2 times a day (25 Sep 2023 11:00)  furosemide 40 mg oral tablet: 1 tab(s) orally every 12 hours (25 Sep 2023 11:00)  melatonin 5 mg oral tablet: 1 tab(s) orally once a day (at bedtime) As needed Insomnia (25 Sep 2023 10:46)  METFORMIN 500MG TABLETS: TAKE 1 TABLET BY MOUTH EVERY MORNING AND EVERY EVENING WITH MEALS. (20 Sep 2023 21:58)  methyl salicylate-menthol 15%-10% topical cream: 1 Apply topically to affected area 3 times a day (25 Sep 2023 10:46)  metoprolol succinate 25 mg oral tablet, extended release: 1 tab(s) orally once a day (25 Sep 2023 11:00)  midodrine 5 mg oral tablet: 1 tab(s) orally 3 times a day (25 Sep 2023 11:00)  OLANZapine 10 mg oral tablet, disintegratin tab(s) orally once a day (at bedtime) (27 Sep 2023 08:38)  OLANZapine 5 mg oral tablet, disintegratin tab(s) orally 2 times a day (27 Sep 2023 08:38)  pantoprazole 40 mg oral delayed release tablet: 1 tab(s) orally once a day (before a meal) (25 Sep 2023 10:46)  POTASSIUM CL 20MEQ ER TABLETS: TAKE 1 TABLET BY MOUTH EVERY DAY WITH FOOD FOR 4 DAYS (20 Sep 2023 21:58)  sacubitril-valsartan 24 mg-26 mg oral tablet: 1 tab(s) orally 2 times a day (25 Sep 2023 11:00)  spironolactone 25 mg oral tablet: 1 tab(s) orally once a day (25 Sep 2023 10:46)  TRELEGY ELLIPTA 100-62.5MCG INH 30P: INHALE 1 PUFF BY MOUTH EVERY DAY (20 Sep 2023 21:58)      MEDICATIONS  (STANDING):  ammonium lactate 12% Lotion 1 Application(s) Topical two times a day  atorvastatin 40 milliGRAM(s) Oral at bedtime  budesonide  80 MICROgram(s)/formoterol 4.5 MICROgram(s) Inhaler 2 Puff(s) Inhalation two times a day  dapagliflozin 10 milliGRAM(s) Oral every 24 hours  dextrose 5%. 1000 milliLiter(s) (100 mL/Hr) IV Continuous <Continuous>  dextrose 5%. 1000 milliLiter(s) (50 mL/Hr) IV Continuous <Continuous>  dextrose 50% Injectable 25 Gram(s) IV Push once  dextrose 50% Injectable 12.5 Gram(s) IV Push once  dextrose 50% Injectable 25 Gram(s) IV Push once  divalproex  milliGRAM(s) Oral two times a day  DULoxetine 30 milliGRAM(s) Oral <User Schedule>  furosemide    Tablet 40 milliGRAM(s) Oral every 12 hours  glucagon  Injectable 1 milliGRAM(s) IntraMuscular once  heparin   Injectable 5000 Unit(s) SubCutaneous every 8 hours  insulin lispro (ADMELOG) corrective regimen sliding scale   SubCutaneous three times a day before meals  insulin lispro (ADMELOG) corrective regimen sliding scale   SubCutaneous at bedtime  methyl salicylate 15%/menthol 10% Topical Cream 1 Application(s) Topical three times a day  metoprolol succinate ER 25 milliGRAM(s) Oral daily  midodrine. 5 milliGRAM(s) Oral three times a day  OLANZapine Disintegrating Tablet 5 milliGRAM(s) Oral two times a day  OLANZapine Disintegrating Tablet 10 milliGRAM(s) Oral at bedtime  pantoprazole    Tablet 40 milliGRAM(s) Oral before breakfast  sacubitril 24 mG/valsartan 26 mG 1 Tablet(s) Oral two times a day  spironolactone 25 milliGRAM(s) Oral daily  tiotropium 2.5 MICROgram(s) Inhaler 2 Puff(s) Inhalation daily    MEDICATIONS  (PRN):  acetaminophen     Tablet .. 650 milliGRAM(s) Oral every 6 hours PRN Temp greater or equal to 38C (100.4F), Mild Pain (1 - 3)  albuterol    0.083% 2.5 milliGRAM(s) Nebulizer every 6 hours PRN Shortness of Breath and/or Wheezing  dextrose Oral Gel 15 Gram(s) Oral once PRN Blood Glucose LESS THAN 70 milliGRAM(s)/deciliter  melatonin 5 milliGRAM(s) Oral at bedtime PRN Insomnia      Allergies    No Known Allergies    Intolerances        Social History:  Tobacco: former , quit >30 years ago  EtOH: social drinker  Recreational drug use: denies   Lives with: Assisted Living Facility  Ambulates: without assistance  ADLs: needs assistance (20 Sep 2023 21:58)      REVIEW OF SYSTEMS:  CONSTITUTIONAL: No fever, No chills, No fatigue, No myalgia, No Body ache, No Weakness  EYES: No eye pain,  No visual disturbances, No discharge, NO Redness  ENMT:  No ear pain, No nose bleed, No vertigo; No sinus pain, NO throat pain, No Congestion  NECK: No pain, No stiffness  RESPIRATORY: No cough, NO wheezing, No  hemoptysis, NO  shortness of breath  CARDIOVASCULAR: No chest pain, palpitations  GASTROINTESTINAL: No abdominal pain, NO epigastric pain. No nausea, No vomiting; No diarrhea, No constipation. [ x ] BM   GENITOURINARY: No dysuria, No frequency, No urgency, No hematuria, NO incontinence  NEUROLOGICAL: No headaches, No dizziness, No numbness, No tingling, No tremors, No weakness  EXT: No Swelling, No Pain, No Edema  SKIN:  [ x ] No itching, burning, rashes, or lesions   MUSCULOSKELETAL: No joint pain ,No Jt swelling; No muscle pain, No extremity pain  PSYCHIATRIC: No depression,  No anxiety,  No mood swings ,No difficulty sleeping at night  PAIN SCALE: [  ] None  [ x ] Other- chronic lower back pain.    Vital Signs Last 24 Hrs  T(C): 36.3 (28 Sep 2023 12:30), Max: 36.8 (28 Sep 2023 05:26)  T(F): 97.3 (28 Sep 2023 12:30), Max: 98.3 (28 Sep 2023 05:26)  HR: 95 (28 Sep 2023 12:30) (90 - 95)  BP: 91/48 (28 Sep 2023 12:30) (91/48 - 117/63)  BP(mean): --  RR: 17 (28 Sep 2023 12:30) (17 - 18)  SpO2: 94% (28 Sep 2023 12:30) (94% - 95%)    Parameters below as of 28 Sep 2023 12:30  Patient On (Oxygen Delivery Method): room air      Finger Stick          PHYSICAL EXAM:  GENERAL:  [ x ] NAD , [ x ] well appearing, [  ] Agitated, [  ] Drowsy,  [  ] Lethargy, [  ] confused   HEAD:  [ x ] Normal, [  ] Other  EYES:  [ x ] EOMI, [  ] PERRLA, [ x ] conjunctiva and sclera clear normal, [  ] Other,  [  ] Pallor,[  ] Discharge  ENMT:  [ x ] Normal, [ x ] Moist mucous membranes, [ x ] Good dentition, [ x ] No Thrush  NECK:  [ x ] Supple, [ x ] No JVD, [ x ] Normal thyroid, [  ] Lymphadenopathy [  ] Other  CHEST/LUNG:  [ x ] Clear to auscultation bilaterally, [ x ] Breath Sounds equal B/L / Decrease, [ x ] poor effort  [ x ] No rales, [ x ] No rhonchi  [ x ]  No wheezing,   HEART:  [ x ] Regular rate and rhythm, [  ] tachycardia, [  ] Bradycardia,  [  ] irregular  [ x ] No murmurs, No rubs, No gallops, [  ] PPM in place (Mfr:  )  ABDOMEN:  [ x ] Soft, [ x ] Nontender, [ x ] Nondistended, [ x ] No mass, [ x ] Bowel sounds present, [ x ] obese  NERVOUS SYSTEM:  [ x ] Alert & Oriented X3, [ x ] Nonfocal  [  ] Confusion  [  ] Encephalopathic [  ] Sedated [  ] Unable to assess, [  ] Dementia [ x ] Other- responds appropriately to commands and questions. Tired  EXTREMITIES: [ x ] 2+ Peripheral Pulses, No clubbing, No cyanosis,  [x  ] pitting 2+ edema B/L lower EXT. [ x ]  severe PVD stasis skin changes B/L Lower EXT, + Scabs  [ x ] wound-Dry Scabs on b/l Lower Ext , B/L Hand small Wound   LYMPH: No lymphadenopathy noted  SKIN:  [ x ] No rashes or lesions, [  ] Pressure Ulcers, [  ] ecchymosis, [  ] Skin Tears, [  ] Other    DIET: Diet, Regular:   Consistent Carbohydrate Evening Snack  DASH/TLC Sodium & Cholesterol Restricted (23 @ 22:36)      LABS:  none.      Anemia Panel:  Folate, Serum: 11.8 ng/mL (23 @ 06:42)  Vitamin B12, Serum: 517 pg/mL (23 @ 06:42)      Thyroid Panel:      RADIOLOGY & ADDITIONAL TESTS:  None.    HEALTH ISSUES - PROBLEM Dx:  Paranoia    HFrEF (heart failure with reduced ejection fraction)    HTN (hypertension)    HLD (hyperlipidemia)    Need for prophylactic measure    Medication management    DM2 (diabetes mellitus, type 2)    Anemia            Consultant(s) Notes Reviewed:  [ x ] YES     Care Discussed with [X] Consultants  [ x ] Patient  [  ] Family [  ] HCP [  ]   [  x] Social Service  [x  ] RN, [ x ] Physical Therapy,[  ] Palliative care team  DVT PPX: [  ] Lovenox, [ x ] S C Heparin, [  ] Coumadin, [  ] Xarelto, [  ] Eliquis, [  ] Pradaxa, [  ] IV Heparin drip, [  ] SCD [  ] Contraindication 2 to GI Bleed,[  ] Ambulation [  ] Contraindicated 2 to  bleed [  ] Contraindicated 2 to Brain Bleed  Advanced directive: [ x ] None, [  ] DNR/DNI

## 2023-09-28 NOTE — PROGRESS NOTE ADULT - PROBLEM SELECTOR PLAN 2
Chronic s/p PPM (paced ~end of Aug 2023 at [facility] per daughter )  - TTE (8/2023) per daughter showed EF ~20%  - on Lasix 40 mg 2x day Entresto, metoprolol XL, spironolactone, Farxiga. Due to soft BP, lowered hold parameters to ensure Pt receives meds.  - Dash/ TLC Diet

## 2023-09-28 NOTE — PHYSICAL THERAPY INITIAL EVALUATION ADULT - PERTINENT HX OF CURRENT PROBLEM, REHAB EVAL
obtained from patient's daughter Arianne over phone.   Patient moved from [place] to [place] ~2months ago and has been having issues with increased paranoia for the past month. He stays at a 55 [facility] older assisted living community where he has been having several episodes of sundowning.   Recently he was hospitalized at [facility] for same issues from 9/1-914/23. Daughter was unable to provide sufficient history regarding hospitalization.   Patient's son, Jose has been taking care of him and knows more about his medical hx however patient's daughter states she is taking over now and did not want to bother Jose at this time.  Patient was also recently seen at [facility] ED for episodes of agitation and was told to follow up with outpatient Psychiatrist but daughter states that likely did not happened because of his increased agitation.

## 2023-09-28 NOTE — SOCIAL WORK PROGRESS NOTE - NSSWPROGRESSNOTE_GEN_ALL_CORE
JOHN spoke with Magdalena at the [facility], family is opting for [facility] at this time and will continue JI conversation after rehab stay if needed.    JOHN spoke with Rylan at [facility] to discuss discharge planning for pt. At this time pt. was not accepted to [facility], [facility], [facility] and [facility] are potentially accepting at this time. Rylan will speak with pt. family to discuss further.

## 2023-09-28 NOTE — PHYSICAL THERAPY INITIAL EVALUATION ADULT - ASR WT BEARING STATUS EVAL
Pt having fever now. % days into infection pt will update us if symptoms worsen.   no weight-bearing restrictions

## 2023-09-29 LAB
GLUCOSE BLDC GLUCOMTR-MCNC: 115 MG/DL — HIGH (ref 70–99)
GLUCOSE BLDC GLUCOMTR-MCNC: 119 MG/DL — HIGH (ref 70–99)
GLUCOSE BLDC GLUCOMTR-MCNC: 134 MG/DL — HIGH (ref 70–99)
GLUCOSE BLDC GLUCOMTR-MCNC: 149 MG/DL — HIGH (ref 70–99)
HCT VFR BLD CALC: 42.6 % — SIGNIFICANT CHANGE UP (ref 39–50)
HGB BLD-MCNC: 14.1 G/DL — SIGNIFICANT CHANGE UP (ref 13–17)
MCHC RBC-ENTMCNC: 32.3 PG — SIGNIFICANT CHANGE UP (ref 27–34)
MCHC RBC-ENTMCNC: 33.1 GM/DL — SIGNIFICANT CHANGE UP (ref 32–36)
MCV RBC AUTO: 97.5 FL — SIGNIFICANT CHANGE UP (ref 80–100)
NRBC # BLD: 0 /100 WBCS — SIGNIFICANT CHANGE UP (ref 0–0)
PLATELET # BLD AUTO: 161 K/UL — SIGNIFICANT CHANGE UP (ref 150–400)
RBC # BLD: 4.37 M/UL — SIGNIFICANT CHANGE UP (ref 4.2–5.8)
RBC # FLD: 14.7 % — HIGH (ref 10.3–14.5)
WBC # BLD: 7.47 K/UL — SIGNIFICANT CHANGE UP (ref 3.8–10.5)
WBC # FLD AUTO: 7.47 K/UL — SIGNIFICANT CHANGE UP (ref 3.8–10.5)

## 2023-09-29 RX ADMIN — BUDESONIDE AND FORMOTEROL FUMARATE DIHYDRATE 2 PUFF(S): 160; 4.5 AEROSOL RESPIRATORY (INHALATION) at 21:24

## 2023-09-29 RX ADMIN — Medication 25 MILLIGRAM(S): at 06:10

## 2023-09-29 RX ADMIN — SPIRONOLACTONE 25 MILLIGRAM(S): 25 TABLET, FILM COATED ORAL at 06:10

## 2023-09-29 RX ADMIN — OLANZAPINE 5 MILLIGRAM(S): 15 TABLET, FILM COATED ORAL at 06:09

## 2023-09-29 RX ADMIN — PANTOPRAZOLE SODIUM 40 MILLIGRAM(S): 20 TABLET, DELAYED RELEASE ORAL at 06:10

## 2023-09-29 RX ADMIN — DULOXETINE HYDROCHLORIDE 30 MILLIGRAM(S): 30 CAPSULE, DELAYED RELEASE ORAL at 06:10

## 2023-09-29 RX ADMIN — SACUBITRIL AND VALSARTAN 1 TABLET(S): 24; 26 TABLET, FILM COATED ORAL at 17:04

## 2023-09-29 RX ADMIN — Medication 40 MILLIGRAM(S): at 06:10

## 2023-09-29 RX ADMIN — DIVALPROEX SODIUM 500 MILLIGRAM(S): 500 TABLET, DELAYED RELEASE ORAL at 06:10

## 2023-09-29 RX ADMIN — MENTHOL AND METHYL SALICYLATE 1 APPLICATION(S): 10; 30 CREAM TOPICAL at 06:22

## 2023-09-29 RX ADMIN — HEPARIN SODIUM 5000 UNIT(S): 5000 INJECTION INTRAVENOUS; SUBCUTANEOUS at 21:24

## 2023-09-29 RX ADMIN — Medication 1 APPLICATION(S): at 17:03

## 2023-09-29 RX ADMIN — ATORVASTATIN CALCIUM 40 MILLIGRAM(S): 80 TABLET, FILM COATED ORAL at 21:24

## 2023-09-29 RX ADMIN — MIDODRINE HYDROCHLORIDE 5 MILLIGRAM(S): 2.5 TABLET ORAL at 16:49

## 2023-09-29 RX ADMIN — Medication 650 MILLIGRAM(S): at 17:15

## 2023-09-29 RX ADMIN — TIOTROPIUM BROMIDE 2 PUFF(S): 18 CAPSULE ORAL; RESPIRATORY (INHALATION) at 06:17

## 2023-09-29 RX ADMIN — MENTHOL AND METHYL SALICYLATE 1 APPLICATION(S): 10; 30 CREAM TOPICAL at 21:25

## 2023-09-29 RX ADMIN — OLANZAPINE 10 MILLIGRAM(S): 15 TABLET, FILM COATED ORAL at 21:29

## 2023-09-29 RX ADMIN — Medication 650 MILLIGRAM(S): at 08:24

## 2023-09-29 RX ADMIN — BUDESONIDE AND FORMOTEROL FUMARATE DIHYDRATE 2 PUFF(S): 160; 4.5 AEROSOL RESPIRATORY (INHALATION) at 06:17

## 2023-09-29 RX ADMIN — MIDODRINE HYDROCHLORIDE 5 MILLIGRAM(S): 2.5 TABLET ORAL at 12:26

## 2023-09-29 RX ADMIN — HEPARIN SODIUM 5000 UNIT(S): 5000 INJECTION INTRAVENOUS; SUBCUTANEOUS at 06:09

## 2023-09-29 RX ADMIN — DIVALPROEX SODIUM 500 MILLIGRAM(S): 500 TABLET, DELAYED RELEASE ORAL at 17:04

## 2023-09-29 RX ADMIN — Medication 40 MILLIGRAM(S): at 17:04

## 2023-09-29 RX ADMIN — DAPAGLIFLOZIN 10 MILLIGRAM(S): 10 TABLET, FILM COATED ORAL at 12:26

## 2023-09-29 RX ADMIN — Medication 1 APPLICATION(S): at 06:09

## 2023-09-29 RX ADMIN — HEPARIN SODIUM 5000 UNIT(S): 5000 INJECTION INTRAVENOUS; SUBCUTANEOUS at 15:23

## 2023-09-29 RX ADMIN — Medication 650 MILLIGRAM(S): at 16:49

## 2023-09-29 RX ADMIN — SACUBITRIL AND VALSARTAN 1 TABLET(S): 24; 26 TABLET, FILM COATED ORAL at 06:09

## 2023-09-29 RX ADMIN — Medication 650 MILLIGRAM(S): at 09:00

## 2023-09-29 RX ADMIN — OLANZAPINE 5 MILLIGRAM(S): 15 TABLET, FILM COATED ORAL at 17:04

## 2023-09-29 RX ADMIN — MENTHOL AND METHYL SALICYLATE 1 APPLICATION(S): 10; 30 CREAM TOPICAL at 15:25

## 2023-09-29 RX ADMIN — MIDODRINE HYDROCHLORIDE 5 MILLIGRAM(S): 2.5 TABLET ORAL at 06:10

## 2023-09-29 RX ADMIN — DULOXETINE HYDROCHLORIDE 30 MILLIGRAM(S): 30 CAPSULE, DELAYED RELEASE ORAL at 16:49

## 2023-09-29 NOTE — PROGRESS NOTE ADULT - SUBJECTIVE AND OBJECTIVE BOX
Patient is a 83y old  Male who presents with a chief complaint of paranoia (28 Sep 2023 12:42)    HPI:  84y/o M PMH dementia, HFrEF s/p PPM, HTN, HLD  presents with increased agitation. History obtained from patient's daughter Arianne over phone.   Patient moved from [place] to [place] ~2months ago and has been having issues with increased paranoia for the past month. He stays at a [facility] older assisted living community where he has been having several episodes of sundowning.   Recently he was hospitalized at [facility] for same issues from -. Daughter was unable to provide sufficient history regarding hospitalization.   Patient's son, Jose has been taking care of him and knows more about his medical hx however patient's daughter states she is taking over now and did not want to bother Jose at this time.  Patient was also recently seen at [facility] ED for episodes of agitation and was told to follow up with outpatient Psychiatrist but daughter states that likely did not happened because of his increased agitation.  Patient became agitated today and staff had to call 911 for further evaluation. The facility as well as his children feel that his current psych medication regimen is not sufficient to keep him calm.   Per review of recent dispensary history, he was seeing a psychiatric NP (Barbara Bravo) in [place], [place].   Of note, he had placement of PPM in late August at [facility]. Per his daughter, his LVEF at that time was 20% before and after PPM placement.  Currently, patient states his b/l legs feel itchy but otherwise he feels fine and is agreeable.  Denies fevers, chills. sob, cp, n/v/d.    ED Course   T 97.4 F  /79 RR 18 SpO2 95% on RA  Labs H/H 12.1/37.5 Glu   UA: >1000 glucose  UTox: negaitve     In the ED, given tylenol 650mg x1 , zyprexa 5mg IVP x1  (20 Sep 2023 21:58)    INTERVAL HPI:  : Pt seen and examined, sitting in chair with 1 to 1 present. Patient expressed concern about not receiving his pantoprazole, stating he cant digest his food without it, denies n/v, epigastric pain or burning. When asked about why pt came to hospital, states that he was being followed and he called the police to come help him. Says he lives with his son Jose. EMS brought patient in from  and over [facility]. Additionally states that one pill he was given this morning by staff was "phony" so he didn't take it. Contacted pt's pharmacy [email or web address], numerous scripts filled in [place] and [place]. Per night team sign out, daughter states that pt has been in and out of many ERs and has been started on many different medications so there are a lot of recent meds that he does not currently take to the best of her knowledge. Per daughter, her brothers are tired of assisting with their father given history of difficulties with him. She is willing to assist but does not want to be too involved and asks us to not contact her brothers. Psychiatry consulted and to see patient. JOHN/VAISHALI updated.   Soren Krishnan [phone]  : Pt seen and examined at bedside. xyprexa dose increased to 5mg bedtime and 2.5mg in AM starting yesterday. Marked improvement in demeanor. A&Ox3 (person, time, place). States he is in a "police hospital" and that he came here because he asked some man in their car to call the police because he was being followed by an aide that his son hired. Said he came from "[place] [id number] and over [facility]. No complaints or concerns today. States he has to get things ready to move back to [place]. Psych is following. DSC planning pending placement as his previous community does not want pt to return and family does not want to take patient. Replace PO K   : Pt seen and examined at bedside. Depakote increased to 500mg PO BID, amitriptyline stopped as per psych recs. Pt with no complaints or concerns. Pleasant on encounter, says he is doing well and thanks provider for checking in on him.  Reports that he is waiting to go home. Patient informed we are working to get him out of the hospital. DSC planning pending placement. JOHN sent referral to [facility]. Awaiting decision.   :  Pt seen and examined at bedside. Pt reports that is feeling well and does not have any symptoms, pain or concern. Pt is tolerating PO and eating well.  Pt is cooperative with evaluation and pleasant.  Waiting for placement for dc. Awaiting placement.   : Pt seen and examined at bedside. Reports last night he was looking for 3 medications that were started in the hospital at night. Reassured that he is receiving all the proper medications he needs. Appetite is good. Pt has no complaints at this time. Now off 1:1.  : Pt seen and examined at bedside. Was not agitated overnight. Pt is very tired this morning. Reports chronic low back pain, but is not concerned. Otherwise he has no active complaints at  this time. Denies chest pain, abdominal pain, SOB. D/W Son Mykel today   : Pt seen and examined at bedside. No agitation overnight. Pt is tired this morning. Has no complaints other than his chronic lower back pain. Denies chest pain, abdominal pain SOB. Pt wants to go home. D/W Son Angelito today   : Pt seen and examined at bedside. No agitation overnight. Pt is tired this morning and wants to sleep. No complaints other than his chronic low back pain. Denies chest pain, SOB, abdominal pain. Pt wants to go.  : Pt seen and examined at bedside. No agitation. Pt is more awake    OVERNIGHT EVENTS:    Home Medications:  albuterol 2.5 mg/3 mL (0.083%) inhalation solution: 2.5 milligram(s) inhaled every 6 hours as needed for  shortness of breath and/or wheezing (25 Sep 2023 11:00)  ammonium lactate 12% topical lotion: 1 Apply topically to affected area 2 times a day (25 Sep 2023 10:46)  atorvastatin 40 mg oral tablet: 1 tab(s) orally once a day (at bedtime) (25 Sep 2023 11:00)  dapagliflozin 10 mg oral tablet: 1 tab(s) orally every 24 hours (25 Sep 2023 10:46)  divalproex sodium 500 mg oral delayed release tablet: 1 tab(s) orally 2 times a day (25 Sep 2023 10:37)  DULoxetine 30 mg oral delayed release capsule: 1 cap(s) orally 2 times a day (25 Sep 2023 11:00)  furosemide 40 mg oral tablet: 1 tab(s) orally every 12 hours (25 Sep 2023 11:00)  melatonin 5 mg oral tablet: 1 tab(s) orally once a day (at bedtime) As needed Insomnia (25 Sep 2023 10:46)  METFORMIN 500MG TABLETS: TAKE 1 TABLET BY MOUTH EVERY MORNING AND EVERY EVENING WITH MEALS. (20 Sep 2023 21:58)  methyl salicylate-menthol 15%-10% topical cream: 1 Apply topically to affected area 3 times a day (25 Sep 2023 10:46)  metoprolol succinate 25 mg oral tablet, extended release: 1 tab(s) orally once a day (25 Sep 2023 11:00)  midodrine 5 mg oral tablet: 1 tab(s) orally 3 times a day (25 Sep 2023 11:00)  OLANZapine 10 mg oral tablet, disintegratin tab(s) orally once a day (at bedtime) (27 Sep 2023 08:38)  OLANZapine 5 mg oral tablet, disintegratin tab(s) orally 2 times a day (27 Sep 2023 08:38)  pantoprazole 40 mg oral delayed release tablet: 1 tab(s) orally once a day (before a meal) (25 Sep 2023 10:46)  POTASSIUM CL 20MEQ ER TABLETS: TAKE 1 TABLET BY MOUTH EVERY DAY WITH FOOD FOR 4 DAYS (20 Sep 2023 21:58)  sacubitril-valsartan 24 mg-26 mg oral tablet: 1 tab(s) orally 2 times a day (25 Sep 2023 11:00)  spironolactone 25 mg oral tablet: 1 tab(s) orally once a day (25 Sep 2023 10:46)  TRELEGY ELLIPTA 100-62.5MCG INH 30P: INHALE 1 PUFF BY MOUTH EVERY DAY (20 Sep 2023 21:58)      MEDICATIONS  (STANDING):  ammonium lactate 12% Lotion 1 Application(s) Topical two times a day  atorvastatin 40 milliGRAM(s) Oral at bedtime  budesonide  80 MICROgram(s)/formoterol 4.5 MICROgram(s) Inhaler 2 Puff(s) Inhalation two times a day  dapagliflozin 10 milliGRAM(s) Oral every 24 hours  dextrose 5%. 1000 milliLiter(s) (50 mL/Hr) IV Continuous <Continuous>  dextrose 5%. 1000 milliLiter(s) (100 mL/Hr) IV Continuous <Continuous>  dextrose 50% Injectable 25 Gram(s) IV Push once  dextrose 50% Injectable 12.5 Gram(s) IV Push once  dextrose 50% Injectable 25 Gram(s) IV Push once  divalproex  milliGRAM(s) Oral two times a day  DULoxetine 30 milliGRAM(s) Oral <User Schedule>  furosemide    Tablet 40 milliGRAM(s) Oral every 12 hours  glucagon  Injectable 1 milliGRAM(s) IntraMuscular once  heparin   Injectable 5000 Unit(s) SubCutaneous every 8 hours  insulin lispro (ADMELOG) corrective regimen sliding scale   SubCutaneous at bedtime  insulin lispro (ADMELOG) corrective regimen sliding scale   SubCutaneous three times a day before meals  methyl salicylate 15%/menthol 10% Topical Cream 1 Application(s) Topical three times a day  metoprolol succinate ER 25 milliGRAM(s) Oral daily  midodrine. 5 milliGRAM(s) Oral three times a day  OLANZapine Disintegrating Tablet 10 milliGRAM(s) Oral at bedtime  OLANZapine Disintegrating Tablet 5 milliGRAM(s) Oral two times a day  pantoprazole    Tablet 40 milliGRAM(s) Oral before breakfast  sacubitril 24 mG/valsartan 26 mG 1 Tablet(s) Oral two times a day  spironolactone 25 milliGRAM(s) Oral daily  tiotropium 2.5 MICROgram(s) Inhaler 2 Puff(s) Inhalation daily    MEDICATIONS  (PRN):  acetaminophen     Tablet .. 650 milliGRAM(s) Oral every 6 hours PRN Temp greater or equal to 38C (100.4F), Mild Pain (1 - 3)  albuterol    0.083% 2.5 milliGRAM(s) Nebulizer every 6 hours PRN Shortness of Breath and/or Wheezing  dextrose Oral Gel 15 Gram(s) Oral once PRN Blood Glucose LESS THAN 70 milliGRAM(s)/deciliter  melatonin 5 milliGRAM(s) Oral at bedtime PRN Insomnia      Allergies    No Known Allergies    Intolerances        Social History:  Tobacco: former , quit >30 years ago  EtOH: social drinker  Recreational drug use: denies   Lives with: Assisted Living Facility  Ambulates: without assistance  ADLs: needs assistance (20 Sep 2023 21:58)      REVIEW OF SYSTEMS:  CONSTITUTIONAL: No fever, No chills, No fatigue, No myalgia, No Body ache, No Weakness  EYES: No eye pain,  No visual disturbances, No discharge, NO Redness  ENMT:  No ear pain, No nose bleed, No vertigo; No sinus pain, NO throat pain, No Congestion  NECK: No pain, No stiffness  RESPIRATORY: No cough, NO wheezing, No  hemoptysis, NO  shortness of breath  CARDIOVASCULAR: No chest pain, palpitations  GASTROINTESTINAL: No abdominal pain, NO epigastric pain. No nausea, No vomiting; No diarrhea, No constipation. [  ] BM  GENITOURINARY: No dysuria, No frequency, No urgency, No hematuria, NO incontinence  NEUROLOGICAL: No headaches, No dizziness, No numbness, No tingling, No tremors, No weakness  EXT: No Swelling, No Pain, No Edema  SKIN:  [  ] No itching, burning, rashes, or lesions   MUSCULOSKELETAL: No joint pain ,No Jt swelling; No muscle pain, No back pain, No extremity pain  PSYCHIATRIC: No depression,  No anxiety,  No mood swings ,No difficulty sleeping at night  PAIN SCALE: [  ] None  [  ] Other-  ROS Unable to obtain due to - [  ] Dementia  [  ] Lethargy [  ] Drowsy [  ] Sedated [  ] non verbal  REST OF REVIEW Of SYSTEM - [  ] Normal     Vital Signs Last 24 Hrs  T(C): 36.6 (29 Sep 2023 12:56), Max: 36.6 (29 Sep 2023 12:56)  T(F): 97.8 (29 Sep 2023 12:56), Max: 97.8 (29 Sep 2023 12:56)  HR: 84 (29 Sep 2023 12:56) (84 - 101)  BP: 108/- (29 Sep 2023 12:56) (102/65 - 108/-)  BP(mean): --  RR: 17 (29 Sep 2023 12:56) (17 - 18)  SpO2: 93% (29 Sep 2023 12:56) (91% - 96%)    Parameters below as of 29 Sep 2023 12:56  Patient On (Oxygen Delivery Method): room air      Finger Stick          PHYSICAL EXAM:  GENERAL:  [  ] NAD , [  ] well appearing, [  ] Agitated, [  ] Drowsy,  [  ] Lethargy, [  ] confused   HEAD:  [  ] Normal, [  ] Other  EYES:  [  ] EOMI, [  ] PERRLA, [  ] conjunctiva and sclera clear normal, [  ] Other,  [  ] Pallor,[  ] Discharge  ENMT:  [  ] Normal, [  ] Moist mucous membranes, [  ] Good dentition, [  ] No Thrush  NECK:  [  ] Supple, [  ] No JVD, [  ] Normal thyroid, [  ] Lymphadenopathy [  ] Other  CHEST/LUNG:  [  ] Clear to auscultation bilaterally, [  ] Breath Sounds equal B/L / Decrease, [  ] poor effort  [  ] No rales, [  ] No rhonchi  [  ]  No wheezing,   HEART:  [  ] Regular rate and rhythm, [  ] tachycardia, [  ] Bradycardia,  [  ] irregular  [  ] No murmurs, No rubs, No gallops, [  ] PPM in place (Mfr:  )  ABDOMEN:  [  ] Soft, [  ] Nontender, [  ] Nondistended, [  ] No mass, [  ] Bowel sounds present, [  ] obese  NERVOUS SYSTEM:  [  ] Alert & Oriented X3, [  ] Nonfocal  [  ] Confusion  [  ] Encephalopathic [  ] Sedated [  ] Unable to assess, [  ] Dementia [  ] Other-  EXTREMITIES: [  ] 2+ Peripheral Pulses, No clubbing, No cyanosis,  [  ] edema B/L lower EXT. [  ] PVD stasis skin changes B/L Lower EXT, [  ] wound  LYMPH: No lymphadenopathy noted  SKIN:  [  ] No rashes or lesions, [  ] Pressure Ulcers, [  ] ecchymosis, [  ] Skin Tears, [  ] Other    DIET: Diet, Regular:   Consistent Carbohydrate Evening Snack  DASH/TLC Sodium & Cholesterol Restricted (23 @ 22:36)      LABS:                        14.1   7.47  )-----------( 161      ( 29 Sep 2023 08:54 )             42.6                                    Anemia Panel:      Thyroid Panel:                RADIOLOGY & ADDITIONAL TESTS:      HEALTH ISSUES - PROBLEM Dx:  Paranoia    HFrEF (heart failure with reduced ejection fraction)    HTN (hypertension)    HLD (hyperlipidemia)    Need for prophylactic measure    Medication management    DM2 (diabetes mellitus, type 2)    Anemia            Consultant(s) Notes Reviewed:  [  ] YES     Care Discussed with [X] Consultants  [  ] Patient  [  ] Family [  ] HCP [  ]   [  ] Social Service  [  ] RN, [  ] Physical Therapy,[  ] Palliative care team  DVT PPX: [  ] Lovenox, [  ] S C Heparin, [  ] Coumadin, [  ] Xarelto, [  ] Eliquis, [  ] Pradaxa, [  ] IV Heparin drip, [  ] SCD [  ] Contraindication 2 to GI Bleed,[  ] Ambulation [  ] Contraindicated 2 to  bleed [  ] Contraindicated 2 to Brain Bleed  Advanced directive: [  ] None, [  ] DNR/DNI Patient is a 83y old  Male who presents with a chief complaint of paranoia (28 Sep 2023 12:42)    HPI:  84y/o M PMH dementia, HFrEF s/p PPM, HTN, HLD  presents with increased agitation. History obtained from patient's daughter Arianne over phone.   Patient moved from [place] to [place] ~2months ago and has been having issues with increased paranoia for the past month. He stays at a 55 [facility] older assisted living community where he has been having several episodes of sundowning.   Recently he was hospitalized at [facility] for same issues from -. Daughter was unable to provide sufficient history regarding hospitalization.   Patient's son, Jose has been taking care of him and knows more about his medical hx however patient's daughter states she is taking over now and did not want to bother Jose at this time.  Patient was also recently seen at [facility] ED for episodes of agitation and was told to follow up with outpatient Psychiatrist but daughter states that likely did not happened because of his increased agitation.  Patient became agitated today and staff had to call 911 for further evaluation. The facility as well as his children feel that his current psych medication regimen is not sufficient to keep him calm.   Per review of recent dispensary history, he was seeing a psychiatric NP (Barbara Bravo) in [place], [place].   Of note, he had placement of PPM in late August at [facility]. Per his daughter, his LVEF at that time was 20% before and after PPM placement.  Currently, patient states his b/l legs feel itchy but otherwise he feels fine and is agreeable.  Denies fevers, chills. sob, cp, n/v/d.    ED Course   T 97.4 F  /79 RR 18 SpO2 95% on RA  Labs H/H 12.1/37.5 Glu   UA: >1000 glucose  UTox: negaitve     In the ED, given tylenol 650mg x1 , zyprexa 5mg IVP x1  (20 Sep 2023 21:58)    INTERVAL HPI:  : Pt seen and examined, sitting in chair with 1 to 1 present. Patient expressed concern about not receiving his pantoprazole, stating he cant digest his food without it, denies n/v, epigastric pain or burning. When asked about why pt came to hospital, states that he was being followed and he called the police to come help him. Says he lives with his son Jose. EMS brought patient in from  and over [facility]. Additionally states that one pill he was given this morning by staff was "phony" so he didn't take it. Contacted pt's pharmacy [facility], numerous scripts filled in [place] and [place]. Per night team sign out, daughter states that pt has been in and out of many ERs and has been started on many different medications so there are a lot of recent meds that he does not currently take to the best of her knowledge. Per daughter, her brothers are tired of assisting with their father given history of difficulties with him. She is willing to assist but does not want to be too involved and asks us to not contact her brothers. Psychiatry consulted and to see patient. JOHN/VAISHALI updated.   Soren Krishnan [phone]  : Pt seen and examined at bedside. xyprexa dose increased to 5mg bedtime and 2.5mg in AM starting yesterday. Marked improvement in demeanor. A&Ox3 (person, time, place). States he is in a "police hospital" and that he came here because he asked some man in their car to call the police because he was being followed by an aide that his son hired. Said he came from "[place] [id number] and over [facility]. No complaints or concerns today. States he has to get things ready to move back to [place]. Psych is following. DSC planning pending placement as his previous community does not want pt to return and family does not want to take patient. Replace PO K   : Pt seen and examined at bedside. Depakote increased to 500mg PO BID, amitriptyline stopped as per psych recs. Pt with no complaints or concerns. Pleasant on encounter, says he is doing well and thanks provider for checking in on him.  Reports that he is waiting to go home. Patient informed we are working to get him out of the hospital. DSC planning pending placement. [facility] sent referral to [facility]. Awaiting decision.   :  Pt seen and examined at bedside. Pt reports that is feeling well and does not have any symptoms, pain or concern. Pt is tolerating PO and eating well.  Pt is cooperative with evaluation and pleasant.  Waiting for placement for dc. Awaiting placement.   : Pt seen and examined at bedside. Reports last night he was looking for 3 medications that were started in the hospital at night. Reassured that he is receiving all the proper medications he needs. Appetite is good. Pt has no complaints at this time. Now off 1:1.  : Pt seen and examined at bedside. Was not agitated overnight. Pt is very tired this morning. Reports chronic low back pain, but is not concerned. Otherwise he has no active complaints at  this time. Denies chest pain, abdominal pain, SOB. D/W Son Mykel today   : Pt seen and examined at bedside. No agitation overnight. Pt is tired this morning. Has no complaints other than his chronic lower back pain. Denies chest pain, abdominal pain SOB. Pt wants to go home. D/W Son Angelito today   : Pt seen and examined at bedside. No agitation overnight. Pt is tired this morning and wants to sleep. No complaints other than his chronic low back pain. Denies chest pain, SOB, abdominal pain. Pt wants to go.  : Pt seen and examined at bedside. No agitation. Pt is more awake. Continues to report chronic low back pain. No acute complaints at this time. Denies chest pain, SOB, abdominal pain. Wants to go.    OVERNIGHT EVENTS: None    Home Medications:  albuterol 2.5 mg/3 mL (0.083%) inhalation solution: 2.5 milligram(s) inhaled every 6 hours as needed for  shortness of breath and/or wheezing (25 Sep 2023 11:00)  ammonium lactate 12% topical lotion: 1 Apply topically to affected area 2 times a day (25 Sep 2023 10:46)  atorvastatin 40 mg oral tablet: 1 tab(s) orally once a day (at bedtime) (25 Sep 2023 11:00)  dapagliflozin 10 mg oral tablet: 1 tab(s) orally every 24 hours (25 Sep 2023 10:46)  divalproex sodium 500 mg oral delayed release tablet: 1 tab(s) orally 2 times a day (25 Sep 2023 10:37)  DULoxetine 30 mg oral delayed release capsule: 1 cap(s) orally 2 times a day (25 Sep 2023 11:00)  furosemide 40 mg oral tablet: 1 tab(s) orally every 12 hours (25 Sep 2023 11:00)  melatonin 5 mg oral tablet: 1 tab(s) orally once a day (at bedtime) As needed Insomnia (25 Sep 2023 10:46)  METFORMIN 500MG TABLETS: TAKE 1 TABLET BY MOUTH EVERY MORNING AND EVERY EVENING WITH MEALS. (20 Sep 2023 21:58)  methyl salicylate-menthol 15%-10% topical cream: 1 Apply topically to affected area 3 times a day (25 Sep 2023 10:46)  metoprolol succinate 25 mg oral tablet, extended release: 1 tab(s) orally once a day (25 Sep 2023 11:00)  midodrine 5 mg oral tablet: 1 tab(s) orally 3 times a day (25 Sep 2023 11:00)  OLANZapine 10 mg oral tablet, disintegratin tab(s) orally once a day (at bedtime) (27 Sep 2023 08:38)  OLANZapine 5 mg oral tablet, disintegratin tab(s) orally 2 times a day (27 Sep 2023 08:38)  pantoprazole 40 mg oral delayed release tablet: 1 tab(s) orally once a day (before a meal) (25 Sep 2023 10:46)  POTASSIUM CL 20MEQ ER TABLETS: TAKE 1 TABLET BY MOUTH EVERY DAY WITH FOOD FOR 4 DAYS (20 Sep 2023 21:58)  sacubitril-valsartan 24 mg-26 mg oral tablet: 1 tab(s) orally 2 times a day (25 Sep 2023 11:00)  spironolactone 25 mg oral tablet: 1 tab(s) orally once a day (25 Sep 2023 10:46)  TRELEGY ELLIPTA 100-62.5MCG INH 30P: INHALE 1 PUFF BY MOUTH EVERY DAY (20 Sep 2023 21:58)      MEDICATIONS  (STANDING):  ammonium lactate 12% Lotion 1 Application(s) Topical two times a day  atorvastatin 40 milliGRAM(s) Oral at bedtime  budesonide  80 MICROgram(s)/formoterol 4.5 MICROgram(s) Inhaler 2 Puff(s) Inhalation two times a day  dapagliflozin 10 milliGRAM(s) Oral every 24 hours  dextrose 5%. 1000 milliLiter(s) (50 mL/Hr) IV Continuous <Continuous>  dextrose 5%. 1000 milliLiter(s) (100 mL/Hr) IV Continuous <Continuous>  dextrose 50% Injectable 25 Gram(s) IV Push once  dextrose 50% Injectable 12.5 Gram(s) IV Push once  dextrose 50% Injectable 25 Gram(s) IV Push once  divalproex  milliGRAM(s) Oral two times a day  DULoxetine 30 milliGRAM(s) Oral <User Schedule>  furosemide    Tablet 40 milliGRAM(s) Oral every 12 hours  glucagon  Injectable 1 milliGRAM(s) IntraMuscular once  heparin   Injectable 5000 Unit(s) SubCutaneous every 8 hours  insulin lispro (ADMELOG) corrective regimen sliding scale   SubCutaneous at bedtime  insulin lispro (ADMELOG) corrective regimen sliding scale   SubCutaneous three times a day before meals  methyl salicylate 15%/menthol 10% Topical Cream 1 Application(s) Topical three times a day  metoprolol succinate ER 25 milliGRAM(s) Oral daily  midodrine. 5 milliGRAM(s) Oral three times a day  OLANZapine Disintegrating Tablet 10 milliGRAM(s) Oral at bedtime  OLANZapine Disintegrating Tablet 5 milliGRAM(s) Oral two times a day  pantoprazole    Tablet 40 milliGRAM(s) Oral before breakfast  sacubitril 24 mG/valsartan 26 mG 1 Tablet(s) Oral two times a day  spironolactone 25 milliGRAM(s) Oral daily  tiotropium 2.5 MICROgram(s) Inhaler 2 Puff(s) Inhalation daily    MEDICATIONS  (PRN):  acetaminophen     Tablet .. 650 milliGRAM(s) Oral every 6 hours PRN Temp greater or equal to 38C (100.4F), Mild Pain (1 - 3)  albuterol    0.083% 2.5 milliGRAM(s) Nebulizer every 6 hours PRN Shortness of Breath and/or Wheezing  dextrose Oral Gel 15 Gram(s) Oral once PRN Blood Glucose LESS THAN 70 milliGRAM(s)/deciliter  melatonin 5 milliGRAM(s) Oral at bedtime PRN Insomnia      Allergies    No Known Allergies    Intolerances        Social History:  Tobacco: former , quit >30 years ago  EtOH: social drinker  Recreational drug use: denies   Lives with: Assisted Living Facility  Ambulates: without assistance  ADLs: needs assistance (20 Sep 2023 21:58)      REVIEW OF SYSTEMS:  CONSTITUTIONAL: No fever, No chills, No fatigue, No myalgia, No Body ache, No Weakness  EYES: No eye pain,  No visual disturbances, No discharge, NO Redness  ENMT:  No ear pain, No nose bleed, No vertigo; No sinus pain, NO throat pain, No Congestion  NECK: No pain, No stiffness  RESPIRATORY: No cough, NO wheezing, No  hemoptysis, NO  shortness of breath  CARDIOVASCULAR: No chest pain, palpitations  GASTROINTESTINAL: No abdominal pain, NO epigastric pain. No nausea, No vomiting; No diarrhea, No constipation. [ x ] BM   GENITOURINARY: No dysuria, No frequency, No urgency, No hematuria, NO incontinence  NEUROLOGICAL: No headaches, No dizziness, No numbness, No tingling, No tremors, No weakness  EXT: No Swelling, No Pain, No Edema  SKIN:  [ x ] No itching, burning, rashes, or lesions   MUSCULOSKELETAL: No joint pain ,No Jt swelling; No muscle pain, No extremity pain  PSYCHIATRIC: No depression,  No anxiety,  No mood swings ,No difficulty sleeping at night  PAIN SCALE: [  ] None  [ x ] Other- chronic lower back pain.    Vital Signs Last 24 Hrs  T(C): 36.6 (29 Sep 2023 12:56), Max: 36.6 (29 Sep 2023 12:56)  T(F): 97.8 (29 Sep 2023 12:56), Max: 97.8 (29 Sep 2023 12:56)  HR: 84 (29 Sep 2023 12:56) (84 - 101)  BP: 108/- (29 Sep 2023 12:56) (102/65 - 108/-)  BP(mean): --  RR: 17 (29 Sep 2023 12:56) (17 - 18)  SpO2: 93% (29 Sep 2023 12:56) (91% - 96%)    Parameters below as of 29 Sep 2023 12:56  Patient On (Oxygen Delivery Method): room air      Finger Stick          PHYSICAL EXAM:  GENERAL:  [ x ] NAD , [ x ] well appearing, [  ] Agitated, [  ] Drowsy,  [  ] Lethargy, [  ] confused   HEAD:  [ x ] Normal, [  ] Other  EYES:  [ x ] EOMI, [  ] PERRLA, [ x ] conjunctiva and sclera clear normal, [  ] Other,  [  ] Pallor,[  ] Discharge  ENMT:  [ x ] Normal, [ x ] Moist mucous membranes, [ x ] Good dentition, [ x ] No Thrush  NECK:  [ x ] Supple, [ x ] No JVD, [ x ] Normal thyroid, [  ] Lymphadenopathy [  ] Other  CHEST/LUNG:  [ x ] Clear to auscultation bilaterally, [ x ] Breath Sounds equal B/L / Decrease, [ x ] poor effort  [ x ] No rales, [ x ] No rhonchi  [ x ]  No wheezing,   HEART:  [ x ] Regular rate and rhythm, [  ] tachycardia, [  ] Bradycardia,  [  ] irregular  [ x ] No murmurs, No rubs, No gallops, [  ] PPM in place (Mfr:  )  ABDOMEN:  [ x ] Soft, [ x ] Nontender, [ x ] Nondistended, [ x ] No mass, [ x ] Bowel sounds present, [ x ] obese  NERVOUS SYSTEM:  [ x ] Alert & Oriented X3, [ x ] Nonfocal  [  ] Confusion  [  ] Encephalopathic [  ] Sedated [  ] Unable to assess, [  ] Dementia [ x ] Other- responds appropriately to commands and questions. Tired  EXTREMITIES: [ x ] 2+ Peripheral Pulses, No clubbing, No cyanosis,  [x  ] pitting 2+ edema B/L lower EXT. [ x ]  severe PVD stasis skin changes B/L Lower EXT, + Scabs  [ x ] wound-Dry Scabs on b/l Lower Ext , B/L Hand small Wound   LYMPH: No lymphadenopathy noted  SKIN:  [ x ] No rashes or lesions, [  ] Pressure Ulcers, [  ] ecchymosis, [  ] Skin Tears, [  ] Other    DIET: Diet, Regular:   Consistent Carbohydrate Evening Snack  DASH/TLC Sodium & Cholesterol Restricted (23 @ 22:36)      LABS:                        14.1   7.47  )-----------( 161      ( 29 Sep 2023 08:54 )             42.6                                    Anemia Panel:      Thyroid Panel:                RADIOLOGY & ADDITIONAL TESTS:      HEALTH ISSUES - PROBLEM Dx:  Paranoia    HFrEF (heart failure with reduced ejection fraction)    HTN (hypertension)    HLD (hyperlipidemia)    Need for prophylactic measure    Medication management    DM2 (diabetes mellitus, type 2)    Anemia            Consultant(s) Notes Reviewed:  [ x ] YES     Care Discussed with [X] Consultants  [ x ] Patient  [  ] Family [  ] HCP [  ]   [  x] Social Service  [x  ] RN, [ x ] Physical Therapy,[  ] Palliative care team  DVT PPX: [  ] Lovenox, [ x ] S C Heparin, [  ] Coumadin, [  ] Xarelto, [  ] Eliquis, [  ] Pradaxa, [  ] IV Heparin drip, [  ] SCD [  ] Contraindication 2 to GI Bleed,[  ] Ambulation [  ] Contraindicated 2 to  bleed [  ] Contraindicated 2 to Brain Bleed  Advanced directive: [ x ] None, [  ] DNR/DNI Patient is a 83y old  Male who presents with a chief complaint of paranoia (28 Sep 2023 12:42)    HPI:  84y/o M PMH dementia, HFrEF s/p PPM, HTN, HLD  presents with increased agitation. History obtained from patient's daughter Arianne over phone.   Patient moved from [place] to [place] ~2months ago and has been having issues with increased paranoia for the past month. He stays at a [facility] older assisted living community where he has been having several episodes of sundowning.   Recently he was hospitalized at [facility] for same issues from -. Daughter was unable to provide sufficient history regarding hospitalization.   Patient's son, Jose has been taking care of him and knows more about his medical hx however patient's daughter states she is taking over now and did not want to bother Jose at this time.  Patient was also recently seen at [facility] ED for episodes of agitation and was told to follow up with outpatient Psychiatrist but daughter states that likely did not happened because of his increased agitation.  Patient became agitated today and staff had to call 911 for further evaluation. The facility as well as his children feel that his current psych medication regimen is not sufficient to keep him calm.   Per review of recent dispensary history, he was seeing a psychiatric NP (Barbara Bravo) in [place], [place].   Of note, he had placement of PPM in late August at [facility]. Per his daughter, his LVEF at that time was 20% before and after PPM placement.  Currently, patient states his b/l legs feel itchy but otherwise he feels fine and is agreeable.  Denies fevers, chills. sob, cp, n/v/d.    ED Course   T 97.4 F  /79 RR 18 SpO2 95% on RA  Labs H/H 12.1/37.5 Glu   UA: >1000 glucose  UTox: negaitve     In the ED, given tylenol 650mg x1 , zyprexa 5mg IVP x1  (20 Sep 2023 21:58)    INTERVAL HPI:  : Pt seen and examined, sitting in chair with 1 to 1 present. Patient expressed concern about not receiving his pantoprazole, stating he cant digest his food without it, denies n/v, epigastric pain or burning. When asked about why pt came to hospital, states that he was being followed and he called the police to come help him. Says he lives with his son Jose. EMS brought patient in from  and over [facility]. Additionally states that one pill he was given this morning by staff was "phony" so he didn't take it. Contacted pt's pharmacy [facility], numerous scripts filled in [place] and [place]. Per night team sign out, daughter states that pt has been in and out of many ERs and has been started on many different medications so there are a lot of recent meds that he does not currently take to the best of her knowledge. Per daughter, her brothers are tired of assisting with their father given history of difficulties with him. She is willing to assist but does not want to be too involved and asks us to not contact her brothers. Psychiatry consulted and to see patient. JOHN/VAISHALI updated.   Soren Krishnan [phone]  : Pt seen and examined at bedside. xyprexa dose increased to 5mg bedtime and 2.5mg in AM starting yesterday. Marked improvement in demeanor. A&Ox3 (person, time, place). States he is in a "police hospital" and that he came here because he asked some man in their car to call the police because he was being followed by an aide that his son hired. Said he came from "[place] [id number] and over [facility]. No complaints or concerns today. States he has to get things ready to move back to [place]. Psych is following. DSC planning pending placement as his previous community does not want pt to return and family does not want to take patient. Replace PO K   : Pt seen and examined at bedside. Depakote increased to 500mg PO BID, amitriptyline stopped as per psych recs. Pt with no complaints or concerns. Pleasant on encounter, says he is doing well and thanks provider for checking in on him.  Reports that he is waiting to go home. Patient informed we are working to get him out of the hospital. DSC planning pending placement. JOHN sent referral to [facility]. Awaiting decision.   :  Pt seen and examined at bedside. Pt reports that is feeling well and does not have any symptoms, pain or concern. Pt is tolerating PO and eating well.  Pt is cooperative with evaluation and pleasant.  Waiting for placement for dc. Awaiting placement.   : Pt seen and examined at bedside. Reports last night he was looking for 3 medications that were started in the hospital at night. Reassured that he is receiving all the proper medications he needs. Appetite is good. Pt has no complaints at this time. Now off 1:1.  : Pt seen and examined at bedside. Was not agitated overnight. Pt is very tired this morning. Reports chronic low back pain, but is not concerned. Otherwise he has no active complaints at  this time. Denies chest pain, abdominal pain, SOB. D/W Son Mykel today   : Pt seen and examined at bedside. No agitation overnight. Pt is tired this morning. Has no complaints other than his chronic lower back pain. Denies chest pain, abdominal pain SOB. Pt wants to go home. D/W Son Angelito today   : Pt seen and examined at bedside. No agitation overnight. Pt is tired this morning and wants to sleep. No complaints other than his chronic low back pain. Denies chest pain, SOB, abdominal pain. Pt wants to go.  : Pt seen and examined at bedside. No agitation. Pt is more awake. Continues to report chronic low back pain. No acute complaints at this time. Denies chest pain, SOB, abdominal pain. Wants to go.Social work for d/c planning when bed is available.    OVERNIGHT EVENTS: None    Home Medications:  albuterol 2.5 mg/3 mL (0.083%) inhalation solution: 2.5 milligram(s) inhaled every 6 hours as needed for  shortness of breath and/or wheezing (25 Sep 2023 11:00)  ammonium lactate 12% topical lotion: 1 Apply topically to affected area 2 times a day (25 Sep 2023 10:46)  atorvastatin 40 mg oral tablet: 1 tab(s) orally once a day (at bedtime) (25 Sep 2023 11:00)  dapagliflozin 10 mg oral tablet: 1 tab(s) orally every 24 hours (25 Sep 2023 10:46)  divalproex sodium 500 mg oral delayed release tablet: 1 tab(s) orally 2 times a day (25 Sep 2023 10:37)  DULoxetine 30 mg oral delayed release capsule: 1 cap(s) orally 2 times a day (25 Sep 2023 11:00)  furosemide 40 mg oral tablet: 1 tab(s) orally every 12 hours (25 Sep 2023 11:00)  melatonin 5 mg oral tablet: 1 tab(s) orally once a day (at bedtime) As needed Insomnia (25 Sep 2023 10:46)  METFORMIN 500MG TABLETS: TAKE 1 TABLET BY MOUTH EVERY MORNING AND EVERY EVENING WITH MEALS. (20 Sep 2023 21:58)  methyl salicylate-menthol 15%-10% topical cream: 1 Apply topically to affected area 3 times a day (25 Sep 2023 10:46)  metoprolol succinate 25 mg oral tablet, extended release: 1 tab(s) orally once a day (25 Sep 2023 11:00)  midodrine 5 mg oral tablet: 1 tab(s) orally 3 times a day (25 Sep 2023 11:00)  OLANZapine 10 mg oral tablet, disintegratin tab(s) orally once a day (at bedtime) (27 Sep 2023 08:38)  OLANZapine 5 mg oral tablet, disintegratin tab(s) orally 2 times a day (27 Sep 2023 08:38)  pantoprazole 40 mg oral delayed release tablet: 1 tab(s) orally once a day (before a meal) (25 Sep 2023 10:46)  POTASSIUM CL 20MEQ ER TABLETS: TAKE 1 TABLET BY MOUTH EVERY DAY WITH FOOD FOR 4 DAYS (20 Sep 2023 21:58)  sacubitril-valsartan 24 mg-26 mg oral tablet: 1 tab(s) orally 2 times a day (25 Sep 2023 11:00)  spironolactone 25 mg oral tablet: 1 tab(s) orally once a day (25 Sep 2023 10:46)  TRELEGY ELLIPTA 100-62.5MCG INH 30P: INHALE 1 PUFF BY MOUTH EVERY DAY (20 Sep 2023 21:58)      MEDICATIONS  (STANDING):  ammonium lactate 12% Lotion 1 Application(s) Topical two times a day  atorvastatin 40 milliGRAM(s) Oral at bedtime  budesonide  80 MICROgram(s)/formoterol 4.5 MICROgram(s) Inhaler 2 Puff(s) Inhalation two times a day  dapagliflozin 10 milliGRAM(s) Oral every 24 hours  dextrose 5%. 1000 milliLiter(s) (50 mL/Hr) IV Continuous <Continuous>  dextrose 5%. 1000 milliLiter(s) (100 mL/Hr) IV Continuous <Continuous>  dextrose 50% Injectable 25 Gram(s) IV Push once  dextrose 50% Injectable 12.5 Gram(s) IV Push once  dextrose 50% Injectable 25 Gram(s) IV Push once  divalproex  milliGRAM(s) Oral two times a day  DULoxetine 30 milliGRAM(s) Oral <User Schedule>  furosemide    Tablet 40 milliGRAM(s) Oral every 12 hours  glucagon  Injectable 1 milliGRAM(s) IntraMuscular once  heparin   Injectable 5000 Unit(s) SubCutaneous every 8 hours  insulin lispro (ADMELOG) corrective regimen sliding scale   SubCutaneous at bedtime  insulin lispro (ADMELOG) corrective regimen sliding scale   SubCutaneous three times a day before meals  methyl salicylate 15%/menthol 10% Topical Cream 1 Application(s) Topical three times a day  metoprolol succinate ER 25 milliGRAM(s) Oral daily  midodrine. 5 milliGRAM(s) Oral three times a day  OLANZapine Disintegrating Tablet 10 milliGRAM(s) Oral at bedtime  OLANZapine Disintegrating Tablet 5 milliGRAM(s) Oral two times a day  pantoprazole    Tablet 40 milliGRAM(s) Oral before breakfast  sacubitril 24 mG/valsartan 26 mG 1 Tablet(s) Oral two times a day  spironolactone 25 milliGRAM(s) Oral daily  tiotropium 2.5 MICROgram(s) Inhaler 2 Puff(s) Inhalation daily    MEDICATIONS  (PRN):  acetaminophen     Tablet .. 650 milliGRAM(s) Oral every 6 hours PRN Temp greater or equal to 38C (100.4F), Mild Pain (1 - 3)  albuterol    0.083% 2.5 milliGRAM(s) Nebulizer every 6 hours PRN Shortness of Breath and/or Wheezing  dextrose Oral Gel 15 Gram(s) Oral once PRN Blood Glucose LESS THAN 70 milliGRAM(s)/deciliter  melatonin 5 milliGRAM(s) Oral at bedtime PRN Insomnia      Allergies    No Known Allergies    Intolerances        Social History:  Tobacco: former , quit >30 years ago  EtOH: social drinker  Recreational drug use: denies   Lives with: Assisted Living Facility  Ambulates: without assistance  ADLs: needs assistance (20 Sep 2023 21:58)      REVIEW OF SYSTEMS:  CONSTITUTIONAL: No fever, No chills, No fatigue, No myalgia, No Body ache, No Weakness  EYES: No eye pain,  No visual disturbances, No discharge, NO Redness  ENMT:  No ear pain, No nose bleed, No vertigo; No sinus pain, NO throat pain, No Congestion  NECK: No pain, No stiffness  RESPIRATORY: No cough, NO wheezing, No  hemoptysis, NO  shortness of breath  CARDIOVASCULAR: No chest pain, palpitations  GASTROINTESTINAL: No abdominal pain, NO epigastric pain. No nausea, No vomiting; No diarrhea, No constipation. [ x ] BM   GENITOURINARY: No dysuria, No frequency, No urgency, No hematuria, NO incontinence  NEUROLOGICAL: No headaches, No dizziness, No numbness, No tingling, No tremors, No weakness  EXT: No Swelling, No Pain, No Edema  SKIN:  [ x ] No itching, burning, rashes, or lesions   MUSCULOSKELETAL: No joint pain ,No Jt swelling; No muscle pain, No extremity pain  PSYCHIATRIC: No depression,  No anxiety,  No mood swings ,No difficulty sleeping at night  PAIN SCALE: [  ] None  [ x ] Other- chronic lower back pain.    Vital Signs Last 24 Hrs  T(C): 36.6 (29 Sep 2023 12:56), Max: 36.6 (29 Sep 2023 12:56)  T(F): 97.8 (29 Sep 2023 12:56), Max: 97.8 (29 Sep 2023 12:56)  HR: 84 (29 Sep 2023 12:56) (84 - 101)  BP: 108/- (29 Sep 2023 12:56) (102/65 - 108/-)  BP(mean): --  RR: 17 (29 Sep 2023 12:56) (17 - 18)  SpO2: 93% (29 Sep 2023 12:56) (91% - 96%)    Parameters below as of 29 Sep 2023 12:56  Patient On (Oxygen Delivery Method): room air      Finger Stick          PHYSICAL EXAM:  GENERAL:  [ x ] NAD , [ x ] well appearing, [  ] Agitated, [  ] Drowsy,  [  ] Lethargy, [  ] confused   HEAD:  [ x ] Normal, [  ] Other  EYES:  [ x ] EOMI, [  ] PERRLA, [ x ] conjunctiva and sclera clear normal, [  ] Other,  [  ] Pallor,[  ] Discharge  ENMT:  [ x ] Normal, [ x ] Moist mucous membranes, [ x ] Good dentition, [ x ] No Thrush  NECK:  [ x ] Supple, [ x ] No JVD, [ x ] Normal thyroid, [  ] Lymphadenopathy [  ] Other  CHEST/LUNG:  [ x ] Clear to auscultation bilaterally, [ x ] Breath Sounds equal B/L / Decrease, [ x ] poor effort  [ x ] No rales, [ x ] No rhonchi  [ x ]  No wheezing,   HEART:  [ x ] Regular rate and rhythm, [  ] tachycardia, [  ] Bradycardia,  [  ] irregular  [ x ] No murmurs, No rubs, No gallops, [  ] PPM in place (Mfr:  )  ABDOMEN:  [ x ] Soft, [ x ] Nontender, [ x ] Nondistended, [ x ] No mass, [ x ] Bowel sounds present, [ x ] obese  NERVOUS SYSTEM:  [ x ] Alert & Oriented X3, [ x ] Nonfocal  [  ] Confusion  [  ] Encephalopathic [  ] Sedated [  ] Unable to assess, [  ] Dementia [ x ] Other- responds appropriately to commands and questions. Tired  EXTREMITIES: [ x ] 2+ Peripheral Pulses, No clubbing, No cyanosis,  [x  ] pitting 2+ edema B/L lower EXT. [ x ]  severe PVD stasis skin changes B/L Lower EXT, + Scabs  [ x ] wound-Dry Scabs on b/l Lower Ext , B/L Hand small Wound   LYMPH: No lymphadenopathy noted  SKIN:  [ x ] No rashes or lesions, [  ] Pressure Ulcers, [  ] ecchymosis, [  ] Skin Tears, [  ] Other    DIET: Diet, Regular:   Consistent Carbohydrate Evening Snack  DASH/TLC Sodium & Cholesterol Restricted (23 @ 22:36)      LABS:                        14.1   7.47  )-----------( 161      ( 29 Sep 2023 08:54 )             42.6       RADIOLOGY & ADDITIONAL TESTS:      HEALTH ISSUES - PROBLEM Dx:  Paranoia    HFrEF (heart failure with reduced ejection fraction)    HTN (hypertension)    HLD (hyperlipidemia)    Need for prophylactic measure    Medication management    DM2 (diabetes mellitus, type 2)    Anemia            Consultant(s) Notes Reviewed:  [ x ] YES     Care Discussed with [X] Consultants  [ x ] Patient  [  ] Family [  ] HCP [  ]   [  x] Social Service  [x  ] RN, [ x ] Physical Therapy,[  ] Palliative care team  DVT PPX: [  ] Lovenox, [ x ] S C Heparin, [  ] Coumadin, [  ] Xarelto, [  ] Eliquis, [  ] Pradaxa, [  ] IV Heparin drip, [  ] SCD [  ] Contraindication 2 to GI Bleed,[  ] Ambulation [  ] Contraindicated 2 to  bleed [  ] Contraindicated 2 to Brain Bleed  Advanced directive: [ x ] None, [  ] DNR/DNI

## 2023-09-29 NOTE — PROGRESS NOTE ADULT - PROBLEM SELECTOR PLAN 8
-heparin 5000 q8h    #DSC planning: awaiting placement to [facility] -heparin 5000 q8h    #DSC planning: [facility] on monday.

## 2023-09-29 NOTE — PROGRESS NOTE ADULT - ATTENDING COMMENTS
84y/o M PMH dementia, DM2, HFrEF s/p PPM, HTN, HLD presents with increased agitation.  Admit for increased paranoia & Placement .  pt seen, examined, case & care plan d/w pt, residents at detail. d/w dtr Arianne on 9/21 at [facility] about pt's condition, Care plan Meds   Consult;  Psych Dr Strange on case , -Dr Strange  adjusted Psych meds  stable for d/c   Social service d/w about d/c planning to SNF   PT Eval.-Pt is ambulating   D/W Social work -follow up   PO diet  AM labs   Total care time is 40 minutes.

## 2023-09-29 NOTE — PROGRESS NOTE ADULT - PROBLEM SELECTOR PLAN 4
Discussed patient medication list with daughter (Arianne) who confirmed most medications found on surescripts recent dispensary history) however she is not sure of any changes that may have happened when he was hospitalized or when he went to the ED  - Patient has list with him, copy in chart however unclear if that list is up to date  - Current med rec completed based on recent dispensary history  - [facility] pharmacy called: additional medications from pharmacy include allopurinol 300qD, lisinopril 20mg qD, mirabegron 25mg qD, trazodone 50mg qD at bedtime. medications not mentioned by daughter. daughter notes pt has been to numerous different hospitals/ERs and has come back with numerous different medications with no follow up  - plan to hold these additional meds, per psych consult for use of trazodone

## 2023-09-29 NOTE — PROGRESS NOTE ADULT - PROBLEM SELECTOR PLAN 2
Chronic s/p PPM (paced ~end of Aug 2023 at [facility] per daughter )  - TTE (8/2023) per daughter showed EF ~20%  - on Lasix 40 mg 2x day Entresto, metoprolol XL, spironolactone, Farxiga. Due to soft BP, lowered hold parameters to ensure Pt receives meds.  - Dash/ TLC Diet

## 2023-09-29 NOTE — PROGRESS NOTE ADULT - PROBLEM SELECTOR PLAN 1
Acute paranoia in setting of hx of dementia, ?baseline mental disorder ? Psych Hx- ?Psychosis/ Agitation: unclear hx  - Previous hospitalization at [facility] for similar problem. Unknown if Pt was taking meds prior to admission.    - No infectious or metabolic concomitant disorder. Normal thyroid profile.   - continue current Zyrexa  5 mg 2x day , Zyprexa  10 mg Q HS regimen as per psych.  - Continue Depakote 500mg BID as per Dr. Strange.    - Continue duloxetine 30 mg 2x day.  - STOP amitriptyline, as per psych recs  - Psych (Dr. Strange) D/C 1:1  - SW consulted for d/c plan: awaiting new facility  - Now off constant observation - no acute agitation observed Acute paranoia in setting of hx of dementia, ?baseline mental disorder ? Psych Hx- ?Psychosis/ Agitation: unclear hx  - Previous hospitalization at [facility] for similar problem. Unknown if Pt was taking meds prior to admission.    - No infectious or metabolic concomitant disorder. Normal thyroid profile.   - continue current Zyrexa  5 mg 2x day , Zyprexa  10 mg Q HS regimen as per psych.  - Continue Depakote 500mg BID as per Dr. Strange.    - Continue duloxetine 30 mg 2x day.  - STOP amitriptyline, as per psych recs  - Psych (Dr. Strange) D/C 1:1  - SW consulted for d/c plan: plan for [facility] on monday.  - Now off constant observation - no acute agitation observed Acute paranoia in setting of hx of dementia, ?baseline mental disorder ? Psych Hx- ?Psychosis/ Agitation: unclear hx  - Previous hospitalization at [facility] for similar problem. Unknown if Pt was taking meds prior to admission.    - No infectious or metabolic concomitant disorder. Normal thyroid profile.   - continue current Zyrexa  5 mg 2x day , Zyprexa  10 mg Q HS regimen as per psych.  - Continue Depakote 500mg BID as per Dr. Strange.    - Continue duloxetine 30 mg 2x day.  - STOP amitriptyline, as per psych recs  - Psych (Dr. Strange) D/C 1:1  - SW consulted for d/c plan: plan for [facility] on Monday.  - Now off constant observation - no acute agitation observed

## 2023-09-29 NOTE — SOCIAL WORK PROGRESS NOTE - NSSWPROGRESSNOTE_GEN_ALL_CORE
JOHN spoke with Pt. son Mykel and [facility] rep. Rylan to discuss ant. dc plan. Family would like to move forward with [facility] for dc.     JOHN spoke with Tita at [facility], pt. accepted pending financial conversation. Anticipate dc monday, MD informed.

## 2023-09-30 DIAGNOSIS — I95.9 HYPOTENSION, UNSPECIFIED: ICD-10-CM

## 2023-09-30 LAB
GLUCOSE BLDC GLUCOMTR-MCNC: 111 MG/DL — HIGH (ref 70–99)
GLUCOSE BLDC GLUCOMTR-MCNC: 114 MG/DL — HIGH (ref 70–99)
GLUCOSE BLDC GLUCOMTR-MCNC: 126 MG/DL — HIGH (ref 70–99)
GLUCOSE BLDC GLUCOMTR-MCNC: 142 MG/DL — HIGH (ref 70–99)

## 2023-09-30 RX ORDER — SODIUM CHLORIDE 9 MG/ML
250 INJECTION INTRAMUSCULAR; INTRAVENOUS; SUBCUTANEOUS ONCE
Refills: 0 | Status: COMPLETED | OUTPATIENT
Start: 2023-09-30 | End: 2023-09-30

## 2023-09-30 RX ORDER — ENOXAPARIN SODIUM 100 MG/ML
40 INJECTION SUBCUTANEOUS EVERY 24 HOURS
Refills: 0 | Status: DISCONTINUED | OUTPATIENT
Start: 2023-09-30 | End: 2023-10-09

## 2023-09-30 RX ADMIN — MENTHOL AND METHYL SALICYLATE 1 APPLICATION(S): 10; 30 CREAM TOPICAL at 20:59

## 2023-09-30 RX ADMIN — SACUBITRIL AND VALSARTAN 1 TABLET(S): 24; 26 TABLET, FILM COATED ORAL at 18:59

## 2023-09-30 RX ADMIN — SODIUM CHLORIDE 250 MILLILITER(S): 9 INJECTION INTRAMUSCULAR; INTRAVENOUS; SUBCUTANEOUS at 15:43

## 2023-09-30 RX ADMIN — DIVALPROEX SODIUM 500 MILLIGRAM(S): 500 TABLET, DELAYED RELEASE ORAL at 18:59

## 2023-09-30 RX ADMIN — DULOXETINE HYDROCHLORIDE 30 MILLIGRAM(S): 30 CAPSULE, DELAYED RELEASE ORAL at 05:46

## 2023-09-30 RX ADMIN — PANTOPRAZOLE SODIUM 40 MILLIGRAM(S): 20 TABLET, DELAYED RELEASE ORAL at 06:34

## 2023-09-30 RX ADMIN — Medication 25 MILLIGRAM(S): at 05:47

## 2023-09-30 RX ADMIN — Medication 5 MILLIGRAM(S): at 23:22

## 2023-09-30 RX ADMIN — Medication 40 MILLIGRAM(S): at 05:46

## 2023-09-30 RX ADMIN — MIDODRINE HYDROCHLORIDE 5 MILLIGRAM(S): 2.5 TABLET ORAL at 12:13

## 2023-09-30 RX ADMIN — Medication 650 MILLIGRAM(S): at 12:30

## 2023-09-30 RX ADMIN — Medication 650 MILLIGRAM(S): at 21:55

## 2023-09-30 RX ADMIN — MENTHOL AND METHYL SALICYLATE 1 APPLICATION(S): 10; 30 CREAM TOPICAL at 12:15

## 2023-09-30 RX ADMIN — SACUBITRIL AND VALSARTAN 1 TABLET(S): 24; 26 TABLET, FILM COATED ORAL at 05:46

## 2023-09-30 RX ADMIN — ENOXAPARIN SODIUM 40 MILLIGRAM(S): 100 INJECTION SUBCUTANEOUS at 20:58

## 2023-09-30 RX ADMIN — Medication 650 MILLIGRAM(S): at 20:57

## 2023-09-30 RX ADMIN — MIDODRINE HYDROCHLORIDE 5 MILLIGRAM(S): 2.5 TABLET ORAL at 05:47

## 2023-09-30 RX ADMIN — Medication 40 MILLIGRAM(S): at 18:59

## 2023-09-30 RX ADMIN — Medication 1 APPLICATION(S): at 05:45

## 2023-09-30 RX ADMIN — Medication 650 MILLIGRAM(S): at 12:13

## 2023-09-30 RX ADMIN — OLANZAPINE 5 MILLIGRAM(S): 15 TABLET, FILM COATED ORAL at 19:00

## 2023-09-30 RX ADMIN — SPIRONOLACTONE 25 MILLIGRAM(S): 25 TABLET, FILM COATED ORAL at 05:46

## 2023-09-30 RX ADMIN — OLANZAPINE 5 MILLIGRAM(S): 15 TABLET, FILM COATED ORAL at 05:46

## 2023-09-30 RX ADMIN — MENTHOL AND METHYL SALICYLATE 1 APPLICATION(S): 10; 30 CREAM TOPICAL at 06:34

## 2023-09-30 RX ADMIN — TIOTROPIUM BROMIDE 2 PUFF(S): 18 CAPSULE ORAL; RESPIRATORY (INHALATION) at 08:52

## 2023-09-30 RX ADMIN — Medication 1 APPLICATION(S): at 19:00

## 2023-09-30 RX ADMIN — OLANZAPINE 10 MILLIGRAM(S): 15 TABLET, FILM COATED ORAL at 20:59

## 2023-09-30 RX ADMIN — ATORVASTATIN CALCIUM 40 MILLIGRAM(S): 80 TABLET, FILM COATED ORAL at 20:58

## 2023-09-30 RX ADMIN — DULOXETINE HYDROCHLORIDE 30 MILLIGRAM(S): 30 CAPSULE, DELAYED RELEASE ORAL at 18:58

## 2023-09-30 RX ADMIN — DIVALPROEX SODIUM 500 MILLIGRAM(S): 500 TABLET, DELAYED RELEASE ORAL at 05:46

## 2023-09-30 RX ADMIN — BUDESONIDE AND FORMOTEROL FUMARATE DIHYDRATE 2 PUFF(S): 160; 4.5 AEROSOL RESPIRATORY (INHALATION) at 08:52

## 2023-09-30 RX ADMIN — MIDODRINE HYDROCHLORIDE 5 MILLIGRAM(S): 2.5 TABLET ORAL at 18:59

## 2023-09-30 RX ADMIN — HEPARIN SODIUM 5000 UNIT(S): 5000 INJECTION INTRAVENOUS; SUBCUTANEOUS at 12:14

## 2023-09-30 RX ADMIN — HEPARIN SODIUM 5000 UNIT(S): 5000 INJECTION INTRAVENOUS; SUBCUTANEOUS at 05:47

## 2023-09-30 RX ADMIN — BUDESONIDE AND FORMOTEROL FUMARATE DIHYDRATE 2 PUFF(S): 160; 4.5 AEROSOL RESPIRATORY (INHALATION) at 20:58

## 2023-09-30 RX ADMIN — DAPAGLIFLOZIN 10 MILLIGRAM(S): 10 TABLET, FILM COATED ORAL at 08:53

## 2023-09-30 NOTE — PROGRESS NOTE ADULT - PROBLEM SELECTOR PLAN 6
Discussed patient medication list with daughter (Arianne) who confirmed most medications found on surescripts recent dispensary history) however she is not sure of any changes that may have happened when he was hospitalized or when he went to the ED  - Patient has list with him, copy in chart however unclear if that list is up to date  - Current med rec completed based on recent dispensary history  - [facility] pharmacy called: additional medications from pharmacy include allopurinol 300qD, lisinopril 20mg qD, mirabegron 25mg qD, trazodone 50mg qD at bedtime. medications not mentioned by daughter. daughter notes pt has been to numerous different hospitals/ERs and has come back with numerous different medications with no follow up  - plan to hold these additional meds, per psych consult for use of trazodone

## 2023-09-30 NOTE — PROGRESS NOTE ADULT - PROBLEM SELECTOR PLAN 2
Chronic s/p PPM (paced ~end of Aug 2023 at [place] per daughter )  - TTE (8/2023) per daughter showed EF ~20%  - on Lasix 40 mg 2x day, Entresto, metoprolol XL, spironolactone, Farxiga. Due to soft BP, lowered hold parameters to ensure Pt receives meds.  - Lower extremity edema improving  - Dash/ TLC Diet

## 2023-09-30 NOTE — PROGRESS NOTE ADULT - PROBLEM SELECTOR PLAN 1
Acute paranoia in setting of hx of dementia, ?baseline mental disorder ? Psych Hx- ?Psychosis/ Agitation: unclear hx  - Previous hospitalization at [facility] for similar problem. Unknown if Pt was taking meds prior to admission.    - No infectious or metabolic concomitant disorder. Normal thyroid profile.   - continue current Zyrexa  5 mg 2x day , Zyprexa  10 mg Q HS regimen as per psych.  - Continue Depakote 500mg BID as per Dr. Strange.    - Continue duloxetine 30 mg 2x day.  - STOP amitriptyline, as per psych recs  - Psych (Dr. Strange) D/C 1:1  - SW consulted for d/c plan: plan for [facility] on Monday.  - Now off constant observation - no acute agitation observed

## 2023-09-30 NOTE — PROGRESS NOTE ADULT - PROBLEM SELECTOR PLAN 4
BP stable on admission   - Per med dispensary hx patient is on midodrine as well for possible orthostatic hypotension  - BP has been soft, pt asymptomatic.   - Continue home Lasix, metoprolol, Entresto, Spironolactone, hold parameters changed.    - Monitor hemodynamics BP stable on admission   - Per med dispensary hx patient is on midodrine as well for possible orthostatic hypotension  - BP has been soft, pt asymptomatic.   - Continue home Lasix, metoprolol, Entresto, Spironolactone, hold parameters changed.    -On Midodrine 5 mg 3c day for Hypotension

## 2023-09-30 NOTE — PROGRESS NOTE ADULT - PROBLEM SELECTOR PLAN 3
BP borderline hypotensive today when getting up, standing, however pt is asx  - likely due to lowered hold parameters on Lasix 40 mg 2x day, Entresto, metoprolol XL, spironolactone, Farxiga.   - continue midrodrine 5mg TID  -  bolus x1 ordered

## 2023-09-30 NOTE — PROGRESS NOTE ADULT - SUBJECTIVE AND OBJECTIVE BOX
Patient is a 83y old  Male who presents with a chief complaint of paranoia (29 Sep 2023 13:04)    HPI:  84y/o M PMH dementia, HFrEF s/p PPM, HTN, HLD  presents with increased agitation. History obtained from patient's daughter Arianne over phone.   Patient moved from [place] to [place] ~2months ago and has been having issues with increased paranoia for the past month. He stays at a [facility] older assisted living community where he has been having several episodes of sundowning.   Recently he was hospitalized at [facility] for same issues from -. Daughter was unable to provide sufficient history regarding hospitalization.   Patient's son, Jose has been taking care of him and knows more about his medical hx however patient's daughter states she is taking over now and did not want to bother Jose at this time.  Patient was also recently seen at [facility] ED for episodes of agitation and was told to follow up with outpatient Psychiatrist but daughter states that likely did not happened because of his increased agitation.  Patient became agitated today and staff had to call 911 for further evaluation. The facility as well as his children feel that his current psych medication regimen is not sufficient to keep him calm.   Per review of recent dispensary history, he was seeing a psychiatric NP (Barbara Bravo) in [place], [place].   Of note, he had placement of PPM in late August at [facility]. Per his daughter, his LVEF at that time was 20% before and after PPM placement.  Currently, patient states his b/l legs feel itchy but otherwise he feels fine and is agreeable.  Denies fevers, chills. sob, cp, n/v/d.    ED Course   T 97.4 F  /79 RR 18 SpO2 95% on RA  Labs H/H 12.1/37.5 Glu   UA: >1000 glucose  UTox: negaitve     In the ED, given tylenol 650mg x1 , zyprexa 5mg IVP x1  (20 Sep 2023 21:58)    INTERVAL HPI:  : Pt seen and examined, sitting in chair with 1 to 1 present. Patient expressed concern about not receiving his pantoprazole, stating he cant digest his food without it, denies n/v, epigastric pain or burning. When asked about why pt came to hospital, states that he was being followed and he called the police to come help him. Says he lives with his son Jose. EMS brought patient in from  and over [facility]. Additionally states that one pill he was given this morning by staff was "phony" so he didn't take it. Contacted pt's pharmacy [facility], numerous scripts filled in [place] and [place]. Per night team sign out, daughter states that pt has been in and out of many ERs and has been started on many different medications so there are a lot of recent meds that he does not currently take to the best of her knowledge. Per daughter, her brothers are tired of assisting with their father given history of difficulties with him. She is willing to assist but does not want to be too involved and asks us to not contact her brothers. Psychiatry consulted and to see patient. JOHN/VAISHALI updated.   Soren Krishnan [phone]  : Pt seen and examined at bedside. xyprexa dose increased to 5mg bedtime and 2.5mg in AM starting yesterday. Marked improvement in demeanor. A&Ox3 (person, time, place). States he is in a "police hospital" and that he came here because he asked some man in their car to call the police because he was being followed by an aide that his son hired. Said he came from "[place] [id number] and over [facility]. No complaints or concerns today. States he has to get things ready to move back to [place]. Psych is following. DSC planning pending placement as his previous community does not want pt to return and family does not want to take patient. Replace PO K   : Pt seen and examined at bedside. Depakote increased to 500mg PO BID, amitriptyline stopped as per psych recs. Pt with no complaints or concerns. Pleasant on encounter, says he is doing well and thanks provider for checking in on him.  Reports that he is waiting to go home. Patient informed we are working to get him out of the hospital. DSC planning pending placement. JOHN sent referral to [facility]. Awaiting decision.   :  Pt seen and examined at bedside. Pt reports that is feeling well and does not have any symptoms, pain or concern. Pt is tolerating PO and eating well.  Pt is cooperative with evaluation and pleasant.  Waiting for placement for dc. Awaiting placement.   : Pt seen and examined at bedside. Reports last night he was looking for 3 medications that were started in the hospital at night. Reassured that he is receiving all the proper medications he needs. Appetite is good. Pt has no complaints at this time. Now off 1:1.  : Pt seen and examined at bedside. Was not agitated overnight. Pt is very tired this morning. Reports chronic low back pain, but is not concerned. Otherwise he has no active complaints at  this time. Denies chest pain, abdominal pain, SOB. D/W Son Mykel today   : Pt seen and examined at bedside. No agitation overnight. Pt is tired this morning. Has no complaints other than his chronic lower back pain. Denies chest pain, abdominal pain SOB. Pt wants to go home. D/W Son Angelito today   : Pt seen and examined at bedside. No agitation overnight. Pt is tired this morning and wants to sleep. No complaints other than his chronic low back pain. Denies chest pain, SOB, abdominal pain. Pt wants to go.  : Pt seen and examined at bedside. No agitation. Pt is more awake. Continues to report chronic low back pain. No acute complaints at this time. Denies chest pain, SOB, abdominal pain. Wants to go.Social work for d/c planning when bed is available.  : Pt seen and examined at bedside. No agitation overnight. Pt is AAOx1. Knows he is in a hospital but unsure which one. Patient is pleasant and has no acute complaints at this time. Denies fever, chills, CP SOB, abd pain,       OVERNIGHT EVENTS: None    Home Medications:  albuterol 2.5 mg/3 mL (0.083%) inhalation solution: 2.5 milligram(s) inhaled every 6 hours as needed for  shortness of breath and/or wheezing (25 Sep 2023 11:00)  ammonium lactate 12% topical lotion: 1 Apply topically to affected area 2 times a day (25 Sep 2023 10:46)  atorvastatin 40 mg oral tablet: 1 tab(s) orally once a day (at bedtime) (25 Sep 2023 11:00)  dapagliflozin 10 mg oral tablet: 1 tab(s) orally every 24 hours (25 Sep 2023 10:46)  divalproex sodium 500 mg oral delayed release tablet: 1 tab(s) orally 2 times a day (25 Sep 2023 10:37)  DULoxetine 30 mg oral delayed release capsule: 1 cap(s) orally 2 times a day (25 Sep 2023 11:00)  furosemide 40 mg oral tablet: 1 tab(s) orally every 12 hours (25 Sep 2023 11:00)  melatonin 5 mg oral tablet: 1 tab(s) orally once a day (at bedtime) As needed Insomnia (25 Sep 2023 10:46)  METFORMIN 500MG TABLETS: TAKE 1 TABLET BY MOUTH EVERY MORNING AND EVERY EVENING WITH MEALS. (20 Sep 2023 21:58)  methyl salicylate-menthol 15%-10% topical cream: 1 Apply topically to affected area 3 times a day (25 Sep 2023 10:46)  metoprolol succinate 25 mg oral tablet, extended release: 1 tab(s) orally once a day (25 Sep 2023 11:00)  midodrine 5 mg oral tablet: 1 tab(s) orally 3 times a day (25 Sep 2023 11:00)  OLANZapine 10 mg oral tablet, disintegratin tab(s) orally once a day (at bedtime) (27 Sep 2023 08:38)  OLANZapine 5 mg oral tablet, disintegratin tab(s) orally 2 times a day (27 Sep 2023 08:38)  pantoprazole 40 mg oral delayed release tablet: 1 tab(s) orally once a day (before a meal) (25 Sep 2023 10:46)  POTASSIUM CL 20MEQ ER TABLETS: TAKE 1 TABLET BY MOUTH EVERY DAY WITH FOOD FOR 4 DAYS (20 Sep 2023 21:58)  sacubitril-valsartan 24 mg-26 mg oral tablet: 1 tab(s) orally 2 times a day (25 Sep 2023 11:00)  spironolactone 25 mg oral tablet: 1 tab(s) orally once a day (25 Sep 2023 10:46)  TRELEGY ELLIPTA 100-62.5MCG INH 30P: INHALE 1 PUFF BY MOUTH EVERY DAY (20 Sep 2023 21:58)      MEDICATIONS  (STANDING):  ammonium lactate 12% Lotion 1 Application(s) Topical two times a day  atorvastatin 40 milliGRAM(s) Oral at bedtime  budesonide  80 MICROgram(s)/formoterol 4.5 MICROgram(s) Inhaler 2 Puff(s) Inhalation two times a day  dapagliflozin 10 milliGRAM(s) Oral every 24 hours  dextrose 5%. 1000 milliLiter(s) (100 mL/Hr) IV Continuous <Continuous>  dextrose 5%. 1000 milliLiter(s) (50 mL/Hr) IV Continuous <Continuous>  dextrose 50% Injectable 25 Gram(s) IV Push once  dextrose 50% Injectable 12.5 Gram(s) IV Push once  dextrose 50% Injectable 25 Gram(s) IV Push once  divalproex  milliGRAM(s) Oral two times a day  DULoxetine 30 milliGRAM(s) Oral <User Schedule>  enoxaparin Injectable 40 milliGRAM(s) SubCutaneous every 24 hours  furosemide    Tablet 40 milliGRAM(s) Oral every 12 hours  glucagon  Injectable 1 milliGRAM(s) IntraMuscular once  insulin lispro (ADMELOG) corrective regimen sliding scale   SubCutaneous at bedtime  insulin lispro (ADMELOG) corrective regimen sliding scale   SubCutaneous three times a day before meals  methyl salicylate 15%/menthol 10% Topical Cream 1 Application(s) Topical three times a day  metoprolol succinate ER 25 milliGRAM(s) Oral daily  midodrine. 5 milliGRAM(s) Oral three times a day  OLANZapine Disintegrating Tablet 5 milliGRAM(s) Oral two times a day  OLANZapine Disintegrating Tablet 10 milliGRAM(s) Oral at bedtime  pantoprazole    Tablet 40 milliGRAM(s) Oral before breakfast  sacubitril 24 mG/valsartan 26 mG 1 Tablet(s) Oral two times a day  spironolactone 25 milliGRAM(s) Oral daily  tiotropium 2.5 MICROgram(s) Inhaler 2 Puff(s) Inhalation daily    MEDICATIONS  (PRN):  acetaminophen     Tablet .. 650 milliGRAM(s) Oral every 6 hours PRN Temp greater or equal to 38C (100.4F), Mild Pain (1 - 3)  albuterol    0.083% 2.5 milliGRAM(s) Nebulizer every 6 hours PRN Shortness of Breath and/or Wheezing  dextrose Oral Gel 15 Gram(s) Oral once PRN Blood Glucose LESS THAN 70 milliGRAM(s)/deciliter  melatonin 5 milliGRAM(s) Oral at bedtime PRN Insomnia      Allergies    No Known Allergies    Intolerances        Social History:  Tobacco: former , quit >30 years ago  EtOH: social drinker  Recreational drug use: denies   Lives with: Assisted Living Facility  Ambulates: without assistance  ADLs: needs assistance (20 Sep 2023 21:58)      REVIEW OF SYSTEMS:  CONSTITUTIONAL: No fever, No chills, No fatigue, No myalgia, No Body ache, No Weakness  EYES: No eye pain,  No visual disturbances, No discharge, NO Redness  ENMT:  No ear pain, No nose bleed, No vertigo; No sinus pain, NO throat pain, No Congestion  NECK: No pain, No stiffness  RESPIRATORY: No cough, NO wheezing, No  hemoptysis, NO  shortness of breath  CARDIOVASCULAR: No chest pain, palpitations  GASTROINTESTINAL: No abdominal pain, NO epigastric pain. No nausea, No vomiting; No diarrhea, No constipation. [  ] BM  GENITOURINARY: No dysuria, No frequency, No urgency, No hematuria, NO incontinence  NEUROLOGICAL: No headaches, No dizziness, No numbness, No tingling, No tremors, No weakness  EXT: No Swelling, No Pain, No Edema  SKIN:  [  ] No itching, burning, rashes, or lesions   MUSCULOSKELETAL: No joint pain ,No Jt swelling; No muscle pain, No back pain, No extremity pain  PSYCHIATRIC: No depression,  No anxiety,  No mood swings ,No difficulty sleeping at night  PAIN SCALE: [  ] None  [  ] Other-  ROS Unable to obtain due to - [  ] Dementia  [  ] Lethargy [  ] Drowsy [  ] Sedated [  ] non verbal  REST OF REVIEW Of SYSTEM - [  ] Normal     Vital Signs Last 24 Hrs  T(C): 36.3 (30 Sep 2023 12:22), Max: 36.4 (29 Sep 2023 22:05)  T(F): 97.4 (30 Sep 2023 12:22), Max: 97.5 (29 Sep 2023 22:05)  HR: 90 (30 Sep 2023 12:22) (79 - 90)  BP: 105/67 (30 Sep 2023 05:24) (99/68 - 105/67)  BP(mean): --  RR: 18 (30 Sep 2023 12:22) (18 - 18)  SpO2: 92% (30 Sep 2023 12:22) (92% - 95%)    Parameters below as of 30 Sep 2023 12:22  Patient On (Oxygen Delivery Method): room air      Finger Stick          PHYSICAL EXAM:  GENERAL:  [  ] NAD , [  ] well appearing, [  ] Agitated, [  ] Drowsy,  [  ] Lethargy, [  ] confused   HEAD:  [  ] Normal, [  ] Other  EYES:  [  ] EOMI, [  ] PERRLA, [  ] conjunctiva and sclera clear normal, [  ] Other,  [  ] Pallor,[  ] Discharge  ENMT:  [  ] Normal, [  ] Moist mucous membranes, [  ] Good dentition, [  ] No Thrush  NECK:  [  ] Supple, [  ] No JVD, [  ] Normal thyroid, [  ] Lymphadenopathy [  ] Other  CHEST/LUNG:  [  ] Clear to auscultation bilaterally, [  ] Breath Sounds equal B/L, [  ] poor effort  [  ] No rales, [  ] No rhonchi  [  ]  No wheezing,   HEART:  [  ] Regular rate and rhythm, [  ] tachycardia, [  ] Bradycardia,  [  ] irregular  [  ] No murmurs, No rubs, No gallops, [  ] PPM in place (Mfr:  )  ABDOMEN:  [  ] Soft, [  ] Nontender, [  ] Nondistended, [  ] No mass, [  ] Bowel sounds present, [  ] obese  NERVOUS SYSTEM:  [  ] Alert & Oriented X3, [  ] Nonfocal  [  ] Confusion  [  ] Encephalopathic [  ] Sedated [  ] Unable to assess, [  ] Dementia [  ] Other-  EXTREMITIES: [  ] 2+ Peripheral Pulses, No clubbing, No cyanosis,  [  ] edema B/L lower EXT. [  ] PVD stasis skin changes B/L Lower EXT, [  ] wound  LYMPH: No lymphadenopathy noted  SKIN:  [  ] No rashes or lesions, [  ] Pressure Ulcers, [  ] ecchymosis, [  ] Skin Tears, [  ] Other    DIET: Diet, Regular:   Consistent Carbohydrate Evening Snack  DASH/TLC Sodium & Cholesterol Restricted (23 @ 22:36)      LABS:                                   Anemia Panel:      Thyroid Panel:                RADIOLOGY & ADDITIONAL TESTS:      HEALTH ISSUES - PROBLEM Dx:  Paranoia    HFrEF (heart failure with reduced ejection fraction)    HTN (hypertension)    HLD (hyperlipidemia)    Need for prophylactic measure    Medication management    DM2 (diabetes mellitus, type 2)    Anemia            Consultant(s) Notes Reviewed:  [  ] YES     Care Discussed with [X] Consultants  [  ] Patient  [  ] Family [  ] HCP [  ]   [  ] Social Service  [  ] RN, [  ] Physical Therapy,[  ] Palliative care team  DVT PPX: [  ] Lovenox, [  ] S C Heparin, [  ] Coumadin, [  ] Xarelto, [  ] Eliquis, [  ] Pradaxa, [  ] IV Heparin drip, [  ] SCD [  ] Contraindication 2 to GI Bleed,[  ] Ambulation [  ] Contraindicated 2 to  bleed [  ] Contraindicated 2 to Brain Bleed  Advanced directive: [  ] None, [  ] DNR/DNI Patient is a 83y old  Male who presents with a chief complaint of paranoia (29 Sep 2023 13:04)    HPI:  84y/o M PMH dementia, HFrEF s/p PPM, HTN, HLD  presents with increased agitation. History obtained from patient's daughter Arianne over phone.   Patient moved from [place] to [place] ~2months ago and has been having issues with increased paranoia for the past month. He stays at a 55 [facility] older assisted living community where he has been having several episodes of sundowning.   Recently he was hospitalized at [facility] for same issues from -. Daughter was unable to provide sufficient history regarding hospitalization.   Patient's son, Jose has been taking care of him and knows more about his medical hx however patient's daughter states she is taking over now and did not want to bother Jose at this time.  Patient was also recently seen at [facility] ED for episodes of agitation and was told to follow up with outpatient Psychiatrist but daughter states that likely did not happened because of his increased agitation.  Patient became agitated today and staff had to call 911 for further evaluation. The facility as well as his children feel that his current psych medication regimen is not sufficient to keep him calm.   Per review of recent dispensary history, he was seeing a psychiatric NP (Barbara Bravo) in [place], [place].   Of note, he had placement of PPM in late August at [facility]. Per his daughter, his LVEF at that time was 20% before and after PPM placement.  Currently, patient states his b/l legs feel itchy but otherwise he feels fine and is agreeable.  Denies fevers, chills. sob, cp, n/v/d.    ED Course   T 97.4 F  /79 RR 18 SpO2 95% on RA  Labs H/H 12.1/37.5 Glu   UA: >1000 glucose  UTox: negaitve     In the ED, given tylenol 650mg x1 , zyprexa 5mg IVP x1  (20 Sep 2023 21:58)    INTERVAL HPI:  : Pt seen and examined, sitting in chair with 1 to 1 present. Patient expressed concern about not receiving his pantoprazole, stating he cant digest his food without it, denies n/v, epigastric pain or burning. When asked about why pt came to hospital, states that he was being followed and he called the police to come help him. Says he lives with his son Jose. EMS brought patient in from  and over [facility]. Additionally states that one pill he was given this morning by staff was "phony" so he didn't take it. Contacted pt's pharmacy [facility], numerous scripts filled in [place] and [place]. Per night team sign out, daughter states that pt has been in and out of many ERs and has been started on many different medications so there are a lot of recent meds that he does not currently take to the best of her knowledge. Per daughter, her brothers are tired of assisting with their father given history of difficulties with him. She is willing to assist but does not want to be too involved and asks us to not contact her brothers. Psychiatry consulted and to see patient. JOHN/VAISHALI updated.   Soren Krishnan [phone]  : Pt seen and examined at bedside. xyprexa dose increased to 5mg bedtime and 2.5mg in AM starting yesterday. Marked improvement in demeanor. A&Ox3 (person, time, place). States he is in a "police hospital" and that he came here because he asked some man in their car to call the police because he was being followed by an aide that his son hired. Said he came from "[place] [id number] and over [facility]. No complaints or concerns today. States he has to get things ready to move back to [place]. Psych is following. DSC planning pending placement as his previous community does not want pt to return and family does not want to take patient. Replace PO K   : Pt seen and examined at bedside. Depakote increased to 500mg PO BID, amitriptyline stopped as per psych recs. Pt with no complaints or concerns. Pleasant on encounter, says he is doing well and thanks provider for checking in on him.  Reports that he is waiting to go home. Patient informed we are working to get him out of the hospital. DSC planning pending placement. JOHN sent referral to [facility]. Awaiting decision.   :  Pt seen and examined at bedside. Pt reports that is feeling well and does not have any symptoms, pain or concern. Pt is tolerating PO and eating well.  Pt is cooperative with evaluation and pleasant.  Waiting for placement for dc. Awaiting placement.   : Pt seen and examined at bedside. Reports last night he was looking for 3 medications that were started in the hospital at night. Reassured that he is receiving all the proper medications he needs. Appetite is good. Pt has no complaints at this time. Now off 1:1.  : Pt seen and examined at bedside. Was not agitated overnight. Pt is very tired this morning. Reports chronic low back pain, but is not concerned. Otherwise he has no active complaints at  this time. Denies chest pain, abdominal pain, SOB. D/W Son Mykel today   : Pt seen and examined at bedside. No agitation overnight. Pt is tired this morning. Has no complaints other than his chronic lower back pain. Denies chest pain, abdominal pain SOB. Pt wants to go home. D/W Son Angelito today   : Pt seen and examined at bedside. No agitation overnight. Pt is tired this morning and wants to sleep. No complaints other than his chronic low back pain. Denies chest pain, SOB, abdominal pain. Pt wants to go.  : Pt seen and examined at bedside. No agitation. Pt is more awake. Continues to report chronic low back pain. No acute complaints at this time. Denies chest pain, SOB, abdominal pain. Wants to go.Social work for d/c planning when bed is available.  : Pt seen and examined at bedside. No agitation overnight. Pt is AAOx1. Knows he is in a hospital but unsure which one. Patient is pleasant and has no acute complaints at this time. Denies fever, chills, CP, palpitations, SOB, abd pain, nausea, vomiting.      OVERNIGHT EVENTS: None    Home Medications:  albuterol 2.5 mg/3 mL (0.083%) inhalation solution: 2.5 milligram(s) inhaled every 6 hours as needed for  shortness of breath and/or wheezing (25 Sep 2023 11:00)  ammonium lactate 12% topical lotion: 1 Apply topically to affected area 2 times a day (25 Sep 2023 10:46)  atorvastatin 40 mg oral tablet: 1 tab(s) orally once a day (at bedtime) (25 Sep 2023 11:00)  dapagliflozin 10 mg oral tablet: 1 tab(s) orally every 24 hours (25 Sep 2023 10:46)  divalproex sodium 500 mg oral delayed release tablet: 1 tab(s) orally 2 times a day (25 Sep 2023 10:37)  DULoxetine 30 mg oral delayed release capsule: 1 cap(s) orally 2 times a day (25 Sep 2023 11:00)  furosemide 40 mg oral tablet: 1 tab(s) orally every 12 hours (25 Sep 2023 11:00)  melatonin 5 mg oral tablet: 1 tab(s) orally once a day (at bedtime) As needed Insomnia (25 Sep 2023 10:46)  METFORMIN 500MG TABLETS: TAKE 1 TABLET BY MOUTH EVERY MORNING AND EVERY EVENING WITH MEALS. (20 Sep 2023 21:58)  methyl salicylate-menthol 15%-10% topical cream: 1 Apply topically to affected area 3 times a day (25 Sep 2023 10:46)  metoprolol succinate 25 mg oral tablet, extended release: 1 tab(s) orally once a day (25 Sep 2023 11:00)  midodrine 5 mg oral tablet: 1 tab(s) orally 3 times a day (25 Sep 2023 11:00)  OLANZapine 10 mg oral tablet, disintegratin tab(s) orally once a day (at bedtime) (27 Sep 2023 08:38)  OLANZapine 5 mg oral tablet, disintegratin tab(s) orally 2 times a day (27 Sep 2023 08:38)  pantoprazole 40 mg oral delayed release tablet: 1 tab(s) orally once a day (before a meal) (25 Sep 2023 10:46)  POTASSIUM CL 20MEQ ER TABLETS: TAKE 1 TABLET BY MOUTH EVERY DAY WITH FOOD FOR 4 DAYS (20 Sep 2023 21:58)  sacubitril-valsartan 24 mg-26 mg oral tablet: 1 tab(s) orally 2 times a day (25 Sep 2023 11:00)  spironolactone 25 mg oral tablet: 1 tab(s) orally once a day (25 Sep 2023 10:46)  TRELEGY ELLIPTA 100-62.5MCG INH 30P: INHALE 1 PUFF BY MOUTH EVERY DAY (20 Sep 2023 21:58)      MEDICATIONS  (STANDING):  ammonium lactate 12% Lotion 1 Application(s) Topical two times a day  atorvastatin 40 milliGRAM(s) Oral at bedtime  budesonide  80 MICROgram(s)/formoterol 4.5 MICROgram(s) Inhaler 2 Puff(s) Inhalation two times a day  dapagliflozin 10 milliGRAM(s) Oral every 24 hours  dextrose 5%. 1000 milliLiter(s) (100 mL/Hr) IV Continuous <Continuous>  dextrose 5%. 1000 milliLiter(s) (50 mL/Hr) IV Continuous <Continuous>  dextrose 50% Injectable 25 Gram(s) IV Push once  dextrose 50% Injectable 12.5 Gram(s) IV Push once  dextrose 50% Injectable 25 Gram(s) IV Push once  divalproex  milliGRAM(s) Oral two times a day  DULoxetine 30 milliGRAM(s) Oral <User Schedule>  enoxaparin Injectable 40 milliGRAM(s) SubCutaneous every 24 hours  furosemide    Tablet 40 milliGRAM(s) Oral every 12 hours  glucagon  Injectable 1 milliGRAM(s) IntraMuscular once  insulin lispro (ADMELOG) corrective regimen sliding scale   SubCutaneous at bedtime  insulin lispro (ADMELOG) corrective regimen sliding scale   SubCutaneous three times a day before meals  methyl salicylate 15%/menthol 10% Topical Cream 1 Application(s) Topical three times a day  metoprolol succinate ER 25 milliGRAM(s) Oral daily  midodrine. 5 milliGRAM(s) Oral three times a day  OLANZapine Disintegrating Tablet 5 milliGRAM(s) Oral two times a day  OLANZapine Disintegrating Tablet 10 milliGRAM(s) Oral at bedtime  pantoprazole    Tablet 40 milliGRAM(s) Oral before breakfast  sacubitril 24 mG/valsartan 26 mG 1 Tablet(s) Oral two times a day  spironolactone 25 milliGRAM(s) Oral daily  tiotropium 2.5 MICROgram(s) Inhaler 2 Puff(s) Inhalation daily    MEDICATIONS  (PRN):  acetaminophen     Tablet .. 650 milliGRAM(s) Oral every 6 hours PRN Temp greater or equal to 38C (100.4F), Mild Pain (1 - 3)  albuterol    0.083% 2.5 milliGRAM(s) Nebulizer every 6 hours PRN Shortness of Breath and/or Wheezing  dextrose Oral Gel 15 Gram(s) Oral once PRN Blood Glucose LESS THAN 70 milliGRAM(s)/deciliter  melatonin 5 milliGRAM(s) Oral at bedtime PRN Insomnia      Allergies    No Known Allergies    Intolerances        Social History:  Tobacco: former , quit >30 years ago  EtOH: social drinker  Recreational drug use: denies   Lives with: Assisted Living Facility  Ambulates: without assistance  ADLs: needs assistance (20 Sep 2023 21:58)      REVIEW OF SYSTEMS:  CONSTITUTIONAL: No fever, No chills, No fatigue, No myalgia, No Body ache, No Weakness  EYES: No eye pain,  No visual disturbances, No discharge, NO Redness  ENMT:  No ear pain, No nose bleed, No vertigo; No sinus pain, NO throat pain, No Congestion  NECK: No pain, No stiffness  RESPIRATORY: No cough, NO wheezing, No  hemoptysis, NO  shortness of breath  CARDIOVASCULAR: No chest pain, palpitations  GASTROINTESTINAL: No abdominal pain, NO epigastric pain. No nausea, No vomiting; No diarrhea, No constipation. [ x ] BM   GENITOURINARY: No dysuria, No frequency, No urgency, No hematuria, NO incontinence  NEUROLOGICAL: No headaches, No dizziness, No numbness, No tingling, No tremors, No weakness  EXT: No Swelling, No Pain, No Edema  SKIN:  [ x ] No itching, burning, rashes, or lesions   MUSCULOSKELETAL: No joint pain ,No Jt swelling; No muscle pain, No extremity pain  PSYCHIATRIC: No depression,  No anxiety,  No mood swings ,No difficulty sleeping at night  PAIN SCALE: [ x ] None  [ ] Other      Vital Signs Last 24 Hrs  T(C): 36.3 (30 Sep 2023 12:22), Max: 36.4 (29 Sep 2023 22:05)  T(F): 97.4 (30 Sep 2023 12:22), Max: 97.5 (29 Sep 2023 22:05)  HR: 90 (30 Sep 2023 12:22) (79 - 90)  BP: 105/67 (30 Sep 2023 05:24) (99/68 - 105/67)  BP(mean): --  RR: 18 (30 Sep 2023 12:22) (18 - 18)  SpO2: 92% (30 Sep 2023 12:22) (92% - 95%)    Parameters below as of 30 Sep 2023 12:22  Patient On (Oxygen Delivery Method): room air      Finger Stick          PHYSICAL EXAM:  GENERAL:  [ x ] NAD , [ x ] well appearing, [  ] Agitated, [  ] Drowsy,  [  ] Lethargy, [  ] confused   HEAD:  [ x ] Normal, [  ] Other  EYES:  [ x ] EOMI, [  ] PERRLA, [ x ] conjunctiva and sclera clear normal, [  ] Other,  [  ] Pallor,[  ] Discharge  ENMT:  [ x ] Normal, [ x ] Moist mucous membranes, [ x ] Good dentition, [ x ] No Thrush  NECK:  [ x ] Supple, [ x ] No JVD, [ x ] Normal thyroid, [  ] Lymphadenopathy [  ] Other  CHEST/LUNG:  [ x ] Clear to auscultation bilaterally, [ x ] Breath Sounds equal B/L / Decrease, [ x ] poor effort  [ x ] No rales, [ x ] No rhonchi  [ x ]  No wheezing,   HEART:  [ x ] Regular rate and rhythm, [  ] tachycardia, [  ] Bradycardia,  [  ] irregular  [ x ] No murmurs, No rubs, No gallops, [  ] PPM in place (Mfr:  )  ABDOMEN:  [ x ] Soft, [ x ] Nontender, [ x ] Nondistended, [ x ] No mass, [ x ] Bowel sounds present, [ x ] obese  NERVOUS SYSTEM:  [ x ] Alert & Oriented X1, [ x ] Nonfocal  [  ] Confusion  [  ] Encephalopathic [  ] Sedated [  ] Unable to assess, [  ] Dementia [ x ] Other- responds appropriately to commands and questions.   EXTREMITIES: [ x ] 2+ Peripheral Pulses, No clubbing, No cyanosis,  [x ] pitting 1+ edema B/L lower EXT. [ x ]  severe PVD stasis skin changes B/L Lower EXT, + Scabs  [ x ] wound-Dry Scabs on b/l Lower Ext , B/L Hand small Wound   LYMPH: No lymphadenopathy noted  SKIN:  [ x ] No rashes or lesions, [  ] Pressure Ulcers, [  ] ecchymosis, [  ] Skin Tears, [  ] Other    DIET: Diet, Regular:   Consistent Carbohydrate Evening Snack  DASH/TLC Sodium & Cholesterol Restricted (23 @ 22:36)            HEALTH ISSUES - PROBLEM Dx:  Paranoia    HFrEF (heart failure with reduced ejection fraction)    HTN (hypertension)    HLD (hyperlipidemia)    Need for prophylactic measure    Medication management    DM2 (diabetes mellitus, type 2)    Anemia          Consultant(s) Notes Reviewed:  [ x ] YES     Care Discussed with [X] Consultants  [ x ] Patient  [  ] Family [  ] HCP [  ]   [  x] Social Service  [x  ] RN, [ x ] Physical Therapy, [  ] Palliative care team  DVT PPX: [  ] Lovenox, [ x ] S C Heparin, [  ] Coumadin, [  ] Xarelto, [  ] Eliquis, [  ] Pradaxa, [  ] IV Heparin drip, [  ] SCD [  ] Contraindication 2 to GI Bleed,[  ] Ambulation [  ] Contraindicated 2 to  bleed [  ] Contraindicated 2 to Brain Bleed  Advanced directive: [ x ] None, [  ] DNR/DNI   Patient is a 83y old  Male who presents with a chief complaint of paranoia (29 Sep 2023 13:04)    HPI:  82y/o M PMH dementia, HFrEF s/p PPM, HTN, HLD  presents with increased agitation. History obtained from patient's daughter Arianne over phone.   Patient moved from [place] to [place] ~2months ago and has been having issues with increased paranoia for the past month. He stays at a [facility] assisted living community where he has been having several episodes of sundowning.   Recently he was hospitalized at [facility] for same issues from -. Daughter was unable to provide sufficient history regarding hospitalization.   Patient's son, Jose has been taking care of him and knows more about his medical hx however patient's daughter states she is taking over now and did not want to bother Jose at this time.  Patient was also recently seen at [facility] ED for episodes of agitation and was told to follow up with outpatient Psychiatrist but daughter states that likely did not happened because of his increased agitation.  Patient became agitated today and staff had to call 911 for further evaluation. The facility as well as his children feel that his current psych medication regimen is not sufficient to keep him calm.   Per review of recent dispensary history, he was seeing a psychiatric NP (Barbara rBavo) in [place], [place].   Of note, he had placement of PPM in late August at [facility]. Per his daughter, his LVEF at that time was 20% before and after PPM placement.  Currently, patient states his b/l legs feel itchy but otherwise he feels fine and is agreeable.  Denies fevers, chills. sob, cp, n/v/d.    ED Course   T 97.4 F  /79 RR 18 SpO2 95% on RA  Labs H/H 12.1/37.5 Glu   UA: >1000 glucose  UTox: negaitve     In the ED, given tylenol 650mg x1 , zyprexa 5mg IVP x1  (20 Sep 2023 21:58)    INTERVAL HPI:  : Pt seen and examined, sitting in chair with 1 to 1 present. Patient expressed concern about not receiving his pantoprazole, stating he cant digest his food without it, denies n/v, epigastric pain or burning. When asked about why pt came to hospital, states that he was being followed and he called the police to come help him. Says he lives with his son Jose. EMS brought patient in from  and over [facility]. Additionally states that one pill he was given this morning by staff was "phony" so he didn't take it. Contacted pt's pharmacy [facility], numerous scripts filled in [place] and [place]. Per night team sign out, daughter states that pt has been in and out of many ERs and has been started on many different medications so there are a lot of recent meds that he does not currently take to the best of her knowledge. Per daughter, her brothers are tired of assisting with their father given history of difficulties with him. She is willing to assist but does not want to be too involved and asks us to not contact her brothers. Psychiatry consulted and to see patient. JOHN/VAISHALI updated.   Soren Krishnan [phone]  : Pt seen and examined at bedside. xyprexa dose increased to 5mg bedtime and 2.5mg in AM starting yesterday. Marked improvement in demeanor. A&Ox3 (person, time, place). States he is in a "police hospital" and that he came here because he asked some man in their car to call the police because he was being followed by an aide that his son hired. Said he came from "[place] [id number] and over [facility]. No complaints or concerns today. States he has to get things ready to move back to [place]. Psych is following. DSC planning pending placement as his previous community does not want pt to return and family does not want to take patient. Replace PO K   : Pt seen and examined at bedside. Depakote increased to 500mg PO BID, amitriptyline stopped as per psych recs. Pt with no complaints or concerns. Pleasant on encounter, says he is doing well and thanks provider for checking in on him.  Reports that he is waiting to go home. Patient informed we are working to get him out of the hospital. DSC planning pending placement. JOHN sent referral to [facility]. Awaiting decision.   :  Pt seen and examined at bedside. Pt reports that is feeling well and does not have any symptoms, pain or concern. Pt is tolerating PO and eating well.  Pt is cooperative with evaluation and pleasant.  Waiting for placement for dc. Awaiting placement.   : Pt seen and examined at bedside. Reports last night he was looking for 3 medications that were started in the hospital at night. Reassured that he is receiving all the proper medications he needs. Appetite is good. Pt has no complaints at this time. Now off 1:1.  : Pt seen and examined at bedside. Was not agitated overnight. Pt is very tired this morning. Reports chronic low back pain, but is not concerned. Otherwise he has no active complaints at  this time. Denies chest pain, abdominal pain, SOB. D/W Son Mykel today   : Pt seen and examined at bedside. No agitation overnight. Pt is tired this morning. Has no complaints other than his chronic lower back pain. Denies chest pain, abdominal pain SOB. Pt wants to go home. D/W Son Angelito today   : Pt seen and examined at bedside. No agitation overnight. Pt is tired this morning and wants to sleep. No complaints other than his chronic low back pain. Denies chest pain, SOB, abdominal pain. Pt wants to go.  : Pt seen and examined at bedside. No agitation. Pt is more awake. Continues to report chronic low back pain. No acute complaints at this time. Denies chest pain, SOB, abdominal pain. Wants to go.Social work for d/c planning when bed is available.  : Pt seen and examined at bedside. No agitation overnight. Pt is AAOx1. Knows he is in a hospital but unsure which one. Patient is pleasant and has no acute complaints at this time. Denies fever, chills, CP, palpitations, SOB, abd pain, nausea, vomiting. Low BP S/P 1 Bolus NS      OVERNIGHT EVENTS: None    Home Medications:  albuterol 2.5 mg/3 mL (0.083%) inhalation solution: 2.5 milligram(s) inhaled every 6 hours as needed for  shortness of breath and/or wheezing (25 Sep 2023 11:00)  ammonium lactate 12% topical lotion: 1 Apply topically to affected area 2 times a day (25 Sep 2023 10:46)  atorvastatin 40 mg oral tablet: 1 tab(s) orally once a day (at bedtime) (25 Sep 2023 11:00)  dapagliflozin 10 mg oral tablet: 1 tab(s) orally every 24 hours (25 Sep 2023 10:46)  divalproex sodium 500 mg oral delayed release tablet: 1 tab(s) orally 2 times a day (25 Sep 2023 10:37)  DULoxetine 30 mg oral delayed release capsule: 1 cap(s) orally 2 times a day (25 Sep 2023 11:00)  furosemide 40 mg oral tablet: 1 tab(s) orally every 12 hours (25 Sep 2023 11:00)  melatonin 5 mg oral tablet: 1 tab(s) orally once a day (at bedtime) As needed Insomnia (25 Sep 2023 10:46)  METFORMIN 500MG TABLETS: TAKE 1 TABLET BY MOUTH EVERY MORNING AND EVERY EVENING WITH MEALS. (20 Sep 2023 21:58)  methyl salicylate-menthol 15%-10% topical cream: 1 Apply topically to affected area 3 times a day (25 Sep 2023 10:46)  metoprolol succinate 25 mg oral tablet, extended release: 1 tab(s) orally once a day (25 Sep 2023 11:00)  midodrine 5 mg oral tablet: 1 tab(s) orally 3 times a day (25 Sep 2023 11:00)  OLANZapine 10 mg oral tablet, disintegratin tab(s) orally once a day (at bedtime) (27 Sep 2023 08:38)  OLANZapine 5 mg oral tablet, disintegratin tab(s) orally 2 times a day (27 Sep 2023 08:38)  pantoprazole 40 mg oral delayed release tablet: 1 tab(s) orally once a day (before a meal) (25 Sep 2023 10:46)  POTASSIUM CL 20MEQ ER TABLETS: TAKE 1 TABLET BY MOUTH EVERY DAY WITH FOOD FOR 4 DAYS (20 Sep 2023 21:58)  sacubitril-valsartan 24 mg-26 mg oral tablet: 1 tab(s) orally 2 times a day (25 Sep 2023 11:00)  spironolactone 25 mg oral tablet: 1 tab(s) orally once a day (25 Sep 2023 10:46)  TRELEGY ELLIPTA 100-62.5MCG INH 30P: INHALE 1 PUFF BY MOUTH EVERY DAY (20 Sep 2023 21:58)      MEDICATIONS  (STANDING):  ammonium lactate 12% Lotion 1 Application(s) Topical two times a day  atorvastatin 40 milliGRAM(s) Oral at bedtime  budesonide  80 MICROgram(s)/formoterol 4.5 MICROgram(s) Inhaler 2 Puff(s) Inhalation two times a day  dapagliflozin 10 milliGRAM(s) Oral every 24 hours  dextrose 5%. 1000 milliLiter(s) (100 mL/Hr) IV Continuous <Continuous>  dextrose 5%. 1000 milliLiter(s) (50 mL/Hr) IV Continuous <Continuous>  dextrose 50% Injectable 25 Gram(s) IV Push once  dextrose 50% Injectable 12.5 Gram(s) IV Push once  dextrose 50% Injectable 25 Gram(s) IV Push once  divalproex  milliGRAM(s) Oral two times a day  DULoxetine 30 milliGRAM(s) Oral <User Schedule>  enoxaparin Injectable 40 milliGRAM(s) SubCutaneous every 24 hours  furosemide    Tablet 40 milliGRAM(s) Oral every 12 hours  glucagon  Injectable 1 milliGRAM(s) IntraMuscular once  insulin lispro (ADMELOG) corrective regimen sliding scale   SubCutaneous at bedtime  insulin lispro (ADMELOG) corrective regimen sliding scale   SubCutaneous three times a day before meals  methyl salicylate 15%/menthol 10% Topical Cream 1 Application(s) Topical three times a day  metoprolol succinate ER 25 milliGRAM(s) Oral daily  midodrine. 5 milliGRAM(s) Oral three times a day  OLANZapine Disintegrating Tablet 5 milliGRAM(s) Oral two times a day  OLANZapine Disintegrating Tablet 10 milliGRAM(s) Oral at bedtime  pantoprazole    Tablet 40 milliGRAM(s) Oral before breakfast  sacubitril 24 mG/valsartan 26 mG 1 Tablet(s) Oral two times a day  spironolactone 25 milliGRAM(s) Oral daily  tiotropium 2.5 MICROgram(s) Inhaler 2 Puff(s) Inhalation daily    MEDICATIONS  (PRN):  acetaminophen     Tablet .. 650 milliGRAM(s) Oral every 6 hours PRN Temp greater or equal to 38C (100.4F), Mild Pain (1 - 3)  albuterol    0.083% 2.5 milliGRAM(s) Nebulizer every 6 hours PRN Shortness of Breath and/or Wheezing  dextrose Oral Gel 15 Gram(s) Oral once PRN Blood Glucose LESS THAN 70 milliGRAM(s)/deciliter  melatonin 5 milliGRAM(s) Oral at bedtime PRN Insomnia      Allergies    No Known Allergies    Intolerances        Social History:  Tobacco: former , quit >30 years ago  EtOH: social drinker  Recreational drug use: denies   Lives with: Assisted Living Facility  Ambulates: without assistance  ADLs: needs assistance (20 Sep 2023 21:58)      REVIEW OF SYSTEMS: i want to go home  CONSTITUTIONAL: No fever, No chills, No fatigue, No myalgia, No Body ache, No Weakness  EYES: No eye pain,  No visual disturbances, No discharge, NO Redness  ENMT:  No ear pain, No nose bleed, No vertigo; No sinus pain, NO throat pain, No Congestion  NECK: No pain, No stiffness  RESPIRATORY: No cough, NO wheezing, No  hemoptysis, NO  shortness of breath  CARDIOVASCULAR: No chest pain, palpitations  GASTROINTESTINAL: No abdominal pain, NO epigastric pain. No nausea, No vomiting; No diarrhea, No constipation. [ x ] BM   GENITOURINARY: No dysuria, No frequency, No urgency, No hematuria, NO incontinence  NEUROLOGICAL: No headaches, No dizziness, No numbness, No tingling, No tremors, No weakness  EXT: No Swelling, No Pain, No Edema  SKIN:  [ x ] No itching, burning, rashes, or lesions   MUSCULOSKELETAL: No joint pain ,No Jt swelling; No muscle pain, No extremity pain  PSYCHIATRIC: No depression,  No anxiety,  No mood swings ,No difficulty sleeping at night  PAIN SCALE: [ x ] None  [ ] Other      Vital Signs Last 24 Hrs  T(C): 36.3 (30 Sep 2023 12:22), Max: 36.4 (29 Sep 2023 22:05)  T(F): 97.4 (30 Sep 2023 12:22), Max: 97.5 (29 Sep 2023 22:05)  HR: 90 (30 Sep 2023 12:22) (79 - 90)  BP: 105/67 (30 Sep 2023 05:24) (99/68 - 105/67)  BP(mean): --  RR: 18 (30 Sep 2023 12:22) (18 - 18)  SpO2: 92% (30 Sep 2023 12:22) (92% - 95%)    Parameters below as of 30 Sep 2023 12:22  Patient On (Oxygen Delivery Method): room air      Finger Stick          PHYSICAL EXAM:  GENERAL:  [ x ] NAD , [ x ] well appearing, [  ] Agitated, [  ] Drowsy,  [  ] Lethargy, [  ] confused   HEAD:  [ x ] Normal, [  ] Other  EYES:  [ x ] EOMI, [  ] PERRLA, [ x ] conjunctiva and sclera clear normal, [  ] Other,  [  ] Pallor,[  ] Discharge  ENMT:  [ x ] Normal, [ x ] Moist mucous membranes, [ x ] Good dentition, [ x ] No Thrush  NECK:  [ x ] Supple, [ x ] No JVD, [ x ] Normal thyroid, [  ] Lymphadenopathy [  ] Other  CHEST/LUNG:  [ x ] Clear to auscultation bilaterally, [ x ] Breath Sounds equal B/L / Decrease, [ x ] poor effort  [ x ] No rales, [ x ] No rhonchi  [ x ]  No wheezing,   HEART:  [ x ] Regular rate and rhythm, [  ] tachycardia, [  ] Bradycardia,  [  ] irregular  [ x ] No murmurs, No rubs, No gallops, [  ] PPM in place (Mfr:  )  ABDOMEN:  [ x ] Soft, [ x ] Nontender, [ x ] Nondistended, [ x ] No mass, [ x ] Bowel sounds present, [ x ] obese  NERVOUS SYSTEM:  [ x ] Alert & Oriented X1, [ x ] Nonfocal  [  ] Confusion  [  ] Encephalopathic [  ] Sedated [  ] Unable to assess, [  ] Dementia [ x ] Other- responds appropriately to commands and questions.   EXTREMITIES: [ x ] 2+ Peripheral Pulses, No clubbing, No cyanosis,  [x ] pitting 1+ edema B/L lower EXT. [ x ]  severe PVD stasis skin changes B/L Lower EXT, + Scabs  [ x ] wound-Dry Scabs on b/l Lower Ext , B/L Hand small Wound   LYMPH: No lymphadenopathy noted  SKIN:  [ x ] No rashes or lesions, [  ] Pressure Ulcers, [  ] ecchymosis, [  ] Skin Tears, [  ] Other    DIET: Diet, Regular:   Consistent Carbohydrate Evening Snack  DASH/TLC Sodium & Cholesterol Restricted (23 @ 22:36)            HEALTH ISSUES - PROBLEM Dx:  Paranoia    HFrEF (heart failure with reduced ejection fraction)    HTN (hypertension)    HLD (hyperlipidemia)    Need for prophylactic measure    Medication management    DM2 (diabetes mellitus, type 2)    Anemia          Consultant(s) Notes Reviewed:  [ x ] YES     Care Discussed with [X] Consultants  [ x ] Patient  [  ] Family [  ] HCP [  ]   [  x] Social Service  [x  ] RN, [ x ] Physical Therapy, [  ] Palliative care team  DVT PPX: [  ] Lovenox, [ x ] S C Heparin, [  ] Coumadin, [  ] Xarelto, [  ] Eliquis, [  ] Pradaxa, [  ] IV Heparin drip, [  ] SCD [  ] Contraindication 2 to GI Bleed,[  ] Ambulation [  ] Contraindicated 2 to  bleed [  ] Contraindicated 2 to Brain Bleed  Advanced directive: [ x ] None, [  ] DNR/DNI

## 2023-10-01 LAB
ALBUMIN SERPL ELPH-MCNC: 3.2 G/DL — LOW (ref 3.3–5)
ALP SERPL-CCNC: 64 U/L — SIGNIFICANT CHANGE UP (ref 40–120)
ALT FLD-CCNC: 21 U/L — SIGNIFICANT CHANGE UP (ref 12–78)
ANION GAP SERPL CALC-SCNC: 8 MMOL/L — SIGNIFICANT CHANGE UP (ref 5–17)
AST SERPL-CCNC: 21 U/L — SIGNIFICANT CHANGE UP (ref 15–37)
BASOPHILS # BLD AUTO: 0.08 K/UL — SIGNIFICANT CHANGE UP (ref 0–0.2)
BASOPHILS NFR BLD AUTO: 1.2 % — SIGNIFICANT CHANGE UP (ref 0–2)
BILIRUB SERPL-MCNC: 1 MG/DL — SIGNIFICANT CHANGE UP (ref 0.2–1.2)
BUN SERPL-MCNC: 37 MG/DL — HIGH (ref 7–23)
CALCIUM SERPL-MCNC: 8.9 MG/DL — SIGNIFICANT CHANGE UP (ref 8.5–10.1)
CHLORIDE SERPL-SCNC: 105 MMOL/L — SIGNIFICANT CHANGE UP (ref 96–108)
CO2 SERPL-SCNC: 27 MMOL/L — SIGNIFICANT CHANGE UP (ref 22–31)
CREAT SERPL-MCNC: 1.2 MG/DL — SIGNIFICANT CHANGE UP (ref 0.5–1.3)
EGFR: 60 ML/MIN/1.73M2 — SIGNIFICANT CHANGE UP
EOSINOPHIL # BLD AUTO: 0.41 K/UL — SIGNIFICANT CHANGE UP (ref 0–0.5)
EOSINOPHIL NFR BLD AUTO: 6.1 % — HIGH (ref 0–6)
GLUCOSE BLDC GLUCOMTR-MCNC: 112 MG/DL — HIGH (ref 70–99)
GLUCOSE BLDC GLUCOMTR-MCNC: 115 MG/DL — HIGH (ref 70–99)
GLUCOSE BLDC GLUCOMTR-MCNC: 119 MG/DL — HIGH (ref 70–99)
GLUCOSE BLDC GLUCOMTR-MCNC: 98 MG/DL — SIGNIFICANT CHANGE UP (ref 70–99)
GLUCOSE SERPL-MCNC: 90 MG/DL — SIGNIFICANT CHANGE UP (ref 70–99)
HCT VFR BLD CALC: 44.2 % — SIGNIFICANT CHANGE UP (ref 39–50)
HGB BLD-MCNC: 14.3 G/DL — SIGNIFICANT CHANGE UP (ref 13–17)
IMM GRANULOCYTES NFR BLD AUTO: 0.4 % — SIGNIFICANT CHANGE UP (ref 0–0.9)
LYMPHOCYTES # BLD AUTO: 2.65 K/UL — SIGNIFICANT CHANGE UP (ref 1–3.3)
LYMPHOCYTES # BLD AUTO: 39.3 % — SIGNIFICANT CHANGE UP (ref 13–44)
MCHC RBC-ENTMCNC: 32.1 PG — SIGNIFICANT CHANGE UP (ref 27–34)
MCHC RBC-ENTMCNC: 32.4 GM/DL — SIGNIFICANT CHANGE UP (ref 32–36)
MCV RBC AUTO: 99.1 FL — SIGNIFICANT CHANGE UP (ref 80–100)
MONOCYTES # BLD AUTO: 0.61 K/UL — SIGNIFICANT CHANGE UP (ref 0–0.9)
MONOCYTES NFR BLD AUTO: 9 % — SIGNIFICANT CHANGE UP (ref 2–14)
NEUTROPHILS # BLD AUTO: 2.97 K/UL — SIGNIFICANT CHANGE UP (ref 1.8–7.4)
NEUTROPHILS NFR BLD AUTO: 44 % — SIGNIFICANT CHANGE UP (ref 43–77)
NRBC # BLD: 0 /100 WBCS — SIGNIFICANT CHANGE UP (ref 0–0)
PLAT MORPH BLD: NORMAL — SIGNIFICANT CHANGE UP
PLATELET # BLD AUTO: 132 K/UL — LOW (ref 150–400)
POTASSIUM SERPL-MCNC: 3.8 MMOL/L — SIGNIFICANT CHANGE UP (ref 3.5–5.3)
POTASSIUM SERPL-SCNC: 3.8 MMOL/L — SIGNIFICANT CHANGE UP (ref 3.5–5.3)
PROT SERPL-MCNC: 6.8 G/DL — SIGNIFICANT CHANGE UP (ref 6–8.3)
RBC # BLD: 4.46 M/UL — SIGNIFICANT CHANGE UP (ref 4.2–5.8)
RBC # FLD: 14.7 % — HIGH (ref 10.3–14.5)
RBC BLD AUTO: NORMAL — SIGNIFICANT CHANGE UP
SODIUM SERPL-SCNC: 140 MMOL/L — SIGNIFICANT CHANGE UP (ref 135–145)
WBC # BLD: 6.75 K/UL — SIGNIFICANT CHANGE UP (ref 3.8–10.5)
WBC # FLD AUTO: 6.75 K/UL — SIGNIFICANT CHANGE UP (ref 3.8–10.5)

## 2023-10-01 RX ORDER — MIDODRINE HYDROCHLORIDE 2.5 MG/1
7.5 TABLET ORAL THREE TIMES A DAY
Refills: 0 | Status: DISCONTINUED | OUTPATIENT
Start: 2023-10-01 | End: 2023-10-09

## 2023-10-01 RX ORDER — LANOLIN ALCOHOL/MO/W.PET/CERES
3 CREAM (GRAM) TOPICAL ONCE
Refills: 0 | Status: COMPLETED | OUTPATIENT
Start: 2023-10-01 | End: 2023-10-01

## 2023-10-01 RX ORDER — GENTAMICIN SULFATE 3 MG/ML
1 SOLUTION/ DROPS OPHTHALMIC
Refills: 0 | Status: DISCONTINUED | OUTPATIENT
Start: 2023-10-01 | End: 2023-10-09

## 2023-10-01 RX ADMIN — PANTOPRAZOLE SODIUM 40 MILLIGRAM(S): 20 TABLET, DELAYED RELEASE ORAL at 05:55

## 2023-10-01 RX ADMIN — DULOXETINE HYDROCHLORIDE 30 MILLIGRAM(S): 30 CAPSULE, DELAYED RELEASE ORAL at 05:55

## 2023-10-01 RX ADMIN — Medication 650 MILLIGRAM(S): at 06:37

## 2023-10-01 RX ADMIN — Medication 5 MILLIGRAM(S): at 23:22

## 2023-10-01 RX ADMIN — OLANZAPINE 5 MILLIGRAM(S): 15 TABLET, FILM COATED ORAL at 17:45

## 2023-10-01 RX ADMIN — Medication 650 MILLIGRAM(S): at 05:56

## 2023-10-01 RX ADMIN — DULOXETINE HYDROCHLORIDE 30 MILLIGRAM(S): 30 CAPSULE, DELAYED RELEASE ORAL at 17:44

## 2023-10-01 RX ADMIN — BUDESONIDE AND FORMOTEROL FUMARATE DIHYDRATE 2 PUFF(S): 160; 4.5 AEROSOL RESPIRATORY (INHALATION) at 18:42

## 2023-10-01 RX ADMIN — MENTHOL AND METHYL SALICYLATE 1 APPLICATION(S): 10; 30 CREAM TOPICAL at 13:28

## 2023-10-01 RX ADMIN — OLANZAPINE 5 MILLIGRAM(S): 15 TABLET, FILM COATED ORAL at 05:55

## 2023-10-01 RX ADMIN — Medication 1 APPLICATION(S): at 05:54

## 2023-10-01 RX ADMIN — ENOXAPARIN SODIUM 40 MILLIGRAM(S): 100 INJECTION SUBCUTANEOUS at 19:09

## 2023-10-01 RX ADMIN — Medication 650 MILLIGRAM(S): at 20:03

## 2023-10-01 RX ADMIN — DAPAGLIFLOZIN 10 MILLIGRAM(S): 10 TABLET, FILM COATED ORAL at 12:56

## 2023-10-01 RX ADMIN — Medication 650 MILLIGRAM(S): at 19:10

## 2023-10-01 RX ADMIN — MIDODRINE HYDROCHLORIDE 5 MILLIGRAM(S): 2.5 TABLET ORAL at 05:55

## 2023-10-01 RX ADMIN — GENTAMICIN SULFATE 1 DROP(S): 3 SOLUTION/ DROPS OPHTHALMIC at 21:49

## 2023-10-01 RX ADMIN — ATORVASTATIN CALCIUM 40 MILLIGRAM(S): 80 TABLET, FILM COATED ORAL at 21:48

## 2023-10-01 RX ADMIN — TIOTROPIUM BROMIDE 2 PUFF(S): 18 CAPSULE ORAL; RESPIRATORY (INHALATION) at 05:55

## 2023-10-01 RX ADMIN — Medication 1 APPLICATION(S): at 17:46

## 2023-10-01 RX ADMIN — OLANZAPINE 10 MILLIGRAM(S): 15 TABLET, FILM COATED ORAL at 21:48

## 2023-10-01 RX ADMIN — BUDESONIDE AND FORMOTEROL FUMARATE DIHYDRATE 2 PUFF(S): 160; 4.5 AEROSOL RESPIRATORY (INHALATION) at 05:54

## 2023-10-01 RX ADMIN — DIVALPROEX SODIUM 500 MILLIGRAM(S): 500 TABLET, DELAYED RELEASE ORAL at 05:55

## 2023-10-01 RX ADMIN — Medication 3 MILLIGRAM(S): at 23:44

## 2023-10-01 RX ADMIN — MIDODRINE HYDROCHLORIDE 5 MILLIGRAM(S): 2.5 TABLET ORAL at 12:56

## 2023-10-01 RX ADMIN — DIVALPROEX SODIUM 500 MILLIGRAM(S): 500 TABLET, DELAYED RELEASE ORAL at 17:46

## 2023-10-01 RX ADMIN — MIDODRINE HYDROCHLORIDE 7.5 MILLIGRAM(S): 2.5 TABLET ORAL at 17:51

## 2023-10-01 RX ADMIN — GENTAMICIN SULFATE 1 DROP(S): 3 SOLUTION/ DROPS OPHTHALMIC at 18:40

## 2023-10-01 NOTE — PROGRESS NOTE ADULT - ATTENDING COMMENTS
82y/o M PMH dementia, DM2, HFrEF s/p PPM, HTN, HLD presents with increased agitation.  Admit for increased paranoia & Placement .  pt seen, examined, case & care plan d/w pt, residents at detail. d/w dtr Arianne on 9/21 at [facility] Detail about pt's condition, Care plan Meds   Consult;  Psych Dr Strange on case , -Dr Strange  adjusted Psych meds  stable for d/c   Social service d/w about d/c planning to SNF   PT Eval.-Pt is ambulating   D/W Social work -follow up for d/c -Placement.  PO diet  AM labs   Total care time is 40 minutes.

## 2023-10-01 NOTE — PROGRESS NOTE ADULT - SUBJECTIVE AND OBJECTIVE BOX
Patient is a 83y old  Male who presents with a chief complaint of paranoia (30 Sep 2023 15:55)    HPI:  82y/o M PMH dementia, HFrEF s/p PPM, HTN, HLD  presents with increased agitation. History obtained from patient's daughter Arianne over phone.   Patient moved from [place] to [place] ~2months ago and has been having issues with increased paranoia for the past month. He stays at a [facility] older assisted living community where he has been having several episodes of sundowning.   Recently he was hospitalized at [facility] for same issues from -. Daughter was unable to provide sufficient history regarding hospitalization.   Patient's son, Jose has been taking care of him and knows more about his medical hx however patient's daughter states she is taking over now and did not want to bother Jose at this time.  Patient was also recently seen at [facility] ED for episodes of agitation and was told to follow up with outpatient Psychiatrist but daughter states that likely did not happened because of his increased agitation.  Patient became agitated today and staff had to call 911 for further evaluation. The facility as well as his children feel that his current psych medication regimen is not sufficient to keep him calm.   Per review of recent dispensary history, he was seeing a psychiatric NP (Barbara Bravo) in [place], [place].   Of note, he had placement of PPM in late August at [facility]. Per his daughter, his LVEF at that time was 20% before and after PPM placement.  Currently, patient states his b/l legs feel itchy but otherwise he feels fine and is agreeable.  Denies fevers, chills. sob, cp, n/v/d.    ED Course   T 97.4 F  /79 RR 18 SpO2 95% on RA  Labs H/H 12.1/37.5 Glu   UA: >1000 glucose  UTox: negaitve     In the ED, given tylenol 650mg x1 , zyprexa 5mg IVP x1  (20 Sep 2023 21:58)    INTERVAL HPI:  : Pt seen and examined, sitting in chair with 1 to 1 present. Patient expressed concern about not receiving his pantoprazole, stating he cant digest his food without it, denies n/v, epigastric pain or burning. When asked about why pt came to hospital, states that he was being followed and he called the police to come help him. Says he lives with his son Jose. EMS brought patient in from  and over [facility]. Additionally states that one pill he was given this morning by staff was "phony" so he didn't take it. Contacted pt's pharmacy [facility], numerous scripts filled in [place] and [place]. Per night team sign out, daughter states that pt has been in and out of many ERs and has been started on many different medications so there are a lot of recent meds that he does not currently take to the best of her knowledge. Per daughter, her brothers are tired of assisting with their father given history of difficulties with him. She is willing to assist but does not want to be too involved and asks us to not contact her brothers. Psychiatry consulted and to see patient. JOHN/VAISHALI updated.   Soren Krishnan [phone]  : Pt seen and examined at bedside. xyprexa dose increased to 5mg bedtime and 2.5mg in AM starting yesterday. Marked improvement in demeanor. A&Ox3 (person, time, place). States he is in a "police hospital" and that he came here because he asked some man in their car to call the police because he was being followed by an aide that his son hired. Said he came from "[place] [id number] and over [facility]. No complaints or concerns today. States he has to get things ready to move back to [place]. Psych is following. DSC planning pending placement as his previous community does not want pt to return and family does not want to take patient. Replace PO K   : Pt seen and examined at bedside. Depakote increased to 500mg PO BID, amitriptyline stopped as per psych recs. Pt with no complaints or concerns. Pleasant on encounter, says he is doing well and thanks provider for checking in on him.  Reports that he is waiting to go home. Patient informed we are working to get him out of the hospital. DSC planning pending placement. JOHN sent referral to [facility]. Awaiting decision.   :  Pt seen and examined at bedside. Pt reports that is feeling well and does not have any symptoms, pain or concern. Pt is tolerating PO and eating well.  Pt is cooperative with evaluation and pleasant.  Waiting for placement for dc. Awaiting placement.   : Pt seen and examined at bedside. Reports last night he was looking for 3 medications that were started in the hospital at night. Reassured that he is receiving all the proper medications he needs. Appetite is good. Pt has no complaints at this time. Now off 1:1.  : Pt seen and examined at bedside. Was not agitated overnight. Pt is very tired this morning. Reports chronic low back pain, but is not concerned. Otherwise he has no active complaints at  this time. Denies chest pain, abdominal pain, SOB. D/W Son Mykel today   : Pt seen and examined at bedside. No agitation overnight. Pt is tired this morning. Has no complaints other than his chronic lower back pain. Denies chest pain, abdominal pain SOB. Pt wants to go home. D/W Son Angelito today   : Pt seen and examined at bedside. No agitation overnight. Pt is tired this morning and wants to sleep. No complaints other than his chronic low back pain. Denies chest pain, SOB, abdominal pain. Pt wants to go.  : Pt seen and examined at bedside. No agitation. Pt is more awake. Continues to report chronic low back pain. No acute complaints at this time. Denies chest pain, SOB, abdominal pain. Wants to go.Social work for d/c planning when bed is available.  : Pt seen and examined at bedside. No agitation overnight. Pt is AAOx1. Knows he is in a hospital but unsure which one. Patient is pleasant and has no acute complaints at this time. Denies fever, chills, CP, palpitations, SOB, abd pain, nausea, vomiting. Low BP S/P 1 Bolus NS  10/1: Pt seen and examined at bedside. No agitation overnight. Patient has no complaints at this time. Just very tired. Wishes to go home. Denies fever, chills, chest pain, SOB, abdominal pain.     OVERNIGHT EVENTS: No overnight events.    Home Medications:  albuterol 2.5 mg/3 mL (0.083%) inhalation solution: 2.5 milligram(s) inhaled every 6 hours as needed for  shortness of breath and/or wheezing (25 Sep 2023 11:00)  ammonium lactate 12% topical lotion: 1 Apply topically to affected area 2 times a day (25 Sep 2023 10:46)  atorvastatin 40 mg oral tablet: 1 tab(s) orally once a day (at bedtime) (25 Sep 2023 11:00)  dapagliflozin 10 mg oral tablet: 1 tab(s) orally every 24 hours (25 Sep 2023 10:46)  divalproex sodium 500 mg oral delayed release tablet: 1 tab(s) orally 2 times a day (25 Sep 2023 10:37)  DULoxetine 30 mg oral delayed release capsule: 1 cap(s) orally 2 times a day (25 Sep 2023 11:00)  furosemide 40 mg oral tablet: 1 tab(s) orally every 12 hours (25 Sep 2023 11:00)  melatonin 5 mg oral tablet: 1 tab(s) orally once a day (at bedtime) As needed Insomnia (25 Sep 2023 10:46)  METFORMIN 500MG TABLETS: TAKE 1 TABLET BY MOUTH EVERY MORNING AND EVERY EVENING WITH MEALS. (20 Sep 2023 21:58)  methyl salicylate-menthol 15%-10% topical cream: 1 Apply topically to affected area 3 times a day (25 Sep 2023 10:46)  metoprolol succinate 25 mg oral tablet, extended release: 1 tab(s) orally once a day (25 Sep 2023 11:00)  midodrine 5 mg oral tablet: 1 tab(s) orally 3 times a day (25 Sep 2023 11:00)  OLANZapine 10 mg oral tablet, disintegratin tab(s) orally once a day (at bedtime) (27 Sep 2023 08:38)  OLANZapine 5 mg oral tablet, disintegratin tab(s) orally 2 times a day (27 Sep 2023 08:38)  pantoprazole 40 mg oral delayed release tablet: 1 tab(s) orally once a day (before a meal) (25 Sep 2023 10:46)  POTASSIUM CL 20MEQ ER TABLETS: TAKE 1 TABLET BY MOUTH EVERY DAY WITH FOOD FOR 4 DAYS (20 Sep 2023 21:58)  sacubitril-valsartan 24 mg-26 mg oral tablet: 1 tab(s) orally 2 times a day (25 Sep 2023 11:00)  spironolactone 25 mg oral tablet: 1 tab(s) orally once a day (25 Sep 2023 10:46)  TRELEGY ELLIPTA 100-62.5MCG INH 30P: INHALE 1 PUFF BY MOUTH EVERY DAY (20 Sep 2023 21:58)      MEDICATIONS  (STANDING):  ammonium lactate 12% Lotion 1 Application(s) Topical two times a day  atorvastatin 40 milliGRAM(s) Oral at bedtime  budesonide  80 MICROgram(s)/formoterol 4.5 MICROgram(s) Inhaler 2 Puff(s) Inhalation two times a day  dapagliflozin 10 milliGRAM(s) Oral every 24 hours  dextrose 5%. 1000 milliLiter(s) (100 mL/Hr) IV Continuous <Continuous>  dextrose 5%. 1000 milliLiter(s) (50 mL/Hr) IV Continuous <Continuous>  dextrose 50% Injectable 25 Gram(s) IV Push once  dextrose 50% Injectable 12.5 Gram(s) IV Push once  dextrose 50% Injectable 25 Gram(s) IV Push once  divalproex  milliGRAM(s) Oral two times a day  DULoxetine 30 milliGRAM(s) Oral <User Schedule>  enoxaparin Injectable 40 milliGRAM(s) SubCutaneous every 24 hours  furosemide    Tablet 40 milliGRAM(s) Oral every 12 hours  glucagon  Injectable 1 milliGRAM(s) IntraMuscular once  insulin lispro (ADMELOG) corrective regimen sliding scale   SubCutaneous at bedtime  insulin lispro (ADMELOG) corrective regimen sliding scale   SubCutaneous three times a day before meals  methyl salicylate 15%/menthol 10% Topical Cream 1 Application(s) Topical three times a day  metoprolol succinate ER 25 milliGRAM(s) Oral daily  midodrine. 5 milliGRAM(s) Oral three times a day  OLANZapine Disintegrating Tablet 5 milliGRAM(s) Oral two times a day  OLANZapine Disintegrating Tablet 10 milliGRAM(s) Oral at bedtime  pantoprazole    Tablet 40 milliGRAM(s) Oral before breakfast  sacubitril 24 mG/valsartan 26 mG 1 Tablet(s) Oral two times a day  spironolactone 25 milliGRAM(s) Oral daily  tiotropium 2.5 MICROgram(s) Inhaler 2 Puff(s) Inhalation daily    MEDICATIONS  (PRN):  acetaminophen     Tablet .. 650 milliGRAM(s) Oral every 6 hours PRN Temp greater or equal to 38C (100.4F), Mild Pain (1 - 3)  albuterol    0.083% 2.5 milliGRAM(s) Nebulizer every 6 hours PRN Shortness of Breath and/or Wheezing  dextrose Oral Gel 15 Gram(s) Oral once PRN Blood Glucose LESS THAN 70 milliGRAM(s)/deciliter  melatonin 5 milliGRAM(s) Oral at bedtime PRN Insomnia      Allergies    No Known Allergies    Intolerances        Social History:  Tobacco: former , quit >30 years ago  EtOH: social drinker  Recreational drug use: denies   Lives with: Assisted Living Facility  Ambulates: without assistance  ADLs: needs assistance (20 Sep 2023 21:58)      REVIEW OF SYSTEMS:  CONSTITUTIONAL: No fever, No chills, No fatigue, No myalgia, No Body ache, No Weakness  EYES: No eye pain,  No visual disturbances, No discharge, NO Redness  ENMT:  No ear pain, No nose bleed, No vertigo; No sinus pain, NO throat pain, No Congestion  NECK: No pain, No stiffness  RESPIRATORY: No cough, NO wheezing, No  hemoptysis, NO  shortness of breath  CARDIOVASCULAR: No chest pain, palpitations  GASTROINTESTINAL: No abdominal pain, NO epigastric pain. No nausea, No vomiting; No diarrhea, No constipation. [ x ] BM - LBM  GENITOURINARY: No dysuria, No frequency, No urgency, No hematuria, NO incontinence  NEUROLOGICAL: No headaches, No dizziness, No numbness, No tingling, No tremors, No weakness  EXT: No Swelling, No Pain, No Edema  SKIN:  [ x ] No itching, burning, rashes, or lesions   MUSCULOSKELETAL: No joint pain ,No Jt swelling; No muscle pain, No extremity pain  PSYCHIATRIC: No depression,  No anxiety,  No mood swings ,No difficulty sleeping at night  PAIN SCALE: [ x ] None  [ ] Other    Vital Signs Last 24 Hrs  T(C): 36.4 (01 Oct 2023 05:27), Max: 36.8 (30 Sep 2023 20:24)  T(F): 97.6 (01 Oct 2023 05:27), Max: 98.2 (30 Sep 2023 20:24)  HR: 87 (01 Oct 2023 05:27) (85 - 93)  BP: 94/58 (01 Oct 2023 05:27) (94/58 - 100/65)  BP(mean): --  RR: 17 (01 Oct 2023 05:27) (17 - 18)  SpO2: 98% (01 Oct 2023 05:27) (92% - 98%)    Parameters below as of 01 Oct 2023 05:27  Patient On (Oxygen Delivery Method): room air      Finger Stick          PHYSICAL EXAM:  GENERAL:  [ x ] NAD , [ x ] well appearing, but tired [  ] Agitated, [  ] Drowsy,  [  ] Lethargy, [  ] confused   HEAD:  [ x ] Normal, [  ] Other  EYES:  [ x ] EOMI, [  ] PERRLA, [ x ] conjunctiva and sclera clear normal, [  ] Other,  [  ] Pallor,[  ] Discharge  ENMT:  [ x ] Normal, [ x ] Moist mucous membranes, [ x ] Good dentition, [ x ] No Thrush  NECK:  [ x ] Supple, [ x ] No JVD, [ x ] Normal thyroid, [  ] Lymphadenopathy [  ] Other  CHEST/LUNG:  [ x ] Clear to auscultation bilaterally, [ x ] Breath Sounds equal B/L / Decrease, [ x ] poor effort  [ x ] No rales, [ x ] No rhonchi  [ x ]  No wheezing,   HEART:  [ x ] Regular rate and rhythm, [  ] tachycardia, [  ] Bradycardia,  [  ] irregular  [ x ] No murmurs, No rubs, No gallops, [  ] PPM in place (Mfr:  )  ABDOMEN:  [ x ] Soft, [ x ] Nontender, [ x ] Nondistended, [ x ] No mass, [ x ] Bowel sounds present, [ x ] obese  NERVOUS SYSTEM:  [ x ] Alert & Oriented X2 (name, place), [ x ] Nonfocal  [  ] Confusion  [  ] Encephalopathic [  ] Sedated [  ] Unable to assess, [  ] Dementia [ x ] Other- responds appropriately to commands and questions.   EXTREMITIES: [ x ] 2+ Peripheral Pulses, No clubbing, No cyanosis,  [x ] pitting 1+ edema B/L lower EXT. [ x ]  severe PVD stasis skin changes B/L Lower EXT, + Scabs  [ x ] wound-Dry Scabs on b/l Lower Ext , B/L Hand small Wound   LYMPH: No lymphadenopathy noted  SKIN:  [ x ] No rashes or lesions, [  ] Pressure Ulcers, [  ] ecchymosis, [  ] Skin Tears, [  ] Other    DIET: Diet, Regular:   Consistent Carbohydrate Evening Snack  DASH/TLC Sodium & Cholesterol Restricted (23 @ 22:36)      LABS:                        14.3   6.75  )-----------( 132      ( 01 Oct 2023 09:44 )             44.2     01 Oct 2023 06:40    140    |  105    |  37     ----------------------------<  90     3.8     |  27     |  1.20     Ca    8.9        01 Oct 2023 06:40    TPro  6.8    /  Alb  3.2    /  TBili  1.0    /  DBili  x      /  AST  21     /  ALT  21     /  AlkPhos  64     01 Oct 2023 06:40      Urinalysis Basic - ( 01 Oct 2023 06:40 )    Color: x / Appearance: x / SG: x / pH: x  Gluc: 90 mg/dL / Ketone: x  / Bili: x / Urobili: x   Blood: x / Protein: x / Nitrite: x   Leuk Esterase: x / RBC: x / WBC x   Sq Epi: x / Non Sq Epi: x / Bacteria: x                           Anemia Panel:      Thyroid Panel:                RADIOLOGY & ADDITIONAL TESTS:  None    HEALTH ISSUES - PROBLEM Dx:  Paranoia    HFrEF (heart failure with reduced ejection fraction)    HTN (hypertension)    HLD (hyperlipidemia)    Need for prophylactic measure    Medication management    DM2 (diabetes mellitus, type 2)    Anemia    Hypotension            Consultant(s) Notes Reviewed:  [ x ] YES     Care Discussed with [X] Consultants  [ x ] Patient  [  ] Family [  ] HCP [  ]   [  ] Social Service  [  ] RN, [  ] Physical Therapy,[  ] Palliative care team  DVT PPX: [  ] Lovenox, [ x ] S C Heparin, [  ] Coumadin, [  ] Xarelto, [  ] Eliquis, [  ] Pradaxa, [  ] IV Heparin drip, [  ] SCD [  ] Contraindication 2 to GI Bleed,[  ] Ambulation [  ] Contraindicated 2 to  bleed [  ] Contraindicated 2 to Brain Bleed  Advanced directive: [ x ] None, [  ] DNR/DNI Patient is a 83y old  Male who presents with a chief complaint of paranoia (30 Sep 2023 15:55)    HPI:  84y/o M PMH dementia, HFrEF s/p PPM, HTN, HLD  presents with increased agitation. History obtained from patient's daughter Arianne over phone.   Patient moved from [place] to [place] ~2months ago and has been having issues with increased paranoia for the past month. He stays at a 55 [facility] older assisted living community where he has been having several episodes of sundowning.   Recently he was hospitalized at [facility] for same issues from -. Daughter was unable to provide sufficient history regarding hospitalization.   Patient's son, Jose has been taking care of him and knows more about his medical hx however patient's daughter states she is taking over now and did not want to bother Jose at this time.  Patient was also recently seen at [facility] ED for episodes of agitation and was told to follow up with outpatient Psychiatrist but daughter states that likely did not happened because of his increased agitation.  Patient became agitated today and staff had to call 911 for further evaluation. The facility as well as his children feel that his current psych medication regimen is not sufficient to keep him calm.   Per review of recent dispensary history, he was seeing a psychiatric NP (Barbara Bravo) in [place], [place].   Of note, he had placement of PPM in late August at [facility]. Per his daughter, his LVEF at that time was 20% before and after PPM placement.  Currently, patient states his b/l legs feel itchy but otherwise he feels fine and is agreeable.  Denies fevers, chills. sob, cp, n/v/d.    ED Course   T 97.4 F  /79 RR 18 SpO2 95% on RA  Labs H/H 12.1/37.5 Glu   UA: >1000 glucose  UTox: negaitve     In the ED, given tylenol 650mg x1 , zyprexa 5mg IVP x1  (20 Sep 2023 21:58)    INTERVAL HPI:  : Pt seen and examined, sitting in chair with 1 to 1 present. Patient expressed concern about not receiving his pantoprazole, stating he cant digest his food without it, denies n/v, epigastric pain or burning. When asked about why pt came to hospital, states that he was being followed and he called the police to come help him. Says he lives with his son Jose. EMS brought patient in from  and over [facility]. Additionally states that one pill he was given this morning by staff was "phony" so he didn't take it. Contacted pt's pharmacy [facility], numerous scripts filled in [place] and [place]. Per night team sign out, daughter states that pt has been in and out of many ERs and has been started on many different medications so there are a lot of recent meds that he does not currently take to the best of her knowledge. Per daughter, her brothers are tired of assisting with their father given history of difficulties with him. She is willing to assist but does not want to be too involved and asks us to not contact her brothers. Psychiatry consulted and to see patient. JOHN/VAISHALI updated.   Soren Krishnan [phone]  : Pt seen and examined at bedside. xyprexa dose increased to 5mg bedtime and 2.5mg in AM starting yesterday. Marked improvement in demeanor. A&Ox3 (person, time, place). States he is in a "police hospital" and that he came here because he asked some man in their car to call the police because he was being followed by an aide that his son hired. Said he came from "[place] [id number] and over [facility]. No complaints or concerns today. States he has to get things ready to move back to [place]. Psych is following. DSC planning pending placement as his previous community does not want pt to return and family does not want to take patient. Replace PO K   : Pt seen and examined at bedside. Depakote increased to 500mg PO BID, amitriptyline stopped as per psych recs. Pt with no complaints or concerns. Pleasant on encounter, says he is doing well and thanks provider for checking in on him.  Reports that he is waiting to go home. Patient informed we are working to get him out of the hospital. DSC planning pending placement. JOHN sent referral to [facility]. Awaiting decision.   :  Pt seen and examined at bedside. Pt reports that is feeling well and does not have any symptoms, pain or concern. Pt is tolerating PO and eating well.  Pt is cooperative with evaluation and pleasant.  Waiting for placement for dc. Awaiting placement.   : Pt seen and examined at bedside. Reports last night he was looking for 3 medications that were started in the hospital at night. Reassured that he is receiving all the proper medications he needs. Appetite is good. Pt has no complaints at this time. Now off 1:1.  : Pt seen and examined at bedside. Was not agitated overnight. Pt is very tired this morning. Reports chronic low back pain, but is not concerned. Otherwise he has no active complaints at  this time. Denies chest pain, abdominal pain, SOB. D/W Son Myekl today   : Pt seen and examined at bedside. No agitation overnight. Pt is tired this morning. Has no complaints other than his chronic lower back pain. Denies chest pain, abdominal pain SOB. Pt wants to go home. D/W Son Angelito today   : Pt seen and examined at bedside. No agitation overnight. Pt is tired this morning and wants to sleep. No complaints other than his chronic low back pain. Denies chest pain, SOB, abdominal pain. Pt wants to go.  : Pt seen and examined at bedside. No agitation. Pt is more awake. Continues to report chronic low back pain. No acute complaints at this time. Denies chest pain, SOB, abdominal pain. Wants to go.Social work for d/c planning when bed is available.  : Pt seen and examined at bedside. No agitation overnight. Pt is AAOx1. Knows he is in a hospital but unsure which one. Patient is pleasant and has no acute complaints at this time. Denies fever, chills, CP, palpitations, SOB, abd pain, nausea, vomiting. Low BP S/P 1 Bolus NS  10/1: Pt seen and examined at bedside. No agitation overnight. Patient has no complaints at this time. Just very tired. Wishes to go home. Denies fever, chills, chest pain, SOB, abdominal pain. stable for d/c     OVERNIGHT EVENTS: No overnight events.    Home Medications:  albuterol 2.5 mg/3 mL (0.083%) inhalation solution: 2.5 milligram(s) inhaled every 6 hours as needed for  shortness of breath and/or wheezing (25 Sep 2023 11:00)  ammonium lactate 12% topical lotion: 1 Apply topically to affected area 2 times a day (25 Sep 2023 10:46)  atorvastatin 40 mg oral tablet: 1 tab(s) orally once a day (at bedtime) (25 Sep 2023 11:00)  dapagliflozin 10 mg oral tablet: 1 tab(s) orally every 24 hours (25 Sep 2023 10:46)  divalproex sodium 500 mg oral delayed release tablet: 1 tab(s) orally 2 times a day (25 Sep 2023 10:37)  DULoxetine 30 mg oral delayed release capsule: 1 cap(s) orally 2 times a day (25 Sep 2023 11:00)  furosemide 40 mg oral tablet: 1 tab(s) orally every 12 hours (25 Sep 2023 11:00)  melatonin 5 mg oral tablet: 1 tab(s) orally once a day (at bedtime) As needed Insomnia (25 Sep 2023 10:46)  METFORMIN 500MG TABLETS: TAKE 1 TABLET BY MOUTH EVERY MORNING AND EVERY EVENING WITH MEALS. (20 Sep 2023 21:58)  methyl salicylate-menthol 15%-10% topical cream: 1 Apply topically to affected area 3 times a day (25 Sep 2023 10:46)  metoprolol succinate 25 mg oral tablet, extended release: 1 tab(s) orally once a day (25 Sep 2023 11:00)  midodrine 5 mg oral tablet: 1 tab(s) orally 3 times a day (25 Sep 2023 11:00)  OLANZapine 10 mg oral tablet, disintegratin tab(s) orally once a day (at bedtime) (27 Sep 2023 08:38)  OLANZapine 5 mg oral tablet, disintegratin tab(s) orally 2 times a day (27 Sep 2023 08:38)  pantoprazole 40 mg oral delayed release tablet: 1 tab(s) orally once a day (before a meal) (25 Sep 2023 10:46)  POTASSIUM CL 20MEQ ER TABLETS: TAKE 1 TABLET BY MOUTH EVERY DAY WITH FOOD FOR 4 DAYS (20 Sep 2023 21:58)  sacubitril-valsartan 24 mg-26 mg oral tablet: 1 tab(s) orally 2 times a day (25 Sep 2023 11:00)  spironolactone 25 mg oral tablet: 1 tab(s) orally once a day (25 Sep 2023 10:46)  TRELEGY ELLIPTA 100-62.5MCG INH 30P: INHALE 1 PUFF BY MOUTH EVERY DAY (20 Sep 2023 21:58)      MEDICATIONS  (STANDING):  ammonium lactate 12% Lotion 1 Application(s) Topical two times a day  atorvastatin 40 milliGRAM(s) Oral at bedtime  budesonide  80 MICROgram(s)/formoterol 4.5 MICROgram(s) Inhaler 2 Puff(s) Inhalation two times a day  dapagliflozin 10 milliGRAM(s) Oral every 24 hours  dextrose 5%. 1000 milliLiter(s) (100 mL/Hr) IV Continuous <Continuous>  dextrose 5%. 1000 milliLiter(s) (50 mL/Hr) IV Continuous <Continuous>  dextrose 50% Injectable 25 Gram(s) IV Push once  dextrose 50% Injectable 12.5 Gram(s) IV Push once  dextrose 50% Injectable 25 Gram(s) IV Push once  divalproex  milliGRAM(s) Oral two times a day  DULoxetine 30 milliGRAM(s) Oral <User Schedule>  enoxaparin Injectable 40 milliGRAM(s) SubCutaneous every 24 hours  furosemide    Tablet 40 milliGRAM(s) Oral every 12 hours  glucagon  Injectable 1 milliGRAM(s) IntraMuscular once  insulin lispro (ADMELOG) corrective regimen sliding scale   SubCutaneous at bedtime  insulin lispro (ADMELOG) corrective regimen sliding scale   SubCutaneous three times a day before meals  methyl salicylate 15%/menthol 10% Topical Cream 1 Application(s) Topical three times a day  metoprolol succinate ER 25 milliGRAM(s) Oral daily  midodrine. 5 milliGRAM(s) Oral three times a day  OLANZapine Disintegrating Tablet 5 milliGRAM(s) Oral two times a day  OLANZapine Disintegrating Tablet 10 milliGRAM(s) Oral at bedtime  pantoprazole    Tablet 40 milliGRAM(s) Oral before breakfast  sacubitril 24 mG/valsartan 26 mG 1 Tablet(s) Oral two times a day  spironolactone 25 milliGRAM(s) Oral daily  tiotropium 2.5 MICROgram(s) Inhaler 2 Puff(s) Inhalation daily    MEDICATIONS  (PRN):  acetaminophen     Tablet .. 650 milliGRAM(s) Oral every 6 hours PRN Temp greater or equal to 38C (100.4F), Mild Pain (1 - 3)  albuterol    0.083% 2.5 milliGRAM(s) Nebulizer every 6 hours PRN Shortness of Breath and/or Wheezing  dextrose Oral Gel 15 Gram(s) Oral once PRN Blood Glucose LESS THAN 70 milliGRAM(s)/deciliter  melatonin 5 milliGRAM(s) Oral at bedtime PRN Insomnia      Allergies    No Known Allergies    Intolerances        Social History:  Tobacco: former , quit >30 years ago  EtOH: social drinker  Recreational drug use: denies   Lives with: Assisted Living Facility  Ambulates: without assistance  ADLs: needs assistance (20 Sep 2023 21:58)      REVIEW OF SYSTEMS: i feel fine , i want to go  CONSTITUTIONAL: No fever, No chills, No fatigue, No myalgia, No Body ache, No Weakness  EYES: No eye pain,  No visual disturbances, No discharge, NO Redness  ENMT:  No ear pain, No nose bleed, No vertigo; No sinus pain, NO throat pain, No Congestion  NECK: No pain, No stiffness  RESPIRATORY: No cough, NO wheezing, No  hemoptysis, NO  shortness of breath  CARDIOVASCULAR: No chest pain, palpitations  GASTROINTESTINAL: No abdominal pain, NO epigastric pain. No nausea, No vomiting; No diarrhea, No constipation. [ x ] BM - LBM  GENITOURINARY: No dysuria, No frequency, No urgency, No hematuria, NO incontinence  NEUROLOGICAL: No headaches, No dizziness, No numbness, No tingling, No tremors, No weakness  EXT: No Swelling, No Pain, No Edema  SKIN:  [ x ] No itching, burning, rashes, or lesions   MUSCULOSKELETAL: No joint pain ,No Jt swelling; No muscle pain, No extremity pain  PSYCHIATRIC: No depression,  No anxiety,  No mood swings ,No difficulty sleeping at night  PAIN SCALE: [ x ] None  [ ] Other    Vital Signs Last 24 Hrs  T(C): 36.4 (01 Oct 2023 05:27), Max: 36.8 (30 Sep 2023 20:24)  T(F): 97.6 (01 Oct 2023 05:27), Max: 98.2 (30 Sep 2023 20:24)  HR: 87 (01 Oct 2023 05:27) (85 - 93)  BP: 94/58 (01 Oct 2023 05:27) (94/58 - 100/65)  BP(mean): --  RR: 17 (01 Oct 2023 05:27) (17 - 18)  SpO2: 98% (01 Oct 2023 05:27) (92% - 98%)    Parameters below as of 01 Oct 2023 05:27  Patient On (Oxygen Delivery Method): room air      Finger Stick          PHYSICAL EXAM:  GENERAL:  [ x ] NAD , [ x ] well appearing, but tired [  ] Agitated, [  ] Drowsy,  [  ] Lethargy, [  ] confused   HEAD:  [ x ] Normal, [  ] Other  EYES:  [ x ] EOMI, [  ] PERRLA, [ x ] conjunctiva and sclera clear normal, [  ] Other,  [  ] Pallor,[  x] Discharge-crusting b/l eye  ENMT:  [ x ] Normal, [ x ] Moist mucous membranes, [ x ] Good dentition, [ x ] No Thrush  NECK:  [ x ] Supple, [ x ] No JVD, [ x ] Normal thyroid, [  ] Lymphadenopathy [  ] Other  CHEST/LUNG:  [ x ] Clear to auscultation bilaterally, [ x ] Breath Sounds equal B/L / Decrease, [ x ] poor effort  [ x ] No rales, [ x ] No rhonchi  [ x ]  No wheezing,   HEART:  [ x ] Regular rate and rhythm, [  ] tachycardia, [  ] Bradycardia,  [  ] irregular  [ x ] No murmurs, No rubs, No gallops, [  ] PPM in place (Mfr:  )  ABDOMEN:  [ x ] Soft, [ x ] Nontender, [ x ] Nondistended, [ x ] No mass, [ x ] Bowel sounds present, [ x ] obese  NERVOUS SYSTEM:  [ x ] Alert & Oriented X2 (name, place), [ x ] Nonfocal  [  ] Confusion  [  ] Encephalopathic [  ] Sedated [  ] Unable to assess, [  ] Dementia [ x ] Other- responds appropriately to commands and questions.   EXTREMITIES: [ x ] 2+ Peripheral Pulses, No clubbing, No cyanosis,  [x ] pitting 1+ edema B/L lower EXT. [ x ]  severe PVD stasis skin changes B/L Lower EXT, + Scabs  [ x ] wound-Dry Scabs on b/l Lower Ext , B/L Hand small Wound   LYMPH: No lymphadenopathy noted  SKIN:  [ x ] No rashes or lesions, [  ] Pressure Ulcers, [  ] ecchymosis, [  ] Skin Tears, [  ] Other    DIET: Diet, Regular:   Consistent Carbohydrate Evening Snack  DASH/TLC Sodium & Cholesterol Restricted (23 @ 22:36)      LABS:                        14.3   6.75  )-----------( 132      ( 01 Oct 2023 09:44 )             44.2     01 Oct 2023 06:40    140    |  105    |  37     ----------------------------<  90     3.8     |  27     |  1.20     Ca    8.9        01 Oct 2023 06:40    TPro  6.8    /  Alb  3.2    /  TBili  1.0    /  DBili  x      /  AST  21     /  ALT  21     /  AlkPhos  64     01 Oct 2023 06:40      Urinalysis Basic - ( 01 Oct 2023 06:40 )    Color: x / Appearance: x / SG: x / pH: x  Gluc: 90 mg/dL / Ketone: x  / Bili: x / Urobili: x   Blood: x / Protein: x / Nitrite: x   Leuk Esterase: x / RBC: x / WBC x   Sq Epi: x / Non Sq Epi: x / Bacteria: x      RADIOLOGY & ADDITIONAL TESTS:  None    HEALTH ISSUES - PROBLEM Dx:  Paranoia    HFrEF (heart failure with reduced ejection fraction)    HTN (hypertension)    HLD (hyperlipidemia)    Need for prophylactic measure    Medication management    DM2 (diabetes mellitus, type 2)    Anemia    Hypotension            Consultant(s) Notes Reviewed:  [ x ] YES     Care Discussed with [X] Consultants  [ x ] Patient  [  ] Family [  ] HCP [  ]   [  ] Social Service  [  ] RN, [  ] Physical Therapy,[  ] Palliative care team  DVT PPX: [  ] Lovenox, [ x ] S C Heparin, [  ] Coumadin, [  ] Xarelto, [  ] Eliquis, [  ] Pradaxa, [  ] IV Heparin drip, [  ] SCD [  ] Contraindication 2 to GI Bleed,[  ] Ambulation [  ] Contraindicated 2 to  bleed [  ] Contraindicated 2 to Brain Bleed  Advanced directive: [ x ] None, [  ] DNR/DNI

## 2023-10-01 NOTE — PROGRESS NOTE ADULT - PROBLEM SELECTOR PLAN 4
BP stable on admission   - Per med dispensary hx patient is on midodrine as well for possible orthostatic hypotension  - BP has been soft, pt asymptomatic.   - Continue home Lasix, metoprolol, Entresto, Spironolactone, hold parameters changed.

## 2023-10-01 NOTE — PROGRESS NOTE ADULT - PROBLEM SELECTOR PLAN 3
BP borderline hypotensive today when getting up, standing, however pt is asx  - likely due to lowered hold parameters on Lasix 40 mg 2x day, Entresto, metoprolol XL, spironolactone, Farxiga.   - continue midrodrine 5mg TID  - s/p 250cc bolus yesterday for low BP BP borderline hypotensive today when getting up, standing, however pt is asx  - likely due to lowered hold parameters on Lasix 40 mg 2x day, Entresto, metoprolol XL, spironolactone, Farxiga.   - continue midodrine 5mg TID---> 7.5 mg 3x day   - s/p 250cc bolus yesterday for low BP

## 2023-10-01 NOTE — PROGRESS NOTE ADULT - PROBLEM SELECTOR PLAN 2
Chronic s/p PPM (paced ~end of Aug 2023 at [place] per daughter )  - TTE (8/2023) per daughter showed EF ~20%  - on Lasix 40 mg 2x day, Entresto, metoprolol XL, spironolactone, Farxiga. Due to soft BP, lowered hold parameters to ensure Pt receives meds.  - Lower extremity edema improving  - Dash/ TLC Diet

## 2023-10-01 NOTE — PROGRESS NOTE ADULT - PROBLEM SELECTOR PLAN 1
Acute paranoia in setting of hx of dementia, ?baseline mental disorder ? Psych Hx- ?Psychosis/ Agitation: unclear hx  - Previous hospitalization at [facility] for similar problem. Unknown if Pt was taking meds prior to admission.    - No infectious or metabolic concomitant disorder. Normal thyroid profile.   - continue current Zyrexa  5 mg 2x day , Zyprexa  10 mg Q HS regimen as per psych.  - Continue Depakote 500mg BID as per Dr. Strange.    - Continue duloxetine 30 mg 2x day.  - STOP amitriptyline, as per psych recs  - Psych (Dr. Strange) D/C 1:1  - SW consulted for d/c plan: plan for [facility] on Monday.  - Now off constant observation - no acute agitation observed

## 2023-10-01 NOTE — PROGRESS NOTE ADULT - PROBLEM SELECTOR PLAN 6
Discussed patient medication list with daughter (Arianne) who confirmed most medications found on surescripts recent dispensary history) however she is not sure of any changes that may have happened when he was hospitalized or when he went to the ED  - Patient has list with him, copy in chart however unclear if that list is up to date  - Current med rec completed based on recent dispensary history  - [facility] pharmacy called: additional medications from pharmacy include allopurinol 300qD, lisinopril 20mg qD, mirabegron 25mg qD, trazodone 50mg qD at bedtime. medications not mentioned by daughter. daughter notes pt has been to numerous different hospitals/ERs and has come back with numerous different medications with no follow up  - plan to hold these additional meds, per psych consult for use of trazodone

## 2023-10-02 LAB
GLUCOSE BLDC GLUCOMTR-MCNC: 119 MG/DL — HIGH (ref 70–99)
GLUCOSE BLDC GLUCOMTR-MCNC: 122 MG/DL — HIGH (ref 70–99)
GLUCOSE BLDC GLUCOMTR-MCNC: 135 MG/DL — HIGH (ref 70–99)
GLUCOSE BLDC GLUCOMTR-MCNC: 94 MG/DL — SIGNIFICANT CHANGE UP (ref 70–99)

## 2023-10-02 PROCEDURE — 99231 SBSQ HOSP IP/OBS SF/LOW 25: CPT

## 2023-10-02 RX ORDER — FUROSEMIDE 40 MG
40 TABLET ORAL DAILY
Refills: 0 | Status: DISCONTINUED | OUTPATIENT
Start: 2023-10-02 | End: 2023-10-03

## 2023-10-02 RX ADMIN — OLANZAPINE 5 MILLIGRAM(S): 15 TABLET, FILM COATED ORAL at 06:31

## 2023-10-02 RX ADMIN — OLANZAPINE 10 MILLIGRAM(S): 15 TABLET, FILM COATED ORAL at 21:00

## 2023-10-02 RX ADMIN — ATORVASTATIN CALCIUM 40 MILLIGRAM(S): 80 TABLET, FILM COATED ORAL at 21:00

## 2023-10-02 RX ADMIN — TIOTROPIUM BROMIDE 2 PUFF(S): 18 CAPSULE ORAL; RESPIRATORY (INHALATION) at 13:27

## 2023-10-02 RX ADMIN — SPIRONOLACTONE 25 MILLIGRAM(S): 25 TABLET, FILM COATED ORAL at 06:30

## 2023-10-02 RX ADMIN — MENTHOL AND METHYL SALICYLATE 1 APPLICATION(S): 10; 30 CREAM TOPICAL at 21:00

## 2023-10-02 RX ADMIN — Medication 650 MILLIGRAM(S): at 00:40

## 2023-10-02 RX ADMIN — DAPAGLIFLOZIN 10 MILLIGRAM(S): 10 TABLET, FILM COATED ORAL at 13:29

## 2023-10-02 RX ADMIN — SACUBITRIL AND VALSARTAN 1 TABLET(S): 24; 26 TABLET, FILM COATED ORAL at 18:50

## 2023-10-02 RX ADMIN — MENTHOL AND METHYL SALICYLATE 1 APPLICATION(S): 10; 30 CREAM TOPICAL at 13:29

## 2023-10-02 RX ADMIN — GENTAMICIN SULFATE 1 DROP(S): 3 SOLUTION/ DROPS OPHTHALMIC at 20:58

## 2023-10-02 RX ADMIN — MIDODRINE HYDROCHLORIDE 7.5 MILLIGRAM(S): 2.5 TABLET ORAL at 13:28

## 2023-10-02 RX ADMIN — Medication 650 MILLIGRAM(S): at 16:18

## 2023-10-02 RX ADMIN — GENTAMICIN SULFATE 1 DROP(S): 3 SOLUTION/ DROPS OPHTHALMIC at 23:49

## 2023-10-02 RX ADMIN — DIVALPROEX SODIUM 500 MILLIGRAM(S): 500 TABLET, DELAYED RELEASE ORAL at 18:50

## 2023-10-02 RX ADMIN — GENTAMICIN SULFATE 1 DROP(S): 3 SOLUTION/ DROPS OPHTHALMIC at 16:29

## 2023-10-02 RX ADMIN — PANTOPRAZOLE SODIUM 40 MILLIGRAM(S): 20 TABLET, DELAYED RELEASE ORAL at 06:31

## 2023-10-02 RX ADMIN — DULOXETINE HYDROCHLORIDE 30 MILLIGRAM(S): 30 CAPSULE, DELAYED RELEASE ORAL at 16:29

## 2023-10-02 RX ADMIN — Medication 650 MILLIGRAM(S): at 15:00

## 2023-10-02 RX ADMIN — OLANZAPINE 5 MILLIGRAM(S): 15 TABLET, FILM COATED ORAL at 18:55

## 2023-10-02 RX ADMIN — GENTAMICIN SULFATE 1 DROP(S): 3 SOLUTION/ DROPS OPHTHALMIC at 13:29

## 2023-10-02 RX ADMIN — SACUBITRIL AND VALSARTAN 1 TABLET(S): 24; 26 TABLET, FILM COATED ORAL at 06:30

## 2023-10-02 RX ADMIN — Medication 25 MILLIGRAM(S): at 06:30

## 2023-10-02 RX ADMIN — DULOXETINE HYDROCHLORIDE 30 MILLIGRAM(S): 30 CAPSULE, DELAYED RELEASE ORAL at 06:36

## 2023-10-02 RX ADMIN — GENTAMICIN SULFATE 1 DROP(S): 3 SOLUTION/ DROPS OPHTHALMIC at 09:47

## 2023-10-02 RX ADMIN — Medication 40 MILLIGRAM(S): at 06:30

## 2023-10-02 RX ADMIN — ENOXAPARIN SODIUM 40 MILLIGRAM(S): 100 INJECTION SUBCUTANEOUS at 20:58

## 2023-10-02 RX ADMIN — DIVALPROEX SODIUM 500 MILLIGRAM(S): 500 TABLET, DELAYED RELEASE ORAL at 06:31

## 2023-10-02 RX ADMIN — Medication 650 MILLIGRAM(S): at 01:35

## 2023-10-02 RX ADMIN — MIDODRINE HYDROCHLORIDE 7.5 MILLIGRAM(S): 2.5 TABLET ORAL at 06:33

## 2023-10-02 RX ADMIN — BUDESONIDE AND FORMOTEROL FUMARATE DIHYDRATE 2 PUFF(S): 160; 4.5 AEROSOL RESPIRATORY (INHALATION) at 13:26

## 2023-10-02 RX ADMIN — MIDODRINE HYDROCHLORIDE 7.5 MILLIGRAM(S): 2.5 TABLET ORAL at 16:28

## 2023-10-02 RX ADMIN — BUDESONIDE AND FORMOTEROL FUMARATE DIHYDRATE 2 PUFF(S): 160; 4.5 AEROSOL RESPIRATORY (INHALATION) at 18:52

## 2023-10-02 RX ADMIN — Medication 1 APPLICATION(S): at 18:52

## 2023-10-02 RX ADMIN — Medication 650 MILLIGRAM(S): at 23:30

## 2023-10-02 NOTE — PROGRESS NOTE ADULT - PROBLEM SELECTOR PLAN 4
BP stable on admission   - Per med dispensary hx patient is on midodrine as well for possible orthostatic hypotension  - BP has been soft, pt asymptomatic.   - Continue home Lasix, metoprolol, Entresto, Spironolactone, hold parameters changed. H/H 12.1/37.5 on admission  - Mild anemia stable.   - Folate, B12: wnl  - No signs of bleeding.

## 2023-10-02 NOTE — PROGRESS NOTE ADULT - PROBLEM SELECTOR PLAN 3
BP borderline hypotensive today when getting up, standing, however pt is asx  - likely due to lowered hold parameters on Lasix 40 mg 2x day, Entresto, metoprolol XL, spironolactone, Farxiga.   - lasix 40mg QD now after confirming Pt med w/ pharmacy.  - continue midodrine 5mg TID---> 7.5 mg 3x day for soft BP.  - s/p 250cc bolus yesterday for low BP BP borderline hypotensive today when getting up, standing, however pt is asx  - likely due to lowered hold parameters on Lasix 40 mg 2x day, Entresto, metoprolol XL, spironolactone, Farxiga.   - lasix 40mg QD now after confirming Pt med w/ pharmacy.  - continue midodrine 5mg TID---> 7.5 mg 3x day for soft BP.

## 2023-10-02 NOTE — PROGRESS NOTE ADULT - PROBLEM SELECTOR PLAN 9
-heparin 5000 q8h    #DSC planning: [facility] on monday.

## 2023-10-02 NOTE — PROGRESS NOTE ADULT - PROBLEM SELECTOR PLAN 7
Chronic, controlled with A1c 6.5  - Hold home metformin, FARXIGA inpatient  - Low ISS  - Regular finger sticks  - DASH/TLC Consistent carb diet  - Hypoglycemic protocol. Chronic  - Continue home statin

## 2023-10-02 NOTE — PROGRESS NOTE ADULT - PROBLEM SELECTOR PLAN 2
Chronic s/p PPM (paced ~end of Aug 2023 at [place] per daughter )  - TTE (8/2023) per daughter showed EF ~20%  - on Lasix 40 mg 2x day, Entresto, metoprolol XL, spironolactone, Farxiga. Due to soft BP, lowered hold parameters to ensure Pt receives meds. - after confirming w/ pharmacy, discovered lasix 40mg QD.   - Lower extremity edema improving  - Will continue lasix 40mg QD.   - Dash/ TLC Diet Chronic s/p PPM (paced ~end of Aug 2023 at [place] per daughter )  - TTE (8/2023) per daughter showed EF ~20%  - on Lasix 40 mg 2x day---> Change to  Lasix 40mg QD. ,  - Entresto, metoprolol XL, spironolactone, Farxiga. Due to soft BP, lowered hold parameters to ensure Pt receives meds. - after confirming w/ pharmacy,    - Lower extremity edema improving  - Will continue Lasix 40mg QD.   - Dash/ TLC Diet

## 2023-10-02 NOTE — PROGRESS NOTE ADULT - PROBLEM SELECTOR PLAN 1
Acute paranoia in setting of hx of dementia, ?baseline mental disorder ? Psych Hx- ?Psychosis/ Agitation: unclear hx  - Previous hospitalization at [place] for similar problem. Unknown if Pt was taking meds prior to admission.    - No infectious or metabolic concomitant disorder. Normal thyroid profile.   - continue current Zyrexa 5 mg 2x day, Zyprexa 10 mg Q HS regimen as per psych.  - Continue Depakote 500mg BID as per Dr. Strange.    - Continue duloxetine 30 mg 2x day.  - STOP amitriptyline, as per psych recs  - Psych (Dr. Strange) D/C 1:1  - SW consulted for d/c plan: plan for [facility] on Monday.  - Now off constant observation - no acute agitation observed

## 2023-10-02 NOTE — SOCIAL WORK PROGRESS NOTE - NSSWPROGRESSNOTE_GEN_ALL_CORE
SW spoke with [facility] and pt's TIN was reviewed - they are unable to accommodate at this time. JOHN spoke with pt son and son was advised that [id number] was sent. Per son, [facility] has room available on Wednesday. All clinicals were sent and pending response. SW send and will follow/ remain available for any needs.

## 2023-10-02 NOTE — PROGRESS NOTE ADULT - SUBJECTIVE AND OBJECTIVE BOX
Patient is a 83y old  Male who presents with a chief complaint of paranoia (01 Oct 2023 10:45)    HPI:  84y/o M PMH dementia, HFrEF s/p PPM, HTN, HLD  presents with increased agitation. History obtained from patient's daughter Arianne over phone.   Patient moved from [place] to [place] ~2months ago and has been having issues with increased paranoia for the past month. He stays at a [facility] older assisted living community where he has been having several episodes of sundowning.   Recently he was hospitalized at [facility] for same issues from -. Daughter was unable to provide sufficient history regarding hospitalization.   Patient's son, Jose has been taking care of him and knows more about his medical hx however patient's daughter states she is taking over now and did not want to bother Jose at this time.  Patient was also recently seen at [facility] ED for episodes of agitation and was told to follow up with outpatient Psychiatrist but daughter states that likely did not happened because of his increased agitation.  Patient became agitated today and staff had to call 911 for further evaluation. The facility as well as his children feel that his current psych medication regimen is not sufficient to keep him calm.   Per review of recent dispensary history, he was seeing a psychiatric NP (Barbara Bravo) in [place], [place].   Of note, he had placement of PPM in late August at [facility]. Per his daughter, his LVEF at that time was 20% before and after PPM placement.  Currently, patient states his b/l legs feel itchy but otherwise he feels fine and is agreeable.  Denies fevers, chills. sob, cp, n/v/d.    ED Course   T 97.4 F  /79 RR 18 SpO2 95% on RA  Labs H/H 12.1/37.5 Glu   UA: >1000 glucose  UTox: negaitve     In the ED, given tylenol 650mg x1 , zyprexa 5mg IVP x1  (20 Sep 2023 21:58)    INTERVAL HPI:  : Pt seen and examined, sitting in chair with 1 to 1 present. Patient expressed concern about not receiving his pantoprazole, stating he cant digest his food without it, denies n/v, epigastric pain or burning. When asked about why pt came to hospital, states that he was being followed and he called the police to come help him. Says he lives with his son Jose. EMS brought patient in from  and over [facility]. Additionally states that one pill he was given this morning by staff was "phony" so he didn't take it. Contacted pt's pharmacy [facility], numerous scripts filled in [place] and [place]. Per night team sign out, daughter states that pt has been in and out of many ERs and has been started on many different medications so there are a lot of recent meds that he does not currently take to the best of her knowledge. Per daughter, her brothers are tired of assisting with their father given history of difficulties with him. She is willing to assist but does not want to be too involved and asks us to not contact her brothers. Psychiatry consulted and to see patient. JOHN/VAISHALI updated.   Soren Krishnan [phone]  : Pt seen and examined at bedside. xyprexa dose increased to 5mg bedtime and 2.5mg in AM starting yesterday. Marked improvement in demeanor. A&Ox3 (person, time, place). States he is in a "police hospital" and that he came here because he asked some man in their car to call the police because he was being followed by an aide that his son hired. Said he came from "[place] [id number] and over [facility]. No complaints or concerns today. States he has to get things ready to move back to [place]. Psych is following. DSC planning pending placement as his previous community does not want pt to return and family does not want to take patient. Replace PO K   : Pt seen and examined at bedside. Depakote increased to 500mg PO BID, amitriptyline stopped as per psych recs. Pt with no complaints or concerns. Pleasant on encounter, says he is doing well and thanks provider for checking in on him.  Reports that he is waiting to go home. Patient informed we are working to get him out of the hospital. DSC planning pending placement. JOHN sent referral to [facility]. Awaiting decision.   :  Pt seen and examined at bedside. Pt reports that is feeling well and does not have any symptoms, pain or concern. Pt is tolerating PO and eating well.  Pt is cooperative with evaluation and pleasant.  Waiting for placement for dc. Awaiting placement.   : Pt seen and examined at bedside. Reports last night he was looking for 3 medications that were started in the hospital at night. Reassured that he is receiving all the proper medications he needs. Appetite is good. Pt has no complaints at this time. Now off 1:1.  : Pt seen and examined at bedside. Was not agitated overnight. Pt is very tired this morning. Reports chronic low back pain, but is not concerned. Otherwise he has no active complaints at  this time. Denies chest pain, abdominal pain, SOB. D/W Son Mykel today   : Pt seen and examined at bedside. No agitation overnight. Pt is tired this morning. Has no complaints other than his chronic lower back pain. Denies chest pain, abdominal pain SOB. Pt wants to go home. D/W Son Angelito today   : Pt seen and examined at bedside. No agitation overnight. Pt is tired this morning and wants to sleep. No complaints other than his chronic low back pain. Denies chest pain, SOB, abdominal pain. Pt wants to go.  : Pt seen and examined at bedside. No agitation. Pt is more awake. Continues to report chronic low back pain. No acute complaints at this time. Denies chest pain, SOB, abdominal pain. Wants to go.Social work for d/c planning when bed is available.  : Pt seen and examined at bedside. No agitation overnight. Pt is AAOx1. Knows he is in a hospital but unsure which one. Patient is pleasant and has no acute complaints at this time. Denies fever, chills, CP, palpitations, SOB, abd pain, nausea, vomiting. Low BP S/P 1 Bolus NS  10/1: Pt seen and examined at bedside. No agitation overnight. Patient has no complaints at this time. Just very tired. Wishes to go home. Denies fever, chills, chest pain, SOB, abdominal pain. stable for d/c  10/2: Patient was seen and examined at bedside. No agitation observed overnight. Patient has no complaints. Wishes to go home. Denies fever, chills, chest pain, SOB, abdominal pain.     OVERNIGHT EVENTS: No acute overnight events.     Home Medications:  albuterol 2.5 mg/3 mL (0.083%) inhalation solution: 2.5 milligram(s) inhaled every 6 hours as needed for  shortness of breath and/or wheezing (25 Sep 2023 11:00)  ammonium lactate 12% topical lotion: 1 Apply topically to affected area 2 times a day (25 Sep 2023 10:46)  atorvastatin 40 mg oral tablet: 1 tab(s) orally once a day (at bedtime) (25 Sep 2023 11:00)  dapagliflozin 10 mg oral tablet: 1 tab(s) orally every 24 hours (25 Sep 2023 10:46)  divalproex sodium 500 mg oral delayed release tablet: 1 tab(s) orally 2 times a day (25 Sep 2023 10:37)  DULoxetine 30 mg oral delayed release capsule: 1 cap(s) orally 2 times a day (25 Sep 2023 11:00)  furosemide 40 mg oral tablet: 1 tab(s) orally every 12 hours (25 Sep 2023 11:00)  melatonin 5 mg oral tablet: 1 tab(s) orally once a day (at bedtime) As needed Insomnia (25 Sep 2023 10:46)  METFORMIN 500MG TABLETS: TAKE 1 TABLET BY MOUTH EVERY MORNING AND EVERY EVENING WITH MEALS. (20 Sep 2023 21:58)  methyl salicylate-menthol 15%-10% topical cream: 1 Apply topically to affected area 3 times a day (25 Sep 2023 10:46)  metoprolol succinate 25 mg oral tablet, extended release: 1 tab(s) orally once a day (25 Sep 2023 11:00)  midodrine 5 mg oral tablet: 1 tab(s) orally 3 times a day (25 Sep 2023 11:00)  OLANZapine 10 mg oral tablet, disintegratin tab(s) orally once a day (at bedtime) (27 Sep 2023 08:38)  OLANZapine 5 mg oral tablet, disintegratin tab(s) orally 2 times a day (27 Sep 2023 08:38)  pantoprazole 40 mg oral delayed release tablet: 1 tab(s) orally once a day (before a meal) (25 Sep 2023 10:46)  POTASSIUM CL 20MEQ ER TABLETS: TAKE 1 TABLET BY MOUTH EVERY DAY WITH FOOD FOR 4 DAYS (20 Sep 2023 21:58)  sacubitril-valsartan 24 mg-26 mg oral tablet: 1 tab(s) orally 2 times a day (25 Sep 2023 11:00)  spironolactone 25 mg oral tablet: 1 tab(s) orally once a day (25 Sep 2023 10:46)  TRELEGY ELLIPTA 100-62.5MCG INH 30P: INHALE 1 PUFF BY MOUTH EVERY DAY (20 Sep 2023 21:58)      MEDICATIONS  (STANDING):  ammonium lactate 12% Lotion 1 Application(s) Topical two times a day  atorvastatin 40 milliGRAM(s) Oral at bedtime  budesonide  80 MICROgram(s)/formoterol 4.5 MICROgram(s) Inhaler 2 Puff(s) Inhalation two times a day  dapagliflozin 10 milliGRAM(s) Oral every 24 hours  dextrose 5%. 1000 milliLiter(s) (100 mL/Hr) IV Continuous <Continuous>  dextrose 5%. 1000 milliLiter(s) (50 mL/Hr) IV Continuous <Continuous>  dextrose 50% Injectable 25 Gram(s) IV Push once  dextrose 50% Injectable 12.5 Gram(s) IV Push once  dextrose 50% Injectable 25 Gram(s) IV Push once  divalproex  milliGRAM(s) Oral two times a day  DULoxetine 30 milliGRAM(s) Oral <User Schedule>  enoxaparin Injectable 40 milliGRAM(s) SubCutaneous every 24 hours  furosemide    Tablet 40 milliGRAM(s) Oral every 12 hours  gentamicin 0.3% Ophthalmic Solution 1 Drop(s) Both EYES five times a day  glucagon  Injectable 1 milliGRAM(s) IntraMuscular once  insulin lispro (ADMELOG) corrective regimen sliding scale   SubCutaneous at bedtime  insulin lispro (ADMELOG) corrective regimen sliding scale   SubCutaneous three times a day before meals  methyl salicylate 15%/menthol 10% Topical Cream 1 Application(s) Topical three times a day  metoprolol succinate ER 25 milliGRAM(s) Oral daily  midodrine. 7.5 milliGRAM(s) Oral three times a day  OLANZapine Disintegrating Tablet 5 milliGRAM(s) Oral two times a day  OLANZapine Disintegrating Tablet 10 milliGRAM(s) Oral at bedtime  pantoprazole    Tablet 40 milliGRAM(s) Oral before breakfast  sacubitril 24 mG/valsartan 26 mG 1 Tablet(s) Oral two times a day  spironolactone 25 milliGRAM(s) Oral daily  tiotropium 2.5 MICROgram(s) Inhaler 2 Puff(s) Inhalation daily    MEDICATIONS  (PRN):  acetaminophen     Tablet .. 650 milliGRAM(s) Oral every 6 hours PRN Temp greater or equal to 38C (100.4F), Mild Pain (1 - 3)  albuterol    0.083% 2.5 milliGRAM(s) Nebulizer every 6 hours PRN Shortness of Breath and/or Wheezing  dextrose Oral Gel 15 Gram(s) Oral once PRN Blood Glucose LESS THAN 70 milliGRAM(s)/deciliter  melatonin 5 milliGRAM(s) Oral at bedtime PRN Insomnia      Allergies    No Known Allergies    Intolerances        Social History:  Tobacco: former , quit >30 years ago  EtOH: social drinker  Recreational drug use: denies   Lives with: Assisted Living Facility  Ambulates: without assistance  ADLs: needs assistance (20 Sep 2023 21:58)      REVIEW OF SYSTEMS:  CONSTITUTIONAL: No fever, No chills, No fatigue, No myalgia, No Body ache, No Weakness  EYES: No eye pain,  No visual disturbances, No discharge, NO Redness  ENMT:  No ear pain, No nose bleed, No vertigo; No sinus pain, NO throat pain, No Congestion  NECK: No pain, No stiffness  RESPIRATORY: No cough, NO wheezing, No  hemoptysis, NO  shortness of breath  CARDIOVASCULAR: No chest pain, palpitations  GASTROINTESTINAL: No abdominal pain, NO epigastric pain. No nausea, No vomiting; No diarrhea, No constipation. [ x ] BM   GENITOURINARY: No dysuria, No frequency, No urgency, No hematuria, NO incontinence  NEUROLOGICAL: No headaches, No dizziness, No numbness, No tingling, No tremors, No weakness  EXT: No Swelling, No Pain, No Edema  SKIN:  [ x ] No itching, burning, rashes, or lesions   MUSCULOSKELETAL: No joint pain, No Jt swelling; No muscle pain, No extremity pain  PSYCHIATRIC: No depression, No anxiety, No mood swings, No difficulty sleeping at night  PAIN SCALE: [ x ] None  [ ] Other    Vital Signs Last 24 Hrs  T(C): 36.3 (02 Oct 2023 05:57), Max: 36.7 (01 Oct 2023 20:47)  T(F): 97.4 (02 Oct 2023 05:57), Max: 98.1 (01 Oct 2023 20:47)  HR: 87 (02 Oct 2023 05:57) (87 - 102)  BP: 116/73 (02 Oct 2023 05:57) (89/52 - 116/73)  BP(mean): --  RR: 17 (02 Oct 2023 05:57) (17 - 17)  SpO2: 91% (02 Oct 2023 05:57) (91% - 92%)    Parameters below as of 02 Oct 2023 05:57  Patient On (Oxygen Delivery Method): room air      Finger Stick          PHYSICAL EXAM:  GENERAL:  [ x ] NAD , [ x ] well appearing, [  ] Agitated, [  ] Drowsy,  [  ] Lethargy, [  ] confused   HEAD:  [ x ] Normal, [  ] Other  EYES:  [ x ] EOMI, [  ] PERRLA, [ x ] conjunctiva and sclera clear normal, [  ] Other,  [  ] Pallor,[  ] Discharge  ENMT:  [ x ] Normal, [ x ] Moist mucous membranes, [ x ] Good dentition, [ x ] No Thrush  NECK:  [ x ] Supple, [ x ] No JVD, [ x ] Normal thyroid, [  ] Lymphadenopathy [  ] Other  CHEST/LUNG:  [ x ] Clear to auscultation bilaterally, [ x ] Breath Sounds equal B/L / Decrease, [ x ] poor effort  [ x ] No rales, [ x ] No rhonchi  [ x ]  No wheezing,   HEART:  [ x ] Regular rate and rhythm, [  ] tachycardia, [  ] Bradycardia,  [  ] irregular  [ x ] No murmurs, No rubs, No gallops, [  ] PPM in place (Mfr:  )  ABDOMEN:  [ x ] Soft, [ x ] Nontender, [ x ] Nondistended, [ x ] No mass, [ x ] Bowel sounds present, [ x ] obese  NERVOUS SYSTEM:  [ x ] Alert & Oriented X1, [ x ] Nonfocal  [  ] Confusion  [  ] Encephalopathic [  ] Sedated [  ] Unable to assess, [  ] Dementia [ x ] Other- responds appropriately to commands and questions.   EXTREMITIES: [ x ] 2+ Peripheral Pulses, No clubbing, No cyanosis,  [x ] pitting 1+ edema B/L lower EXT. [ x ]  severe PVD stasis skin changes B/L Lower EXT, + Scabs  [ x ] wound-Dry Scabs on b/l Lower Ext , B/L Hand small Wound   LYMPH: No lymphadenopathy noted  SKIN:  [ x ] No rashes or lesions, [  ] Pressure Ulcers, [  ] ecchymosis, [  ] Skin Tears, [  ] Other    DIET: Diet, Regular:   Consistent Carbohydrate Evening Snack  DASH/TLC Sodium & Cholesterol Restricted (23 @ 22:36)      LABS:                        14.3   6.75  )-----------( 132      ( 01 Oct 2023 09:44 )             44.2       Ca    8.9        01 Oct 2023 06:40        Urinalysis Basic - ( 01 Oct 2023 06:40 )    Color: x / Appearance: x / SG: x / pH: x  Gluc: 90 mg/dL / Ketone: x  / Bili: x / Urobili: x   Blood: x / Protein: x / Nitrite: x   Leuk Esterase: x / RBC: x / WBC x   Sq Epi: x / Non Sq Epi: x / Bacteria: x                           Anemia Panel:      Thyroid Panel:                RADIOLOGY & ADDITIONAL TESTS:      HEALTH ISSUES - PROBLEM Dx:  Paranoia    HFrEF (heart failure with reduced ejection fraction)    HTN (hypertension)    HLD (hyperlipidemia)    Need for prophylactic measure    Medication management    DM2 (diabetes mellitus, type 2)    Anemia    Hypotension            Consultant(s) Notes Reviewed:  [  ] YES     Care Discussed with [X] Consultants  [  ] Patient  [  ] Family [  ] HCP [  ]   [  ] Social Service  [  ] RN, [  ] Physical Therapy,[  ] Palliative care team  DVT PPX: [  ] Lovenox, [  ] S C Heparin, [  ] Coumadin, [  ] Xarelto, [  ] Eliquis, [  ] Pradaxa, [  ] IV Heparin drip, [  ] SCD [  ] Contraindication 2 to GI Bleed,[  ] Ambulation [  ] Contraindicated 2 to  bleed [  ] Contraindicated 2 to Brain Bleed  Advanced directive: [  ] None, [  ] DNR/DNI Patient is a 83y old  Male who presents with a chief complaint of paranoia (01 Oct 2023 10:45)    HPI:  84y/o M PMH dementia, HFrEF s/p PPM, HTN, HLD  presents with increased agitation. History obtained from patient's daughter Arianne over phone.   Patient moved from [place] to [place] ~2months ago and has been having issues with increased paranoia for the past month. He stays at a 55 [facility] older assisted living community where he has been having several episodes of sundowning.   Recently he was hospitalized at [facility] for same issues from -. Daughter was unable to provide sufficient history regarding hospitalization.   Patient's son, Jose has been taking care of him and knows more about his medical hx however patient's daughter states she is taking over now and did not want to bother Jose at this time.  Patient was also recently seen at [facility] ED for episodes of agitation and was told to follow up with outpatient Psychiatrist but daughter states that likely did not happened because of his increased agitation.  Patient became agitated today and staff had to call 911 for further evaluation. The facility as well as his children feel that his current psych medication regimen is not sufficient to keep him calm.   Per review of recent dispensary history, he was seeing a psychiatric NP (Barbara Bravo) in [place], [place].   Of note, he had placement of PPM in late August at [facility]. Per his daughter, his LVEF at that time was 20% before and after PPM placement.  Currently, patient states his b/l legs feel itchy but otherwise he feels fine and is agreeable.  Denies fevers, chills. sob, cp, n/v/d.    ED Course   T 97.4 F  /79 RR 18 SpO2 95% on RA  Labs H/H 12.1/37.5 Glu   UA: >1000 glucose  UTox: negaitve     In the ED, given tylenol 650mg x1 , zyprexa 5mg IVP x1  (20 Sep 2023 21:58)    INTERVAL HPI:  : Pt seen and examined, sitting in chair with 1 to 1 present. Patient expressed concern about not receiving his pantoprazole, stating he cant digest his food without it, denies n/v, epigastric pain or burning. When asked about why pt came to hospital, states that he was being followed and he called the police to come help him. Says he lives with his son Jose. EMS brought patient in from  and over [facility]. Additionally states that one pill he was given this morning by staff was "phony" so he didn't take it. Contacted pt's pharmacy [facility], numerous scripts filled in [place] and [place]. Per night team sign out, daughter states that pt has been in and out of many ERs and has been started on many different medications so there are a lot of recent meds that he does not currently take to the best of her knowledge. Per daughter, her brothers are tired of assisting with their father given history of difficulties with him. She is willing to assist but does not want to be too involved and asks us to not contact her brothers. Psychiatry consulted and to see patient. JOHN/VAISHALI updated.   Soren Krishnan [phone]  : Pt seen and examined at bedside. xyprexa dose increased to 5mg bedtime and 2.5mg in AM starting yesterday. Marked improvement in demeanor. A&Ox3 (person, time, place). States he is in a "police hospital" and that he came here because he asked some man in their car to call the police because he was being followed by an aide that his son hired. Said he came from "[place] [id number] and over [facility]. No complaints or concerns today. States he has to get things ready to move back to [place]. Psych is following. DSC planning pending placement as his previous community does not want pt to return and family does not want to take patient. Replace PO K   : Pt seen and examined at bedside. Depakote increased to 500mg PO BID, amitriptyline stopped as per psych recs. Pt with no complaints or concerns. Pleasant on encounter, says he is doing well and thanks provider for checking in on him.  Reports that he is waiting to go home. Patient informed we are working to get him out of the hospital. DSC planning pending placement. JOHN sent referral to [facility]. Awaiting decision.   :  Pt seen and examined at bedside. Pt reports that is feeling well and does not have any symptoms, pain or concern. Pt is tolerating PO and eating well.  Pt is cooperative with evaluation and pleasant.  Waiting for placement for dc. Awaiting placement.   : Pt seen and examined at bedside. Reports last night he was looking for 3 medications that were started in the hospital at night. Reassured that he is receiving all the proper medications he needs. Appetite is good. Pt has no complaints at this time. Now off 1:1.  : Pt seen and examined at bedside. Was not agitated overnight. Pt is very tired this morning. Reports chronic low back pain, but is not concerned. Otherwise he has no active complaints at  this time. Denies chest pain, abdominal pain, SOB. D/W Son Mykel today   : Pt seen and examined at bedside. No agitation overnight. Pt is tired this morning. Has no complaints other than his chronic lower back pain. Denies chest pain, abdominal pain SOB. Pt wants to go home. D/W Son Angelito today   : Pt seen and examined at bedside. No agitation overnight. Pt is tired this morning and wants to sleep. No complaints other than his chronic low back pain. Denies chest pain, SOB, abdominal pain. Pt wants to go.  : Pt seen and examined at bedside. No agitation. Pt is more awake. Continues to report chronic low back pain. No acute complaints at this time. Denies chest pain, SOB, abdominal pain. Wants to go.Social work for d/c planning when bed is available.  : Pt seen and examined at bedside. No agitation overnight. Pt is AAOx1. Knows he is in a hospital but unsure which one. Patient is pleasant and has no acute complaints at this time. Denies fever, chills, CP, palpitations, SOB, abd pain, nausea, vomiting. Low BP S/P 1 Bolus NS  10/1: Pt seen and examined at bedside. No agitation overnight. Patient has no complaints at this time. Just very tired. Wishes to go home. Denies fever, chills, chest pain, SOB, abdominal pain. stable for d/c  10/2: Patient was seen and examined at bedside. No agitation observed overnight. Patient has no complaints. Wishes to go home. Denies fever, chills, chest pain, SOB, abdominal pain. Pt wants to be d/don home with family, Pt is very Cooperative.    OVERNIGHT EVENTS: No acute overnight events.     Home Medications:  albuterol 2.5 mg/3 mL (0.083%) inhalation solution: 2.5 milligram(s) inhaled every 6 hours as needed for  shortness of breath and/or wheezing (25 Sep 2023 11:00)  ammonium lactate 12% topical lotion: 1 Apply topically to affected area 2 times a day (25 Sep 2023 10:46)  atorvastatin 40 mg oral tablet: 1 tab(s) orally once a day (at bedtime) (25 Sep 2023 11:00)  dapagliflozin 10 mg oral tablet: 1 tab(s) orally every 24 hours (25 Sep 2023 10:46)  divalproex sodium 500 mg oral delayed release tablet: 1 tab(s) orally 2 times a day (25 Sep 2023 10:37)  DULoxetine 30 mg oral delayed release capsule: 1 cap(s) orally 2 times a day (25 Sep 2023 11:00)  furosemide 40 mg oral tablet: 1 tab(s) orally every 12 hours (25 Sep 2023 11:00)  melatonin 5 mg oral tablet: 1 tab(s) orally once a day (at bedtime) As needed Insomnia (25 Sep 2023 10:46)  METFORMIN 500MG TABLETS: TAKE 1 TABLET BY MOUTH EVERY MORNING AND EVERY EVENING WITH MEALS. (20 Sep 2023 21:58)  methyl salicylate-menthol 15%-10% topical cream: 1 Apply topically to affected area 3 times a day (25 Sep 2023 10:46)  metoprolol succinate 25 mg oral tablet, extended release: 1 tab(s) orally once a day (25 Sep 2023 11:00)  midodrine 5 mg oral tablet: 1 tab(s) orally 3 times a day (25 Sep 2023 11:00)  OLANZapine 10 mg oral tablet, disintegratin tab(s) orally once a day (at bedtime) (27 Sep 2023 08:38)  OLANZapine 5 mg oral tablet, disintegratin tab(s) orally 2 times a day (27 Sep 2023 08:38)  pantoprazole 40 mg oral delayed release tablet: 1 tab(s) orally once a day (before a meal) (25 Sep 2023 10:46)  POTASSIUM CL 20MEQ ER TABLETS: TAKE 1 TABLET BY MOUTH EVERY DAY WITH FOOD FOR 4 DAYS (20 Sep 2023 21:58)  sacubitril-valsartan 24 mg-26 mg oral tablet: 1 tab(s) orally 2 times a day (25 Sep 2023 11:00)  spironolactone 25 mg oral tablet: 1 tab(s) orally once a day (25 Sep 2023 10:46)  TRELEGY ELLIPTA 100-62.5MCG INH 30P: INHALE 1 PUFF BY MOUTH EVERY DAY (20 Sep 2023 21:58)      MEDICATIONS  (STANDING):  ammonium lactate 12% Lotion 1 Application(s) Topical two times a day  atorvastatin 40 milliGRAM(s) Oral at bedtime  budesonide  80 MICROgram(s)/formoterol 4.5 MICROgram(s) Inhaler 2 Puff(s) Inhalation two times a day  dapagliflozin 10 milliGRAM(s) Oral every 24 hours  dextrose 5%. 1000 milliLiter(s) (100 mL/Hr) IV Continuous <Continuous>  dextrose 5%. 1000 milliLiter(s) (50 mL/Hr) IV Continuous <Continuous>  dextrose 50% Injectable 25 Gram(s) IV Push once  dextrose 50% Injectable 12.5 Gram(s) IV Push once  dextrose 50% Injectable 25 Gram(s) IV Push once  divalproex  milliGRAM(s) Oral two times a day  DULoxetine 30 milliGRAM(s) Oral <User Schedule>  enoxaparin Injectable 40 milliGRAM(s) SubCutaneous every 24 hours  furosemide    Tablet 40 milliGRAM(s) Oral every 12 hours  gentamicin 0.3% Ophthalmic Solution 1 Drop(s) Both EYES five times a day  glucagon  Injectable 1 milliGRAM(s) IntraMuscular once  insulin lispro (ADMELOG) corrective regimen sliding scale   SubCutaneous at bedtime  insulin lispro (ADMELOG) corrective regimen sliding scale   SubCutaneous three times a day before meals  methyl salicylate 15%/menthol 10% Topical Cream 1 Application(s) Topical three times a day  metoprolol succinate ER 25 milliGRAM(s) Oral daily  midodrine. 7.5 milliGRAM(s) Oral three times a day  OLANZapine Disintegrating Tablet 5 milliGRAM(s) Oral two times a day  OLANZapine Disintegrating Tablet 10 milliGRAM(s) Oral at bedtime  pantoprazole    Tablet 40 milliGRAM(s) Oral before breakfast  sacubitril 24 mG/valsartan 26 mG 1 Tablet(s) Oral two times a day  spironolactone 25 milliGRAM(s) Oral daily  tiotropium 2.5 MICROgram(s) Inhaler 2 Puff(s) Inhalation daily    MEDICATIONS  (PRN):  acetaminophen     Tablet .. 650 milliGRAM(s) Oral every 6 hours PRN Temp greater or equal to 38C (100.4F), Mild Pain (1 - 3)  albuterol    0.083% 2.5 milliGRAM(s) Nebulizer every 6 hours PRN Shortness of Breath and/or Wheezing  dextrose Oral Gel 15 Gram(s) Oral once PRN Blood Glucose LESS THAN 70 milliGRAM(s)/deciliter  melatonin 5 milliGRAM(s) Oral at bedtime PRN Insomnia      Allergies    No Known Allergies    Intolerances        Social History:  Tobacco: former , quit >30 years ago  EtOH: social drinker  Recreational drug use: denies   Lives with: Assisted Living Facility  Ambulates: without assistance  ADLs: needs assistance (20 Sep 2023 21:58)      REVIEW OF SYSTEMS: i am fine  CONSTITUTIONAL: No fever, No chills, No fatigue, No myalgia, No Body ache, No Weakness  EYES: No eye pain,  No visual disturbances, No discharge, NO Redness  ENMT:  No ear pain, No nose bleed, No vertigo; No sinus pain, NO throat pain, No Congestion  NECK: No pain, No stiffness  RESPIRATORY: No cough, NO wheezing, No  hemoptysis, NO  shortness of breath  CARDIOVASCULAR: No chest pain, palpitations  GASTROINTESTINAL: No abdominal pain, NO epigastric pain. No nausea, No vomiting; No diarrhea, No constipation. [ x ] BM   GENITOURINARY: No dysuria, No frequency, No urgency, No hematuria, NO incontinence  NEUROLOGICAL: No headaches, No dizziness, No numbness, No tingling, No tremors, No weakness  EXT: No Swelling, No Pain, No Edema  SKIN:  [ x ] No itching, burning, rashes, or lesions   MUSCULOSKELETAL: No joint pain, No Jt swelling; No muscle pain, No extremity pain  PSYCHIATRIC: No depression, No anxiety, No mood swings, No difficulty sleeping at night  PAIN SCALE: [ x ] None  [ ] Other    Vital Signs Last 24 Hrs  T(C): 36.3 (02 Oct 2023 05:57), Max: 36.7 (01 Oct 2023 20:47)  T(F): 97.4 (02 Oct 2023 05:57), Max: 98.1 (01 Oct 2023 20:47)  HR: 87 (02 Oct 2023 05:57) (87 - 102)  BP: 116/73 (02 Oct 2023 05:57) (89/52 - 116/73)  BP(mean): --  RR: 17 (02 Oct 2023 05:57) (17 - 17)  SpO2: 91% (02 Oct 2023 05:57) (91% - 92%)    Parameters below as of 02 Oct 2023 05:57  Patient On (Oxygen Delivery Method): room air      Finger Stick          PHYSICAL EXAM:  GENERAL:  [ x ] NAD , [ x ] well appearing, [  ] Agitated, [  ] Drowsy,  [  ] Lethargy, [  ] confused   HEAD:  [ x ] Normal, [  ] Other  EYES:  [ x ] EOMI, [  ] PERRLA, [ x ] conjunctiva and sclera clear normal, [  ] Other,  [  ] Pallor,[  ] Discharge  ENMT:  [ x ] Normal, [ x ] Moist mucous membranes, [ x ] Good dentition, [ x ] No Thrush  NECK:  [ x ] Supple, [ x ] No JVD, [ x ] Normal thyroid, [  ] Lymphadenopathy [  ] Other  CHEST/LUNG:  [ x ] Clear to auscultation bilaterally, [ x ] Breath Sounds equal B/L / Decrease, [ x ] poor effort  [ x ] No rales, [ x ] No rhonchi  [ x ]  No wheezing,   HEART:  [ x ] Regular rate and rhythm, [  ] tachycardia, [  ] Bradycardia,  [  ] irregular  [ x ] No murmurs, No rubs, No gallops, [  ] PPM in place (Mfr:  )  ABDOMEN:  [ x ] Soft, [ x ] Nontender, [ x ] Nondistended, [ x ] No mass, [ x ] Bowel sounds present, [ x ] obese  NERVOUS SYSTEM:  [ x ] Alert & Oriented X1, [ x ] Nonfocal  [  ] Confusion  [  ] Encephalopathic [  ] Sedated [  ] Unable to assess, [  ] Dementia [ x ] Other- responds appropriately to commands and questions.   EXTREMITIES: [ x ] 2+ Peripheral Pulses, No clubbing, No cyanosis,  [x ] pitting 1+ edema B/L lower EXT. [ x ]  severe PVD stasis skin changes B/L Lower EXT, + Scabs  [ x ] wound-Dry Scabs on b/l Lower Ext , B/L Hand small Wound   LYMPH: No lymphadenopathy noted  SKIN:  [ x ] No rashes or lesions, [  ] Pressure Ulcers, [  ] ecchymosis, [  ] Skin Tears, [  ] Other    DIET: Diet, Regular:   Consistent Carbohydrate Evening Snack  DASH/TLC Sodium & Cholesterol Restricted (23 @ 22:36)      LABS:                        14.3   6.75  )-----------( 132      ( 01 Oct 2023 09:44 )             44.2       Ca    8.9        01 Oct 2023 06:40        Urinalysis Basic - ( 01 Oct 2023 06:40 )    Color: x / Appearance: x / SG: x / pH: x  Gluc: 90 mg/dL / Ketone: x  / Bili: x / Urobili: x   Blood: x / Protein: x / Nitrite: x   Leuk Esterase: x / RBC: x / WBC x   Sq Epi: x / Non Sq Epi: x / Bacteria: x    RADIOLOGY & ADDITIONAL TESTS:  NONE    HEALTH ISSUES - PROBLEM Dx:  Paranoia    HFrEF (heart failure with reduced ejection fraction)    HTN (hypertension)    HLD (hyperlipidemia)    Need for prophylactic measure    Medication management    DM2 (diabetes mellitus, type 2)    Anemia    Hypotension            Consultant(s) Notes Reviewed:  [  ] YES     Care Discussed with [X] Consultants  [  ] Patient  [  ] Family [  ] HCP [  ]   [  ] Social Service  [  ] RN, [  ] Physical Therapy,[  ] Palliative care team  DVT PPX: [  ] Lovenox, [  ] S C Heparin, [  ] Coumadin, [  ] Xarelto, [  ] Eliquis, [  ] Pradaxa, [  ] IV Heparin drip, [  ] SCD [  ] Contraindication 2 to GI Bleed,[  ] Ambulation [  ] Contraindicated 2 to  bleed [  ] Contraindicated 2 to Brain Bleed  Advanced directive: [  ] None, [  ] DNR/DNI

## 2023-10-02 NOTE — BH CONSULTATION LIAISON PROGRESS NOTE - NSBHINDICATION_PSY_ALL_CORE
Patient is combative and unpredictable

## 2023-10-02 NOTE — PROGRESS NOTE ADULT - PROBLEM SELECTOR PLAN 6
Discussed patient medication list with daughter (Arianne) who confirmed most medications found on surescripts recent dispensary history) however she is not sure of any changes that may have happened when he was hospitalized or when he went to the ED  - Patient has list with him, copy in chart however unclear if that list is up to date  - Current med rec completed based on recent dispensary history  - [facility] pharmacy called: additional medications from pharmacy include allopurinol 300qD, lisinopril 20mg qD, mirabegron 25mg qD, trazodone 50mg qD at bedtime. medications not mentioned by daughter. daughter notes pt has been to numerous different hospitals/ERs and has come back with numerous different medications with no follow up  - plan to hold these additional meds, per psych consult for use of trazodone Chronic, controlled with A1c 6.5  - Hold home metformin, FARXIGA inpatient  - Low ISS  - Regular finger sticks  - DASH/TLC Consistent carb diet  - Hypoglycemic protocol.

## 2023-10-02 NOTE — PROGRESS NOTE ADULT - PROBLEM SELECTOR PLAN 5
H/H 12.1/37.5 on admission  - Mild anemia stable.   - Folate, B12: wnl  - No signs of bleeding. Discussed patient medication list with daughter (Arianne) who confirmed most medications found on surescripts recent dispensary history) however she is not sure of any changes that may have happened when he was hospitalized or when he went to the ED  - Patient has list with him, copy in chart however unclear if that list is up to date  - Current med rec completed based on recent dispensary history  - [facility] pharmacy called: additional medications from pharmacy include allopurinol 300qD, lisinopril 20mg qD, mirabegron 25mg qD, trazodone 50mg qD at bedtime. medications not mentioned by daughter. daughter notes pt has been to numerous different hospitals/ERs and has come back with numerous different medications with no follow up  - plan to hold these additional meds, per psych consult for use of trazodone

## 2023-10-02 NOTE — PROGRESS NOTE ADULT - ATTENDING COMMENTS
84y/o M PMH dementia, DM2, HFrEF s/p PPM, HTN, HLD presents with increased agitation.  Admit for increased paranoia & Placement .  pt seen, examined, case & care plan d/w pt, residents at detail. d/w dtr Arianne on 9/21 at [facility] about pt's condition, Care plan Meds   Consult;  Psych Dr Strange  -Dr Strange  adjusted Psych meds  stable for d/c   Social service d/w about d/c planning to [facility] as SNF refused to take pt   PT Eval.-Pt is ambulating   D/W Social work -follow up for d/c -Placement.  PO diet  AM labs   D/W Son Mykel today at detail.  Total care time is 40 minutes.

## 2023-10-03 LAB
GLUCOSE BLDC GLUCOMTR-MCNC: 101 MG/DL — HIGH (ref 70–99)
GLUCOSE BLDC GLUCOMTR-MCNC: 104 MG/DL — HIGH (ref 70–99)
GLUCOSE BLDC GLUCOMTR-MCNC: 128 MG/DL — HIGH (ref 70–99)
GLUCOSE BLDC GLUCOMTR-MCNC: 134 MG/DL — HIGH (ref 70–99)

## 2023-10-03 RX ORDER — METOPROLOL TARTRATE 50 MG
25 TABLET ORAL DAILY
Refills: 0 | Status: DISCONTINUED | OUTPATIENT
Start: 2023-10-03 | End: 2023-10-05

## 2023-10-03 RX ORDER — SACUBITRIL AND VALSARTAN 24; 26 MG/1; MG/1
1 TABLET, FILM COATED ORAL
Refills: 0 | Status: DISCONTINUED | OUTPATIENT
Start: 2023-10-03 | End: 2023-10-05

## 2023-10-03 RX ORDER — SPIRONOLACTONE 25 MG/1
25 TABLET, FILM COATED ORAL DAILY
Refills: 0 | Status: DISCONTINUED | OUTPATIENT
Start: 2023-10-03 | End: 2023-10-04

## 2023-10-03 RX ORDER — FUROSEMIDE 40 MG
40 TABLET ORAL DAILY
Refills: 0 | Status: DISCONTINUED | OUTPATIENT
Start: 2023-10-03 | End: 2023-10-05

## 2023-10-03 RX ADMIN — BUDESONIDE AND FORMOTEROL FUMARATE DIHYDRATE 2 PUFF(S): 160; 4.5 AEROSOL RESPIRATORY (INHALATION) at 19:28

## 2023-10-03 RX ADMIN — SACUBITRIL AND VALSARTAN 1 TABLET(S): 24; 26 TABLET, FILM COATED ORAL at 14:56

## 2023-10-03 RX ADMIN — MIDODRINE HYDROCHLORIDE 7.5 MILLIGRAM(S): 2.5 TABLET ORAL at 16:34

## 2023-10-03 RX ADMIN — Medication 650 MILLIGRAM(S): at 09:02

## 2023-10-03 RX ADMIN — DULOXETINE HYDROCHLORIDE 30 MILLIGRAM(S): 30 CAPSULE, DELAYED RELEASE ORAL at 05:46

## 2023-10-03 RX ADMIN — MIDODRINE HYDROCHLORIDE 7.5 MILLIGRAM(S): 2.5 TABLET ORAL at 06:11

## 2023-10-03 RX ADMIN — Medication 650 MILLIGRAM(S): at 19:29

## 2023-10-03 RX ADMIN — Medication 650 MILLIGRAM(S): at 19:59

## 2023-10-03 RX ADMIN — Medication 1 APPLICATION(S): at 05:46

## 2023-10-03 RX ADMIN — MENTHOL AND METHYL SALICYLATE 1 APPLICATION(S): 10; 30 CREAM TOPICAL at 21:00

## 2023-10-03 RX ADMIN — ATORVASTATIN CALCIUM 40 MILLIGRAM(S): 80 TABLET, FILM COATED ORAL at 21:00

## 2023-10-03 RX ADMIN — GENTAMICIN SULFATE 1 DROP(S): 3 SOLUTION/ DROPS OPHTHALMIC at 11:27

## 2023-10-03 RX ADMIN — OLANZAPINE 5 MILLIGRAM(S): 15 TABLET, FILM COATED ORAL at 05:45

## 2023-10-03 RX ADMIN — GENTAMICIN SULFATE 1 DROP(S): 3 SOLUTION/ DROPS OPHTHALMIC at 16:33

## 2023-10-03 RX ADMIN — BUDESONIDE AND FORMOTEROL FUMARATE DIHYDRATE 2 PUFF(S): 160; 4.5 AEROSOL RESPIRATORY (INHALATION) at 05:49

## 2023-10-03 RX ADMIN — MIDODRINE HYDROCHLORIDE 7.5 MILLIGRAM(S): 2.5 TABLET ORAL at 11:27

## 2023-10-03 RX ADMIN — DIVALPROEX SODIUM 500 MILLIGRAM(S): 500 TABLET, DELAYED RELEASE ORAL at 05:45

## 2023-10-03 RX ADMIN — GENTAMICIN SULFATE 1 DROP(S): 3 SOLUTION/ DROPS OPHTHALMIC at 19:29

## 2023-10-03 RX ADMIN — Medication 5 MILLIGRAM(S): at 23:03

## 2023-10-03 RX ADMIN — Medication 650 MILLIGRAM(S): at 00:00

## 2023-10-03 RX ADMIN — DULOXETINE HYDROCHLORIDE 30 MILLIGRAM(S): 30 CAPSULE, DELAYED RELEASE ORAL at 16:34

## 2023-10-03 RX ADMIN — TIOTROPIUM BROMIDE 2 PUFF(S): 18 CAPSULE ORAL; RESPIRATORY (INHALATION) at 05:49

## 2023-10-03 RX ADMIN — Medication 40 MILLIGRAM(S): at 14:56

## 2023-10-03 RX ADMIN — DAPAGLIFLOZIN 10 MILLIGRAM(S): 10 TABLET, FILM COATED ORAL at 11:27

## 2023-10-03 RX ADMIN — OLANZAPINE 10 MILLIGRAM(S): 15 TABLET, FILM COATED ORAL at 21:00

## 2023-10-03 RX ADMIN — Medication 650 MILLIGRAM(S): at 09:32

## 2023-10-03 RX ADMIN — Medication 1 APPLICATION(S): at 16:34

## 2023-10-03 RX ADMIN — DIVALPROEX SODIUM 500 MILLIGRAM(S): 500 TABLET, DELAYED RELEASE ORAL at 16:35

## 2023-10-03 RX ADMIN — OLANZAPINE 5 MILLIGRAM(S): 15 TABLET, FILM COATED ORAL at 16:35

## 2023-10-03 RX ADMIN — MENTHOL AND METHYL SALICYLATE 1 APPLICATION(S): 10; 30 CREAM TOPICAL at 05:46

## 2023-10-03 RX ADMIN — GENTAMICIN SULFATE 1 DROP(S): 3 SOLUTION/ DROPS OPHTHALMIC at 23:02

## 2023-10-03 RX ADMIN — PANTOPRAZOLE SODIUM 40 MILLIGRAM(S): 20 TABLET, DELAYED RELEASE ORAL at 05:45

## 2023-10-03 RX ADMIN — ENOXAPARIN SODIUM 40 MILLIGRAM(S): 100 INJECTION SUBCUTANEOUS at 19:28

## 2023-10-03 RX ADMIN — GENTAMICIN SULFATE 1 DROP(S): 3 SOLUTION/ DROPS OPHTHALMIC at 08:09

## 2023-10-03 NOTE — PROGRESS NOTE ADULT - PROBLEM SELECTOR PLAN 1
Acute paranoia in setting of hx of dementia, ?baseline mental disorder ? Psych Hx- ?Psychosis/ Agitation: unclear hx  - Previous hospitalization at [facility] for similar problem. Unknown if Pt was taking meds prior to admission.    - No infectious or metabolic concomitant disorder. Normal thyroid profile.   - Continue current Zyrexa 5 mg 2x day, Zyprexa 10 mg Q HS regimen as per psych.  - Continue Depakote 500mg BID as per Dr. Strange.    - Continue duloxetine 30 mg 2x day.  - STOP amitriptyline, as per psych recs  - Psych (Dr. Strange) D/C 1:1  - SW consulted for d/c plan: awaiting placement.   - Now off constant observation - no acute agitation observed

## 2023-10-03 NOTE — SOCIAL WORK PROGRESS NOTE - NSSWPROGRESSNOTE_GEN_ALL_CORE
spoke with Summer at [facility] the [facility], [phone]. nursing currently reviewing pts paper work to determine if pt appropriate for their facility. ELIER Zhang from [facility] to come to hospitals tmrw 10/4 at 12 noon to do on site eval of the pt. sw to continue to follow for transition to [facility].

## 2023-10-03 NOTE — PROGRESS NOTE ADULT - ATTENDING COMMENTS
84y/o M PMH dementia, DM2, HFrEF s/p PPM, HTN, HLD presents with increased agitation.  Admit for increased paranoia & Placement .  pt seen, examined, case & care plan d/w pt, residents at detail. d/w dtr Arianne on 9/21 at [facility] about pt's condition, Care plan Meds   Consult;  Psych Dr Strange  -Dr Strange  adjusted Psych meds  stable for d/c   Social service d/w about d/c planning to [facility] as SNF refused to take pt   PT Eval.-Pt is ambulating   D/W Social work -follow up for d/c -Placement to new [facility] as provided by family .  PO diet  AM labs   Total care time is 40 minutes.

## 2023-10-03 NOTE — SOCIAL WORK PROGRESS NOTE - NSSWPROGRESSNOTE_GEN_ALL_CORE
message left this am for Summer at [facility] [phone] regarding bed status and if pt has been accepted to the facility. spoke with pts son Mykel [phone] who stated he is going to the facility today to speak with them further and see if pt has been accepted.  sw to follow.

## 2023-10-03 NOTE — PROGRESS NOTE ADULT - PROBLEM SELECTOR PLAN 3
BP borderline hypotensive today when getting up, standing, however pt is asx  - likely due to lowered hold parameters on Lasix 40 mg 2x day, Entresto, metoprolol XL, spironolactone, Farxiga.   - lasix 40mg QD now after confirming Pt med w/ pharmacy.  - continue midodrine 7.5 mg 3x day for soft BP.

## 2023-10-03 NOTE — PROGRESS NOTE ADULT - SUBJECTIVE AND OBJECTIVE BOX
Patient is a 83y old  Male who presents with a chief complaint of paranoia (02 Oct 2023 09:38)    HPI:  84y/o M PMH dementia, HFrEF s/p PPM, HTN, HLD  presents with increased agitation. History obtained from patient's daughter Arianne over phone.   Patient moved from [place] to [place] ~2months ago and has been having issues with increased paranoia for the past month. He stays at a [facility] older assisted living community where he has been having several episodes of sundowning.   Recently he was hospitalized at [facility] for same issues from -. Daughter was unable to provide sufficient history regarding hospitalization.   Patient's son, Jose has been taking care of him and knows more about his medical hx however patient's daughter states she is taking over now and did not want to bother Jose at this time.  Patient was also recently seen at [facility] ED for episodes of agitation and was told to follow up with outpatient Psychiatrist but daughter states that likely did not happened because of his increased agitation.  Patient became agitated today and staff had to call 911 for further evaluation. The facility as well as his children feel that his current psych medication regimen is not sufficient to keep him calm.   Per review of recent dispensary history, he was seeing a psychiatric NP (Barbara Bravo) in [place], [place].   Of note, he had placement of PPM in late August at [facility]. Per his daughter, his LVEF at that time was 20% before and after PPM placement.  Currently, patient states his b/l legs feel itchy but otherwise he feels fine and is agreeable.  Denies fevers, chills. sob, cp, n/v/d.    ED Course   T 97.4 F  /79 RR 18 SpO2 95% on RA  Labs H/H 12.1/37.5 Glu   UA: >1000 glucose  UTox: negaitve     In the ED, given tylenol 650mg x1 , zyprexa 5mg IVP x1  (20 Sep 2023 21:58)    INTERVAL HPI:  : Pt seen and examined, sitting in chair with 1 to 1 present. Patient expressed concern about not receiving his pantoprazole, stating he cant digest his food without it, denies n/v, epigastric pain or burning. When asked about why pt came to hospital, states that he was being followed and he called the police to come help him. Says he lives with his son Jose. EMS brought patient in from  and over [facility]. Additionally states that one pill he was given this morning by staff was "phony" so he didn't take it. Contacted pt's pharmacy [facility], numerous scripts filled in [place] and [place]. Per night team sign out, daughter states that pt has been in and out of many ERs and has been started on many different medications so there are a lot of recent meds that he does not currently take to the best of her knowledge. Per daughter, her brothers are tired of assisting with their father given history of difficulties with him. She is willing to assist but does not want to be too involved and asks us to not contact her brothers. Psychiatry consulted and to see patient. JOHN/VAISHALI updated.   Soren Krishnan [phone]  : Pt seen and examined at bedside. xyprexa dose increased to 5mg bedtime and 2.5mg in AM starting yesterday. Marked improvement in demeanor. A&Ox3 (person, time, place). States he is in a "police hospital" and that he came here because he asked some man in their car to call the police because he was being followed by an aide that his son hired. Said he came from "[place] [id number] and over [facility]. No complaints or concerns today. States he has to get things ready to move back to [place]. Psych is following. DSC planning pending placement as his previous community does not want pt to return and family does not want to take patient. Replace PO K   : Pt seen and examined at bedside. Depakote increased to 500mg PO BID, amitriptyline stopped as per psych recs. Pt with no complaints or concerns. Pleasant on encounter, says he is doing well and thanks provider for checking in on him.  Reports that he is waiting to go home. Patient informed we are working to get him out of the hospital. DSC planning pending placement. JOHN sent referral to [facility]. Awaiting decision.   :  Pt seen and examined at bedside. Pt reports that is feeling well and does not have any symptoms, pain or concern. Pt is tolerating PO and eating well.  Pt is cooperative with evaluation and pleasant.  Waiting for placement for dc. Awaiting placement.   : Pt seen and examined at bedside. Reports last night he was looking for 3 medications that were started in the hospital at night. Reassured that he is receiving all the proper medications he needs. Appetite is good. Pt has no complaints at this time. Now off 1:1.  : Pt seen and examined at bedside. Was not agitated overnight. Pt is very tired this morning. Reports chronic low back pain, but is not concerned. Otherwise he has no active complaints at  this time. Denies chest pain, abdominal pain, SOB. D/W Son Mykel today   : Pt seen and examined at bedside. No agitation overnight. Pt is tired this morning. Has no complaints other than his chronic lower back pain. Denies chest pain, abdominal pain SOB. Pt wants to go home. D/W Son Angelito today   : Pt seen and examined at bedside. No agitation overnight. Pt is tired this morning and wants to sleep. No complaints other than his chronic low back pain. Denies chest pain, SOB, abdominal pain. Pt wants to go.  : Pt seen and examined at bedside. No agitation. Pt is more awake. Continues to report chronic low back pain. No acute complaints at this time. Denies chest pain, SOB, abdominal pain. Wants to go.Social work for d/c planning when bed is available.  : Pt seen and examined at bedside. No agitation overnight. Pt is AAOx1. Knows he is in a hospital but unsure which one. Patient is pleasant and has no acute complaints at this time. Denies fever, chills, CP, palpitations, SOB, abd pain, nausea, vomiting. Low BP S/P 1 Bolus NS  10/1: Pt seen and examined at bedside. No agitation overnight. Patient has no complaints at this time. Just very tired. Wishes to go home. Denies fever, chills, chest pain, SOB, abdominal pain. stable for d/c  10/2: Patient was seen and examined at bedside. No agitation observed overnight. Patient has no complaints. Wishes to go home. Denies fever, chills, chest pain, SOB, abdominal pain. Pt wants to be d/don home with family, Pt is very Cooperative.  10/3: Pt seen and examined at bedside. Resting comfortably in chair. No agitation overnight. Pt really wants to go home, upset that he has been in the hospital for so long. No acute complaints at this time. Denies fever, chills, chest pain, SOB, abdominal pain. Pt is very cooperative.    OVERNIGHT EVENTS: No acute overnight events.    Home Medications:  albuterol 2.5 mg/3 mL (0.083%) inhalation solution: 2.5 milligram(s) inhaled every 6 hours as needed for  shortness of breath and/or wheezing (25 Sep 2023 11:00)  ammonium lactate 12% topical lotion: 1 Apply topically to affected area 2 times a day (25 Sep 2023 10:46)  atorvastatin 40 mg oral tablet: 1 tab(s) orally once a day (at bedtime) (25 Sep 2023 11:00)  dapagliflozin 10 mg oral tablet: 1 tab(s) orally every 24 hours (25 Sep 2023 10:46)  divalproex sodium 500 mg oral delayed release tablet: 1 tab(s) orally 2 times a day (25 Sep 2023 10:37)  DULoxetine 30 mg oral delayed release capsule: 1 cap(s) orally 2 times a day (25 Sep 2023 11:00)  furosemide 40 mg oral tablet: 1 tab(s) orally every 12 hours (25 Sep 2023 11:00)  melatonin 5 mg oral tablet: 1 tab(s) orally once a day (at bedtime) As needed Insomnia (25 Sep 2023 10:46)  METFORMIN 500MG TABLETS: TAKE 1 TABLET BY MOUTH EVERY MORNING AND EVERY EVENING WITH MEALS. (20 Sep 2023 21:58)  methyl salicylate-menthol 15%-10% topical cream: 1 Apply topically to affected area 3 times a day (25 Sep 2023 10:46)  metoprolol succinate 25 mg oral tablet, extended release: 1 tab(s) orally once a day (25 Sep 2023 11:00)  midodrine 5 mg oral tablet: 1 tab(s) orally 3 times a day (25 Sep 2023 11:00)  OLANZapine 10 mg oral tablet, disintegratin tab(s) orally once a day (at bedtime) (27 Sep 2023 08:38)  OLANZapine 5 mg oral tablet, disintegratin tab(s) orally 2 times a day (27 Sep 2023 08:38)  pantoprazole 40 mg oral delayed release tablet: 1 tab(s) orally once a day (before a meal) (25 Sep 2023 10:46)  POTASSIUM CL 20MEQ ER TABLETS: TAKE 1 TABLET BY MOUTH EVERY DAY WITH FOOD FOR 4 DAYS (20 Sep 2023 21:58)  sacubitril-valsartan 24 mg-26 mg oral tablet: 1 tab(s) orally 2 times a day (25 Sep 2023 11:00)  spironolactone 25 mg oral tablet: 1 tab(s) orally once a day (25 Sep 2023 10:46)  TRELEGY ELLIPTA 100-62.5MCG INH 30P: INHALE 1 PUFF BY MOUTH EVERY DAY (20 Sep 2023 21:58)      MEDICATIONS  (STANDING):  ammonium lactate 12% Lotion 1 Application(s) Topical two times a day  atorvastatin 40 milliGRAM(s) Oral at bedtime  budesonide  80 MICROgram(s)/formoterol 4.5 MICROgram(s) Inhaler 2 Puff(s) Inhalation two times a day  dapagliflozin 10 milliGRAM(s) Oral every 24 hours  dextrose 5%. 1000 milliLiter(s) (100 mL/Hr) IV Continuous <Continuous>  dextrose 5%. 1000 milliLiter(s) (50 mL/Hr) IV Continuous <Continuous>  dextrose 50% Injectable 25 Gram(s) IV Push once  dextrose 50% Injectable 12.5 Gram(s) IV Push once  dextrose 50% Injectable 25 Gram(s) IV Push once  divalproex  milliGRAM(s) Oral two times a day  DULoxetine 30 milliGRAM(s) Oral <User Schedule>  enoxaparin Injectable 40 milliGRAM(s) SubCutaneous every 24 hours  furosemide    Tablet 40 milliGRAM(s) Oral daily  gentamicin 0.3% Ophthalmic Solution 1 Drop(s) Both EYES five times a day  glucagon  Injectable 1 milliGRAM(s) IntraMuscular once  insulin lispro (ADMELOG) corrective regimen sliding scale   SubCutaneous three times a day before meals  insulin lispro (ADMELOG) corrective regimen sliding scale   SubCutaneous at bedtime  methyl salicylate 15%/menthol 10% Topical Cream 1 Application(s) Topical three times a day  metoprolol succinate ER 25 milliGRAM(s) Oral daily  midodrine. 7.5 milliGRAM(s) Oral three times a day  OLANZapine Disintegrating Tablet 5 milliGRAM(s) Oral two times a day  OLANZapine Disintegrating Tablet 10 milliGRAM(s) Oral at bedtime  pantoprazole    Tablet 40 milliGRAM(s) Oral before breakfast  sacubitril 24 mG/valsartan 26 mG 1 Tablet(s) Oral two times a day  spironolactone 25 milliGRAM(s) Oral daily  tiotropium 2.5 MICROgram(s) Inhaler 2 Puff(s) Inhalation daily    MEDICATIONS  (PRN):  acetaminophen     Tablet .. 650 milliGRAM(s) Oral every 6 hours PRN Temp greater or equal to 38C (100.4F), Mild Pain (1 - 3)  albuterol    0.083% 2.5 milliGRAM(s) Nebulizer every 6 hours PRN Shortness of Breath and/or Wheezing  dextrose Oral Gel 15 Gram(s) Oral once PRN Blood Glucose LESS THAN 70 milliGRAM(s)/deciliter  melatonin 5 milliGRAM(s) Oral at bedtime PRN Insomnia      Allergies    No Known Allergies    Intolerances        Social History:  Tobacco: former , quit >30 years ago  EtOH: social drinker  Recreational drug use: denies   Lives with: Assisted Living Facility  Ambulates: without assistance  ADLs: needs assistance (20 Sep 2023 21:58)      REVIEW OF SYSTEMS:  CONSTITUTIONAL: No fever, No chills, No fatigue, No myalgia, No Body ache, No Weakness  EYES: No eye pain,  No visual disturbances, No discharge, NO Redness  ENMT:  No ear pain, No nose bleed, No vertigo; No sinus pain, NO throat pain, No Congestion  NECK: No pain, No stiffness  RESPIRATORY: No cough, NO wheezing, No  hemoptysis, NO  shortness of breath  CARDIOVASCULAR: No chest pain, palpitations  GASTROINTESTINAL: No abdominal pain, NO epigastric pain. No nausea, No vomiting; No diarrhea, No constipation. [ x ] BM   GENITOURINARY: No dysuria, No frequency, No urgency, No hematuria, NO incontinence  NEUROLOGICAL: No headaches, No dizziness, No numbness, No tingling, No tremors, No weakness  EXT: No Swelling, No Pain, No Edema  SKIN:  [ x ] No itching, burning, rashes, or lesions   MUSCULOSKELETAL: No joint pain, No Jt swelling; No muscle pain, No extremity pain  PSYCHIATRIC: No depression, No anxiety, No mood swings, No difficulty sleeping at night  PAIN SCALE: [ x ] None  [ ] Other    Vital Signs Last 24 Hrs  T(C): 36.6 (03 Oct 2023 05:05), Max: 36.7 (02 Oct 2023 20:25)  T(F): 97.8 (03 Oct 2023 05:05), Max: 98 (02 Oct 2023 20:25)  HR: 78 (03 Oct 2023 05:42) (78 - 94)  BP: 94/60 (03 Oct 2023 05:42) (85/53 - 104/71)  BP(mean): --  RR: 16 (03 Oct 2023 05:05) (16 - 19)  SpO2: 92% (03 Oct 2023 05:05) (92% - 97%)    Parameters below as of 03 Oct 2023 05:05  Patient On (Oxygen Delivery Method): room air      Finger Stick          PHYSICAL EXAM:  GENERAL:  [ x ] NAD , [ x ] well appearing, [  ] Agitated, [  ] Drowsy,  [  ] Lethargy, [  ] confused   HEAD:  [ x ] Normal, [  ] Other  EYES:  [ x ] EOMI, [  ] PERRLA, [ x ] conjunctiva and sclera clear normal, [  ] Other,  [  ] Pallor,[  ] Discharge  ENMT:  [ x ] Normal, [ x ] Moist mucous membranes, [ x ] Good dentition, [ x ] No Thrush  NECK:  [ x ] Supple, [ x ] No JVD, [ x ] Normal thyroid, [  ] Lymphadenopathy [  ] Other  CHEST/LUNG:  [ x ] Clear to auscultation bilaterally, [ x ] Breath Sounds equal B/L / Decrease, [ x ] poor effort  [ x ] No rales, [ x ] No rhonchi  [ x ]  No wheezing,   HEART:  [ x ] Regular rate and rhythm, [  ] tachycardia, [  ] Bradycardia,  [  ] irregular  [ x ] No murmurs, No rubs, No gallops, [  ] PPM in place (Mfr:  )  ABDOMEN:  [ x ] Soft, [ x ] Nontender, [ x ] Nondistended, [ x ] No mass, [ x ] Bowel sounds present, [ x ] obese  NERVOUS SYSTEM:  [ x ] Alert & Oriented X2 (name, place), [ x ] Nonfocal  [  ] Confusion  [  ] Encephalopathic [  ] Sedated [  ] Unable to assess, [  ] Dementia [ x ] Other- responds appropriately to commands and questions.   EXTREMITIES: [ x ] 2+ Peripheral Pulses, No clubbing, No cyanosis,  [x ] pitting 1+ edema B/L lower EXT. [ x ]  severe PVD stasis skin changes B/L Lower EXT, + Scabs  [ x ] wound-Dry Scabs on b/l Lower Ext  LYMPH: No lymphadenopathy noted  SKIN:  [ x ] No rashes or lesions, [  ] Pressure Ulcers, [  ] ecchymosis, [  ] Skin Tears, [  ] Other    DIET: Diet, Regular:   Consistent Carbohydrate Evening Snack  DASH/TLC Sodium & Cholesterol Restricted (23 @ 22:36)      LABS:                                   Anemia Panel:      Thyroid Panel:                RADIOLOGY & ADDITIONAL TESTS:  None.     HEALTH ISSUES - PROBLEM Dx:  Paranoia    HFrEF (heart failure with reduced ejection fraction)    HLD (hyperlipidemia)    Need for prophylactic measure    Medication management    DM2 (diabetes mellitus, type 2)    Anemia    Hypotension            Consultant(s) Notes Reviewed:  [ x ] YES     Care Discussed with [X] Consultants  [ x ] Patient  [  ] Family [  ] HCP [  ]   [  ] Social Service  [  ] RN, [  ] Physical Therapy,[  ] Palliative care team  DVT PPX: [ x ] Lovenox, [  ] S C Heparin, [  ] Coumadin, [  ] Xarelto, [  ] Eliquis, [  ] Pradaxa, [  ] IV Heparin drip, [  ] SCD [  ] Contraindication 2 to GI Bleed,[  ] Ambulation [  ] Contraindicated 2 to  bleed [  ] Contraindicated 2 to Brain Bleed  Advanced directive: [ x ] None, [  ] DNR/DNI Patient is a 83y old  Male who presents with a chief complaint of paranoia (02 Oct 2023 09:38)    HPI:  84y/o M PMH dementia, HFrEF s/p PPM, HTN, HLD  presents with increased agitation. History obtained from patient's daughter Arianne over phone.   Patient moved from [place] to [place] ~2months ago and has been having issues with increased paranoia for the past month. He stays at a 55 [facility] older assisted living community where he has been having several episodes of sundowning.   Recently he was hospitalized at [facility] for same issues from -. Daughter was unable to provide sufficient history regarding hospitalization.   Patient's son, Jose has been taking care of him and knows more about his medical hx however patient's daughter states she is taking over now and did not want to bother Jose at this time.  Patient was also recently seen at [facility] ED for episodes of agitation and was told to follow up with outpatient Psychiatrist but daughter states that likely did not happened because of his increased agitation.  Patient became agitated today and staff had to call 911 for further evaluation. The facility as well as his children feel that his current psych medication regimen is not sufficient to keep him calm.   Per review of recent dispensary history, he was seeing a psychiatric NP (Barbara Bravo) in [place], [place].   Of note, he had placement of PPM in late August at [facility]. Per his daughter, his LVEF at that time was 20% before and after PPM placement.  Currently, patient states his b/l legs feel itchy but otherwise he feels fine and is agreeable.  Denies fevers, chills. sob, cp, n/v/d.    ED Course   T 97.4 F  /79 RR 18 SpO2 95% on RA  Labs H/H 12.1/37.5 Glu   UA: >1000 glucose  UTox: negaitve     In the ED, given tylenol 650mg x1 , zyprexa 5mg IVP x1  (20 Sep 2023 21:58)    INTERVAL HPI:  : Pt seen and examined, sitting in chair with 1 to 1 present. Patient expressed concern about not receiving his pantoprazole, stating he cant digest his food without it, denies n/v, epigastric pain or burning. When asked about why pt came to hospital, states that he was being followed and he called the police to come help him. Says he lives with his son Jose. EMS brought patient in from  and over [facility]. Additionally states that one pill he was given this morning by staff was "phony" so he didn't take it. Contacted pt's pharmacy [id number], numerous scripts filled in [place] and [place]. Per night team sign out, daughter states that pt has been in and out of many ERs and has been started on many different medications so there are a lot of recent meds that he does not currently take to the best of her knowledge. Per daughter, her brothers are tired of assisting with their father given history of difficulties with him. She is willing to assist but does not want to be too involved and asks us to not contact her brothers. Psychiatry consulted and to see patient. JOHN/VAISHALI updated.   Soren Krishnan [phone]  : Pt seen and examined at bedside. xyprexa dose increased to 5mg bedtime and 2.5mg in AM starting yesterday. Marked improvement in demeanor. A&Ox3 (person, time, place). States he is in a "police hospital" and that he came here because he asked some man in their car to call the police because he was being followed by an aide that his son hired. Said he came from "[place] [address] and over [facility]. No complaints or concerns today. States he has to get things ready to move back to [place]. Psych is following. DSC planning pending placement as his previous community does not want pt to return and family does not want to take patient. Replace PO K   : Pt seen and examined at bedside. Depakote increased to 500mg PO BID, amitriptyline stopped as per psych recs. Pt with no complaints or concerns. Pleasant on encounter, says he is doing well and thanks provider for checking in on him.  Reports that he is waiting to go home. Patient informed we are working to get him out of the hospital. DSC planning pending placement. JOHN sent referral to [facility]. Awaiting decision.   :  Pt seen and examined at bedside. Pt reports that is feeling well and does not have any symptoms, pain or concern. Pt is tolerating PO and eating well.  Pt is cooperative with evaluation and pleasant.  Waiting for placement for dc. Awaiting placement.   : Pt seen and examined at bedside. Reports last night he was looking for 3 medications that were started in the hospital at night. Reassured that he is receiving all the proper medications he needs. Appetite is good. Pt has no complaints at this time. Now off 1:1.  : Pt seen and examined at bedside. Was not agitated overnight. Pt is very tired this morning. Reports chronic low back pain, but is not concerned. Otherwise he has no active complaints at  this time. Denies chest pain, abdominal pain, SOB. D/W Son Mykel today   : Pt seen and examined at bedside. No agitation overnight. Pt is tired this morning. Has no complaints other than his chronic lower back pain. Denies chest pain, abdominal pain SOB. Pt wants to go home. D/W Son Angelito today   : Pt seen and examined at bedside. No agitation overnight. Pt is tired this morning and wants to sleep. No complaints other than his chronic low back pain. Denies chest pain, SOB, abdominal pain. Pt wants to go.  : Pt seen and examined at bedside. No agitation. Pt is more awake. Continues to report chronic low back pain. No acute complaints at this time. Denies chest pain, SOB, abdominal pain. Wants to go.Social work for d/c planning when bed is available.  : Pt seen and examined at bedside. No agitation overnight. Pt is AAOx1. Knows he is in a hospital but unsure which one. Patient is pleasant and has no acute complaints at this time. Denies fever, chills, CP, palpitations, SOB, abd pain, nausea, vomiting. Low BP S/P 1 Bolus NS  10/1: Pt seen and examined at bedside. No agitation overnight. Patient has no complaints at this time. Just very tired. Wishes to go home. Denies fever, chills, chest pain, SOB, abdominal pain. stable for d/c  10/2: Patient was seen and examined at bedside. No agitation observed overnight. Patient has no complaints. Wishes to go home. Denies fever, chills, chest pain, SOB, abdominal pain. Pt wants to be d/don home with family, Pt is very Cooperative.  10/3: Pt seen and examined at bedside. Resting comfortably in chair. No agitation overnight. Pt really wants to go home, upset that he has been in the hospital for so long. No acute complaints at this time. Denies fever, chills, chest pain, SOB, abdominal pain. Pt is very cooperative.    OVERNIGHT EVENTS: No acute overnight events.    Home Medications:  albuterol 2.5 mg/3 mL (0.083%) inhalation solution: 2.5 milligram(s) inhaled every 6 hours as needed for  shortness of breath and/or wheezing (25 Sep 2023 11:00)  ammonium lactate 12% topical lotion: 1 Apply topically to affected area 2 times a day (25 Sep 2023 10:46)  atorvastatin 40 mg oral tablet: 1 tab(s) orally once a day (at bedtime) (25 Sep 2023 11:00)  dapagliflozin 10 mg oral tablet: 1 tab(s) orally every 24 hours (25 Sep 2023 10:46)  divalproex sodium 500 mg oral delayed release tablet: 1 tab(s) orally 2 times a day (25 Sep 2023 10:37)  DULoxetine 30 mg oral delayed release capsule: 1 cap(s) orally 2 times a day (25 Sep 2023 11:00)  furosemide 40 mg oral tablet: 1 tab(s) orally every 12 hours (25 Sep 2023 11:00)  melatonin 5 mg oral tablet: 1 tab(s) orally once a day (at bedtime) As needed Insomnia (25 Sep 2023 10:46)  METFORMIN 500MG TABLETS: TAKE 1 TABLET BY MOUTH EVERY MORNING AND EVERY EVENING WITH MEALS. (20 Sep 2023 21:58)  methyl salicylate-menthol 15%-10% topical cream: 1 Apply topically to affected area 3 times a day (25 Sep 2023 10:46)  metoprolol succinate 25 mg oral tablet, extended release: 1 tab(s) orally once a day (25 Sep 2023 11:00)  midodrine 5 mg oral tablet: 1 tab(s) orally 3 times a day (25 Sep 2023 11:00)  OLANZapine 10 mg oral tablet, disintegratin tab(s) orally once a day (at bedtime) (27 Sep 2023 08:38)  OLANZapine 5 mg oral tablet, disintegratin tab(s) orally 2 times a day (27 Sep 2023 08:38)  pantoprazole 40 mg oral delayed release tablet: 1 tab(s) orally once a day (before a meal) (25 Sep 2023 10:46)  POTASSIUM CL 20MEQ ER TABLETS: TAKE 1 TABLET BY MOUTH EVERY DAY WITH FOOD FOR 4 DAYS (20 Sep 2023 21:58)  sacubitril-valsartan 24 mg-26 mg oral tablet: 1 tab(s) orally 2 times a day (25 Sep 2023 11:00)  spironolactone 25 mg oral tablet: 1 tab(s) orally once a day (25 Sep 2023 10:46)  TRELEGY ELLIPTA 100-62.5MCG INH 30P: INHALE 1 PUFF BY MOUTH EVERY DAY (20 Sep 2023 21:58)      MEDICATIONS  (STANDING):  ammonium lactate 12% Lotion 1 Application(s) Topical two times a day  atorvastatin 40 milliGRAM(s) Oral at bedtime  budesonide  80 MICROgram(s)/formoterol 4.5 MICROgram(s) Inhaler 2 Puff(s) Inhalation two times a day  dapagliflozin 10 milliGRAM(s) Oral every 24 hours  dextrose 5%. 1000 milliLiter(s) (100 mL/Hr) IV Continuous <Continuous>  dextrose 5%. 1000 milliLiter(s) (50 mL/Hr) IV Continuous <Continuous>  dextrose 50% Injectable 25 Gram(s) IV Push once  dextrose 50% Injectable 12.5 Gram(s) IV Push once  dextrose 50% Injectable 25 Gram(s) IV Push once  divalproex  milliGRAM(s) Oral two times a day  DULoxetine 30 milliGRAM(s) Oral <User Schedule>  enoxaparin Injectable 40 milliGRAM(s) SubCutaneous every 24 hours  furosemide    Tablet 40 milliGRAM(s) Oral daily  gentamicin 0.3% Ophthalmic Solution 1 Drop(s) Both EYES five times a day  glucagon  Injectable 1 milliGRAM(s) IntraMuscular once  insulin lispro (ADMELOG) corrective regimen sliding scale   SubCutaneous three times a day before meals  insulin lispro (ADMELOG) corrective regimen sliding scale   SubCutaneous at bedtime  methyl salicylate 15%/menthol 10% Topical Cream 1 Application(s) Topical three times a day  metoprolol succinate ER 25 milliGRAM(s) Oral daily  midodrine. 7.5 milliGRAM(s) Oral three times a day  OLANZapine Disintegrating Tablet 5 milliGRAM(s) Oral two times a day  OLANZapine Disintegrating Tablet 10 milliGRAM(s) Oral at bedtime  pantoprazole    Tablet 40 milliGRAM(s) Oral before breakfast  sacubitril 24 mG/valsartan 26 mG 1 Tablet(s) Oral two times a day  spironolactone 25 milliGRAM(s) Oral daily  tiotropium 2.5 MICROgram(s) Inhaler 2 Puff(s) Inhalation daily    MEDICATIONS  (PRN):  acetaminophen     Tablet .. 650 milliGRAM(s) Oral every 6 hours PRN Temp greater or equal to 38C (100.4F), Mild Pain (1 - 3)  albuterol    0.083% 2.5 milliGRAM(s) Nebulizer every 6 hours PRN Shortness of Breath and/or Wheezing  dextrose Oral Gel 15 Gram(s) Oral once PRN Blood Glucose LESS THAN 70 milliGRAM(s)/deciliter  melatonin 5 milliGRAM(s) Oral at bedtime PRN Insomnia      Allergies    No Known Allergies    Intolerances        Social History:  Tobacco: former , quit >30 years ago  EtOH: social drinker  Recreational drug use: denies   Lives with: Assisted Living Facility  Ambulates: without assistance  ADLs: needs assistance (20 Sep 2023 21:58)      REVIEW OF SYSTEMS: i want to go home   CONSTITUTIONAL: No fever, No chills, No fatigue, No myalgia, No Body ache, No Weakness  EYES: No eye pain,  No visual disturbances, No discharge, NO Redness  ENMT:  No ear pain, No nose bleed, No vertigo; No sinus pain, NO throat pain, No Congestion  NECK: No pain, No stiffness  RESPIRATORY: No cough, NO wheezing, No  hemoptysis, NO  shortness of breath  CARDIOVASCULAR: No chest pain, palpitations  GASTROINTESTINAL: No abdominal pain, NO epigastric pain. No nausea, No vomiting; No diarrhea, No constipation. [ x ] BM   GENITOURINARY: No dysuria, No frequency, No urgency, No hematuria, NO incontinence  NEUROLOGICAL: No headaches, No dizziness, No numbness, No tingling, No tremors, No weakness  EXT: No Swelling, No Pain, No Edema  SKIN:  [ x ] No itching, burning, rashes, or lesions   MUSCULOSKELETAL: No joint pain, No Jt swelling; No muscle pain, No extremity pain  PSYCHIATRIC: No depression, No anxiety, No mood swings, No difficulty sleeping at night  PAIN SCALE: [ x ] None  [ ] Other    Vital Signs Last 24 Hrs  T(C): 36.6 (03 Oct 2023 05:05), Max: 36.7 (02 Oct 2023 20:25)  T(F): 97.8 (03 Oct 2023 05:05), Max: 98 (02 Oct 2023 20:25)  HR: 78 (03 Oct 2023 05:42) (78 - 94)  BP: 94/60 (03 Oct 2023 05:42) (85/53 - 104/71)  BP(mean): --  RR: 16 (03 Oct 2023 05:05) (16 - 19)  SpO2: 92% (03 Oct 2023 05:05) (92% - 97%)    Parameters below as of 03 Oct 2023 05:05  Patient On (Oxygen Delivery Method): room air      Finger Stick          PHYSICAL EXAM:  GENERAL:  [ x ] NAD , [ x ] well appearing, [  ] Agitated, [  ] Drowsy,  [  ] Lethargy, [  ] confused   HEAD:  [ x ] Normal, [  ] Other  EYES:  [ x ] EOMI, [  ] PERRLA, [ x ] conjunctiva and sclera clear normal, [  ] Other,  [  ] Pallor,[  ] Discharge  ENMT:  [ x ] Normal, [ x ] Moist mucous membranes, [ x ] Good dentition, [ x ] No Thrush  NECK:  [ x ] Supple, [ x ] No JVD, [ x ] Normal thyroid, [  ] Lymphadenopathy [  ] Other  CHEST/LUNG:  [ x ] Clear to auscultation bilaterally, [ x ] Breath Sounds equal B/L / Decrease, [ x ] poor effort  [ x ] No rales, [ x ] No rhonchi  [ x ]  No wheezing,   HEART:  [ x ] Regular rate and rhythm, [  ] tachycardia, [  ] Bradycardia,  [  ] irregular  [ x ] No murmurs, No rubs, No gallops, [  ] PPM in place (Mfr:  )  ABDOMEN:  [ x ] Soft, [ x ] Nontender, [ x ] Nondistended, [ x ] No mass, [ x ] Bowel sounds present, [ x ] obese  NERVOUS SYSTEM:  [ x ] Alert & Oriented X2 (name, place), [ x ] Nonfocal  [  ] Confusion  [  ] Encephalopathic [  ] Sedated [  ] Unable to assess, [  ] Dementia [ x ] Other- responds appropriately to commands and questions.   EXTREMITIES: [ x ] 2+ Peripheral Pulses, No clubbing, No cyanosis,  [x ] pitting 1+ edema B/L lower EXT. [ x ]  severe PVD stasis skin changes B/L Lower EXT, + Scabs  [ x ] wound-Dry Scabs on b/l Lower Ext  LYMPH: No lymphadenopathy noted  SKIN:  [ x ] No rashes or lesions, [  ] Pressure Ulcers, [  ] ecchymosis, [  ] Skin Tears, [  ] Other    DIET: Diet, Regular:   Consistent Carbohydrate Evening Snack  DASH/TLC Sodium & Cholesterol Restricted (23 @ 22:36)      LABS:                        RADIOLOGY & ADDITIONAL TESTS:  None.     HEALTH ISSUES - PROBLEM Dx:  Paranoia    HFrEF (heart failure with reduced ejection fraction)    HLD (hyperlipidemia)    Need for prophylactic measure    Medication management    DM2 (diabetes mellitus, type 2)    Anemia    Hypotension            Consultant(s) Notes Reviewed:  [ x ] YES     Care Discussed with [X] Consultants  [ x ] Patient  [ x ] Family [  ] HCP [  ]   [ x ] Social Service  [ x ] RN, [  ] Physical Therapy,[  ] Palliative care team  DVT PPX: [ x ] Lovenox, [  ] S C Heparin, [  ] Coumadin, [  ] Xarelto, [  ] Eliquis, [  ] Pradaxa, [  ] IV Heparin drip, [  ] SCD [  ] Contraindication 2 to GI Bleed,[  ] Ambulation [  ] Contraindicated 2 to  bleed [  ] Contraindicated 2 to Brain Bleed  Advanced directive: [ x ] None, [  ] DNR/DNI

## 2023-10-03 NOTE — PROGRESS NOTE ADULT - PROBLEM SELECTOR PLAN 2
Chronic s/p PPM (paced ~end of Aug 2023 at [facility] per daughter )  - TTE (8/2023) per daughter showed EF ~20%  - continue lasix 40mg QD  - Entresto, metoprolol XL, spironolactone, Farxiga. Due to soft BP, lowered hold parameters to ensure Pt receives meds. - after confirming w/ pharmacy,    - Lower extremity edema improving  - Dash/ TLC Diet

## 2023-10-03 NOTE — PROGRESS NOTE ADULT - PROBLEM SELECTOR PLAN 5
Discussed patient medication list with daughter (Arianne) who confirmed most medications found on surescripts recent dispensary history) however she is not sure of any changes that may have happened when he was hospitalized or when he went to the ED  - Patient has list with him, copy in chart however unclear if that list is up to date  - Current med rec completed based on recent dispensary history  - [facility] pharmacy called: additional medications from pharmacy include allopurinol 300qD, lisinopril 20mg qD, mirabegron 25mg qD, trazodone 50mg qD at bedtime. medications not mentioned by daughter. daughter notes pt has been to numerous different hospitals/ERs and has come back with numerous different medications with no follow up  - plan to hold these additional meds, per psych consult for use of trazodone

## 2023-10-04 LAB
ALBUMIN SERPL ELPH-MCNC: 3.9 G/DL — SIGNIFICANT CHANGE UP (ref 3.3–5)
ALP SERPL-CCNC: 74 U/L — SIGNIFICANT CHANGE UP (ref 40–120)
ALT FLD-CCNC: 23 U/L — SIGNIFICANT CHANGE UP (ref 12–78)
ANION GAP SERPL CALC-SCNC: 6 MMOL/L — SIGNIFICANT CHANGE UP (ref 5–17)
AST SERPL-CCNC: 22 U/L — SIGNIFICANT CHANGE UP (ref 15–37)
BASOPHILS # BLD AUTO: 0.09 K/UL — SIGNIFICANT CHANGE UP (ref 0–0.2)
BASOPHILS NFR BLD AUTO: 1.1 % — SIGNIFICANT CHANGE UP (ref 0–2)
BILIRUB SERPL-MCNC: 1.1 MG/DL — SIGNIFICANT CHANGE UP (ref 0.2–1.2)
BUN SERPL-MCNC: 40 MG/DL — HIGH (ref 7–23)
CALCIUM SERPL-MCNC: 9.6 MG/DL — SIGNIFICANT CHANGE UP (ref 8.5–10.1)
CHLORIDE SERPL-SCNC: 102 MMOL/L — SIGNIFICANT CHANGE UP (ref 96–108)
CO2 SERPL-SCNC: 32 MMOL/L — HIGH (ref 22–31)
CREAT SERPL-MCNC: 1.2 MG/DL — SIGNIFICANT CHANGE UP (ref 0.5–1.3)
EGFR: 60 ML/MIN/1.73M2 — SIGNIFICANT CHANGE UP
EOSINOPHIL # BLD AUTO: 0.37 K/UL — SIGNIFICANT CHANGE UP (ref 0–0.5)
EOSINOPHIL NFR BLD AUTO: 4.6 % — SIGNIFICANT CHANGE UP (ref 0–6)
GLUCOSE BLDC GLUCOMTR-MCNC: 102 MG/DL — HIGH (ref 70–99)
GLUCOSE BLDC GLUCOMTR-MCNC: 119 MG/DL — HIGH (ref 70–99)
GLUCOSE BLDC GLUCOMTR-MCNC: 133 MG/DL — HIGH (ref 70–99)
GLUCOSE BLDC GLUCOMTR-MCNC: 138 MG/DL — HIGH (ref 70–99)
GLUCOSE SERPL-MCNC: 111 MG/DL — HIGH (ref 70–99)
HCT VFR BLD CALC: 43.7 % — SIGNIFICANT CHANGE UP (ref 39–50)
HGB BLD-MCNC: 14.3 G/DL — SIGNIFICANT CHANGE UP (ref 13–17)
IMM GRANULOCYTES NFR BLD AUTO: 0.4 % — SIGNIFICANT CHANGE UP (ref 0–0.9)
LYMPHOCYTES # BLD AUTO: 3.04 K/UL — SIGNIFICANT CHANGE UP (ref 1–3.3)
LYMPHOCYTES # BLD AUTO: 37.8 % — SIGNIFICANT CHANGE UP (ref 13–44)
MCHC RBC-ENTMCNC: 32.3 PG — SIGNIFICANT CHANGE UP (ref 27–34)
MCHC RBC-ENTMCNC: 32.7 GM/DL — SIGNIFICANT CHANGE UP (ref 32–36)
MCV RBC AUTO: 98.6 FL — SIGNIFICANT CHANGE UP (ref 80–100)
MONOCYTES # BLD AUTO: 0.68 K/UL — SIGNIFICANT CHANGE UP (ref 0–0.9)
MONOCYTES NFR BLD AUTO: 8.5 % — SIGNIFICANT CHANGE UP (ref 2–14)
NEUTROPHILS # BLD AUTO: 3.83 K/UL — SIGNIFICANT CHANGE UP (ref 1.8–7.4)
NEUTROPHILS NFR BLD AUTO: 47.6 % — SIGNIFICANT CHANGE UP (ref 43–77)
NRBC # BLD: 0 /100 WBCS — SIGNIFICANT CHANGE UP (ref 0–0)
PLATELET # BLD AUTO: 146 K/UL — LOW (ref 150–400)
POTASSIUM SERPL-MCNC: 3.8 MMOL/L — SIGNIFICANT CHANGE UP (ref 3.5–5.3)
POTASSIUM SERPL-SCNC: 3.8 MMOL/L — SIGNIFICANT CHANGE UP (ref 3.5–5.3)
PROT SERPL-MCNC: 7.9 G/DL — SIGNIFICANT CHANGE UP (ref 6–8.3)
RBC # BLD: 4.43 M/UL — SIGNIFICANT CHANGE UP (ref 4.2–5.8)
RBC # FLD: 14.4 % — SIGNIFICANT CHANGE UP (ref 10.3–14.5)
SODIUM SERPL-SCNC: 140 MMOL/L — SIGNIFICANT CHANGE UP (ref 135–145)
WBC # BLD: 8.04 K/UL — SIGNIFICANT CHANGE UP (ref 3.8–10.5)
WBC # FLD AUTO: 8.04 K/UL — SIGNIFICANT CHANGE UP (ref 3.8–10.5)

## 2023-10-04 PROCEDURE — 99223 1ST HOSP IP/OBS HIGH 75: CPT

## 2023-10-04 PROCEDURE — 93306 TTE W/DOPPLER COMPLETE: CPT | Mod: 26

## 2023-10-04 RX ORDER — BACITRACIN ZINC 500 UNIT/G
1 OINTMENT IN PACKET (EA) TOPICAL EVERY 8 HOURS
Refills: 0 | Status: DISCONTINUED | OUTPATIENT
Start: 2023-10-04 | End: 2023-10-09

## 2023-10-04 RX ORDER — DAPAGLIFLOZIN 10 MG/1
10 TABLET, FILM COATED ORAL DAILY
Refills: 0 | Status: DISCONTINUED | OUTPATIENT
Start: 2023-10-05 | End: 2023-10-05

## 2023-10-04 RX ORDER — SODIUM CHLORIDE 9 MG/ML
1000 INJECTION INTRAMUSCULAR; INTRAVENOUS; SUBCUTANEOUS
Refills: 0 | Status: DISCONTINUED | OUTPATIENT
Start: 2023-10-04 | End: 2023-10-09

## 2023-10-04 RX ADMIN — DIVALPROEX SODIUM 500 MILLIGRAM(S): 500 TABLET, DELAYED RELEASE ORAL at 05:44

## 2023-10-04 RX ADMIN — MENTHOL AND METHYL SALICYLATE 1 APPLICATION(S): 10; 30 CREAM TOPICAL at 06:31

## 2023-10-04 RX ADMIN — SACUBITRIL AND VALSARTAN 1 TABLET(S): 24; 26 TABLET, FILM COATED ORAL at 05:46

## 2023-10-04 RX ADMIN — MIDODRINE HYDROCHLORIDE 7.5 MILLIGRAM(S): 2.5 TABLET ORAL at 05:44

## 2023-10-04 RX ADMIN — GENTAMICIN SULFATE 1 DROP(S): 3 SOLUTION/ DROPS OPHTHALMIC at 17:38

## 2023-10-04 RX ADMIN — TIOTROPIUM BROMIDE 2 PUFF(S): 18 CAPSULE ORAL; RESPIRATORY (INHALATION) at 05:45

## 2023-10-04 RX ADMIN — Medication 25 MILLIGRAM(S): at 05:44

## 2023-10-04 RX ADMIN — DAPAGLIFLOZIN 10 MILLIGRAM(S): 10 TABLET, FILM COATED ORAL at 12:00

## 2023-10-04 RX ADMIN — ATORVASTATIN CALCIUM 40 MILLIGRAM(S): 80 TABLET, FILM COATED ORAL at 21:37

## 2023-10-04 RX ADMIN — Medication 1 APPLICATION(S): at 17:38

## 2023-10-04 RX ADMIN — DULOXETINE HYDROCHLORIDE 30 MILLIGRAM(S): 30 CAPSULE, DELAYED RELEASE ORAL at 17:37

## 2023-10-04 RX ADMIN — Medication 1 APPLICATION(S): at 05:45

## 2023-10-04 RX ADMIN — GENTAMICIN SULFATE 1 DROP(S): 3 SOLUTION/ DROPS OPHTHALMIC at 12:00

## 2023-10-04 RX ADMIN — MIDODRINE HYDROCHLORIDE 7.5 MILLIGRAM(S): 2.5 TABLET ORAL at 17:37

## 2023-10-04 RX ADMIN — MIDODRINE HYDROCHLORIDE 7.5 MILLIGRAM(S): 2.5 TABLET ORAL at 12:00

## 2023-10-04 RX ADMIN — OLANZAPINE 10 MILLIGRAM(S): 15 TABLET, FILM COATED ORAL at 23:07

## 2023-10-04 RX ADMIN — GENTAMICIN SULFATE 1 DROP(S): 3 SOLUTION/ DROPS OPHTHALMIC at 19:31

## 2023-10-04 RX ADMIN — Medication 650 MILLIGRAM(S): at 17:28

## 2023-10-04 RX ADMIN — DULOXETINE HYDROCHLORIDE 30 MILLIGRAM(S): 30 CAPSULE, DELAYED RELEASE ORAL at 05:44

## 2023-10-04 RX ADMIN — DIVALPROEX SODIUM 500 MILLIGRAM(S): 500 TABLET, DELAYED RELEASE ORAL at 17:37

## 2023-10-04 RX ADMIN — ENOXAPARIN SODIUM 40 MILLIGRAM(S): 100 INJECTION SUBCUTANEOUS at 19:31

## 2023-10-04 RX ADMIN — MENTHOL AND METHYL SALICYLATE 1 APPLICATION(S): 10; 30 CREAM TOPICAL at 19:31

## 2023-10-04 RX ADMIN — BUDESONIDE AND FORMOTEROL FUMARATE DIHYDRATE 2 PUFF(S): 160; 4.5 AEROSOL RESPIRATORY (INHALATION) at 05:45

## 2023-10-04 RX ADMIN — Medication 650 MILLIGRAM(S): at 23:59

## 2023-10-04 RX ADMIN — GENTAMICIN SULFATE 1 DROP(S): 3 SOLUTION/ DROPS OPHTHALMIC at 08:33

## 2023-10-04 RX ADMIN — Medication 650 MILLIGRAM(S): at 23:07

## 2023-10-04 RX ADMIN — PANTOPRAZOLE SODIUM 40 MILLIGRAM(S): 20 TABLET, DELAYED RELEASE ORAL at 05:44

## 2023-10-04 RX ADMIN — Medication 1 APPLICATION(S): at 21:37

## 2023-10-04 RX ADMIN — GENTAMICIN SULFATE 1 DROP(S): 3 SOLUTION/ DROPS OPHTHALMIC at 23:07

## 2023-10-04 RX ADMIN — SPIRONOLACTONE 25 MILLIGRAM(S): 25 TABLET, FILM COATED ORAL at 05:47

## 2023-10-04 RX ADMIN — MENTHOL AND METHYL SALICYLATE 1 APPLICATION(S): 10; 30 CREAM TOPICAL at 13:53

## 2023-10-04 RX ADMIN — Medication 650 MILLIGRAM(S): at 16:28

## 2023-10-04 RX ADMIN — SACUBITRIL AND VALSARTAN 1 TABLET(S): 24; 26 TABLET, FILM COATED ORAL at 17:38

## 2023-10-04 RX ADMIN — SODIUM CHLORIDE 250 MILLILITER(S): 9 INJECTION INTRAMUSCULAR; INTRAVENOUS; SUBCUTANEOUS at 13:14

## 2023-10-04 RX ADMIN — BUDESONIDE AND FORMOTEROL FUMARATE DIHYDRATE 2 PUFF(S): 160; 4.5 AEROSOL RESPIRATORY (INHALATION) at 17:44

## 2023-10-04 RX ADMIN — Medication 5 MILLIGRAM(S): at 23:07

## 2023-10-04 RX ADMIN — OLANZAPINE 5 MILLIGRAM(S): 15 TABLET, FILM COATED ORAL at 17:37

## 2023-10-04 RX ADMIN — OLANZAPINE 5 MILLIGRAM(S): 15 TABLET, FILM COATED ORAL at 05:44

## 2023-10-04 RX ADMIN — Medication 40 MILLIGRAM(S): at 05:45

## 2023-10-04 NOTE — PROGRESS NOTE ADULT - ATTENDING COMMENTS
82y/o M PMH dementia, DM2, HFrEF s/p PPM, HTN, HLD presents with increased agitation.  Admit for increased paranoia & Placement .  pt seen, examined, case & care plan d/w pt, residents at detail. d/w dtr Arianne on 9/21 at [facility] Detail about pt's condition, Care plan Meds   Consult;  Psych Dr Strange  -Dr Strange follow up today-  stable for d/c   Cardiology Dr Johnston - D/W ECHO, STOP Spironolactone, Orthostasis VS,   Social service d/w about d/c planning to [facility] as SNF refused to take pt   PT Eval.-Pt is ambulating in [place] ways   D/W Social work -follow up for d/c -Placement to new correction as provided by family .  PO diet  AM labs   Total care time is 40 minutes.

## 2023-10-04 NOTE — CONSULT NOTE ADULT - ATTENDING COMMENTS
HFrEF, admitted with agitation/paranoia, now improved  called to evaluate for hypotension    - no symptoms at this time, despite low BP  - would ideally like to continue GDMT, with hold parameters  - agree with backing down on oral diuretic  - echocardiogram to evaluate EF  - check orthostatics  - cont to monitor BP closely

## 2023-10-04 NOTE — PROGRESS NOTE ADULT - PROBLEM SELECTOR PLAN 5
Discussed patient medication list with daughter (Arianne) who confirmed most medications found on surescripts recent dispensary history) however she is not sure of any changes that may have happened when he was hospitalized or when he went to the ED  - Patient has list with him, copy in chart however unclear if that list is up to date  - Current med rec completed based on recent dispensary history  - [facility] pharmacy called: additional medications from pharmacy include allopurinol 300qD, lisinopril 20mg qD, mirabegron 25mg qD, trazodone 50mg qD at bedtime. medications not mentioned by daughter. daughter notes pt has been to numerous different hospitals/ERs and has come back with numerous different medications with no follow up  - plan to hold these additional meds, per psych consult for use of trazodone

## 2023-10-04 NOTE — PROGRESS NOTE ADULT - PROBLEM SELECTOR PLAN 3
BP borderline hypotensive today when getting up, standing, however pt is asx  - likely due to lowered hold parameters on Lasix 40 mg 2x day, Entresto, metoprolol XL, spironolactone, Farxiga.   - lasix 40mg QD now after confirming Pt med w/ pharmacy.  - continue midodrine 7.5 mg 3x day for soft BP. BP borderline hypotensive today when getting up, standing, however pt is asx  - likely due to lowered hold parameters on Lasix 40 mg 2x day, Entresto, metoprolol XL, spironolactone, Farxiga.   - continue lasix 40mg QD w/ hold parameters  - continue midodrine 7.5 mg 3x day for soft BP.  - continues to be hypotensive, but asymptomatic.   - f/u TTE  - Cardiology consulted BP borderline hypotensive today when getting up, standing, however pt is asx  - likely due to lowered hold parameters on Lasix 40 mg 2x day, Entresto, metoprolol XL, spironolactone, Farxiga.   - continue lasix 40mg QD w/ hold parameters  - continue midodrine 7.5 mg 3x day for soft BP.  - continues to be hypotensive, but asymptomatic.   - f/u TTE  - Cardiology consulted--Dr mccarthy- d/w

## 2023-10-04 NOTE — PROGRESS NOTE ADULT - PROBLEM SELECTOR PLAN 1
Acute paranoia in setting of hx of dementia, ?baseline mental disorder ? Psych Hx- ?Psychosis/ Agitation: unclear hx  - Previous hospitalization at [facility] for similar problem. Unknown if Pt was taking meds prior to admission.    - No infectious or metabolic concomitant disorder. Normal thyroid profile.   - Continue current Zyrexa 5 mg 2x day, Zyprexa 10 mg Q HS regimen as per psych.  - Continue Depakote 500mg BID as per Dr. Strange.    - Continue duloxetine 30 mg 2x day.  - STOP amitriptyline, as per psych recs  - Psych (Dr. Strange) D/C 1:1  - SW consulted for d/c plan: awaiting placement.   - Now off constant observation - no acute agitation observed

## 2023-10-04 NOTE — CHART NOTE - NSCHARTNOTEFT_GEN_A_CORE
Called by RN as patient was agitated and requested pain meds. Patient states that he takes 3 meds for pain (does not recall names) and melatonin for sleep every night and has not been able to receive them. He states he has chronic non-radiating back pain, leg pain, and shoulder pain and cannot go to sleep. Patient was given Tylenol and melatonin, but did not help. He is AOx3. Patient fell asleep shortly after encounter.    Plan:  Continue to monitor
Called by RN for patient with hypotension. Patient states manual BP was 84/52. Patient is otherwise asx. Patient seen and examined at bedside. Patient has no acute complaints       T(C): 36.3 (10-04-23 @ 05:06), Max: 36.4 (10-03-23 @ 21:13)  HR: 87 (10-04-23 @ 11:58) (84 - 99)  BP: 84/52 (10-04-23 @ 11:58) (84/52 - 114/71)  RR: 18 (10-04-23 @ 11:58) (17 - 18)  SpO2: 96% (10-04-23 @ 11:58) (92% - 96%)  Wt(kg): --    Physical Exam:  Gen: well appearing, NAD  HEENT: NCAT, PEERLA b/l, EOMI b/l, no conjunctival erythema  Cardio: regular rate and rhythm, +s1s2, no murmurs, rubs, or gallops  Pulm: CTA b/l, no wheezes, rales or rhonchi  Abdomen: soft, nontender, nondistended, +BS x4 quadrants, no guarding  Extremities: no clubbing, cyanosis or edema, +2 pedal pulses  Neuro: AAOx2, moving all 4 extremities, sensation intact  Skin: severe PVD stasis skin changes B/L Lower EXT, warm and dry    Assessment/Plan  82y/o M PMH dementia, DM2, HFrEF s/p PPM, HTN, HLD presents with increased agitation.  Admit for increased paranoia. Called by RN for hypotension    - patient received Entresto, metoprolol XL, spironolactone, Farxiga, lasix this AM  - hypotension likely medication induced  - midodrine 7.5mg x1 given now, 250 cc NS bolus x1 ordered  - TTE ordered  - Cardio Dr. Johnston consulted for medication management of hypotension  - RN to call if changes
Assessment: patient seen for follow up  83y old  Male who presents with a chief complaint of paranoia in setting of dementia  HF chronic  DM type 2  patient seen sitting in chair reports with good PO intake. CNA states patient ate 100% of breakfast  + BM per patient         Factors impacting intake: [x ] none [ ] nausea  [ ] vomiting [ ] diarrhea [ ] constipation  [ ]chewing problems [ ] swallowing issues  [ ] other:     Diet Prescription: Diet, Regular:   Consistent Carbohydrate {Evening Snack}  DASH/TLC {Sodium & Cholesterol Restricted} (09-20-23 @ 22:36)    Intake: up to 100%     Current Weight: 9/27 198# 9/20 188.9# + 1 left and right leg edema continue to follow weights      Pertinent Medications: MEDICATIONS  (STANDING):  ammonium lactate 12% Lotion 1 Application(s) Topical two times a day  atorvastatin 40 milliGRAM(s) Oral at bedtime  budesonide  80 MICROgram(s)/formoterol 4.5 MICROgram(s) Inhaler 2 Puff(s) Inhalation two times a day  dapagliflozin 10 milliGRAM(s) Oral every 24 hours  dextrose 5%. 1000 milliLiter(s) (100 mL/Hr) IV Continuous <Continuous>  dextrose 5%. 1000 milliLiter(s) (50 mL/Hr) IV Continuous <Continuous>  dextrose 50% Injectable 12.5 Gram(s) IV Push once  dextrose 50% Injectable 25 Gram(s) IV Push once  dextrose 50% Injectable 25 Gram(s) IV Push once  divalproex  milliGRAM(s) Oral two times a day  DULoxetine 30 milliGRAM(s) Oral <User Schedule>  enoxaparin Injectable 40 milliGRAM(s) SubCutaneous every 24 hours  furosemide    Tablet 40 milliGRAM(s) Oral daily  gentamicin 0.3% Ophthalmic Solution 1 Drop(s) Both EYES five times a day  glucagon  Injectable 1 milliGRAM(s) IntraMuscular once  insulin lispro (ADMELOG) corrective regimen sliding scale   SubCutaneous at bedtime  insulin lispro (ADMELOG) corrective regimen sliding scale   SubCutaneous three times a day before meals  methyl salicylate 15%/menthol 10% Topical Cream 1 Application(s) Topical three times a day  metoprolol succinate ER 25 milliGRAM(s) Oral daily  midodrine. 7.5 milliGRAM(s) Oral three times a day  OLANZapine Disintegrating Tablet 5 milliGRAM(s) Oral two times a day  OLANZapine Disintegrating Tablet 10 milliGRAM(s) Oral at bedtime  pantoprazole    Tablet 40 milliGRAM(s) Oral before breakfast  sacubitril 24 mG/valsartan 26 mG 1 Tablet(s) Oral two times a day  spironolactone 25 milliGRAM(s) Oral daily  tiotropium 2.5 MICROgram(s) Inhaler 2 Puff(s) Inhalation daily    MEDICATIONS  (PRN):  acetaminophen     Tablet .. 650 milliGRAM(s) Oral every 6 hours PRN Temp greater or equal to 38C (100.4F), Mild Pain (1 - 3)  albuterol    0.083% 2.5 milliGRAM(s) Nebulizer every 6 hours PRN Shortness of Breath and/or Wheezing  dextrose Oral Gel 15 Gram(s) Oral once PRN Blood Glucose LESS THAN 70 milliGRAM(s)/deciliter  melatonin 5 milliGRAM(s) Oral at bedtime PRN Insomnia    Pertinent Labs: POCT 101, 128 A1c 6.5%   Skin: skin tear    Estimated Needs:   [x ] no change since previous assessment based on 188# 85.2kg 20-25kcals/kg 1704-2130kcals and .8-1.0gms protein/kg 68-85gms protein  [ ] recalculated:     Previous Nutrition Diagnosis:   [x ] decreased nutrient needs Na     Nutrition Diagnosis is [x ] ongoing  [ ] resolved [ ] not applicable     New Nutrition Diagnosis: [x ] not applicable       Interventions:   Recommend  [ ] Change Diet To:  [ ] Nutrition Supplement  [ ] Nutrition Support  [x ] Other: continue diet as ordered, follow weights    Monitoring and Evaluation:   [x ] PO intake [ x ] Tolerance to diet prescription [ x ] weights [ x ] labs[ x ] follow up per protocol  [ ] other:

## 2023-10-04 NOTE — CONSULT NOTE ADULT - SUBJECTIVE AND OBJECTIVE BOX
Patient is a 83y old  Male who presents with a chief complaint of paranoia (04 Oct 2023 09:30)      HPI:  84y/o M PMH dementia, HFrEF s/p PPM, HTN, HLD  presents with increased agitation. History obtained from patient's daughter Arianne over phone.   Patient moved from [place] to [place] ~2months ago and has been having issues with increased paranoia for the past month. He stays at a [facility] assisted living community where he has been having several episodes of sundowning.   Recently he was hospitalized at [facility] for same issues from 9/1-914/23. Daughter was unable to provide sufficient history regarding hospitalization.   Patient's son, Jose has been taking care of him and knows more about his medical hx however patient's daughter states she is taking over now and did not want to bother Jose at this time.  Patient was also recently seen at [facility] ED for episodes of agitation and was told to follow up with outpatient Psychiatrist but daughter states that likely did not happened because of his increased agitation.  Patient became agitated today and staff had to call 911 for further evaluation. The facility as well as his children feel that his current psych medication regimen is not sufficient to keep him calm.   Per review of recent dispensary history, he was seeing a psychiatric NP (Barbara Bravo) in [place], [place].   Of note, he had placement of PPM in late August at [facility]. Per his daughter, his LVEF at that time was 20% before and after PPM placement.  Currently, patient states his b/l legs feel itchy but otherwise he feels fine and is agreeable.  Denies fevers, chills. sob, cp, n/v/d.    ED Course   T 97.4 F  /79 RR 18 SpO2 95% on RA  Labs H/H 12.1/37.5 Glu   UA: >1000 glucose  UTox: negaitve     In the ED, given tylenol 650mg x1 , zyprexa 5mg IVP x1  (20 Sep 2023 21:58)    INTERVAL HPI: Patient seen and examined at bedside. Has no acute complaints, states he sometimes has back pain when laying flat on his back. Denies headaches, lightheadedness, dizziness, fevers, chills, chest pain, sob, n/v/d.    PAST MEDICAL & SURGICAL HISTORY:  HFrEF (heart failure with reduced ejection fraction)      HTN (hypertension)      HLD (hyperlipidemia)      Type 2 diabetes mellitus      Pacemaker                ECHO  FINDINGS: Not on file (8/2023) per daughter showed EF ~20%      MEDICATIONS  (STANDING):  ammonium lactate 12% Lotion 1 Application(s) Topical two times a day  atorvastatin 40 milliGRAM(s) Oral at bedtime  budesonide  80 MICROgram(s)/formoterol 4.5 MICROgram(s) Inhaler 2 Puff(s) Inhalation two times a day  dapagliflozin 10 milliGRAM(s) Oral daily  dextrose 5%. 1000 milliLiter(s) (100 mL/Hr) IV Continuous <Continuous>  dextrose 5%. 1000 milliLiter(s) (50 mL/Hr) IV Continuous <Continuous>  dextrose 50% Injectable 25 Gram(s) IV Push once  dextrose 50% Injectable 12.5 Gram(s) IV Push once  dextrose 50% Injectable 25 Gram(s) IV Push once  divalproex  milliGRAM(s) Oral two times a day  DULoxetine 30 milliGRAM(s) Oral <User Schedule>  enoxaparin Injectable 40 milliGRAM(s) SubCutaneous every 24 hours  furosemide    Tablet 40 milliGRAM(s) Oral daily  gentamicin 0.3% Ophthalmic Solution 1 Drop(s) Both EYES five times a day  glucagon  Injectable 1 milliGRAM(s) IntraMuscular once  insulin lispro (ADMELOG) corrective regimen sliding scale   SubCutaneous three times a day before meals  insulin lispro (ADMELOG) corrective regimen sliding scale   SubCutaneous at bedtime  methyl salicylate 15%/menthol 10% Topical Cream 1 Application(s) Topical three times a day  metoprolol succinate ER 25 milliGRAM(s) Oral daily  midodrine. 7.5 milliGRAM(s) Oral three times a day  OLANZapine Disintegrating Tablet 5 milliGRAM(s) Oral two times a day  OLANZapine Disintegrating Tablet 10 milliGRAM(s) Oral at bedtime  pantoprazole    Tablet 40 milliGRAM(s) Oral before breakfast  sacubitril 24 mG/valsartan 26 mG 1 Tablet(s) Oral two times a day  sodium chloride 0.9%. 1000 milliLiter(s) (250 mL/Hr) IV Continuous <Continuous>  spironolactone 25 milliGRAM(s) Oral daily  tiotropium 2.5 MICROgram(s) Inhaler 2 Puff(s) Inhalation daily    MEDICATIONS  (PRN):  acetaminophen     Tablet .. 650 milliGRAM(s) Oral every 6 hours PRN Temp greater or equal to 38C (100.4F), Mild Pain (1 - 3)  albuterol    0.083% 2.5 milliGRAM(s) Nebulizer every 6 hours PRN Shortness of Breath and/or Wheezing  dextrose Oral Gel 15 Gram(s) Oral once PRN Blood Glucose LESS THAN 70 milliGRAM(s)/deciliter  melatonin 5 milliGRAM(s) Oral at bedtime PRN Insomnia      FAMILY HISTORY:    Denies Family history of CAD or early MI      Constitutional: denies fever, chills  HEENT: denies blurry vision, difficulty hearing  Respiratory: denies SOB, LEVIN, cough  Cardiovascular: denies CP, palpitations, orthopnea, PND, LE edema  Gastrointestinal: denies nausea, vomiting, abdominal pain  Genitourinary: denies urinary changes  Skin: Denies rashes, itching  Neurologic: denies headache, weakness, dizziness  Hematology/Oncology: denies bleeding, easy bruising  ROS negative except as noted above      SOCIAL HISTORY:    former smoker , quit >30 years ago  EtOH: social drinker  Recreational drug use: denies       Vital Signs Last 24 Hrs  T(C): 36.3 (04 Oct 2023 05:06), Max: 36.4 (03 Oct 2023 21:13)  T(F): 97.4 (04 Oct 2023 05:06), Max: 97.5 (03 Oct 2023 21:13)  HR: 87 (04 Oct 2023 11:58) (84 - 96)  BP: 84/52 (04 Oct 2023 11:58) (84/52 - 114/71)  BP(mean): --  RR: 18 (04 Oct 2023 11:58) (17 - 18)  SpO2: 96% (04 Oct 2023 11:58) (92% - 96%)    Parameters below as of 04 Oct 2023 11:58  Patient On (Oxygen Delivery Method): room air        Physical Exam:  General: Well developed, well nourished, NAD  HEENT: NCAT, EOMI bl, moist mucous membranes   Neck: Supple, nontender, no mass  Neurology: A&Ox3, nonfocal, sensation intact   Respiratory: CTA B/L, No W/R/R  CV: RRR, +S1/S2, no murmurs, rubs or gallops  Abdominal: Soft, NT, ND +BSx4, no palpable masses  Extremities: PVD stasis skin changes B/L Lower EXT, No C/C/E, + peripheral pulses  MSK: Normal ROM, no joint erythema or warmth, no joint swelling   Skin: warm, dry, normal color      ECG: Atrial sensed v-paced rhythm HR 97     I&O's Detail      LABS:                        14.3   8.04  )-----------( 146      ( 04 Oct 2023 07:35 )             43.7     10-04    140  |  102  |  40<H>  ----------------------------<  111<H>  3.8   |  32<H>  |  1.20    Ca    9.6      04 Oct 2023 07:35    TPro  7.9  /  Alb  3.9  /  TBili  1.1  /  DBili  x   /  AST  22  /  ALT  23  /  AlkPhos  74  10-04          Urinalysis Basic - ( 04 Oct 2023 07:35 )    Color: x / Appearance: x / SG: x / pH: x  Gluc: 111 mg/dL / Ketone: x  / Bili: x / Urobili: x   Blood: x / Protein: x / Nitrite: x   Leuk Esterase: x / RBC: x / WBC x   Sq Epi: x / Non Sq Epi: x / Bacteria: x      I&O's Summary      RADIOLOGY & ADDITIONAL STUDIES: Reviewed

## 2023-10-04 NOTE — CONSULT NOTE ADULT - ASSESSMENT
84y/o M PMH dementia, DM2, HFrEF s/p PPM, HTN, HLD presents with increased agitation.  Admit for increased paranoia. Consulted for evaluation of hypotension.    Hypotension/ HFrEF s/p ppm (8/2023) / HTN/HLD    - EKG shows Atrial sensed v-paced rhythm HR 97   - No previous EKG available for comparison  - Previous TTE (8/2023) per daughter showed EF ~20%  - BP hypotensive to 84/52, monitor routine hemodynamics.  - Check orthostatics  - Check TTE  - Continue lasix, toprol XL, entresto, spironolactone with current hold parameters  - Continue midodrine   - Encourage fluid intake  - Monitor and replete lytes, keep K>4, Mg>2.  - Other cardiovascular workup will depend on clinical course.  - All other workup per primary team.  - Will continue to follow.             84y/o M PMH dementia, DM2, HFrEF s/p PPM, HTN, HLD presents with increased agitation.  Admit for increased paranoia. Consulted for evaluation of hypotension.    Hypotension/ HFrEF s/p ppm (8/2023) / HTN/HLD    - EKG shows Atrial sensed v-paced rhythm HR 97   - No previous EKG available for comparison  - Previous TTE (8/2023) per daughter showed EF ~20%  - BP hypotensive to 84/52, monitor routine hemodynamics.  - Check orthostatics  - Check TTE  - Continue lasix, toprol XL, entresto, with current hold parameters  - STOP spironolactone   - Continue midodrine   - Encourage fluid intake  - Monitor and replete lytes, keep K>4, Mg>2.  - Other cardiovascular workup will depend on clinical course.  - All other workup per primary team.  - Will continue to follow.             82y/o M PMH dementia, DM2, HFrEF s/p PPM, HTN, HLD presents with increased agitation.  Admitted for increased paranoia. Consulted for evaluation of hypotension.    Hypotension/HFrEF s/p ppm (8/2023) / HTN/HLD    - EKG shows Atrial sensed v-paced rhythm HR 97   - No previous EKG available for comparison  - Previous TTE (8/2023) per daughter showed EF ~20%  - BP hypotensive to 84/52, monitor routine hemodynamics.  - Check orthostatics  - Check TTE  - Continue lasix, toprol XL, entresto, with current hold parameters  - can HOLD spironolactone   - Continue midodrine   - Encourage fluid intake  - Monitor and replete lytes, keep K>4, Mg>2.  - Other cardiovascular workup will depend on clinical course.  - All other workup per primary team.  - Will continue to follow.

## 2023-10-04 NOTE — PROGRESS NOTE ADULT - SUBJECTIVE AND OBJECTIVE BOX
Patient is a 83y old  Male who presents with a chief complaint of paranoia (03 Oct 2023 08:31)    HPI:  84y/o M PMH dementia, HFrEF s/p PPM, HTN, HLD  presents with increased agitation. History obtained from patient's daughter Arianne over phone.   Patient moved from [place] to [place] ~2months ago and has been having issues with increased paranoia for the past month. He stays at a [facility] assisted living community where he has been having several episodes of sundowning.   Recently he was hospitalized at [facility] for same issues from -. Daughter was unable to provide sufficient history regarding hospitalization.   Patient's son, Jose has been taking care of him and knows more about his medical hx however patient's daughter states she is taking over now and did not want to bother Jose at this time.  Patient was also recently seen at [facility] ED for episodes of agitation and was told to follow up with outpatient Psychiatrist but daughter states that likely did not happened because of his increased agitation.  Patient became agitated today and staff had to call 911 for further evaluation. The facility as well as his children feel that his current psych medication regimen is not sufficient to keep him calm.   Per review of recent dispensary history, he was seeing a psychiatric NP (Barbara Bravo) in [place], [place].   Of note, he had placement of PPM in late August at [facility]. Per his daughter, his LVEF at that time was 20% before and after PPM placement.  Currently, patient states his b/l legs feel itchy but otherwise he feels fine and is agreeable.  Denies fevers, chills. sob, cp, n/v/d.    ED Course   T 97.4 F  /79 RR 18 SpO2 95% on RA  Labs H/H 12.1/37.5 Glu   UA: >1000 glucose  UTox: negaitve     In the ED, given tylenol 650mg x1 , zyprexa 5mg IVP x1  (20 Sep 2023 21:58)    INTERVAL HPI:      OVERNIGHT EVENTS:    Home Medications:  albuterol 2.5 mg/3 mL (0.083%) inhalation solution: 2.5 milligram(s) inhaled every 6 hours as needed for  shortness of breath and/or wheezing (25 Sep 2023 11:00)  ammonium lactate 12% topical lotion: 1 Apply topically to affected area 2 times a day (25 Sep 2023 10:46)  atorvastatin 40 mg oral tablet: 1 tab(s) orally once a day (at bedtime) (25 Sep 2023 11:00)  dapagliflozin 10 mg oral tablet: 1 tab(s) orally every 24 hours (25 Sep 2023 10:46)  divalproex sodium 500 mg oral delayed release tablet: 1 tab(s) orally 2 times a day (25 Sep 2023 10:37)  DULoxetine 30 mg oral delayed release capsule: 1 cap(s) orally 2 times a day (25 Sep 2023 11:00)  furosemide 40 mg oral tablet: 1 tab(s) orally every 12 hours (25 Sep 2023 11:00)  melatonin 5 mg oral tablet: 1 tab(s) orally once a day (at bedtime) As needed Insomnia (25 Sep 2023 10:46)  METFORMIN 500MG TABLETS: TAKE 1 TABLET BY MOUTH EVERY MORNING AND EVERY EVENING WITH MEALS. (20 Sep 2023 21:58)  methyl salicylate-menthol 15%-10% topical cream: 1 Apply topically to affected area 3 times a day (25 Sep 2023 10:46)  metoprolol succinate 25 mg oral tablet, extended release: 1 tab(s) orally once a day (25 Sep 2023 11:00)  midodrine 5 mg oral tablet: 1 tab(s) orally 3 times a day (25 Sep 2023 11:00)  OLANZapine 10 mg oral tablet, disintegratin tab(s) orally once a day (at bedtime) (27 Sep 2023 08:38)  OLANZapine 5 mg oral tablet, disintegratin tab(s) orally 2 times a day (27 Sep 2023 08:38)  pantoprazole 40 mg oral delayed release tablet: 1 tab(s) orally once a day (before a meal) (25 Sep 2023 10:46)  POTASSIUM CL 20MEQ ER TABLETS: TAKE 1 TABLET BY MOUTH EVERY DAY WITH FOOD FOR 4 DAYS (20 Sep 2023 21:58)  sacubitril-valsartan 24 mg-26 mg oral tablet: 1 tab(s) orally 2 times a day (25 Sep 2023 11:00)  spironolactone 25 mg oral tablet: 1 tab(s) orally once a day (25 Sep 2023 10:46)  TRELEGY ELLIPTA 100-62.5MCG INH 30P: INHALE 1 PUFF BY MOUTH EVERY DAY (20 Sep 2023 21:58)      MEDICATIONS  (STANDING):  ammonium lactate 12% Lotion 1 Application(s) Topical two times a day  atorvastatin 40 milliGRAM(s) Oral at bedtime  budesonide  80 MICROgram(s)/formoterol 4.5 MICROgram(s) Inhaler 2 Puff(s) Inhalation two times a day  dapagliflozin 10 milliGRAM(s) Oral every 24 hours  dextrose 5%. 1000 milliLiter(s) (100 mL/Hr) IV Continuous <Continuous>  dextrose 5%. 1000 milliLiter(s) (50 mL/Hr) IV Continuous <Continuous>  dextrose 50% Injectable 12.5 Gram(s) IV Push once  dextrose 50% Injectable 25 Gram(s) IV Push once  dextrose 50% Injectable 25 Gram(s) IV Push once  divalproex  milliGRAM(s) Oral two times a day  DULoxetine 30 milliGRAM(s) Oral <User Schedule>  enoxaparin Injectable 40 milliGRAM(s) SubCutaneous every 24 hours  furosemide    Tablet 40 milliGRAM(s) Oral daily  gentamicin 0.3% Ophthalmic Solution 1 Drop(s) Both EYES five times a day  glucagon  Injectable 1 milliGRAM(s) IntraMuscular once  insulin lispro (ADMELOG) corrective regimen sliding scale   SubCutaneous three times a day before meals  insulin lispro (ADMELOG) corrective regimen sliding scale   SubCutaneous at bedtime  methyl salicylate 15%/menthol 10% Topical Cream 1 Application(s) Topical three times a day  metoprolol succinate ER 25 milliGRAM(s) Oral daily  midodrine. 7.5 milliGRAM(s) Oral three times a day  OLANZapine Disintegrating Tablet 5 milliGRAM(s) Oral two times a day  OLANZapine Disintegrating Tablet 10 milliGRAM(s) Oral at bedtime  pantoprazole    Tablet 40 milliGRAM(s) Oral before breakfast  sacubitril 24 mG/valsartan 26 mG 1 Tablet(s) Oral two times a day  spironolactone 25 milliGRAM(s) Oral daily  tiotropium 2.5 MICROgram(s) Inhaler 2 Puff(s) Inhalation daily    MEDICATIONS  (PRN):  acetaminophen     Tablet .. 650 milliGRAM(s) Oral every 6 hours PRN Temp greater or equal to 38C (100.4F), Mild Pain (1 - 3)  albuterol    0.083% 2.5 milliGRAM(s) Nebulizer every 6 hours PRN Shortness of Breath and/or Wheezing  dextrose Oral Gel 15 Gram(s) Oral once PRN Blood Glucose LESS THAN 70 milliGRAM(s)/deciliter  melatonin 5 milliGRAM(s) Oral at bedtime PRN Insomnia      Allergies    No Known Allergies    Intolerances        Social History:  Tobacco: former , quit >30 years ago  EtOH: social drinker  Recreational drug use: denies   Lives with: Assisted Living Facility  Ambulates: without assistance  ADLs: needs assistance (20 Sep 2023 21:58)      REVIEW OF SYSTEMS: i want to go home   CONSTITUTIONAL: No fever, No chills, No fatigue, No myalgia, No Body ache, No Weakness  EYES: No eye pain,  No visual disturbances, No discharge, NO Redness  ENMT:  No ear pain, No nose bleed, No vertigo; No sinus pain, NO throat pain, No Congestion  NECK: No pain, No stiffness  RESPIRATORY: No cough, NO wheezing, No  hemoptysis, NO  shortness of breath  CARDIOVASCULAR: No chest pain, palpitations  GASTROINTESTINAL: No abdominal pain, NO epigastric pain. No nausea, No vomiting; No diarrhea, No constipation. [ x ] BM   GENITOURINARY: No dysuria, No frequency, No urgency, No hematuria, NO incontinence  NEUROLOGICAL: No headaches, No dizziness, No numbness, No tingling, No tremors, No weakness  EXT: No Swelling, No Pain, No Edema  SKIN:  [ x ] No itching, burning, rashes, or lesions   MUSCULOSKELETAL: admits chronic low back pain. No joint pain, No Jt swelling; No muscle pain, No extremity pain  PSYCHIATRIC: No depression, No anxiety, No mood swings, No difficulty sleeping at night  PAIN SCALE: [ x ] None  [ ] Other    Vital Signs Last 24 Hrs  T(C): 36.3 (04 Oct 2023 05:06), Max: 36.7 (03 Oct 2023 11:34)  T(F): 97.4 (04 Oct 2023 05:06), Max: 98.1 (03 Oct 2023 11:34)  HR: 84 (04 Oct 2023 05:06) (76 - 99)  BP: 114/71 (04 Oct 2023 05:06) (96/59 - 114/71)  BP(mean): --  RR: 17 (04 Oct 2023 05:06) (17 - 17)  SpO2: 92% (04 Oct 2023 05:06) (92% - 94%)    Parameters below as of 04 Oct 2023 05:06  Patient On (Oxygen Delivery Method): room air      Finger Stick          PHYSICAL EXAM:  GENERAL:  [ x ] NAD , [ x ] well appearing, [  ] Agitated, [  ] Drowsy,  [  ] Lethargy, [  ] confused   HEAD:  [ x ] Normal, [  ] Other  EYES:  [ x ] EOMI, [  ] PERRLA, [ x ] conjunctiva and sclera clear normal, [  ] Other,  [  ] Pallor,[  ] Discharge  ENMT:  [ x ] Normal, [ x ] Moist mucous membranes, [ x ] Good dentition, [ x ] No Thrush  NECK:  [ x ] Supple, [ x ] No JVD, [ x ] Normal thyroid, [  ] Lymphadenopathy [  ] Other  CHEST/LUNG:  [ x ] Clear to auscultation bilaterally, [ x ] Breath Sounds equal B/L / Decrease, [ x ] poor effort  [ x ] No rales, [ x ] No rhonchi  [ x ]  No wheezing,   HEART:  [ x ] Regular rate and rhythm, [  ] tachycardia, [  ] Bradycardia,  [  ] irregular  [ x ] No murmurs, No rubs, No gallops, [  ] PPM in place (Mfr:  )  ABDOMEN:  [ x ] Soft, [ x ] Nontender, [ x ] Nondistended, [ x ] No mass, [ x ] Bowel sounds present, [ x ] obese  NERVOUS SYSTEM:  [ x ] Alert & Oriented X2 (name, place), [ x ] Nonfocal  [  ] Confusion  [  ] Encephalopathic [  ] Sedated [  ] Unable to assess, [  ] Dementia [ x ] Other- responds appropriately to commands and questions.   EXTREMITIES: [ x ] 2+ Peripheral Pulses, No clubbing, No cyanosis,  [x ] pitting 1+ edema B/L lower EXT. [ x ]  severe PVD stasis skin changes B/L Lower EXT, + Scabs  [ x ] wound-Dry Scabs on b/l Lower Ext  LYMPH: No lymphadenopathy noted  SKIN:  [ x ] No rashes or lesions, [  ] Pressure Ulcers, [  ] ecchymosis, [  ] Skin Tears, [  ] Other    DIET: Diet, Regular:   Consistent Carbohydrate Evening Snack  DASH/TLC Sodium & Cholesterol Restricted (23 @ 22:36)      LABS:                        14.3   8.04  )-----------( 146      ( 04 Oct 2023 07:35 )             43.7     04 Oct 2023 07:35    140    |  102    |  40     ----------------------------<  111    3.8     |  32     |  1.20     Ca    9.6        04 Oct 2023 07:35    TPro  7.9    /  Alb  3.9    /  TBili  1.1    /  DBili  x      /  AST  22     /  ALT  23     /  AlkPhos  74     04 Oct 2023 07:35      Urinalysis Basic - ( 04 Oct 2023 07:35 )    Color: x / Appearance: x / SG: x / pH: x  Gluc: 111 mg/dL / Ketone: x  / Bili: x / Urobili: x   Blood: x / Protein: x / Nitrite: x   Leuk Esterase: x / RBC: x / WBC x   Sq Epi: x / Non Sq Epi: x / Bacteria: x                           Anemia Panel:      Thyroid Panel:                RADIOLOGY & ADDITIONAL TESTS:  None    HEALTH ISSUES - PROBLEM Dx:  Paranoia    HFrEF (heart failure with reduced ejection fraction)    HLD (hyperlipidemia)    Need for prophylactic measure    Medication management    DM2 (diabetes mellitus, type 2)    Anemia    Hypotension            Consultant(s) Notes Reviewed:  [ x ] YES     Care Discussed with [X] Consultants  [ x ] Patient  [ x ] Family [  ] HCP [  ]   [ x ] Social Service  [ x ] RN, [  ] Physical Therapy,[  ] Palliative care team  DVT PPX: [ x ] Lovenox, [  ] S C Heparin, [  ] Coumadin, [  ] Xarelto, [  ] Eliquis, [  ] Pradaxa, [  ] IV Heparin drip, [  ] SCD [  ] Contraindication 2 to GI Bleed,[  ] Ambulation [  ] Contraindicated 2 to  bleed [  ] Contraindicated 2 to Brain Bleed  Advanced directive: [ x ] None, [  ] DNR/DNI Patient is a 83y old  Male who presents with a chief complaint of paranoia (03 Oct 2023 08:31)    HPI:  84y/o M PMH dementia, HFrEF s/p PPM, HTN, HLD  presents with increased agitation. History obtained from patient's daughter Arianne over phone.   Patient moved from [place] to [place] ~2months ago and has been having issues with increased paranoia for the past month. He stays at a 55 [facility] older assisted living community where he has been having several episodes of sundowning.   Recently he was hospitalized at [facility] for same issues from -. Daughter was unable to provide sufficient history regarding hospitalization.   Patient's son, Jose has been taking care of him and knows more about his medical hx however patient's daughter states she is taking over now and did not want to bother Jose at this time.  Patient was also recently seen at [facility] ED for episodes of agitation and was told to follow up with outpatient Psychiatrist but daughter states that likely did not happened because of his increased agitation.  Patient became agitated today and staff had to call 911 for further evaluation. The facility as well as his children feel that his current psych medication regimen is not sufficient to keep him calm.   Per review of recent dispensary history, he was seeing a psychiatric NP (Barbara Bravo) in [place], [place].   Of note, he had placement of PPM in late August at [facility]. Per his daughter, his LVEF at that time was 20% before and after PPM placement.  Currently, patient states his b/l legs feel itchy but otherwise he feels fine and is agreeable.  Denies fevers, chills. sob, cp, n/v/d.    ED Course   T 97.4 F  /79 RR 18 SpO2 95% on RA  Labs H/H 12.1/37.5 Glu   UA: >1000 glucose  UTox: negaitve     In the ED, given tylenol 650mg x1 , zyprexa 5mg IVP x1  (20 Sep 2023 21:58)    INTERVAL HPI:      OVERNIGHT EVENTS: NONE    Home Medications:  albuterol 2.5 mg/3 mL (0.083%) inhalation solution: 2.5 milligram(s) inhaled every 6 hours as needed for  shortness of breath and/or wheezing (25 Sep 2023 11:00)  ammonium lactate 12% topical lotion: 1 Apply topically to affected area 2 times a day (25 Sep 2023 10:46)  atorvastatin 40 mg oral tablet: 1 tab(s) orally once a day (at bedtime) (25 Sep 2023 11:00)  dapagliflozin 10 mg oral tablet: 1 tab(s) orally every 24 hours (25 Sep 2023 10:46)  divalproex sodium 500 mg oral delayed release tablet: 1 tab(s) orally 2 times a day (25 Sep 2023 10:37)  DULoxetine 30 mg oral delayed release capsule: 1 cap(s) orally 2 times a day (25 Sep 2023 11:00)  furosemide 40 mg oral tablet: 1 tab(s) orally every 12 hours (25 Sep 2023 11:00)  melatonin 5 mg oral tablet: 1 tab(s) orally once a day (at bedtime) As needed Insomnia (25 Sep 2023 10:46)  METFORMIN 500MG TABLETS: TAKE 1 TABLET BY MOUTH EVERY MORNING AND EVERY EVENING WITH MEALS. (20 Sep 2023 21:58)  methyl salicylate-menthol 15%-10% topical cream: 1 Apply topically to affected area 3 times a day (25 Sep 2023 10:46)  metoprolol succinate 25 mg oral tablet, extended release: 1 tab(s) orally once a day (25 Sep 2023 11:00)  midodrine 5 mg oral tablet: 1 tab(s) orally 3 times a day (25 Sep 2023 11:00)  OLANZapine 10 mg oral tablet, disintegratin tab(s) orally once a day (at bedtime) (27 Sep 2023 08:38)  OLANZapine 5 mg oral tablet, disintegratin tab(s) orally 2 times a day (27 Sep 2023 08:38)  pantoprazole 40 mg oral delayed release tablet: 1 tab(s) orally once a day (before a meal) (25 Sep 2023 10:46)  POTASSIUM CL 20MEQ ER TABLETS: TAKE 1 TABLET BY MOUTH EVERY DAY WITH FOOD FOR 4 DAYS (20 Sep 2023 21:58)  sacubitril-valsartan 24 mg-26 mg oral tablet: 1 tab(s) orally 2 times a day (25 Sep 2023 11:00)  spironolactone 25 mg oral tablet: 1 tab(s) orally once a day (25 Sep 2023 10:46)  TRELEGY ELLIPTA 100-62.5MCG INH 30P: INHALE 1 PUFF BY MOUTH EVERY DAY (20 Sep 2023 21:58)      MEDICATIONS  (STANDING):  ammonium lactate 12% Lotion 1 Application(s) Topical two times a day  atorvastatin 40 milliGRAM(s) Oral at bedtime  budesonide  80 MICROgram(s)/formoterol 4.5 MICROgram(s) Inhaler 2 Puff(s) Inhalation two times a day  dapagliflozin 10 milliGRAM(s) Oral every 24 hours  dextrose 5%. 1000 milliLiter(s) (100 mL/Hr) IV Continuous <Continuous>  dextrose 5%. 1000 milliLiter(s) (50 mL/Hr) IV Continuous <Continuous>  dextrose 50% Injectable 12.5 Gram(s) IV Push once  dextrose 50% Injectable 25 Gram(s) IV Push once  dextrose 50% Injectable 25 Gram(s) IV Push once  divalproex  milliGRAM(s) Oral two times a day  DULoxetine 30 milliGRAM(s) Oral <User Schedule>  enoxaparin Injectable 40 milliGRAM(s) SubCutaneous every 24 hours  furosemide    Tablet 40 milliGRAM(s) Oral daily  gentamicin 0.3% Ophthalmic Solution 1 Drop(s) Both EYES five times a day  glucagon  Injectable 1 milliGRAM(s) IntraMuscular once  insulin lispro (ADMELOG) corrective regimen sliding scale   SubCutaneous three times a day before meals  insulin lispro (ADMELOG) corrective regimen sliding scale   SubCutaneous at bedtime  methyl salicylate 15%/menthol 10% Topical Cream 1 Application(s) Topical three times a day  metoprolol succinate ER 25 milliGRAM(s) Oral daily  midodrine. 7.5 milliGRAM(s) Oral three times a day  OLANZapine Disintegrating Tablet 5 milliGRAM(s) Oral two times a day  OLANZapine Disintegrating Tablet 10 milliGRAM(s) Oral at bedtime  pantoprazole    Tablet 40 milliGRAM(s) Oral before breakfast  sacubitril 24 mG/valsartan 26 mG 1 Tablet(s) Oral two times a day  spironolactone 25 milliGRAM(s) Oral daily  tiotropium 2.5 MICROgram(s) Inhaler 2 Puff(s) Inhalation daily    MEDICATIONS  (PRN):  acetaminophen     Tablet .. 650 milliGRAM(s) Oral every 6 hours PRN Temp greater or equal to 38C (100.4F), Mild Pain (1 - 3)  albuterol    0.083% 2.5 milliGRAM(s) Nebulizer every 6 hours PRN Shortness of Breath and/or Wheezing  dextrose Oral Gel 15 Gram(s) Oral once PRN Blood Glucose LESS THAN 70 milliGRAM(s)/deciliter  melatonin 5 milliGRAM(s) Oral at bedtime PRN Insomnia      Allergies    No Known Allergies    Intolerances        Social History:  Tobacco: former , quit >30 years ago  EtOH: social drinker  Recreational drug use: denies   Lives with: Assisted Living Facility  Ambulates: without assistance  ADLs: needs assistance (20 Sep 2023 21:58)      REVIEW OF SYSTEMS: i want to go home   CONSTITUTIONAL: No fever, No chills, No fatigue, No myalgia, No Body ache, No Weakness  EYES: No eye pain,  No visual disturbances, No discharge, NO Redness  ENMT:  No ear pain, No nose bleed, No vertigo; No sinus pain, NO throat pain, No Congestion  NECK: No pain, No stiffness  RESPIRATORY: No cough, NO wheezing, No  hemoptysis, NO  shortness of breath  CARDIOVASCULAR: No chest pain, palpitations  GASTROINTESTINAL: No abdominal pain, NO epigastric pain. No nausea, No vomiting; No diarrhea, No constipation. [ x ] BM   GENITOURINARY: No dysuria, No frequency, No urgency, No hematuria, NO incontinence  NEUROLOGICAL: No headaches, No dizziness, No numbness, No tingling, No tremors, No weakness  EXT: No Swelling, No Pain, No Edema  SKIN:  [ x ] No itching, burning, rashes, or lesions   MUSCULOSKELETAL: admits chronic low back pain. No joint pain, No Jt swelling; No muscle pain, No extremity pain  PSYCHIATRIC: No depression, No anxiety, No mood swings, No difficulty sleeping at night  PAIN SCALE: [ x ] None  [ ] Other    Vital Signs Last 24 Hrs  T(C): 36.3 (04 Oct 2023 05:06), Max: 36.7 (03 Oct 2023 11:34)  T(F): 97.4 (04 Oct 2023 05:06), Max: 98.1 (03 Oct 2023 11:34)  HR: 84 (04 Oct 2023 05:06) (76 - 99)  BP: 114/71 (04 Oct 2023 05:06) (96/59 - 114/71)  BP(mean): --  RR: 17 (04 Oct 2023 05:06) (17 - 17)  SpO2: 92% (04 Oct 2023 05:06) (92% - 94%)    Parameters below as of 04 Oct 2023 05:06  Patient On (Oxygen Delivery Method): room air      Finger Stick          PHYSICAL EXAM:  GENERAL:  [ x ] NAD , [ x ] well appearing, [  ] Agitated, [  ] Drowsy,  [  ] Lethargy, [  ] confused   HEAD:  [ x ] Normal, [  ] Other  EYES:  [ x ] EOMI, [  ] PERRLA, [ x ] conjunctiva and sclera clear normal, [  ] Other,  [  ] Pallor,[  ] Discharge  ENMT:  [ x ] Normal, [ x ] Moist mucous membranes, [ x ] Good dentition, [ x ] No Thrush  NECK:  [ x ] Supple, [ x ] No JVD, [ x ] Normal thyroid, [  ] Lymphadenopathy [  ] Other  CHEST/LUNG:  [ x ] Clear to auscultation bilaterally, [ x ] Breath Sounds equal B/L / Decrease, [ x ] poor effort  [ x ] No rales, [ x ] No rhonchi  [ x ]  No wheezing,   HEART:  [ x ] Regular rate and rhythm, [  ] tachycardia, [  ] Bradycardia,  [  ] irregular  [ x ] No murmurs, No rubs, No gallops, [  ] PPM in place (Mfr:  )  ABDOMEN:  [ x ] Soft, [ x ] Nontender, [ x ] Nondistended, [ x ] No mass, [ x ] Bowel sounds present, [ x ] obese  NERVOUS SYSTEM:  [ x ] Alert & Oriented X2 (name, place), [ x ] Nonfocal  [  ] Confusion  [  ] Encephalopathic [  ] Sedated [  ] Unable to assess, [  ] Dementia [ x ] Other- responds appropriately to commands and questions.   EXTREMITIES: [ x ] 2+ Peripheral Pulses, No clubbing, No cyanosis,  [x ] pitting 1+ edema B/L lower EXT. [ x ]  severe PVD stasis skin changes B/L Lower EXT, + Scabs  [ x ] wound-Dry Scabs on b/l Lower Ext  LYMPH: No lymphadenopathy noted  SKIN:  [ x ] No rashes or lesions, [  ] Pressure Ulcers, [  ] ecchymosis, [  ] Skin Tears, [  ] Other    DIET: Diet, Regular:   Consistent Carbohydrate Evening Snack  DASH/TLC Sodium & Cholesterol Restricted (23 @ 22:36)      LABS:                        14.3   8.04  )-----------( 146      ( 04 Oct 2023 07:35 )             43.7     04 Oct 2023 07:35    140    |  102    |  40     ----------------------------<  111    3.8     |  32     |  1.20     Ca    9.6        04 Oct 2023 07:35    TPro  7.9    /  Alb  3.9    /  TBili  1.1    /  DBili  x      /  AST  22     /  ALT  23     /  AlkPhos  74     04 Oct 2023 07:35      Urinalysis Basic - ( 04 Oct 2023 07:35 )    Color: x / Appearance: x / SG: x / pH: x  Gluc: 111 mg/dL / Ketone: x  / Bili: x / Urobili: x   Blood: x / Protein: x / Nitrite: x   Leuk Esterase: x / RBC: x / WBC x   Sq Epi: x / Non Sq Epi: x / Bacteria: x            RADIOLOGY & ADDITIONAL TESTS:  None    HEALTH ISSUES - PROBLEM Dx:  Paranoia    HFrEF (heart failure with reduced ejection fraction)    HLD (hyperlipidemia)    Need for prophylactic measure    Medication management    DM2 (diabetes mellitus, type 2)    Anemia    Hypotension            Consultant(s) Notes Reviewed:  [ x ] YES     Care Discussed with [X] Consultants  [ x ] Patient  [ x ] Family [  ] HCP [  ]   [ x ] Social Service  [ x ] RN, [  ] Physical Therapy,[  ] Palliative care team  DVT PPX: [ x ] Lovenox, [  ] S C Heparin, [  ] Coumadin, [  ] Xarelto, [  ] Eliquis, [  ] Pradaxa, [  ] IV Heparin drip, [  ] SCD [  ] Contraindication 2 to GI Bleed,[  ] Ambulation [  ] Contraindicated 2 to  bleed [  ] Contraindicated 2 to Brain Bleed  Advanced directive: [ x ] None, [  ] DNR/DNI Patient is a 83y old  Male who presents with a chief complaint of paranoia (03 Oct 2023 08:31)    HPI:  82y/o M PMH dementia, HFrEF s/p PPM, HTN, HLD  presents with increased agitation. History obtained from patient's daughter Arianne over phone.   Patient moved from [place] to [place] ~2months ago and has been having issues with increased paranoia for the past month. He stays at a 55 [facility] older assisted living community where he has been having several episodes of sundowning.   Recently he was hospitalized at [facility] for same issues from -. Daughter was unable to provide sufficient history regarding hospitalization.   Patient's son, Jose has been taking care of him and knows more about his medical hx however patient's daughter states she is taking over now and did not want to bother Jose at this time.  Patient was also recently seen at [facility] ED for episodes of agitation and was told to follow up with outpatient Psychiatrist but daughter states that likely did not happened because of his increased agitation.  Patient became agitated today and staff had to call 911 for further evaluation. The facility as well as his children feel that his current psych medication regimen is not sufficient to keep him calm.   Per review of recent dispensary history, he was seeing a psychiatric NP (Barbara Bravo) in [place], [place].   Of note, he had placement of PPM in late August at [facility]. Per his daughter, his LVEF at that time was 20% before and after PPM placement.  Currently, patient states his b/l legs feel itchy but otherwise he feels fine and is agreeable.  Denies fevers, chills. sob, cp, n/v/d.    ED Course   T 97.4 F  /79 RR 18 SpO2 95% on RA  Labs H/H 12.1/37.5 Glu   UA: >1000 glucose  UTox: negaitve     In the ED, given tylenol 650mg x1 , zyprexa 5mg IVP x1  (20 Sep 2023 21:58)    INTERVAL HPI:  : Pt seen and examined, sitting in chair with 1 to 1 present. Patient expressed concern about not receiving his pantoprazole, stating he cant digest his food without it, denies n/v, epigastric pain or burning. When asked about why pt came to hospital, states that he was being followed and he called the police to come help him. Says he lives with his son Jose. EMS brought patient in from  and over [facility]. Additionally states that one pill he was given this morning by staff was "phony" so he didn't take it. Contacted pt's pharmacy [facility], numerous scripts filled in [place] and [place]. Per night team sign out, daughter states that pt has been in and out of many ERs and has been started on many different medications so there are a lot of recent meds that he does not currently take to the best of her knowledge. Per daughter, her brothers are tired of assisting with their father given history of difficulties with him. She is willing to assist but does not want to be too involved and asks us to not contact her brothers. Psychiatry consulted and to see patient. JOHN/VAISHALI updated.   Soren Krishnan [phone]  : Pt seen and examined at bedside. xyprexa dose increased to 5mg bedtime and 2.5mg in AM starting yesterday. Marked improvement in demeanor. A&Ox3 (person, time, place). States he is in a "police hospital" and that he came here because he asked some man in their car to call the police because he was being followed by an aide that his son hired. Said he came from "[place] [id number] and over [facility]. No complaints or concerns today. States he has to get things ready to move back to [place]. Psych is following. DSC planning pending placement as his previous community does not want pt to return and family does not want to take patient. Replace PO K   : Pt seen and examined at bedside. Depakote increased to 500mg PO BID, amitriptyline stopped as per psych recs. Pt with no complaints or concerns. Pleasant on encounter, says he is doing well and thanks provider for checking in on him.  Reports that he is waiting to go home. Patient informed we are working to get him out of the hospital. DSC planning pending placement. JOHN sent referral to [facility]. Awaiting decision.   :  Pt seen and examined at bedside. Pt reports that is feeling well and does not have any symptoms, pain or concern. Pt is tolerating PO and eating well.  Pt is cooperative with evaluation and pleasant.  Waiting for placement for dc. Awaiting placement.   : Pt seen and examined at bedside. Reports last night he was looking for 3 medications that were started in the hospital at night. Reassured that he is receiving all the proper medications he needs. Appetite is good. Pt has no complaints at this time. Now off 1:1.  : Pt seen and examined at bedside. Was not agitated overnight. Pt is very tired this morning. Reports chronic low back pain, but is not concerned. Otherwise he has no active complaints at  this time. Denies chest pain, abdominal pain, SOB. D/W Son Mykel today   : Pt seen and examined at bedside. No agitation overnight. Pt is tired this morning. Has no complaints other than his chronic lower back pain. Denies chest pain, abdominal pain SOB. Pt wants to go home. D/W Son Angelito today   : Pt seen and examined at bedside. No agitation overnight. Pt is tired this morning and wants to sleep. No complaints other than his chronic low back pain. Denies chest pain, SOB, abdominal pain. Pt wants to go.  : Pt seen and examined at bedside. No agitation. Pt is more awake. Continues to report chronic low back pain. No acute complaints at this time. Denies chest pain, SOB, abdominal pain. Wants to go.Social work for d/c planning when bed is available.  : Pt seen and examined at bedside. No agitation overnight. Pt is AAOx1. Knows he is in a hospital but unsure which one. Patient is pleasant and has no acute complaints at this time. Denies fever, chills, CP, palpitations, SOB, abd pain, nausea, vomiting. Low BP S/P 1 Bolus NS  10/1: Pt seen and examined at bedside. No agitation overnight. Patient has no complaints at this time. Just very tired. Wishes to go home. Denies fever, chills, chest pain, SOB, abdominal pain. stable for d/c  10/2: Patient was seen and examined at bedside. No agitation observed overnight. Patient has no complaints. Wishes to go home. Denies fever, chills, chest pain, SOB, abdominal pain. Pt wants to be d/don home with family, Pt is very Cooperative.  10/3: Pt seen and examined at bedside. Resting comfortably in chair. No agitation overnight. Pt really wants to go home, upset that he has been in the hospital for so long. No acute complaints at this time. Denies fever, chills, chest pain, SOB, abdominal pain. Pt is very cooperative.  10/4: Pt seen examined at bedside. Wants to go home. No new complaints at this time. very cooperative.    OVERNIGHT EVENTS: NONE    Home Medications:  albuterol 2.5 mg/3 mL (0.083%) inhalation solution: 2.5 milligram(s) inhaled every 6 hours as needed for  shortness of breath and/or wheezing (25 Sep 2023 11:00)  ammonium lactate 12% topical lotion: 1 Apply topically to affected area 2 times a day (25 Sep 2023 10:46)  atorvastatin 40 mg oral tablet: 1 tab(s) orally once a day (at bedtime) (25 Sep 2023 11:00)  dapagliflozin 10 mg oral tablet: 1 tab(s) orally every 24 hours (25 Sep 2023 10:46)  divalproex sodium 500 mg oral delayed release tablet: 1 tab(s) orally 2 times a day (25 Sep 2023 10:37)  DULoxetine 30 mg oral delayed release capsule: 1 cap(s) orally 2 times a day (25 Sep 2023 11:00)  furosemide 40 mg oral tablet: 1 tab(s) orally every 12 hours (25 Sep 2023 11:00)  melatonin 5 mg oral tablet: 1 tab(s) orally once a day (at bedtime) As needed Insomnia (25 Sep 2023 10:46)  METFORMIN 500MG TABLETS: TAKE 1 TABLET BY MOUTH EVERY MORNING AND EVERY EVENING WITH MEALS. (20 Sep 2023 21:58)  methyl salicylate-menthol 15%-10% topical cream: 1 Apply topically to affected area 3 times a day (25 Sep 2023 10:46)  metoprolol succinate 25 mg oral tablet, extended release: 1 tab(s) orally once a day (25 Sep 2023 11:00)  midodrine 5 mg oral tablet: 1 tab(s) orally 3 times a day (25 Sep 2023 11:00)  OLANZapine 10 mg oral tablet, disintegratin tab(s) orally once a day (at bedtime) (27 Sep 2023 08:38)  OLANZapine 5 mg oral tablet, disintegratin tab(s) orally 2 times a day (27 Sep 2023 08:38)  pantoprazole 40 mg oral delayed release tablet: 1 tab(s) orally once a day (before a meal) (25 Sep 2023 10:46)  POTASSIUM CL 20MEQ ER TABLETS: TAKE 1 TABLET BY MOUTH EVERY DAY WITH FOOD FOR 4 DAYS (20 Sep 2023 21:58)  sacubitril-valsartan 24 mg-26 mg oral tablet: 1 tab(s) orally 2 times a day (25 Sep 2023 11:00)  spironolactone 25 mg oral tablet: 1 tab(s) orally once a day (25 Sep 2023 10:46)  TRELEGY ELLIPTA 100-62.5MCG INH 30P: INHALE 1 PUFF BY MOUTH EVERY DAY (20 Sep 2023 21:58)      MEDICATIONS  (STANDING):  ammonium lactate 12% Lotion 1 Application(s) Topical two times a day  atorvastatin 40 milliGRAM(s) Oral at bedtime  budesonide  80 MICROgram(s)/formoterol 4.5 MICROgram(s) Inhaler 2 Puff(s) Inhalation two times a day  dapagliflozin 10 milliGRAM(s) Oral every 24 hours  dextrose 5%. 1000 milliLiter(s) (100 mL/Hr) IV Continuous <Continuous>  dextrose 5%. 1000 milliLiter(s) (50 mL/Hr) IV Continuous <Continuous>  dextrose 50% Injectable 12.5 Gram(s) IV Push once  dextrose 50% Injectable 25 Gram(s) IV Push once  dextrose 50% Injectable 25 Gram(s) IV Push once  divalproex  milliGRAM(s) Oral two times a day  DULoxetine 30 milliGRAM(s) Oral <User Schedule>  enoxaparin Injectable 40 milliGRAM(s) SubCutaneous every 24 hours  furosemide    Tablet 40 milliGRAM(s) Oral daily  gentamicin 0.3% Ophthalmic Solution 1 Drop(s) Both EYES five times a day  glucagon  Injectable 1 milliGRAM(s) IntraMuscular once  insulin lispro (ADMELOG) corrective regimen sliding scale   SubCutaneous three times a day before meals  insulin lispro (ADMELOG) corrective regimen sliding scale   SubCutaneous at bedtime  methyl salicylate 15%/menthol 10% Topical Cream 1 Application(s) Topical three times a day  metoprolol succinate ER 25 milliGRAM(s) Oral daily  midodrine. 7.5 milliGRAM(s) Oral three times a day  OLANZapine Disintegrating Tablet 5 milliGRAM(s) Oral two times a day  OLANZapine Disintegrating Tablet 10 milliGRAM(s) Oral at bedtime  pantoprazole    Tablet 40 milliGRAM(s) Oral before breakfast  sacubitril 24 mG/valsartan 26 mG 1 Tablet(s) Oral two times a day  spironolactone 25 milliGRAM(s) Oral daily  tiotropium 2.5 MICROgram(s) Inhaler 2 Puff(s) Inhalation daily    MEDICATIONS  (PRN):  acetaminophen     Tablet .. 650 milliGRAM(s) Oral every 6 hours PRN Temp greater or equal to 38C (100.4F), Mild Pain (1 - 3)  albuterol    0.083% 2.5 milliGRAM(s) Nebulizer every 6 hours PRN Shortness of Breath and/or Wheezing  dextrose Oral Gel 15 Gram(s) Oral once PRN Blood Glucose LESS THAN 70 milliGRAM(s)/deciliter  melatonin 5 milliGRAM(s) Oral at bedtime PRN Insomnia      Allergies    No Known Allergies    Intolerances        Social History:  Tobacco: former , quit >30 years ago  EtOH: social drinker  Recreational drug use: denies   Lives with: Assisted Living Facility  Ambulates: without assistance  ADLs: needs assistance (20 Sep 2023 21:58)      REVIEW OF SYSTEMS: i want to go home   CONSTITUTIONAL: No fever, No chills, No fatigue, No myalgia, No Body ache, No Weakness  EYES: No eye pain,  No visual disturbances, No discharge, NO Redness  ENMT:  No ear pain, No nose bleed, No vertigo; No sinus pain, NO throat pain, No Congestion  NECK: No pain, No stiffness  RESPIRATORY: No cough, NO wheezing, No  hemoptysis, NO  shortness of breath  CARDIOVASCULAR: No chest pain, palpitations  GASTROINTESTINAL: No abdominal pain, NO epigastric pain. No nausea, No vomiting; No diarrhea, No constipation. [ x ] BM   GENITOURINARY: No dysuria, No frequency, No urgency, No hematuria, NO incontinence  NEUROLOGICAL: No headaches, No dizziness, No numbness, No tingling, No tremors, No weakness  EXT: No Swelling, No Pain, No Edema  SKIN:  [ x ] No itching, burning, rashes, or lesions   MUSCULOSKELETAL: admits chronic low back pain. No joint pain, No Jt swelling; No muscle pain, No extremity pain  PSYCHIATRIC: No depression, No anxiety, No mood swings, No difficulty sleeping at night  PAIN SCALE: [ x ] None  [ ] Other    Vital Signs Last 24 Hrs  T(C): 36.3 (04 Oct 2023 05:06), Max: 36.7 (03 Oct 2023 11:34)  T(F): 97.4 (04 Oct 2023 05:06), Max: 98.1 (03 Oct 2023 11:34)  HR: 84 (04 Oct 2023 05:06) (76 - 99)  BP: 114/71 (04 Oct 2023 05:06) (96/59 - 114/71)  BP(mean): --  RR: 17 (04 Oct 2023 05:06) (17 - 17)  SpO2: 92% (04 Oct 2023 05:06) (92% - 94%)    Parameters below as of 04 Oct 2023 05:06  Patient On (Oxygen Delivery Method): room air      Finger Stick          PHYSICAL EXAM:  GENERAL:  [ x ] NAD , [ x ] well appearing, [  ] Agitated, [  ] Drowsy,  [  ] Lethargy, [  ] confused   HEAD:  [ x ] Normal, [  ] Other  EYES:  [ x ] EOMI, [  ] PERRLA, [ x ] conjunctiva and sclera clear normal, [  ] Other,  [  ] Pallor,[  ] Discharge  ENMT:  [ x ] Normal, [ x ] Moist mucous membranes, [ x ] Good dentition, [ x ] No Thrush  NECK:  [ x ] Supple, [ x ] No JVD, [ x ] Normal thyroid, [  ] Lymphadenopathy [  ] Other  CHEST/LUNG:  [ x ] Clear to auscultation bilaterally, [ x ] Breath Sounds equal B/L / Decrease, [ x ] poor effort  [ x ] No rales, [ x ] No rhonchi  [ x ]  No wheezing,   HEART:  [ x ] Regular rate and rhythm, [  ] tachycardia, [  ] Bradycardia,  [  ] irregular  [ x ] No murmurs, No rubs, No gallops, [  ] PPM in place (Mfr:  )  ABDOMEN:  [ x ] Soft, [ x ] Nontender, [ x ] Nondistended, [ x ] No mass, [ x ] Bowel sounds present, [ x ] obese  NERVOUS SYSTEM:  [ x ] Alert & Oriented X2 (name, place), [ x ] Nonfocal  [  ] Confusion  [  ] Encephalopathic [  ] Sedated [  ] Unable to assess, [  ] Dementia [ x ] Other- responds appropriately to commands and questions.   EXTREMITIES: [ x ] 2+ Peripheral Pulses, No clubbing, No cyanosis,  [x ] pitting 1+ edema B/L lower EXT. [ x ]  severe PVD stasis skin changes B/L Lower EXT, + Scabs  [ x ] wound-Dry Scabs on b/l Lower Ext  LYMPH: No lymphadenopathy noted  SKIN:  [ x ] No rashes or lesions, [  ] Pressure Ulcers, [  ] ecchymosis, [  ] Skin Tears, [  ] Other    DIET: Diet, Regular:   Consistent Carbohydrate Evening Snack  DASH/TLC Sodium & Cholesterol Restricted (23 @ 22:36)      LABS:                        14.3   8.04  )-----------( 146      ( 04 Oct 2023 07:35 )             43.7     04 Oct 2023 07:35    140    |  102    |  40     ----------------------------<  111    3.8     |  32     |  1.20     Ca    9.6        04 Oct 2023 07:35    TPro  7.9    /  Alb  3.9    /  TBili  1.1    /  DBili  x      /  AST  22     /  ALT  23     /  AlkPhos  74     04 Oct 2023 07:35      Urinalysis Basic - ( 04 Oct 2023 07:35 )    Color: x / Appearance: x / SG: x / pH: x  Gluc: 111 mg/dL / Ketone: x  / Bili: x / Urobili: x   Blood: x / Protein: x / Nitrite: x   Leuk Esterase: x / RBC: x / WBC x   Sq Epi: x / Non Sq Epi: x / Bacteria: x            RADIOLOGY & ADDITIONAL TESTS:  None    HEALTH ISSUES - PROBLEM Dx:  Paranoia    HFrEF (heart failure with reduced ejection fraction)    HLD (hyperlipidemia)    Need for prophylactic measure    Medication management    DM2 (diabetes mellitus, type 2)    Anemia    Hypotension            Consultant(s) Notes Reviewed:  [ x ] YES     Care Discussed with [X] Consultants  [ x ] Patient  [ x ] Family [  ] HCP [  ]   [ x ] Social Service  [ x ] RN, [  ] Physical Therapy,[  ] Palliative care team  DVT PPX: [ x ] Lovenox, [  ] S C Heparin, [  ] Coumadin, [  ] Xarelto, [  ] Eliquis, [  ] Pradaxa, [  ] IV Heparin drip, [  ] SCD [  ] Contraindication 2 to GI Bleed,[  ] Ambulation [  ] Contraindicated 2 to  bleed [  ] Contraindicated 2 to Brain Bleed  Advanced directive: [ x ] None, [  ] DNR/DNI

## 2023-10-04 NOTE — PROGRESS NOTE ADULT - PROBLEM SELECTOR PLAN 2
Chronic s/p PPM (paced ~end of Aug 2023 at [place] per daughter )  - TTE (8/2023) per daughter showed EF ~20%  - continue lasix 40mg QD  - Entresto, metoprolol XL, spironolactone, Farxiga. Due to soft BP, lowered hold parameters to ensure Pt receives meds. - after confirming w/ pharmacy,    - Lower extremity edema improving  - Dash/ TLC Diet Chronic s/p PPM (paced ~end of Aug 2023 at [place] per daughter )  - TTE (8/2023) per daughter showed EF ~20%  - continue Lasix 40mg QD  - Entresto, metoprolol XL, spironolactone, Farxiga. Due to soft BP, lowered hold parameters to ensure Pt receives meds. - after confirming w/ pharmacy,    - Lower extremity edema improving  - Dash/ TLC Diet  Dr Johnston- D/W ECHO, STOP Spironolactone  Orthostasis VS.

## 2023-10-04 NOTE — SOCIAL WORK PROGRESS NOTE - NSSWPROGRESSNOTE_GEN_ALL_CORE
SW received call from [facility] and they are unable to accommodate interview today. They rescheduled for tomorrow @ 10AM. JOHN spoke with dtr Josselyn for d/c planning and she deferred to son Mykel as he is HCP to discuss SNF. JOHN called and left voice mail for Mykel [phone]. SW to follow and remain available for any needs.

## 2023-10-05 LAB
GLUCOSE BLDC GLUCOMTR-MCNC: 121 MG/DL — HIGH (ref 70–99)
GLUCOSE BLDC GLUCOMTR-MCNC: 149 MG/DL — HIGH (ref 70–99)
GLUCOSE BLDC GLUCOMTR-MCNC: 154 MG/DL — HIGH (ref 70–99)
GLUCOSE BLDC GLUCOMTR-MCNC: 92 MG/DL — SIGNIFICANT CHANGE UP (ref 70–99)

## 2023-10-05 PROCEDURE — 99233 SBSQ HOSP IP/OBS HIGH 50: CPT

## 2023-10-05 PROCEDURE — 99231 SBSQ HOSP IP/OBS SF/LOW 25: CPT

## 2023-10-05 RX ORDER — METFORMIN HYDROCHLORIDE 850 MG/1
1 TABLET ORAL
Qty: 60 | Refills: 0
Start: 2023-10-05 | End: 2023-11-03

## 2023-10-05 RX ORDER — SACUBITRIL AND VALSARTAN 24; 26 MG/1; MG/1
1 TABLET, FILM COATED ORAL
Qty: 60 | Refills: 0
Start: 2023-10-05 | End: 2023-11-03

## 2023-10-05 RX ORDER — FLUTICASONE FUROATE, UMECLIDINIUM BROMIDE AND VILANTEROL TRIFENATATE 200; 62.5; 25 UG/1; UG/1; UG/1
0 POWDER RESPIRATORY (INHALATION)
Qty: 0 | Refills: 0 | DISCHARGE

## 2023-10-05 RX ORDER — OLANZAPINE 15 MG/1
1 TABLET, FILM COATED ORAL
Qty: 30 | Refills: 0
Start: 2023-10-05 | End: 2023-11-03

## 2023-10-05 RX ORDER — BACITRACIN ZINC 500 UNIT/G
1 OINTMENT IN PACKET (EA) TOPICAL
Qty: 1 | Refills: 0
Start: 2023-10-05 | End: 2023-10-11

## 2023-10-05 RX ORDER — MENTHOL AND METHYL SALICYLATE 10; 30 G/100G; G/100G
1 CREAM TOPICAL
Qty: 1 | Refills: 0
Start: 2023-10-05 | End: 2023-10-11

## 2023-10-05 RX ORDER — DIVALPROEX SODIUM 500 MG/1
1 TABLET, DELAYED RELEASE ORAL
Qty: 60 | Refills: 0
Start: 2023-10-05 | End: 2023-11-03

## 2023-10-05 RX ORDER — DULOXETINE HYDROCHLORIDE 30 MG/1
1 CAPSULE, DELAYED RELEASE ORAL
Qty: 60 | Refills: 0
Start: 2023-10-05 | End: 2023-11-03

## 2023-10-05 RX ORDER — METOPROLOL TARTRATE 50 MG
25 TABLET ORAL DAILY
Refills: 0 | Status: DISCONTINUED | OUTPATIENT
Start: 2023-10-05 | End: 2023-10-09

## 2023-10-05 RX ORDER — MIDODRINE HYDROCHLORIDE 2.5 MG/1
3 TABLET ORAL
Qty: 270 | Refills: 0
Start: 2023-10-05 | End: 2023-11-03

## 2023-10-05 RX ORDER — ATORVASTATIN CALCIUM 80 MG/1
1 TABLET, FILM COATED ORAL
Qty: 60 | Refills: 0
Start: 2023-10-05 | End: 2023-12-03

## 2023-10-05 RX ORDER — METOPROLOL TARTRATE 50 MG
1 TABLET ORAL
Qty: 30 | Refills: 0
Start: 2023-10-05 | End: 2023-11-03

## 2023-10-05 RX ORDER — FLUTICASONE FUROATE, UMECLIDINIUM BROMIDE AND VILANTEROL TRIFENATATE 200; 62.5; 25 UG/1; UG/1; UG/1
1 POWDER RESPIRATORY (INHALATION)
Qty: 1 | Refills: 0
Start: 2023-10-05 | End: 2023-11-03

## 2023-10-05 RX ORDER — FLUTICASONE FUROATE, UMECLIDINIUM BROMIDE AND VILANTEROL TRIFENATATE 200; 62.5; 25 UG/1; UG/1; UG/1
1 POWDER RESPIRATORY (INHALATION)
Qty: 1 | Refills: 0
Start: 2023-10-05

## 2023-10-05 RX ORDER — ALBUTEROL 90 UG/1
2.5 AEROSOL, METERED ORAL
Qty: 1 | Refills: 0
Start: 2023-10-05 | End: 2023-11-03

## 2023-10-05 RX ORDER — PANTOPRAZOLE SODIUM 20 MG/1
1 TABLET, DELAYED RELEASE ORAL
Qty: 30 | Refills: 0
Start: 2023-10-05 | End: 2023-11-03

## 2023-10-05 RX ORDER — SACUBITRIL AND VALSARTAN 24; 26 MG/1; MG/1
1 TABLET, FILM COATED ORAL
Refills: 0 | Status: DISCONTINUED | OUTPATIENT
Start: 2023-10-05 | End: 2023-10-05

## 2023-10-05 RX ORDER — OLANZAPINE 15 MG/1
1 TABLET, FILM COATED ORAL
Qty: 60 | Refills: 0
Start: 2023-10-05 | End: 2023-11-03

## 2023-10-05 RX ORDER — METOPROLOL TARTRATE 50 MG
25 TABLET ORAL DAILY
Refills: 0 | Status: DISCONTINUED | OUTPATIENT
Start: 2023-10-05 | End: 2023-10-05

## 2023-10-05 RX ORDER — METFORMIN HYDROCHLORIDE 850 MG/1
0 TABLET ORAL
Qty: 0 | Refills: 0 | DISCHARGE

## 2023-10-05 RX ORDER — MIDODRINE HYDROCHLORIDE 2.5 MG/1
1 TABLET ORAL
Qty: 90 | Refills: 0
Start: 2023-10-05 | End: 2023-11-03

## 2023-10-05 RX ORDER — SACUBITRIL AND VALSARTAN 24; 26 MG/1; MG/1
1 TABLET, FILM COATED ORAL
Refills: 0 | Status: DISCONTINUED | OUTPATIENT
Start: 2023-10-05 | End: 2023-10-06

## 2023-10-05 RX ORDER — LANOLIN ALCOHOL/MO/W.PET/CERES
1 CREAM (GRAM) TOPICAL
Qty: 30 | Refills: 0
Start: 2023-10-05 | End: 2023-11-03

## 2023-10-05 RX ORDER — SOD,AMMONIUM,POTASSIUM LACTATE
1 CREAM (GRAM) TOPICAL
Qty: 1 | Refills: 0
Start: 2023-10-05 | End: 2023-10-11

## 2023-10-05 RX ORDER — POTASSIUM CHLORIDE 20 MEQ
0 PACKET (EA) ORAL
Qty: 0 | Refills: 0 | DISCHARGE

## 2023-10-05 RX ADMIN — TIOTROPIUM BROMIDE 2 PUFF(S): 18 CAPSULE ORAL; RESPIRATORY (INHALATION) at 08:00

## 2023-10-05 RX ADMIN — Medication 650 MILLIGRAM(S): at 23:48

## 2023-10-05 RX ADMIN — MENTHOL AND METHYL SALICYLATE 1 APPLICATION(S): 10; 30 CREAM TOPICAL at 14:02

## 2023-10-05 RX ADMIN — Medication 5 MILLIGRAM(S): at 22:49

## 2023-10-05 RX ADMIN — Medication 650 MILLIGRAM(S): at 22:49

## 2023-10-05 RX ADMIN — Medication 1 APPLICATION(S): at 08:59

## 2023-10-05 RX ADMIN — ATORVASTATIN CALCIUM 40 MILLIGRAM(S): 80 TABLET, FILM COATED ORAL at 21:02

## 2023-10-05 RX ADMIN — GENTAMICIN SULFATE 1 DROP(S): 3 SOLUTION/ DROPS OPHTHALMIC at 21:02

## 2023-10-05 RX ADMIN — DIVALPROEX SODIUM 500 MILLIGRAM(S): 500 TABLET, DELAYED RELEASE ORAL at 08:59

## 2023-10-05 RX ADMIN — OLANZAPINE 10 MILLIGRAM(S): 15 TABLET, FILM COATED ORAL at 22:49

## 2023-10-05 RX ADMIN — DAPAGLIFLOZIN 10 MILLIGRAM(S): 10 TABLET, FILM COATED ORAL at 14:02

## 2023-10-05 RX ADMIN — MIDODRINE HYDROCHLORIDE 7.5 MILLIGRAM(S): 2.5 TABLET ORAL at 08:59

## 2023-10-05 RX ADMIN — DULOXETINE HYDROCHLORIDE 30 MILLIGRAM(S): 30 CAPSULE, DELAYED RELEASE ORAL at 16:00

## 2023-10-05 RX ADMIN — Medication 1 APPLICATION(S): at 09:00

## 2023-10-05 RX ADMIN — MIDODRINE HYDROCHLORIDE 7.5 MILLIGRAM(S): 2.5 TABLET ORAL at 18:48

## 2023-10-05 RX ADMIN — OLANZAPINE 5 MILLIGRAM(S): 15 TABLET, FILM COATED ORAL at 08:59

## 2023-10-05 RX ADMIN — MENTHOL AND METHYL SALICYLATE 1 APPLICATION(S): 10; 30 CREAM TOPICAL at 21:02

## 2023-10-05 RX ADMIN — Medication 1 APPLICATION(S): at 18:48

## 2023-10-05 RX ADMIN — OLANZAPINE 5 MILLIGRAM(S): 15 TABLET, FILM COATED ORAL at 18:48

## 2023-10-05 RX ADMIN — Medication 1 APPLICATION(S): at 14:02

## 2023-10-05 RX ADMIN — GENTAMICIN SULFATE 1 DROP(S): 3 SOLUTION/ DROPS OPHTHALMIC at 16:00

## 2023-10-05 RX ADMIN — Medication 1 APPLICATION(S): at 21:02

## 2023-10-05 RX ADMIN — DIVALPROEX SODIUM 500 MILLIGRAM(S): 500 TABLET, DELAYED RELEASE ORAL at 18:46

## 2023-10-05 RX ADMIN — GENTAMICIN SULFATE 1 DROP(S): 3 SOLUTION/ DROPS OPHTHALMIC at 12:58

## 2023-10-05 RX ADMIN — MENTHOL AND METHYL SALICYLATE 1 APPLICATION(S): 10; 30 CREAM TOPICAL at 09:00

## 2023-10-05 RX ADMIN — MIDODRINE HYDROCHLORIDE 7.5 MILLIGRAM(S): 2.5 TABLET ORAL at 14:03

## 2023-10-05 RX ADMIN — DULOXETINE HYDROCHLORIDE 30 MILLIGRAM(S): 30 CAPSULE, DELAYED RELEASE ORAL at 08:59

## 2023-10-05 RX ADMIN — GENTAMICIN SULFATE 1 DROP(S): 3 SOLUTION/ DROPS OPHTHALMIC at 09:00

## 2023-10-05 RX ADMIN — ENOXAPARIN SODIUM 40 MILLIGRAM(S): 100 INJECTION SUBCUTANEOUS at 21:02

## 2023-10-05 RX ADMIN — PANTOPRAZOLE SODIUM 40 MILLIGRAM(S): 20 TABLET, DELAYED RELEASE ORAL at 08:59

## 2023-10-05 RX ADMIN — BUDESONIDE AND FORMOTEROL FUMARATE DIHYDRATE 2 PUFF(S): 160; 4.5 AEROSOL RESPIRATORY (INHALATION) at 08:00

## 2023-10-05 NOTE — PROGRESS NOTE ADULT - PROBLEM SELECTOR PLAN 2
Chronic s/p PPM (paced ~end of Aug 2023 at [place] [facility] per daughter )  - TTE (8/2023) per daughter showed EF ~20%  - continue Lasix 40mg QD  - Entresto, metoprolol XL, spironolactone, Farxiga. Due to soft BP, lowered hold parameters to ensure Pt receives meds. - after confirming w/ pharmacy,    - Lower extremity edema improving  - Dash/ TLC Diet  Dr Johnston- D/W ECHO, STOP Spironolactone  Orthostasis VS. Chronic s/p PPM (paced ~end of Aug 2023 at [place] per daughter )  - TTE (8/2023) per daughter showed EF ~20%  - home meds: Entresto, metoprolol XL, spironolactone, Farxiga. Due to soft BP, lowered hold parameters to ensure Pt receives meds. - after confirming w/ pharmacy,    - dc lasix, spironolactone, entresto as per Cardio  - Lower extremity edema improving  - Dash/ TLC Diet  Dr Johnston- D/W ECHO, STOP Spironolactone  Orthostasis VS. Chronic s/p PPM (paced ~end of Aug 2023 at [place] per daughter )  - TTE (8/2023) per daughter showed EF ~20%  - home meds: Entresto, metoprolol XL, spironolactone, Farxiga. Due to soft BP, lowered hold parameters to ensure Pt receives meds. - after confirming w/ pharmacy,    - dc lasix, spironolactone, entresto as per Cardio  - Lower extremity edema improving  - Dash/ TLC Diet  Dr Oshea D/W ECHO, STOP Spironolactone, STOP Lasix , continue Toprol xl & Entresto 2x day  Orthostasis VS.

## 2023-10-05 NOTE — BH CONSULTATION LIAISON PROGRESS NOTE - NSBHFUPINTERVALHXFT_PSY_A_CORE
Patient seen and evaluated. Patient at this time appears to be lucid, he appears to be somewhat lethargic. As per nursing staff no interval events reported.
Patient seen and evaluated. Patient at this time appears to be lucid, and actually well oriented. No interval events reported.
Patient seen and evaluated. Patient's Zyprexa has been adjusted. Patient at this time appears to be more lucid, although engages in confabulation. As per daughter patient gets agitated and confused mostly in the evenings. As per daughter patient has been struggling with cognitive impairment for the last two years.
Patient seen and evaluated. Patient at this time appears to be lucid, and actually well oriented. No interval events reported.
Patient seen and evaluated. Patient at this time appears to be lucid, although continues to engage in confabulation and increased paranoid delusions. 
Patient seen and evaluated. Patient at this time appears to be lucid, and actually well oriented. No interval events reported. Patient has reportedly been accepted by a facility.

## 2023-10-05 NOTE — BH CONSULTATION LIAISON PROGRESS NOTE - NSBHATTESTBILLING_PSY_A_CORE
46750-Lrhkrfkafe OBS or IP - low complexity OR 25-34 mins
39745-Bqyodedpkw OBS or IP - low complexity OR 25-34 mins
45047-Caqzqwpupe OBS or IP - low complexity OR 25-34 mins
69145-Ujxkdhafvu OBS or IP - low complexity OR 25-34 mins
63501-Jqawqagnwc OBS or IP - low complexity OR 25-34 mins
60488-Olukkqgceh OBS or IP - low complexity OR 25-34 mins

## 2023-10-05 NOTE — PROGRESS NOTE ADULT - ATTENDING COMMENTS
82y/o M PMH dementia, DM2, HFrEF s/p PPM, HTN, HLD presents with increased agitation.  Admit for increased paranoia & Placement .  pt seen, examined, case & care plan d/w pt, residents at detail. d/w dtr Arianne on 9/21 at [facility] about pt's condition, Care plan Meds   Consult;  Psych Dr Strange  -Dr Strange follow up today-  stable for d/c   Cardiology Dr Oshea  D/W ECHO, STOP Spironolactone, HOLD Expandly d/w about d/c planning to [facility] in AM   PT Eval.-Pt is ambulating in [place] ways   D/W Social work -follow up for d/c -Placement to new [facility] as provided by family . Dr Strange to fill out [facility] mental Health form for d/c   PO diet  D/C planning   Total care time is 40 minutes.

## 2023-10-05 NOTE — BH CONSULTATION LIAISON PROGRESS NOTE - NSBHCONSULTFOLLOWAFTERCARE_PSY_A_CORE FT
Follow-up outpatient.
Follow-up outpatient.
F-up outpatient
Follow-up outpatient.
Follow-up outpatient.

## 2023-10-05 NOTE — PROGRESS NOTE ADULT - PROBLEM SELECTOR PLAN 1
Acute paranoia in setting of hx of dementia, ?baseline mental disorder ? Psych Hx- ?Psychosis/ Agitation: unclear hx  - Previous hospitalization at [facility] for similar problem. Unknown if Pt was taking meds prior to admission.    - No infectious or metabolic concomitant disorder. Normal thyroid profile.   - Continue current Zyrexa 5 mg 2x day, Zyprexa 10 mg Q HS regimen as per psych.  - Continue Depakote 500mg BID as per Dr. Strange.    - Continue duloxetine 30 mg 2x day.  - STOP amitriptyline, as per psych recs  - Psych (Dr. Strange) D/C 1:1  - SW consulted for d/c plan: awaiting placement.   - Now off constant observation - no acute agitation observed

## 2023-10-05 NOTE — BH CONSULTATION LIAISON PROGRESS NOTE - NSBHFUPINTERVALCCFT_PSY_A_CORE
I am talking to someone right now. I am alright.
I am doing fine.
I am doing alright. I am a little tired.
I am doing fine. I know what is going on.
I really need to get out of here. I need to make a major financial transaction today.
I am alright now.

## 2023-10-05 NOTE — PROGRESS NOTE ADULT - SUBJECTIVE AND OBJECTIVE BOX
[facility] -- Nicolle Townsend Pannella, Patel, Savella [facility]  Office # [phone]      Follow Up:    Hypotension   Subjective/Observations:       REVIEW OF SYSTEMS: All other review of systems is negative unless indicated above    PAST MEDICAL & SURGICAL HISTORY:  HFrEF (heart failure with reduced ejection fraction)      HTN (hypertension)      HLD (hyperlipidemia)      Type 2 diabetes mellitus      Pacemaker          MEDICATIONS  (STANDING):  ammonium lactate 12% Lotion 1 Application(s) Topical two times a day  atorvastatin 40 milliGRAM(s) Oral at bedtime  bacitracin   Ointment 1 Application(s) Topical every 8 hours  budesonide  80 MICROgram(s)/formoterol 4.5 MICROgram(s) Inhaler 2 Puff(s) Inhalation two times a day  dapagliflozin 10 milliGRAM(s) Oral daily  dextrose 5%. 1000 milliLiter(s) (50 mL/Hr) IV Continuous <Continuous>  dextrose 5%. 1000 milliLiter(s) (100 mL/Hr) IV Continuous <Continuous>  dextrose 50% Injectable 12.5 Gram(s) IV Push once  dextrose 50% Injectable 25 Gram(s) IV Push once  dextrose 50% Injectable 25 Gram(s) IV Push once  divalproex  milliGRAM(s) Oral two times a day  DULoxetine 30 milliGRAM(s) Oral <User Schedule>  enoxaparin Injectable 40 milliGRAM(s) SubCutaneous every 24 hours  furosemide    Tablet 40 milliGRAM(s) Oral daily  gentamicin 0.3% Ophthalmic Solution 1 Drop(s) Both EYES five times a day  glucagon  Injectable 1 milliGRAM(s) IntraMuscular once  insulin lispro (ADMELOG) corrective regimen sliding scale   SubCutaneous three times a day before meals  insulin lispro (ADMELOG) corrective regimen sliding scale   SubCutaneous at bedtime  methyl salicylate 15%/menthol 10% Topical Cream 1 Application(s) Topical three times a day  metoprolol succinate ER 25 milliGRAM(s) Oral daily  midodrine. 7.5 milliGRAM(s) Oral three times a day  OLANZapine Disintegrating Tablet 5 milliGRAM(s) Oral two times a day  OLANZapine Disintegrating Tablet 10 milliGRAM(s) Oral at bedtime  pantoprazole    Tablet 40 milliGRAM(s) Oral before breakfast  sacubitril 24 mG/valsartan 26 mG 1 Tablet(s) Oral two times a day  sodium chloride 0.9%. 1000 milliLiter(s) (250 mL/Hr) IV Continuous <Continuous>  tiotropium 2.5 MICROgram(s) Inhaler 2 Puff(s) Inhalation daily    MEDICATIONS  (PRN):  acetaminophen     Tablet .. 650 milliGRAM(s) Oral every 6 hours PRN Temp greater or equal to 38C (100.4F), Mild Pain (1 - 3)  albuterol    0.083% 2.5 milliGRAM(s) Nebulizer every 6 hours PRN Shortness of Breath and/or Wheezing  dextrose Oral Gel 15 Gram(s) Oral once PRN Blood Glucose LESS THAN 70 milliGRAM(s)/deciliter  melatonin 5 milliGRAM(s) Oral at bedtime PRN Insomnia      Allergies    No Known Allergies    Intolerances        Vital Signs Last 24 Hrs  T(C): 36.4 (05 Oct 2023 05:30), Max: 36.4 (05 Oct 2023 05:30)  T(F): 97.5 (05 Oct 2023 05:30), Max: 97.5 (05 Oct 2023 05:30)  HR: 82 (05 Oct 2023 05:30) (82 - 87)  BP: 91/52 (05 Oct 2023 05:30) (84/52 - 91/52)  BP(mean): --  RR: 18 (05 Oct 2023 05:30) (18 - 18)  SpO2: 91% (05 Oct 2023 05:30) (91% - 96%)    Parameters below as of 05 Oct 2023 05:30  Patient On (Oxygen Delivery Method): room air        I&O's Summary        PHYSICAL EXAM:  TELE:   Constitutional: NAD, awake and alert, well-developed  HEENT: Moist Mucous Membranes, Anicteric  Pulmonary: Non-labored, breath sounds are clear bilaterally, No wheezing, crackles or rhonchi  Cardiovascular: Regular, S1 and S2 nl, No murmurs, rubs, gallops or clicks  Gastrointestinal: Bowel Sounds present, soft, nontender.   Lymph: No lymphadenopathy. No peripheral edema.  Skin: No visible rashes or ulcers.  Psych:  Mood & affect appropriate    LABS: All Labs Reviewed:                        14.3   8.04  )-----------( 146      ( 04 Oct 2023 07:35 )             43.7     04 Oct 2023 07:35    140    |  102    |  40     ----------------------------<  111    3.8     |  32     |  1.20     Ca    9.6        04 Oct 2023 07:35    TPro  7.9    /  Alb  3.9    /  TBili  1.1    /  DBili  x      /  AST  22     /  ALT  23     /  AlkPhos  74     04 Oct 2023 07:35             EC Lead ECG:   Ventricular Rate 97 BPM    Atrial Rate 97 BPM    P-R Interval 178 ms    QRS Duration 140 ms    Q-T Interval 396 ms    QTC Calculation(Bazett) 502 ms    P Axis 64 degrees    R Axis -75 degrees    T Axis 89 degrees    Diagnosis Line Atrial-sensed ventricular-paced rhythm  Biventricular pacemaker detected  Confirmed by SAGAR PEDERSON ([id number]) on 2023 12:10:56 PM (23 @ 19:35)           [facility] -- Nicolle Townsend Pannella, Patel, Savella [facility]  Office # [phone]      Follow Up:    Hypotension   Subjective/Observations:     Seen bedside, laying flat on room air   alert, forgetful at times , states he wants togo home   denies chest pain dizziness palpitations but not the best historian     REVIEW OF SYSTEMS: All other review of systems is negative unless indicated above    PAST MEDICAL & SURGICAL HISTORY:  HFrEF (heart failure with reduced ejection fraction)      HTN (hypertension)      HLD (hyperlipidemia)      Type 2 diabetes mellitus      Pacemaker          MEDICATIONS  (STANDING):  ammonium lactate 12% Lotion 1 Application(s) Topical two times a day  atorvastatin 40 milliGRAM(s) Oral at bedtime  bacitracin   Ointment 1 Application(s) Topical every 8 hours  budesonide  80 MICROgram(s)/formoterol 4.5 MICROgram(s) Inhaler 2 Puff(s) Inhalation two times a day  dapagliflozin 10 milliGRAM(s) Oral daily  dextrose 5%. 1000 milliLiter(s) (50 mL/Hr) IV Continuous <Continuous>  dextrose 5%. 1000 milliLiter(s) (100 mL/Hr) IV Continuous <Continuous>  dextrose 50% Injectable 12.5 Gram(s) IV Push once  dextrose 50% Injectable 25 Gram(s) IV Push once  dextrose 50% Injectable 25 Gram(s) IV Push once  divalproex  milliGRAM(s) Oral two times a day  DULoxetine 30 milliGRAM(s) Oral <User Schedule>  enoxaparin Injectable 40 milliGRAM(s) SubCutaneous every 24 hours  furosemide    Tablet 40 milliGRAM(s) Oral daily  gentamicin 0.3% Ophthalmic Solution 1 Drop(s) Both EYES five times a day  glucagon  Injectable 1 milliGRAM(s) IntraMuscular once  insulin lispro (ADMELOG) corrective regimen sliding scale   SubCutaneous three times a day before meals  insulin lispro (ADMELOG) corrective regimen sliding scale   SubCutaneous at bedtime  methyl salicylate 15%/menthol 10% Topical Cream 1 Application(s) Topical three times a day  metoprolol succinate ER 25 milliGRAM(s) Oral daily  midodrine. 7.5 milliGRAM(s) Oral three times a day  OLANZapine Disintegrating Tablet 5 milliGRAM(s) Oral two times a day  OLANZapine Disintegrating Tablet 10 milliGRAM(s) Oral at bedtime  pantoprazole    Tablet 40 milliGRAM(s) Oral before breakfast  sacubitril 24 mG/valsartan 26 mG 1 Tablet(s) Oral two times a day  sodium chloride 0.9%. 1000 milliLiter(s) (250 mL/Hr) IV Continuous <Continuous>  tiotropium 2.5 MICROgram(s) Inhaler 2 Puff(s) Inhalation daily    MEDICATIONS  (PRN):  acetaminophen     Tablet .. 650 milliGRAM(s) Oral every 6 hours PRN Temp greater or equal to 38C (100.4F), Mild Pain (1 - 3)  albuterol    0.083% 2.5 milliGRAM(s) Nebulizer every 6 hours PRN Shortness of Breath and/or Wheezing  dextrose Oral Gel 15 Gram(s) Oral once PRN Blood Glucose LESS THAN 70 milliGRAM(s)/deciliter  melatonin 5 milliGRAM(s) Oral at bedtime PRN Insomnia      Allergies    No Known Allergies    Intolerances        Vital Signs Last 24 Hrs  T(C): 36.4 (05 Oct 2023 05:30), Max: 36.4 (05 Oct 2023 05:30)  T(F): 97.5 (05 Oct 2023 05:30), Max: 97.5 (05 Oct 2023 05:30)  HR: 82 (05 Oct 2023 05:30) (82 - 87)  BP: 91/52 (05 Oct 2023 05:30) (84/52 - 91/52)  BP(mean): --  RR: 18 (05 Oct 2023 05:30) (18 - 18)  SpO2: 91% (05 Oct 2023 05:30) (91% - 96%)    Parameters below as of 05 Oct 2023 05:30  Patient On (Oxygen Delivery Method): room air        I&O's Summary        PHYSICAL EXAM:  TELE: not on tele   Constitutional: NAD, awake and alert, pbese   HEENT: Moist Mucous Membranes, Anicteric  Pulmonary: Non-labored, breath sounds are clear bilaterally, No wheezing, crackles or rhonchi  Cardiovascular: Regular, S1 and S2 nl, No murmurs, rubs, gallops or clicks  Gastrointestinal: Bowel Sounds present, soft, nontender.   Lymph: No lymphadenopathy. No peripheral edema.  Skin: No visible rashes or ulcers.  Psych:  Mood & affect appropriate    LABS: All Labs Reviewed:                        14.3   8.04  )-----------( 146      ( 04 Oct 2023 07:35 )             43.7     04 Oct 2023 07:35    140    |  102    |  40     ----------------------------<  111    3.8     |  32     |  1.20     Ca    9.6        04 Oct 2023 07:35    TPro  7.9    /  Alb  3.9    /  TBili  1.1    /  DBili  x      /  AST  22     /  ALT  23     /  AlkPhos  74     04 Oct 2023 07:35             EC Lead ECG:   Ventricular Rate 97 BPM    Atrial Rate 97 BPM    P-R Interval 178 ms    QRS Duration 140 ms    Q-T Interval 396 ms    QTC Calculation(Bazett) 502 ms    P Axis 64 degrees    R Axis -75 degrees    T Axis 89 degrees    Diagnosis Line Atrial-sensed ventricular-paced rhythm  Biventricular pacemaker detected  Confirmed by SAGAR PEDERSON ([id number]) on 2023 12:10:56 PM (23 @ 19:35)

## 2023-10-05 NOTE — BH CONSULTATION LIAISON PROGRESS NOTE - NSBHMSESPABN_PSY_A_CORE
Soft volume/Decreased productivity/Impaired articulation

## 2023-10-05 NOTE — PROGRESS NOTE ADULT - PROBLEM SELECTOR PLAN 5
Discussed patient medication list with daughter (Arianne) who confirmed most medications found on surescripts recent dispensary history) however she is not sure of any changes that may have happened when he was hospitalized or when he went to the ED  - Patient has list with him, copy in chart however unclear if that list is up to date  - Current med rec completed based on recent dispensary history  - [facility] pharmacy called: additional medications from pharmacy include allopurinol 300qD, lisinopril 20mg qD, mirabegron 25mg qD, trazodone 50mg qD at bedtime. medications not mentioned by daughter. daughter notes pt has been to numerous different hospitals/ERs and has come back with numerous different medications with no follow up  - plan to hold these additional meds, per psych consult for use of trazodone

## 2023-10-05 NOTE — BH CONSULTATION LIAISON PROGRESS NOTE - NSBHADMITIPOBS_PSY_A_CORE
Constant observation
Routine observation
Constant observation

## 2023-10-05 NOTE — BH CONSULTATION LIAISON PROGRESS NOTE - NSBHCHARTREVIEWVS_PSY_A_CORE FT
Vital Signs Last 24 Hrs  T(C): 36.4 (26 Sep 2023 06:26), Max: 36.5 (25 Sep 2023 21:40)  T(F): 97.5 (26 Sep 2023 06:26), Max: 97.7 (25 Sep 2023 21:40)  HR: 97 (26 Sep 2023 06:26) (93 - 97)  BP: 108/73 (26 Sep 2023 06:26) (98/61 - 108/73)  BP(mean): --  RR: 18 (26 Sep 2023 06:26) (17 - 18)  SpO2: 92% (26 Sep 2023 06:26) (92% - 99%)    Parameters below as of 26 Sep 2023 06:26  Patient On (Oxygen Delivery Method): room air    
Vital Signs Last 24 Hrs  T(C): 36.6 (22 Sep 2023 05:14), Max: 36.7 (21 Sep 2023 21:36)  T(F): 97.8 (22 Sep 2023 05:14), Max: 98 (21 Sep 2023 21:36)  HR: 98 (22 Sep 2023 05:14) (96 - 101)  BP: 110/72 (22 Sep 2023 05:14) (108/69 - 115/65)  BP(mean): --  RR: 18 (22 Sep 2023 05:14) (18 - 20)  SpO2: 96% (22 Sep 2023 05:14) (94% - 96%)    Parameters below as of 22 Sep 2023 05:14  Patient On (Oxygen Delivery Method): room air    
Vital Signs Last 24 Hrs  T(C): 36.4 (02 Oct 2023 12:56), Max: 36.7 (01 Oct 2023 20:47)  T(F): 97.6 (02 Oct 2023 12:56), Max: 98.1 (01 Oct 2023 20:47)  HR: 86 (02 Oct 2023 12:56) (86 - 101)  BP: 85/54 (02 Oct 2023 12:56) (85/54 - 116/73)  BP(mean): --  RR: 17 (02 Oct 2023 12:56) (17 - 17)  SpO2: 97% (02 Oct 2023 12:56) (91% - 97%)    Parameters below as of 02 Oct 2023 12:56  Patient On (Oxygen Delivery Method): room air    
Vital Signs Last 24 Hrs  T(C): 36.9 (05 Oct 2023 13:17), Max: 36.9 (05 Oct 2023 13:17)  T(F): 98.5 (05 Oct 2023 13:17), Max: 98.5 (05 Oct 2023 13:17)  HR: 84 (05 Oct 2023 13:17) (82 - 85)  BP: 92/52 (05 Oct 2023 13:17) (90/57 - 92/52)  BP(mean): --  RR: 18 (05 Oct 2023 13:17) (18 - 18)  SpO2: 92% (05 Oct 2023 13:17) (91% - 94%)    Parameters below as of 05 Oct 2023 13:17  Patient On (Oxygen Delivery Method): room air    
Vital Signs Last 24 Hrs  T(C): 36.5 (25 Sep 2023 06:14), Max: 36.6 (24 Sep 2023 21:39)  T(F): 97.7 (25 Sep 2023 06:14), Max: 97.9 (24 Sep 2023 21:39)  HR: 97 (25 Sep 2023 06:14) (96 - 99)  BP: 102/68 (25 Sep 2023 06:14) (102/68 - 117/73)  BP(mean): --  RR: 17 (25 Sep 2023 06:14) (17 - 18)  SpO2: 92% (25 Sep 2023 06:14) (92% - 92%)    Parameters below as of 25 Sep 2023 06:14  Patient On (Oxygen Delivery Method): room air    
Vital Signs Last 24 Hrs  T(C): 36.8 (28 Sep 2023 05:26), Max: 36.8 (28 Sep 2023 05:26)  T(F): 98.3 (28 Sep 2023 05:26), Max: 98.3 (28 Sep 2023 05:26)  HR: 93 (28 Sep 2023 05:26) (81 - 93)  BP: 94/61 (28 Sep 2023 05:26) (94/61 - 117/63)  BP(mean): --  RR: 18 (28 Sep 2023 05:26) (18 - 18)  SpO2: 94% (28 Sep 2023 05:26) (93% - 95%)    Parameters below as of 28 Sep 2023 05:26  Patient On (Oxygen Delivery Method): room air

## 2023-10-05 NOTE — BH CONSULTATION LIAISON PROGRESS NOTE - NSBHCONSFOLLOWNEEDS_PSY_ALL_CORE
No psychiatric contraindications to discharge
Needs further psych safety assessment prior to discharge
No psychiatric contraindications to discharge
No psychiatric contraindications to discharge

## 2023-10-05 NOTE — PROGRESS NOTE ADULT - ASSESSMENT
82 y/o M PMH dementia, DM2, HFrEF s/p PPM, HTN, HLD presents with increased agitation.  Admitted for increased paranoia. Consulted for evaluation of hypotension.    IN PROGRESS    Admitted with paranoia found with hypotension     Hypotension/HFrEF s/p ppm (8/2023) / HTN/HLD  - EKG shows Atrial sensed v-paced rhythm HR 97   - No previous EKG available for comparison  - Previous TTE (8/2023) per daughter showed EF ~20%  -holding aldactone for hypotension  -continue midodrine       - BP soft 80's systolic to 90's   - orthostatice negative  -hold entresto for now   -continue toprol with hold parameters  -fu repeat echo     - Monitor and replete lytes, keep K>4, Mg>2.  - Other cardiovascular workup will depend on clinical course.  - All other workup per primary team.  - Will continue to follow.     84 y/o M PMH dementia, DM2, HFrEF s/p PPM, HTN, HLD presents with increased agitation.  Admitted for increased paranoia. Consulted for evaluation of hypotension.    Admitted with paranoia found with hypotension     Hypotension/HFrEF s/p ppm (8/2023) / HTN/HLD  - EKG shows Atrial sensed v-paced rhythm HR 97   - No previous EKG available for comparison  - Previous TTE (8/2023) per daughter showed EF ~20%  -holding aldactone for hypotension, can hold lasix today   -continue midodrine       - BP soft 80's systolic to 90's   - orthostatic negative  -hold entresto for today will reassess daily   -continue toprol with hold parameters  -fu repeat echo     - Monitor and replete lytes, keep K>4, Mg>2.  - Other cardiovascular workup will depend on clinical course.  - All other workup per primary team.  - Will continue to follow.     82 y/o M PMH dementia, DM2, HFrEF s/p PPM, HTN, HLD presents with increased agitation.  Admitted for increased paranoia. Consulted for evaluation of hypotension.    Admitted with paranoia found with hypotension     Hypotension/HFrEF s/p ppm (8/2023) / HTN/HLD  - EKG shows Atrial sensed v-paced rhythm HR 97   - No previous EKG available for comparison  - Previous TTE (8/2023) per daughter showed EF ~20%  -holding aldactone for hypotension, can hold lasix today   -continue midodrine       - BP soft 80's systolic to 90's   - orthostatic negative  -hold entresto for today will reassess daily   -continue toprol with hold parameters  -repeat echo with severe LV dysfunction    - Monitor and replete lytes, keep K>4, Mg>2.  - Other cardiovascular workup will depend on clinical course.  - All other workup per primary team.  - Will continue to follow.

## 2023-10-05 NOTE — BH CONSULTATION LIAISON PROGRESS NOTE - ADDITIONAL DETAILS / COMMENTS
Patient is 1) cognitively impaired 2) Does not understand the nature of the medical condition, risks, benefits, alternatives and side-effects of treatment 3) Wants to make decisions voluntarily.  At this time patient has no decision making capacity regarding medical decisions. 

## 2023-10-05 NOTE — BH CONSULTATION LIAISON PROGRESS NOTE - NSBHCONSULTRECOMMENDOTHER_PSY_A_CORE FT
Depakote 500 mg po bid, continue Zyprexa 5 mg po bid and 10 mg hs
Will increase Depakote to 500 mg po bid, discontinue Amitriptyline, and continue Zyprexa 2.5 mg po in AM and 5 mg po at HS
 Depakote 500 mg po bid, continue Zyprexa 5 mg po bid and 10 mg hs
Will increase Depakote to 500 mg po bid, discontinue Amitriptyline, and continue Zyprexa 2.5 mg po in AM and 5 mg po at HS
 Depakote 500 mg po bid, continue Zyprexa 5 mg po bid and 10 mg hs
 Depakote 500 mg po bid, continue Zyprexa 5 mg po bid and 10 mg hs

## 2023-10-05 NOTE — PROGRESS NOTE ADULT - PROBLEM SELECTOR PLAN 3
BP borderline hypotensive today when getting up, standing, however pt is asx  - likely due to lowered hold parameters on Lasix 40 mg 2x day, Entresto, metoprolol XL, spironolactone, Farxiga.   - continue lasix 40mg QD w/ hold parameters  - continue midodrine 7.5 mg 3x day for soft BP.  - continues to be hypotensive, but asymptomatic.   - f/u TTE  - Cardiology consulted--Dr mccarthy- d/w BP borderline hypotensive today when getting up, standing, however pt is asx  - likely due to lowered hold parameters on Lasix 40 mg 2x day, Entresto, metoprolol XL, spironolactone, Farxiga.   - continue lasix 40mg QD w/ hold parameters  - continue midodrine 7.5 mg 3x day for soft BP.  - continues to be hypotensive, but asymptomatic.   - dced spironolactone  - will hold entresto and lasix for today and reassess.  - TTE: LVEF 20%, severe aortic stenosis (unchanged)  - Cardiology consulted--Dr mccarthy- d/w BP borderline hypotensive today when getting up, standing, however pt is asx  - likely due to lowered hold parameters of home meds Lasix,  Entresto, metoprolol XL, spironolactone, Farxiga.   - continue midodrine 7.5 mg 3x day for soft BP.  - continues to be hypotensive, but asymptomatic.   - dced spironolactone, entresto, lasix.  - will hold entresto and lasix for today and reassess.  - TTE: LVEF 20%, severe aortic stenosis (unchanged)  - Cardiology consulted--Dr mccarthy- d/w BP borderline hypotensive today when getting up, standing, however pt is asx  - likely due to lowered hold parameters of home meds Lasix,  Entresto, metoprolol XL, spironolactone, Farxiga.   - continue midodrine 7.5 mg 3x day for soft BP.  - continues to be hypotensive, but asymptomatic.   - dced spironolactone,  lasix-HOLD  - will continue  Entresto and Toprol as per Dr Oshea -ALl meds d/w cardio-Lower SBP parameters  HOLD meds when SBP <90  - TTE: LVEF 20%, severe aortic stenosis (unchanged)  - Cardiology consulted--Dr Oshea - d/w

## 2023-10-05 NOTE — BH CONSULTATION LIAISON PROGRESS NOTE - NSBHFUPREASONCONS_PSY_A_CORE
dementia/agitation

## 2023-10-05 NOTE — BH CONSULTATION LIAISON PROGRESS NOTE - CURRENT MEDICATION
MEDICATIONS  (STANDING):  ammonium lactate 12% Lotion 1 Application(s) Topical two times a day  atorvastatin 40 milliGRAM(s) Oral at bedtime  budesonide  80 MICROgram(s)/formoterol 4.5 MICROgram(s) Inhaler 2 Puff(s) Inhalation two times a day  dapagliflozin 10 milliGRAM(s) Oral every 24 hours  dextrose 5%. 1000 milliLiter(s) (100 mL/Hr) IV Continuous <Continuous>  dextrose 5%. 1000 milliLiter(s) (50 mL/Hr) IV Continuous <Continuous>  dextrose 50% Injectable 25 Gram(s) IV Push once  dextrose 50% Injectable 12.5 Gram(s) IV Push once  dextrose 50% Injectable 25 Gram(s) IV Push once  divalproex  milliGRAM(s) Oral two times a day  DULoxetine 30 milliGRAM(s) Oral <User Schedule>  furosemide    Tablet 40 milliGRAM(s) Oral every 12 hours  glucagon  Injectable 1 milliGRAM(s) IntraMuscular once  heparin   Injectable 5000 Unit(s) SubCutaneous every 8 hours  insulin lispro (ADMELOG) corrective regimen sliding scale   SubCutaneous at bedtime  insulin lispro (ADMELOG) corrective regimen sliding scale   SubCutaneous three times a day before meals  methyl salicylate 15%/menthol 10% Topical Cream 1 Application(s) Topical three times a day  metoprolol succinate ER 25 milliGRAM(s) Oral daily  midodrine. 5 milliGRAM(s) Oral three times a day  OLANZapine Disintegrating Tablet 5 milliGRAM(s) Oral two times a day  OLANZapine Disintegrating Tablet 10 milliGRAM(s) Oral at bedtime  pantoprazole    Tablet 40 milliGRAM(s) Oral before breakfast  sacubitril 24 mG/valsartan 26 mG 1 Tablet(s) Oral two times a day  spironolactone 25 milliGRAM(s) Oral daily  tiotropium 2.5 MICROgram(s) Inhaler 2 Puff(s) Inhalation daily    MEDICATIONS  (PRN):  acetaminophen     Tablet .. 650 milliGRAM(s) Oral every 6 hours PRN Temp greater or equal to 38C (100.4F), Mild Pain (1 - 3)  albuterol    0.083% 2.5 milliGRAM(s) Nebulizer every 6 hours PRN Shortness of Breath and/or Wheezing  dextrose Oral Gel 15 Gram(s) Oral once PRN Blood Glucose LESS THAN 70 milliGRAM(s)/deciliter  melatonin 5 milliGRAM(s) Oral at bedtime PRN Insomnia  
MEDICATIONS  (STANDING):  ammonium lactate 12% Lotion 1 Application(s) Topical two times a day  atorvastatin 40 milliGRAM(s) Oral at bedtime  budesonide  80 MICROgram(s)/formoterol 4.5 MICROgram(s) Inhaler 2 Puff(s) Inhalation two times a day  dapagliflozin 10 milliGRAM(s) Oral every 24 hours  dextrose 5%. 1000 milliLiter(s) (100 mL/Hr) IV Continuous <Continuous>  dextrose 5%. 1000 milliLiter(s) (50 mL/Hr) IV Continuous <Continuous>  dextrose 50% Injectable 25 Gram(s) IV Push once  dextrose 50% Injectable 12.5 Gram(s) IV Push once  dextrose 50% Injectable 25 Gram(s) IV Push once  divalproex  milliGRAM(s) Oral two times a day  DULoxetine 30 milliGRAM(s) Oral <User Schedule>  furosemide    Tablet 40 milliGRAM(s) Oral every 12 hours  glucagon  Injectable 1 milliGRAM(s) IntraMuscular once  heparin   Injectable 5000 Unit(s) SubCutaneous every 8 hours  insulin lispro (ADMELOG) corrective regimen sliding scale   SubCutaneous three times a day before meals  insulin lispro (ADMELOG) corrective regimen sliding scale   SubCutaneous at bedtime  metoprolol succinate ER 25 milliGRAM(s) Oral daily  midodrine. 5 milliGRAM(s) Oral three times a day  OLANZapine 5 milliGRAM(s) Oral at bedtime  OLANZapine 2.5 milliGRAM(s) Oral daily  pantoprazole    Tablet 40 milliGRAM(s) Oral before breakfast  sacubitril 24 mG/valsartan 26 mG 1 Tablet(s) Oral two times a day  spironolactone 25 milliGRAM(s) Oral daily  tiotropium 2.5 MICROgram(s) Inhaler 2 Puff(s) Inhalation daily    MEDICATIONS  (PRN):  acetaminophen     Tablet .. 650 milliGRAM(s) Oral every 6 hours PRN Temp greater or equal to 38C (100.4F), Mild Pain (1 - 3)  albuterol    0.083% 2.5 milliGRAM(s) Nebulizer every 6 hours PRN Shortness of Breath and/or Wheezing  dextrose Oral Gel 15 Gram(s) Oral once PRN Blood Glucose LESS THAN 70 milliGRAM(s)/deciliter  
MEDICATIONS  (STANDING):  ammonium lactate 12% Lotion 1 Application(s) Topical two times a day  atorvastatin 40 milliGRAM(s) Oral at bedtime  budesonide  80 MICROgram(s)/formoterol 4.5 MICROgram(s) Inhaler 2 Puff(s) Inhalation two times a day  dapagliflozin 10 milliGRAM(s) Oral every 24 hours  dextrose 5%. 1000 milliLiter(s) (100 mL/Hr) IV Continuous <Continuous>  dextrose 5%. 1000 milliLiter(s) (50 mL/Hr) IV Continuous <Continuous>  dextrose 50% Injectable 12.5 Gram(s) IV Push once  dextrose 50% Injectable 25 Gram(s) IV Push once  dextrose 50% Injectable 25 Gram(s) IV Push once  divalproex  milliGRAM(s) Oral two times a day  DULoxetine 30 milliGRAM(s) Oral <User Schedule>  enoxaparin Injectable 40 milliGRAM(s) SubCutaneous every 24 hours  gentamicin 0.3% Ophthalmic Solution 1 Drop(s) Both EYES five times a day  glucagon  Injectable 1 milliGRAM(s) IntraMuscular once  insulin lispro (ADMELOG) corrective regimen sliding scale   SubCutaneous three times a day before meals  insulin lispro (ADMELOG) corrective regimen sliding scale   SubCutaneous at bedtime  methyl salicylate 15%/menthol 10% Topical Cream 1 Application(s) Topical three times a day  metoprolol succinate ER 25 milliGRAM(s) Oral daily  midodrine. 7.5 milliGRAM(s) Oral three times a day  OLANZapine Disintegrating Tablet 5 milliGRAM(s) Oral two times a day  OLANZapine Disintegrating Tablet 10 milliGRAM(s) Oral at bedtime  pantoprazole    Tablet 40 milliGRAM(s) Oral before breakfast  sacubitril 24 mG/valsartan 26 mG 1 Tablet(s) Oral two times a day  spironolactone 25 milliGRAM(s) Oral daily  tiotropium 2.5 MICROgram(s) Inhaler 2 Puff(s) Inhalation daily    MEDICATIONS  (PRN):  acetaminophen     Tablet .. 650 milliGRAM(s) Oral every 6 hours PRN Temp greater or equal to 38C (100.4F), Mild Pain (1 - 3)  albuterol    0.083% 2.5 milliGRAM(s) Nebulizer every 6 hours PRN Shortness of Breath and/or Wheezing  dextrose Oral Gel 15 Gram(s) Oral once PRN Blood Glucose LESS THAN 70 milliGRAM(s)/deciliter  melatonin 5 milliGRAM(s) Oral at bedtime PRN Insomnia  
MEDICATIONS  (STANDING):  ammonium lactate 12% Lotion 1 Application(s) Topical two times a day  atorvastatin 40 milliGRAM(s) Oral at bedtime  budesonide  80 MICROgram(s)/formoterol 4.5 MICROgram(s) Inhaler 2 Puff(s) Inhalation two times a day  dapagliflozin 10 milliGRAM(s) Oral every 24 hours  dextrose 5%. 1000 milliLiter(s) (50 mL/Hr) IV Continuous <Continuous>  dextrose 5%. 1000 milliLiter(s) (100 mL/Hr) IV Continuous <Continuous>  dextrose 50% Injectable 25 Gram(s) IV Push once  dextrose 50% Injectable 12.5 Gram(s) IV Push once  dextrose 50% Injectable 25 Gram(s) IV Push once  divalproex  milliGRAM(s) Oral two times a day  DULoxetine 30 milliGRAM(s) Oral <User Schedule>  furosemide    Tablet 40 milliGRAM(s) Oral every 12 hours  glucagon  Injectable 1 milliGRAM(s) IntraMuscular once  heparin   Injectable 5000 Unit(s) SubCutaneous every 8 hours  insulin lispro (ADMELOG) corrective regimen sliding scale   SubCutaneous three times a day before meals  insulin lispro (ADMELOG) corrective regimen sliding scale   SubCutaneous at bedtime  methyl salicylate 15%/menthol 10% Topical Cream 1 Application(s) Topical three times a day  metoprolol succinate ER 25 milliGRAM(s) Oral daily  midodrine. 5 milliGRAM(s) Oral three times a day  OLANZapine Disintegrating Tablet 5 milliGRAM(s) Oral two times a day  OLANZapine Disintegrating Tablet 10 milliGRAM(s) Oral at bedtime  pantoprazole    Tablet 40 milliGRAM(s) Oral before breakfast  sacubitril 24 mG/valsartan 26 mG 1 Tablet(s) Oral two times a day  spironolactone 25 milliGRAM(s) Oral daily  tiotropium 2.5 MICROgram(s) Inhaler 2 Puff(s) Inhalation daily    MEDICATIONS  (PRN):  acetaminophen     Tablet .. 650 milliGRAM(s) Oral every 6 hours PRN Temp greater or equal to 38C (100.4F), Mild Pain (1 - 3)  albuterol    0.083% 2.5 milliGRAM(s) Nebulizer every 6 hours PRN Shortness of Breath and/or Wheezing  dextrose Oral Gel 15 Gram(s) Oral once PRN Blood Glucose LESS THAN 70 milliGRAM(s)/deciliter  melatonin 5 milliGRAM(s) Oral at bedtime PRN Insomnia  
MEDICATIONS  (STANDING):  ammonium lactate 12% Lotion 1 Application(s) Topical two times a day  atorvastatin 40 milliGRAM(s) Oral at bedtime  budesonide  80 MICROgram(s)/formoterol 4.5 MICROgram(s) Inhaler 2 Puff(s) Inhalation two times a day  dapagliflozin 10 milliGRAM(s) Oral every 24 hours  dextrose 5%. 1000 milliLiter(s) (100 mL/Hr) IV Continuous <Continuous>  dextrose 5%. 1000 milliLiter(s) (50 mL/Hr) IV Continuous <Continuous>  dextrose 50% Injectable 25 Gram(s) IV Push once  dextrose 50% Injectable 12.5 Gram(s) IV Push once  dextrose 50% Injectable 25 Gram(s) IV Push once  divalproex  milliGRAM(s) Oral two times a day  DULoxetine 30 milliGRAM(s) Oral <User Schedule>  furosemide    Tablet 40 milliGRAM(s) Oral every 12 hours  glucagon  Injectable 1 milliGRAM(s) IntraMuscular once  heparin   Injectable 5000 Unit(s) SubCutaneous every 8 hours  insulin lispro (ADMELOG) corrective regimen sliding scale   SubCutaneous three times a day before meals  insulin lispro (ADMELOG) corrective regimen sliding scale   SubCutaneous at bedtime  methyl salicylate 15%/menthol 10% Topical Cream 1 Application(s) Topical three times a day  metoprolol succinate ER 25 milliGRAM(s) Oral daily  midodrine. 5 milliGRAM(s) Oral three times a day  OLANZapine Disintegrating Tablet 5 milliGRAM(s) Oral two times a day  OLANZapine Disintegrating Tablet 10 milliGRAM(s) Oral at bedtime  pantoprazole    Tablet 40 milliGRAM(s) Oral before breakfast  sacubitril 24 mG/valsartan 26 mG 1 Tablet(s) Oral two times a day  spironolactone 25 milliGRAM(s) Oral daily  tiotropium 2.5 MICROgram(s) Inhaler 2 Puff(s) Inhalation daily    MEDICATIONS  (PRN):  acetaminophen     Tablet .. 650 milliGRAM(s) Oral every 6 hours PRN Temp greater or equal to 38C (100.4F), Mild Pain (1 - 3)  albuterol    0.083% 2.5 milliGRAM(s) Nebulizer every 6 hours PRN Shortness of Breath and/or Wheezing  dextrose Oral Gel 15 Gram(s) Oral once PRN Blood Glucose LESS THAN 70 milliGRAM(s)/deciliter  melatonin 5 milliGRAM(s) Oral at bedtime PRN Insomnia  
MEDICATIONS  (STANDING):  ammonium lactate 12% Lotion 1 Application(s) Topical two times a day  atorvastatin 40 milliGRAM(s) Oral at bedtime  bacitracin   Ointment 1 Application(s) Topical every 8 hours  budesonide  80 MICROgram(s)/formoterol 4.5 MICROgram(s) Inhaler 2 Puff(s) Inhalation two times a day  dextrose 5%. 1000 milliLiter(s) (50 mL/Hr) IV Continuous <Continuous>  dextrose 5%. 1000 milliLiter(s) (100 mL/Hr) IV Continuous <Continuous>  dextrose 50% Injectable 25 Gram(s) IV Push once  dextrose 50% Injectable 12.5 Gram(s) IV Push once  dextrose 50% Injectable 25 Gram(s) IV Push once  divalproex  milliGRAM(s) Oral two times a day  DULoxetine 30 milliGRAM(s) Oral <User Schedule>  enoxaparin Injectable 40 milliGRAM(s) SubCutaneous every 24 hours  gentamicin 0.3% Ophthalmic Solution 1 Drop(s) Both EYES five times a day  glucagon  Injectable 1 milliGRAM(s) IntraMuscular once  insulin lispro (ADMELOG) corrective regimen sliding scale   SubCutaneous three times a day before meals  insulin lispro (ADMELOG) corrective regimen sliding scale   SubCutaneous at bedtime  methyl salicylate 15%/menthol 10% Topical Cream 1 Application(s) Topical three times a day  metoprolol succinate ER 25 milliGRAM(s) Oral daily  midodrine. 7.5 milliGRAM(s) Oral three times a day  OLANZapine Disintegrating Tablet 10 milliGRAM(s) Oral at bedtime  OLANZapine Disintegrating Tablet 5 milliGRAM(s) Oral two times a day  pantoprazole    Tablet 40 milliGRAM(s) Oral before breakfast  sacubitril 24 mG/valsartan 26 mG 1 Tablet(s) Oral two times a day  sodium chloride 0.9%. 1000 milliLiter(s) (250 mL/Hr) IV Continuous <Continuous>  tiotropium 2.5 MICROgram(s) Inhaler 2 Puff(s) Inhalation daily    MEDICATIONS  (PRN):  acetaminophen     Tablet .. 650 milliGRAM(s) Oral every 6 hours PRN Temp greater or equal to 38C (100.4F), Mild Pain (1 - 3)  albuterol    0.083% 2.5 milliGRAM(s) Nebulizer every 6 hours PRN Shortness of Breath and/or Wheezing  dextrose Oral Gel 15 Gram(s) Oral once PRN Blood Glucose LESS THAN 70 milliGRAM(s)/deciliter  melatonin 5 milliGRAM(s) Oral at bedtime PRN Insomnia

## 2023-10-05 NOTE — SOCIAL WORK PROGRESS NOTE - NSSWPROGRESSNOTE_GEN_ALL_CORE
SW met with pt at bedside with rep from [facility]. She is accepting pt and needs CHRIS form filled out by  provider. MD notified and will sign. Per MD, cardio monitoring overnight. Anticipated d/c tomorrow. SW to follow and remain available for any needs.

## 2023-10-05 NOTE — BH CONSULTATION LIAISON PROGRESS NOTE - NSBHPTASSESSDT_PSY_A_CORE
25-Sep-2023 13:37
02-Oct-2023 13:39
22-Sep-2023 11:12
26-Sep-2023 10:49
28-Sep-2023 10:33
05-Oct-2023 18:25

## 2023-10-05 NOTE — PROGRESS NOTE ADULT - SUBJECTIVE AND OBJECTIVE BOX
Patient is a 83y old  Male who presents with a chief complaint of paranoia (05 Oct 2023 07:46)    HPI:  82y/o M PMH dementia, HFrEF s/p PPM, HTN, HLD  presents with increased agitation. History obtained from patient's daughter Arianne over phone.   Patient moved from [place] to [place] ~2months ago and has been having issues with increased paranoia for the past month. He stays at a [facility] assisted living community where he has been having several episodes of sundowning.   Recently he was hospitalized at [facility] for same issues from -. Daughter was unable to provide sufficient history regarding hospitalization.   Patient's son, Jose has been taking care of him and knows more about his medical hx however patient's daughter states she is taking over now and did not want to bother Jose at this time.  Patient was also recently seen at [facility] ED for episodes of agitation and was told to follow up with outpatient Psychiatrist but daughter states that likely did not happened because of his increased agitation.  Patient became agitated today and staff had to call 911 for further evaluation. The facility as well as his children feel that his current psych medication regimen is not sufficient to keep him calm.   Per review of recent dispensary history, he was seeing a psychiatric NP (Barbara Bravo) in [place], [place].   Of note, he had placement of PPM in late August at [facility]. Per his daughter, his LVEF at that time was 20% before and after PPM placement.  Currently, patient states his b/l legs feel itchy but otherwise he feels fine and is agreeable.  Denies fevers, chills. sob, cp, n/v/d.    ED Course   T 97.4 F  /79 RR 18 SpO2 95% on RA  Labs H/H 12.1/37.5 Glu   UA: >1000 glucose  UTox: negaitve     In the ED, given tylenol 650mg x1 , zyprexa 5mg IVP x1  (20 Sep 2023 21:58)    INTERVAL HPI:      OVERNIGHT EVENTS:    Home Medications:  albuterol 2.5 mg/3 mL (0.083%) inhalation solution: 2.5 milligram(s) inhaled every 6 hours as needed for  shortness of breath and/or wheezing (25 Sep 2023 11:00)  ammonium lactate 12% topical lotion: 1 Apply topically to affected area 2 times a day (25 Sep 2023 10:46)  atorvastatin 40 mg oral tablet: 1 tab(s) orally once a day (at bedtime) (25 Sep 2023 11:00)  dapagliflozin 10 mg oral tablet: 1 tab(s) orally every 24 hours (25 Sep 2023 10:46)  divalproex sodium 500 mg oral delayed release tablet: 1 tab(s) orally 2 times a day (25 Sep 2023 10:37)  DULoxetine 30 mg oral delayed release capsule: 1 cap(s) orally 2 times a day (25 Sep 2023 11:00)  furosemide 40 mg oral tablet: 1 tab(s) orally every 12 hours (25 Sep 2023 11:00)  melatonin 5 mg oral tablet: 1 tab(s) orally once a day (at bedtime) As needed Insomnia (25 Sep 2023 10:46)  METFORMIN 500MG TABLETS: TAKE 1 TABLET BY MOUTH EVERY MORNING AND EVERY EVENING WITH MEALS. (20 Sep 2023 21:58)  methyl salicylate-menthol 15%-10% topical cream: 1 Apply topically to affected area 3 times a day (25 Sep 2023 10:46)  metoprolol succinate 25 mg oral tablet, extended release: 1 tab(s) orally once a day (25 Sep 2023 11:00)  midodrine 5 mg oral tablet: 1 tab(s) orally 3 times a day (25 Sep 2023 11:00)  OLANZapine 10 mg oral tablet, disintegratin tab(s) orally once a day (at bedtime) (27 Sep 2023 08:38)  OLANZapine 5 mg oral tablet, disintegratin tab(s) orally 2 times a day (27 Sep 2023 08:38)  pantoprazole 40 mg oral delayed release tablet: 1 tab(s) orally once a day (before a meal) (25 Sep 2023 10:46)  POTASSIUM CL 20MEQ ER TABLETS: TAKE 1 TABLET BY MOUTH EVERY DAY WITH FOOD FOR 4 DAYS (20 Sep 2023 21:58)  sacubitril-valsartan 24 mg-26 mg oral tablet: 1 tab(s) orally 2 times a day (25 Sep 2023 11:00)  spironolactone 25 mg oral tablet: 1 tab(s) orally once a day (25 Sep 2023 10:46)  TRELEGY ELLIPTA 100-62.5MCG INH 30P: INHALE 1 PUFF BY MOUTH EVERY DAY (20 Sep 2023 21:58)      MEDICATIONS  (STANDING):  ammonium lactate 12% Lotion 1 Application(s) Topical two times a day  atorvastatin 40 milliGRAM(s) Oral at bedtime  bacitracin   Ointment 1 Application(s) Topical every 8 hours  budesonide  80 MICROgram(s)/formoterol 4.5 MICROgram(s) Inhaler 2 Puff(s) Inhalation two times a day  dapagliflozin 10 milliGRAM(s) Oral daily  dextrose 5%. 1000 milliLiter(s) (100 mL/Hr) IV Continuous <Continuous>  dextrose 5%. 1000 milliLiter(s) (50 mL/Hr) IV Continuous <Continuous>  dextrose 50% Injectable 25 Gram(s) IV Push once  dextrose 50% Injectable 25 Gram(s) IV Push once  dextrose 50% Injectable 12.5 Gram(s) IV Push once  divalproex  milliGRAM(s) Oral two times a day  DULoxetine 30 milliGRAM(s) Oral <User Schedule>  enoxaparin Injectable 40 milliGRAM(s) SubCutaneous every 24 hours  furosemide    Tablet 40 milliGRAM(s) Oral daily  gentamicin 0.3% Ophthalmic Solution 1 Drop(s) Both EYES five times a day  glucagon  Injectable 1 milliGRAM(s) IntraMuscular once  insulin lispro (ADMELOG) corrective regimen sliding scale   SubCutaneous at bedtime  insulin lispro (ADMELOG) corrective regimen sliding scale   SubCutaneous three times a day before meals  methyl salicylate 15%/menthol 10% Topical Cream 1 Application(s) Topical three times a day  metoprolol succinate ER 25 milliGRAM(s) Oral daily  midodrine. 7.5 milliGRAM(s) Oral three times a day  OLANZapine Disintegrating Tablet 5 milliGRAM(s) Oral two times a day  OLANZapine Disintegrating Tablet 10 milliGRAM(s) Oral at bedtime  pantoprazole    Tablet 40 milliGRAM(s) Oral before breakfast  sacubitril 24 mG/valsartan 26 mG 1 Tablet(s) Oral two times a day  sodium chloride 0.9%. 1000 milliLiter(s) (250 mL/Hr) IV Continuous <Continuous>  tiotropium 2.5 MICROgram(s) Inhaler 2 Puff(s) Inhalation daily    MEDICATIONS  (PRN):  acetaminophen     Tablet .. 650 milliGRAM(s) Oral every 6 hours PRN Temp greater or equal to 38C (100.4F), Mild Pain (1 - 3)  albuterol    0.083% 2.5 milliGRAM(s) Nebulizer every 6 hours PRN Shortness of Breath and/or Wheezing  dextrose Oral Gel 15 Gram(s) Oral once PRN Blood Glucose LESS THAN 70 milliGRAM(s)/deciliter  melatonin 5 milliGRAM(s) Oral at bedtime PRN Insomnia      Allergies    No Known Allergies    Intolerances        Social History:  Tobacco: former , quit >30 years ago  EtOH: social drinker  Recreational drug use: denies   Lives with: Assisted Living Facility  Ambulates: without assistance  ADLs: needs assistance (20 Sep 2023 21:58)      REVIEW OF SYSTEMS:  CONSTITUTIONAL: No fever, No chills, No fatigue, No myalgia, No Body ache, No Weakness  EYES: No eye pain,  No visual disturbances, No discharge, NO Redness  ENMT:  No ear pain, No nose bleed, No vertigo; No sinus pain, NO throat pain, No Congestion  NECK: No pain, No stiffness  RESPIRATORY: No cough, NO wheezing, No  hemoptysis, NO  shortness of breath  CARDIOVASCULAR: No chest pain, palpitations  GASTROINTESTINAL: No abdominal pain, NO epigastric pain. No nausea, No vomiting; No diarrhea, No constipation. [  ] BM  GENITOURINARY: No dysuria, No frequency, No urgency, No hematuria, NO incontinence  NEUROLOGICAL: No headaches, No dizziness, No numbness, No tingling, No tremors, No weakness  EXT: No Swelling, No Pain, No Edema  SKIN:  [  ] No itching, burning, rashes, or lesions   MUSCULOSKELETAL: No joint pain ,No Jt swelling; No muscle pain, No back pain, No extremity pain  PSYCHIATRIC: No depression,  No anxiety,  No mood swings ,No difficulty sleeping at night  PAIN SCALE: [  ] None  [  ] Other-  ROS Unable to obtain due to - [  ] Dementia  [  ] Lethargy [  ] Drowsy [  ] Sedated [  ] non verbal  REST OF REVIEW Of SYSTEM - [  ] Normal     Vital Signs Last 24 Hrs  T(C): 36.4 (05 Oct 2023 05:30), Max: 36.4 (05 Oct 2023 05:30)  T(F): 97.5 (05 Oct 2023 05:30), Max: 97.5 (05 Oct 2023 05:30)  HR: 82 (05 Oct 2023 05:30) (82 - 87)  BP: 91/52 (05 Oct 2023 05:30) (84/52 - 91/52)  BP(mean): --  RR: 18 (05 Oct 2023 05:30) (18 - 18)  SpO2: 91% (05 Oct 2023 05:30) (91% - 96%)    Parameters below as of 05 Oct 2023 05:30  Patient On (Oxygen Delivery Method): room air      Finger Stick          PHYSICAL EXAM:  GENERAL:  [  ] NAD , [  ] well appearing, [  ] Agitated, [  ] Drowsy,  [  ] Lethargy, [  ] confused   HEAD:  [  ] Normal, [  ] Other  EYES:  [  ] EOMI, [  ] PERRLA, [  ] conjunctiva and sclera clear normal, [  ] Other,  [  ] Pallor,[  ] Discharge  ENMT:  [  ] Normal, [  ] Moist mucous membranes, [  ] Good dentition, [  ] No Thrush  NECK:  [  ] Supple, [  ] No JVD, [  ] Normal thyroid, [  ] Lymphadenopathy [  ] Other  CHEST/LUNG:  [  ] Clear to auscultation bilaterally, [  ] Breath Sounds equal B/L / Decrease, [  ] poor effort  [  ] No rales, [  ] No rhonchi  [  ]  No wheezing,   HEART:  [  ] Regular rate and rhythm, [  ] tachycardia, [  ] Bradycardia,  [  ] irregular  [  ] No murmurs, No rubs, No gallops, [  ] PPM in place (Mfr:  )  ABDOMEN:  [  ] Soft, [  ] Nontender, [  ] Nondistended, [  ] No mass, [  ] Bowel sounds present, [  ] obese  NERVOUS SYSTEM:  [  ] Alert & Oriented X3, [  ] Nonfocal  [  ] Confusion  [  ] Encephalopathic [  ] Sedated [  ] Unable to assess, [  ] Dementia [  ] Other-  EXTREMITIES: [  ] 2+ Peripheral Pulses, No clubbing, No cyanosis,  [  ] edema B/L lower EXT. [  ] PVD stasis skin changes B/L Lower EXT, [  ] wound  LYMPH: No lymphadenopathy noted  SKIN:  [  ] No rashes or lesions, [  ] Pressure Ulcers, [  ] ecchymosis, [  ] Skin Tears, [  ] Other    DIET: Diet, Regular:   Consistent Carbohydrate Evening Snack  DASH/TLC Sodium & Cholesterol Restricted (23 @ 22:36)      LABS:      Ca    9.6        04 Oct 2023 07:35        Urinalysis Basic - ( 04 Oct 2023 07:35 )    Color: x / Appearance: x / SG: x / pH: x  Gluc: 111 mg/dL / Ketone: x  / Bili: x / Urobili: x   Blood: x / Protein: x / Nitrite: x   Leuk Esterase: x / RBC: x / WBC x   Sq Epi: x / Non Sq Epi: x / Bacteria: x                           Anemia Panel:      Thyroid Panel:                RADIOLOGY & ADDITIONAL TESTS:      HEALTH ISSUES - PROBLEM Dx:  Paranoia    HFrEF (heart failure with reduced ejection fraction)    HLD (hyperlipidemia)    Need for prophylactic measure    Medication management    DM2 (diabetes mellitus, type 2)    Anemia    Hypotension            Consultant(s) Notes Reviewed:  [  ] YES     Care Discussed with [X] Consultants  [  ] Patient  [  ] Family [  ] HCP [  ]   [  ] Social Service  [  ] RN, [  ] Physical Therapy,[  ] Palliative care team  DVT PPX: [  ] Lovenox, [  ] S C Heparin, [  ] Coumadin, [  ] Xarelto, [  ] Eliquis, [  ] Pradaxa, [  ] IV Heparin drip, [  ] SCD [  ] Contraindication 2 to GI Bleed,[  ] Ambulation [  ] Contraindicated 2 to  bleed [  ] Contraindicated 2 to Brain Bleed  Advanced directive: [  ] None, [  ] DNR/DNI Patient is a 83y old  Male who presents with a chief complaint of paranoia (05 Oct 2023 07:46)    HPI:  82y/o M PMH dementia, HFrEF s/p PPM, HTN, HLD  presents with increased agitation. History obtained from patient's daughter Arianne over phone.   Patient moved from [place] to [place] ~2months ago and has been having issues with increased paranoia for the past month. He stays at a [facility] older assisted living community where he has been having several episodes of sundowning.   Recently he was hospitalized at [facility] for same issues from -. Daughter was unable to provide sufficient history regarding hospitalization.   Patient's son, Jose has been taking care of him and knows more about his medical hx however patient's daughter states she is taking over now and did not want to bother Jose at this time.  Patient was also recently seen at [facility] ED for episodes of agitation and was told to follow up with outpatient Psychiatrist but daughter states that likely did not happened because of his increased agitation.  Patient became agitated today and staff had to call 911 for further evaluation. The facility as well as his children feel that his current psych medication regimen is not sufficient to keep him calm.   Per review of recent dispensary history, he was seeing a psychiatric NP (Barbara Bravo) in [place], [place].   Of note, he had placement of PPM in late August at [facility]. Per his daughter, his LVEF at that time was 20% before and after PPM placement.  Currently, patient states his b/l legs feel itchy but otherwise he feels fine and is agreeable.  Denies fevers, chills. sob, cp, n/v/d.    ED Course   T 97.4 F  /79 RR 18 SpO2 95% on RA  Labs H/H 12.1/37.5 Glu   UA: >1000 glucose  UTox: negaitve     In the ED, given tylenol 650mg x1 , zyprexa 5mg IVP x1  (20 Sep 2023 21:58)    INTERVAL HPI:  : Pt seen and examined, sitting in chair with 1 to 1 present. Patient expressed concern about not receiving his pantoprazole, stating he cant digest his food without it, denies n/v, epigastric pain or burning. When asked about why pt came to hospital, states that he was being followed and he called the police to come help him. Says he lives with his son Jose. EMS brought patient in from  and over [facility]. Additionally states that one pill he was given this morning by staff was "phony" so he didn't take it. Contacted pt's pharmacy [facility], numerous scripts filled in [place] and [place]. Per night team sign out, daughter states that pt has been in and out of many ERs and has been started on many different medications so there are a lot of recent meds that he does not currently take to the best of her knowledge. Per daughter, her brothers are tired of assisting with their father given history of difficulties with him. She is willing to assist but does not want to be too involved and asks us to not contact her brothers. Psychiatry consulted and to see patient. JOHN/VAISHALI updated.   Soren Krishnan [phone]  : Pt seen and examined at bedside. xyprexa dose increased to 5mg bedtime and 2.5mg in AM starting yesterday. Marked improvement in demeanor. A&Ox3 (person, time, place). States he is in a "police hospital" and that he came here because he asked some man in their car to call the police because he was being followed by an aide that his son hired. Said he came from "[place] [id number] and over [facility]. No complaints or concerns today. States he has to get things ready to move back to [place]. Psych is following. DSC planning pending placement as his previous community does not want pt to return and family does not want to take patient. Replace PO K   : Pt seen and examined at bedside. Depakote increased to 500mg PO BID, amitriptyline stopped as per psych recs. Pt with no complaints or concerns. Pleasant on encounter, says he is doing well and thanks provider for checking in on him.  Reports that he is waiting to go home. Patient informed we are working to get him out of the hospital. DSC planning pending placement. JOHN sent referral to [facility]. Awaiting decision.   :  Pt seen and examined at bedside. Pt reports that is feeling well and does not have any symptoms, pain or concern. Pt is tolerating PO and eating well.  Pt is cooperative with evaluation and pleasant.  Waiting for placement for dc. Awaiting placement.   : Pt seen and examined at bedside. Reports last night he was looking for 3 medications that were started in the hospital at night. Reassured that he is receiving all the proper medications he needs. Appetite is good. Pt has no complaints at this time. Now off 1:1.  : Pt seen and examined at bedside. Was not agitated overnight. Pt is very tired this morning. Reports chronic low back pain, but is not concerned. Otherwise he has no active complaints at  this time. Denies chest pain, abdominal pain, SOB. D/W Son Mykel today   : Pt seen and examined at bedside. No agitation overnight. Pt is tired this morning. Has no complaints other than his chronic lower back pain. Denies chest pain, abdominal pain SOB. Pt wants to go home. D/W Son Angelito today   : Pt seen and examined at bedside. No agitation overnight. Pt is tired this morning and wants to sleep. No complaints other than his chronic low back pain. Denies chest pain, SOB, abdominal pain. Pt wants to go.  : Pt seen and examined at bedside. No agitation. Pt is more awake. Continues to report chronic low back pain. No acute complaints at this time. Denies chest pain, SOB, abdominal pain. Wants to go.Social work for d/c planning when bed is available.  : Pt seen and examined at bedside. No agitation overnight. Pt is AAOx1. Knows he is in a hospital but unsure which one. Patient is pleasant and has no acute complaints at this time. Denies fever, chills, CP, palpitations, SOB, abd pain, nausea, vomiting. Low BP S/P 1 Bolus NS  10/1: Pt seen and examined at bedside. No agitation overnight. Patient has no complaints at this time. Just very tired. Wishes to go home. Denies fever, chills, chest pain, SOB, abdominal pain. stable for d/c  10/2: Patient was seen and examined at bedside. No agitation observed overnight. Patient has no complaints. Wishes to go home. Denies fever, chills, chest pain, SOB, abdominal pain. Pt wants to be d/don home with family, Pt is very Cooperative.  10/3: Pt seen and examined at bedside. Resting comfortably in chair. No agitation overnight. Pt really wants to go home, upset that he has been in the hospital for so long. No acute complaints at this time. Denies fever, chills, chest pain, SOB, abdominal pain. Pt is very cooperative.  10/4: Pt seen examined at bedside. Wants to go home. No new complaints at this time. very cooperative.  10/5: Pt seen and examined at bedside. No new complaints at this time. Denies dizziness at rest, but states it occurs when he gets up too fast. Very cooperative. Sleepy.     OVERNIGHT EVENTS: None.    Home Medications:  albuterol 2.5 mg/3 mL (0.083%) inhalation solution: 2.5 milligram(s) inhaled every 6 hours as needed for  shortness of breath and/or wheezing (25 Sep 2023 11:00)  ammonium lactate 12% topical lotion: 1 Apply topically to affected area 2 times a day (25 Sep 2023 10:46)  atorvastatin 40 mg oral tablet: 1 tab(s) orally once a day (at bedtime) (25 Sep 2023 11:00)  dapagliflozin 10 mg oral tablet: 1 tab(s) orally every 24 hours (25 Sep 2023 10:46)  divalproex sodium 500 mg oral delayed release tablet: 1 tab(s) orally 2 times a day (25 Sep 2023 10:37)  DULoxetine 30 mg oral delayed release capsule: 1 cap(s) orally 2 times a day (25 Sep 2023 11:00)  furosemide 40 mg oral tablet: 1 tab(s) orally every 12 hours (25 Sep 2023 11:00)  melatonin 5 mg oral tablet: 1 tab(s) orally once a day (at bedtime) As needed Insomnia (25 Sep 2023 10:46)  METFORMIN 500MG TABLETS: TAKE 1 TABLET BY MOUTH EVERY MORNING AND EVERY EVENING WITH MEALS. (20 Sep 2023 21:58)  methyl salicylate-menthol 15%-10% topical cream: 1 Apply topically to affected area 3 times a day (25 Sep 2023 10:46)  metoprolol succinate 25 mg oral tablet, extended release: 1 tab(s) orally once a day (25 Sep 2023 11:00)  midodrine 5 mg oral tablet: 1 tab(s) orally 3 times a day (25 Sep 2023 11:00)  OLANZapine 10 mg oral tablet, disintegratin tab(s) orally once a day (at bedtime) (27 Sep 2023 08:38)  OLANZapine 5 mg oral tablet, disintegratin tab(s) orally 2 times a day (27 Sep 2023 08:38)  pantoprazole 40 mg oral delayed release tablet: 1 tab(s) orally once a day (before a meal) (25 Sep 2023 10:46)  POTASSIUM CL 20MEQ ER TABLETS: TAKE 1 TABLET BY MOUTH EVERY DAY WITH FOOD FOR 4 DAYS (20 Sep 2023 21:58)  sacubitril-valsartan 24 mg-26 mg oral tablet: 1 tab(s) orally 2 times a day (25 Sep 2023 11:00)  spironolactone 25 mg oral tablet: 1 tab(s) orally once a day (25 Sep 2023 10:46)  TRELEGY ELLIPTA 100-62.5MCG INH 30P: INHALE 1 PUFF BY MOUTH EVERY DAY (20 Sep 2023 21:58)      MEDICATIONS  (STANDING):  ammonium lactate 12% Lotion 1 Application(s) Topical two times a day  atorvastatin 40 milliGRAM(s) Oral at bedtime  bacitracin   Ointment 1 Application(s) Topical every 8 hours  budesonide  80 MICROgram(s)/formoterol 4.5 MICROgram(s) Inhaler 2 Puff(s) Inhalation two times a day  dapagliflozin 10 milliGRAM(s) Oral daily  dextrose 5%. 1000 milliLiter(s) (100 mL/Hr) IV Continuous <Continuous>  dextrose 5%. 1000 milliLiter(s) (50 mL/Hr) IV Continuous <Continuous>  dextrose 50% Injectable 25 Gram(s) IV Push once  dextrose 50% Injectable 25 Gram(s) IV Push once  dextrose 50% Injectable 12.5 Gram(s) IV Push once  divalproex  milliGRAM(s) Oral two times a day  DULoxetine 30 milliGRAM(s) Oral <User Schedule>  enoxaparin Injectable 40 milliGRAM(s) SubCutaneous every 24 hours  furosemide    Tablet 40 milliGRAM(s) Oral daily  gentamicin 0.3% Ophthalmic Solution 1 Drop(s) Both EYES five times a day  glucagon  Injectable 1 milliGRAM(s) IntraMuscular once  insulin lispro (ADMELOG) corrective regimen sliding scale   SubCutaneous at bedtime  insulin lispro (ADMELOG) corrective regimen sliding scale   SubCutaneous three times a day before meals  methyl salicylate 15%/menthol 10% Topical Cream 1 Application(s) Topical three times a day  metoprolol succinate ER 25 milliGRAM(s) Oral daily  midodrine. 7.5 milliGRAM(s) Oral three times a day  OLANZapine Disintegrating Tablet 5 milliGRAM(s) Oral two times a day  OLANZapine Disintegrating Tablet 10 milliGRAM(s) Oral at bedtime  pantoprazole    Tablet 40 milliGRAM(s) Oral before breakfast  sacubitril 24 mG/valsartan 26 mG 1 Tablet(s) Oral two times a day  sodium chloride 0.9%. 1000 milliLiter(s) (250 mL/Hr) IV Continuous <Continuous>  tiotropium 2.5 MICROgram(s) Inhaler 2 Puff(s) Inhalation daily    MEDICATIONS  (PRN):  acetaminophen     Tablet .. 650 milliGRAM(s) Oral every 6 hours PRN Temp greater or equal to 38C (100.4F), Mild Pain (1 - 3)  albuterol    0.083% 2.5 milliGRAM(s) Nebulizer every 6 hours PRN Shortness of Breath and/or Wheezing  dextrose Oral Gel 15 Gram(s) Oral once PRN Blood Glucose LESS THAN 70 milliGRAM(s)/deciliter  melatonin 5 milliGRAM(s) Oral at bedtime PRN Insomnia      Allergies    No Known Allergies    Intolerances        Social History:  Tobacco: former , quit >30 years ago  EtOH: social drinker  Recreational drug use: denies   Lives with: Assisted Living Facility  Ambulates: without assistance  ADLs: needs assistance (20 Sep 2023 21:58)      REVIEW OF SYSTEMS:  CONSTITUTIONAL: No fever, No chills, No fatigue, No myalgia, No Body ache, No Weakness  EYES: No eye pain,  No visual disturbances, No discharge, NO Redness  ENMT:  No ear pain, No nose bleed, No vertigo; No sinus pain, NO throat pain, No Congestion  NECK: No pain, No stiffness  RESPIRATORY: No cough, NO wheezing, No  hemoptysis, NO  shortness of breath  CARDIOVASCULAR: No chest pain, palpitations  GASTROINTESTINAL: No abdominal pain, NO epigastric pain. No nausea, No vomiting; No diarrhea, No constipation. [  ] BM  GENITOURINARY: No dysuria, No frequency, No urgency, No hematuria, NO incontinence  NEUROLOGICAL: No headaches, No dizziness, No numbness, No tingling, No tremors, No weakness  EXT: No Swelling, No Pain, No Edema  SKIN:  [  ] No itching, burning, rashes, or lesions   MUSCULOSKELETAL: No joint pain ,No Jt swelling; No muscle pain, No back pain, No extremity pain  PSYCHIATRIC: No depression,  No anxiety,  No mood swings ,No difficulty sleeping at night  PAIN SCALE: [  ] None  [  ] Other-  ROS Unable to obtain due to - [  ] Dementia  [  ] Lethargy [  ] Drowsy [  ] Sedated [  ] non verbal  REST OF REVIEW Of SYSTEM - [  ] Normal     Vital Signs Last 24 Hrs  T(C): 36.4 (05 Oct 2023 05:30), Max: 36.4 (05 Oct 2023 05:30)  T(F): 97.5 (05 Oct 2023 05:30), Max: 97.5 (05 Oct 2023 05:30)  HR: 82 (05 Oct 2023 05:30) (82 - 87)  BP: 91/52 (05 Oct 2023 05:30) (84/52 - 91/52)  BP(mean): --  RR: 18 (05 Oct 2023 05:30) (18 - 18)  SpO2: 91% (05 Oct 2023 05:30) (91% - 96%)    Parameters below as of 05 Oct 2023 05:30  Patient On (Oxygen Delivery Method): room air      Finger Stick          PHYSICAL EXAM:  GENERAL:  [  ] NAD , [  ] well appearing, [  ] Agitated, [  ] Drowsy,  [  ] Lethargy, [  ] confused   HEAD:  [  ] Normal, [  ] Other  EYES:  [  ] EOMI, [  ] PERRLA, [  ] conjunctiva and sclera clear normal, [  ] Other,  [  ] Pallor,[  ] Discharge  ENMT:  [  ] Normal, [  ] Moist mucous membranes, [  ] Good dentition, [  ] No Thrush  NECK:  [  ] Supple, [  ] No JVD, [  ] Normal thyroid, [  ] Lymphadenopathy [  ] Other  CHEST/LUNG:  [  ] Clear to auscultation bilaterally, [  ] Breath Sounds equal B/L / Decrease, [  ] poor effort  [  ] No rales, [  ] No rhonchi  [  ]  No wheezing,   HEART:  [  ] Regular rate and rhythm, [  ] tachycardia, [  ] Bradycardia,  [  ] irregular  [  ] No murmurs, No rubs, No gallops, [  ] PPM in place (Mfr:  )  ABDOMEN:  [  ] Soft, [  ] Nontender, [  ] Nondistended, [  ] No mass, [  ] Bowel sounds present, [  ] obese  NERVOUS SYSTEM:  [  ] Alert & Oriented X3, [  ] Nonfocal  [  ] Confusion  [  ] Encephalopathic [  ] Sedated [  ] Unable to assess, [  ] Dementia [  ] Other-  EXTREMITIES: [  ] 2+ Peripheral Pulses, No clubbing, No cyanosis,  [  ] edema B/L lower EXT. [  ] PVD stasis skin changes B/L Lower EXT, [  ] wound  LYMPH: No lymphadenopathy noted  SKIN:  [  ] No rashes or lesions, [  ] Pressure Ulcers, [  ] ecchymosis, [  ] Skin Tears, [  ] Other    DIET: Diet, Regular:   Consistent Carbohydrate Evening Snack  DASH/TLC Sodium & Cholesterol Restricted (23 @ 22:36)      LABS:      Ca    9.6        04 Oct 2023 07:35        Urinalysis Basic - ( 04 Oct 2023 07:35 )    Color: x / Appearance: x / SG: x / pH: x  Gluc: 111 mg/dL / Ketone: x  / Bili: x / Urobili: x   Blood: x / Protein: x / Nitrite: x   Leuk Esterase: x / RBC: x / WBC x   Sq Epi: x / Non Sq Epi: x / Bacteria: x                           Anemia Panel:      Thyroid Panel:                RADIOLOGY & ADDITIONAL TESTS:      HEALTH ISSUES - PROBLEM Dx:  Paranoia    HFrEF (heart failure with reduced ejection fraction)    HLD (hyperlipidemia)    Need for prophylactic measure    Medication management    DM2 (diabetes mellitus, type 2)    Anemia    Hypotension            Consultant(s) Notes Reviewed:  [  ] YES     Care Discussed with [X] Consultants  [  ] Patient  [  ] Family [  ] HCP [  ]   [  ] Social Service  [  ] RN, [  ] Physical Therapy,[  ] Palliative care team  DVT PPX: [  ] Lovenox, [  ] S C Heparin, [  ] Coumadin, [  ] Xarelto, [  ] Eliquis, [  ] Pradaxa, [  ] IV Heparin drip, [  ] SCD [  ] Contraindication 2 to GI Bleed,[  ] Ambulation [  ] Contraindicated 2 to  bleed [  ] Contraindicated 2 to Brain Bleed  Advanced directive: [  ] None, [  ] DNR/DNI Patient is a 83y old  Male who presents with a chief complaint of paranoia (05 Oct 2023 07:46)    HPI:  84y/o M PMH dementia, HFrEF s/p PPM, HTN, HLD  presents with increased agitation. History obtained from patient's daughter Arianne over phone.   Patient moved from [place] to [place] ~2months ago and has been having issues with increased paranoia for the past month. He stays at a [facility] older assisted living community where he has been having several episodes of sundowning.   Recently he was hospitalized at [facility] for same issues from -. Daughter was unable to provide sufficient history regarding hospitalization.   Patient's son, Jose has been taking care of him and knows more about his medical hx however patient's daughter states she is taking over now and did not want to bother Jose at this time.  Patient was also recently seen at [facility] ED for episodes of agitation and was told to follow up with outpatient Psychiatrist but daughter states that likely did not happened because of his increased agitation.  Patient became agitated today and staff had to call 911 for further evaluation. The facility as well as his children feel that his current psych medication regimen is not sufficient to keep him calm.   Per review of recent dispensary history, he was seeing a psychiatric NP (Barbara Bravo) in [place], [place].   Of note, he had placement of PPM in late August at [facility]. Per his daughter, his LVEF at that time was 20% before and after PPM placement.  Currently, patient states his b/l legs feel itchy but otherwise he feels fine and is agreeable.  Denies fevers, chills. sob, cp, n/v/d.    ED Course   T 97.4 F  /79 RR 18 SpO2 95% on RA  Labs H/H 12.1/37.5 Glu   UA: >1000 glucose  UTox: negaitve     In the ED, given tylenol 650mg x1 , zyprexa 5mg IVP x1  (20 Sep 2023 21:58)    INTERVAL HPI:  : Pt seen and examined, sitting in chair with 1 to 1 present. Patient expressed concern about not receiving his pantoprazole, stating he cant digest his food without it, denies n/v, epigastric pain or burning. When asked about why pt came to hospital, states that he was being followed and he called the police to come help him. Says he lives with his son Jose. EMS brought patient in from  and over [facility]. Additionally states that one pill he was given this morning by staff was "phony" so he didn't take it. Contacted pt's pharmacy [facility], numerous scripts filled in [place] and [place]. Per night team sign out, daughter states that pt has been in and out of many ERs and has been started on many different medications so there are a lot of recent meds that he does not currently take to the best of her knowledge. Per daughter, her brothers are tired of assisting with their father given history of difficulties with him. She is willing to assist but does not want to be too involved and asks us to not contact her brothers. Psychiatry consulted and to see patient. JOHN/VAISHALI updated.   Soren Krishnan [phone]  : Pt seen and examined at bedside. xyprexa dose increased to 5mg bedtime and 2.5mg in AM starting yesterday. Marked improvement in demeanor. A&Ox3 (person, time, place). States he is in a "police hospital" and that he came here because he asked some man in their car to call the police because he was being followed by an aide that his son hired. Said he came from "[place] [id number] and over [facility]. No complaints or concerns today. States he has to get things ready to move back to [place]. Psych is following. DSC planning pending placement as his previous community does not want pt to return and family does not want to take patient. Replace PO K   : Pt seen and examined at bedside. Depakote increased to 500mg PO BID, amitriptyline stopped as per psych recs. Pt with no complaints or concerns. Pleasant on encounter, says he is doing well and thanks provider for checking in on him.  Reports that he is waiting to go home. Patient informed we are working to get him out of the hospital. DSC planning pending placement. JOHN sent referral to [facility]. Awaiting decision.   :  Pt seen and examined at bedside. Pt reports that is feeling well and does not have any symptoms, pain or concern. Pt is tolerating PO and eating well.  Pt is cooperative with evaluation and pleasant.  Waiting for placement for dc. Awaiting placement.   : Pt seen and examined at bedside. Reports last night he was looking for 3 medications that were started in the hospital at night. Reassured that he is receiving all the proper medications he needs. Appetite is good. Pt has no complaints at this time. Now off 1:1.  : Pt seen and examined at bedside. Was not agitated overnight. Pt is very tired this morning. Reports chronic low back pain, but is not concerned. Otherwise he has no active complaints at  this time. Denies chest pain, abdominal pain, SOB. D/W Son Mykel today   : Pt seen and examined at bedside. No agitation overnight. Pt is tired this morning. Has no complaints other than his chronic lower back pain. Denies chest pain, abdominal pain SOB. Pt wants to go home. D/W Son Angelito today   : Pt seen and examined at bedside. No agitation overnight. Pt is tired this morning and wants to sleep. No complaints other than his chronic low back pain. Denies chest pain, SOB, abdominal pain. Pt wants to go.  : Pt seen and examined at bedside. No agitation. Pt is more awake. Continues to report chronic low back pain. No acute complaints at this time. Denies chest pain, SOB, abdominal pain. Wants to go.Social work for d/c planning when bed is available.  : Pt seen and examined at bedside. No agitation overnight. Pt is AAOx1. Knows he is in a hospital but unsure which one. Patient is pleasant and has no acute complaints at this time. Denies fever, chills, CP, palpitations, SOB, abd pain, nausea, vomiting. Low BP S/P 1 Bolus NS  10/1: Pt seen and examined at bedside. No agitation overnight. Patient has no complaints at this time. Just very tired. Wishes to go home. Denies fever, chills, chest pain, SOB, abdominal pain. stable for d/c  10/2: Patient was seen and examined at bedside. No agitation observed overnight. Patient has no complaints. Wishes to go home. Denies fever, chills, chest pain, SOB, abdominal pain. Pt wants to be d/don home with family, Pt is very Cooperative.  10/3: Pt seen and examined at bedside. Resting comfortably in chair. No agitation overnight. Pt really wants to go home, upset that he has been in the hospital for so long. No acute complaints at this time. Denies fever, chills, chest pain, SOB, abdominal pain. Pt is very cooperative.  10/4: Pt seen examined at bedside. Wants to go home. No new complaints at this time. very cooperative.  10/5: Pt seen and examined at bedside. No new complaints at this time. Denies dizziness at rest, but states it occurs when he gets up too fast. Very cooperative. Ambulating in Hallway , wants to be d/don today     OVERNIGHT EVENTS: None.    Home Medications:  albuterol 2.5 mg/3 mL (0.083%) inhalation solution: 2.5 milligram(s) inhaled every 6 hours as needed for  shortness of breath and/or wheezing (25 Sep 2023 11:00)  ammonium lactate 12% topical lotion: 1 Apply topically to affected area 2 times a day (25 Sep 2023 10:46)  atorvastatin 40 mg oral tablet: 1 tab(s) orally once a day (at bedtime) (25 Sep 2023 11:00)  dapagliflozin 10 mg oral tablet: 1 tab(s) orally every 24 hours (25 Sep 2023 10:46)  divalproex sodium 500 mg oral delayed release tablet: 1 tab(s) orally 2 times a day (25 Sep 2023 10:37)  DULoxetine 30 mg oral delayed release capsule: 1 cap(s) orally 2 times a day (25 Sep 2023 11:00)  furosemide 40 mg oral tablet: 1 tab(s) orally every 12 hours (25 Sep 2023 11:00)  melatonin 5 mg oral tablet: 1 tab(s) orally once a day (at bedtime) As needed Insomnia (25 Sep 2023 10:46)  METFORMIN 500MG TABLETS: TAKE 1 TABLET BY MOUTH EVERY MORNING AND EVERY EVENING WITH MEALS. (20 Sep 2023 21:58)  methyl salicylate-menthol 15%-10% topical cream: 1 Apply topically to affected area 3 times a day (25 Sep 2023 10:46)  metoprolol succinate 25 mg oral tablet, extended release: 1 tab(s) orally once a day (25 Sep 2023 11:00)  midodrine 5 mg oral tablet: 1 tab(s) orally 3 times a day (25 Sep 2023 11:00)  OLANZapine 10 mg oral tablet, disintegratin tab(s) orally once a day (at bedtime) (27 Sep 2023 08:38)  OLANZapine 5 mg oral tablet, disintegratin tab(s) orally 2 times a day (27 Sep 2023 08:38)  pantoprazole 40 mg oral delayed release tablet: 1 tab(s) orally once a day (before a meal) (25 Sep 2023 10:46)  POTASSIUM CL 20MEQ ER TABLETS: TAKE 1 TABLET BY MOUTH EVERY DAY WITH FOOD FOR 4 DAYS (20 Sep 2023 21:58)  sacubitril-valsartan 24 mg-26 mg oral tablet: 1 tab(s) orally 2 times a day (25 Sep 2023 11:00)  spironolactone 25 mg oral tablet: 1 tab(s) orally once a day (25 Sep 2023 10:46)  TRELEGY ELLIPTA 100-62.5MCG INH 30P: INHALE 1 PUFF BY MOUTH EVERY DAY (20 Sep 2023 21:58)      MEDICATIONS  (STANDING):  ammonium lactate 12% Lotion 1 Application(s) Topical two times a day  atorvastatin 40 milliGRAM(s) Oral at bedtime  bacitracin   Ointment 1 Application(s) Topical every 8 hours  budesonide  80 MICROgram(s)/formoterol 4.5 MICROgram(s) Inhaler 2 Puff(s) Inhalation two times a day  dapagliflozin 10 milliGRAM(s) Oral daily  dextrose 5%. 1000 milliLiter(s) (100 mL/Hr) IV Continuous <Continuous>  dextrose 5%. 1000 milliLiter(s) (50 mL/Hr) IV Continuous <Continuous>  dextrose 50% Injectable 25 Gram(s) IV Push once  dextrose 50% Injectable 25 Gram(s) IV Push once  dextrose 50% Injectable 12.5 Gram(s) IV Push once  divalproex  milliGRAM(s) Oral two times a day  DULoxetine 30 milliGRAM(s) Oral <User Schedule>  enoxaparin Injectable 40 milliGRAM(s) SubCutaneous every 24 hours  furosemide    Tablet 40 milliGRAM(s) Oral daily  gentamicin 0.3% Ophthalmic Solution 1 Drop(s) Both EYES five times a day  glucagon  Injectable 1 milliGRAM(s) IntraMuscular once  insulin lispro (ADMELOG) corrective regimen sliding scale   SubCutaneous at bedtime  insulin lispro (ADMELOG) corrective regimen sliding scale   SubCutaneous three times a day before meals  methyl salicylate 15%/menthol 10% Topical Cream 1 Application(s) Topical three times a day  metoprolol succinate ER 25 milliGRAM(s) Oral daily  midodrine. 7.5 milliGRAM(s) Oral three times a day  OLANZapine Disintegrating Tablet 5 milliGRAM(s) Oral two times a day  OLANZapine Disintegrating Tablet 10 milliGRAM(s) Oral at bedtime  pantoprazole    Tablet 40 milliGRAM(s) Oral before breakfast  sacubitril 24 mG/valsartan 26 mG 1 Tablet(s) Oral two times a day  sodium chloride 0.9%. 1000 milliLiter(s) (250 mL/Hr) IV Continuous <Continuous>  tiotropium 2.5 MICROgram(s) Inhaler 2 Puff(s) Inhalation daily    MEDICATIONS  (PRN):  acetaminophen     Tablet .. 650 milliGRAM(s) Oral every 6 hours PRN Temp greater or equal to 38C (100.4F), Mild Pain (1 - 3)  albuterol    0.083% 2.5 milliGRAM(s) Nebulizer every 6 hours PRN Shortness of Breath and/or Wheezing  dextrose Oral Gel 15 Gram(s) Oral once PRN Blood Glucose LESS THAN 70 milliGRAM(s)/deciliter  melatonin 5 milliGRAM(s) Oral at bedtime PRN Insomnia      Allergies    No Known Allergies    Intolerances        Social History:  Tobacco: former , quit >30 years ago  EtOH: social drinker  Recreational drug use: denies   Lives with: Assisted Living Facility  Ambulates: without assistance  ADLs: needs assistance (20 Sep 2023 21:58)      REVIEW OF SYSTEMS: i want to go home   CONSTITUTIONAL: No fever, No chills, No fatigue, No myalgia, No Body ache, No Weakness  EYES: No eye pain,  No visual disturbances, No discharge, NO Redness  ENMT:  No ear pain, No nose bleed, No vertigo; No sinus pain, NO throat pain, No Congestion  NECK: No pain, No stiffness  RESPIRATORY: No cough, NO wheezing, No  hemoptysis, NO  shortness of breath  CARDIOVASCULAR: No chest pain, palpitations  GASTROINTESTINAL: No abdominal pain, NO epigastric pain. No nausea, No vomiting; No diarrhea, No constipation. [  ] BM  GENITOURINARY: No dysuria, No frequency, No urgency, No hematuria, NO incontinence  NEUROLOGICAL: No headaches, No dizziness, No numbness, No tingling, No tremors, No weakness  EXT: No Swelling, No Pain, No Edema  SKIN:  [  ] No itching, burning, rashes, or lesions   MUSCULOSKELETAL: No joint pain ,No Jt swelling; No muscle pain, No back pain, No extremity pain  PSYCHIATRIC: No depression,  No anxiety,  No mood swings ,No difficulty sleeping at night  PAIN SCALE: [x  ] None  [  ] Other-  ROS Unable to obtain due to - [  ] Dementia  [  ] Lethargy [  ] Drowsy [  ] Sedated [  ] non verbal  REST OF REVIEW Of SYSTEM - [x  ] Normal     Vital Signs Last 24 Hrs  T(C): 36.4 (05 Oct 2023 05:30), Max: 36.4 (05 Oct 2023 05:30)  T(F): 97.5 (05 Oct 2023 05:30), Max: 97.5 (05 Oct 2023 05:30)  HR: 82 (05 Oct 2023 05:30) (82 - 87)  BP: 91/52 (05 Oct 2023 05:30) (84/52 - 91/52)  BP(mean): --  RR: 18 (05 Oct 2023 05:30) (18 - 18)  SpO2: 91% (05 Oct 2023 05:30) (91% - 96%)    Parameters below as of 05 Oct 2023 05:30  Patient On (Oxygen Delivery Method): room air      Finger Stick          PHYSICAL EXAM:  GENERAL:  [ x ] NAD , [x  ] well appearing, [  ] Agitated, [  ] Drowsy,  [  ] Lethargy, [  ] confused   HEAD:  [ x ] Normal, [  ] Other  EYES:  [ x ] EOMI, [ x ] PERRLA, [ x ] conjunctiva and sclera clear normal, [  ] Other,  [  ] Pallor,[  ] Discharge  ENMT:  [ x ] Normal, [x  ] Moist mucous membranes, [x  ] Good dentition, [x  ] No Thrush  NECK:  [ x ] Supple, [ x ] No JVD, [ x ] Normal thyroid, [  ] Lymphadenopathy [  ] Other  CHEST/LUNG:  [ x ] Clear to auscultation bilaterally, [ x ] Breath Sounds equal B/L / Decrease, [  x] poor effort  [x  ] No rales, [x  ] No rhonchi  [x  ]  No wheezing,   HEART:  [  ] Regular rate and rhythm, [  ] tachycardia, [  ] Bradycardia,  [  ] irregular  [x  ] No murmurs, No rubs, No gallops, [  ] PPM in place (Mfr:  )  ABDOMEN:  [x  ] Soft, [ x ] Nontender, [ x ] Nondistended, [  x] No mass, [x  ] Bowel sounds present, [x  ] obese  NERVOUS SYSTEM:  [ x ] Alert & Oriented X 2- 3, [ x ] Nonfocal  [  ] Confusion  [  ] Encephalopathic [  ] Sedated [  ] Unable to assess, [  ] Dementia [x  ] Other-Forgetful  EXTREMITIES: [  x] 2+ Peripheral Pulses, No clubbing, No cyanosis,  [x  ] Ankle pitting  edema B/L lower EXT. [ x ]  Severe PVD stasis skin changes B/L Lower EXT, [ x ] wound- B/L Hand -small multiple open wound-   LYMPH: No lymphadenopathy noted  SKIN:  [  ] No rashes or lesions, [  ] Pressure Ulcers, x[  ] ecchymosis- b/l upper Ext , [  ] Skin Tears, [ x ] Other-scabs -small -B/L lower Ext    DIET: Diet, Regular:   Consistent Carbohydrate Evening Snack  DASH/TLC Sodium & Cholesterol Restricted (23 @ 22:36)      LABS:      Ca    9.6        04 Oct 2023 07:35        Urinalysis Basic - ( 04 Oct 2023 07:35 )    Color: x / Appearance: x / SG: x / pH: x  Gluc: 111 mg/dL / Ketone: x  / Bili: x / Urobili: x   Blood: x / Protein: x / Nitrite: x   Leuk Esterase: x / RBC: x / WBC x   Sq Epi: x / Non Sq Epi: x / Bacteria: x        RADIOLOGY & ADDITIONAL TESTS:  < from: TTE W or WO Ultrasound Enhancing Agent (10.04.23 @ 18:11) >    _______________________________________________________________________________________     CONCLUSIONS:      1. Left ventricular cavity is moderately dilated. Left ventricular systolic function is severely decreased with an ejection fraction visually estimated at 20 %   2. Normal right ventricular cavity size and reduced systolic function.   3. Device lead is visualized in the right heart.   4. The aortic valve is calcified without severe valvular pathology.   5. Trace aortic regurgitation.   6. Mild to moderate mitral regurgitation.   7. Trace tricuspid regurgitation.    ________________________________________________________________________________________  FINDINGS:     Left Ventricle:  The left ventricular cavity is moderately dilated. Left ventricular systolic function is severely decreased with an ejection fraction visually estimated at 20%. Severely dilated left ventricular systolic cavity size. The left ventricular diastolic function is indeterminate.       < end of copied text >    HEALTH ISSUES - PROBLEM Dx:  Paranoia    HFrEF (heart failure with reduced ejection fraction)    HLD (hyperlipidemia)    Need for prophylactic measure    Medication management    DM2 (diabetes mellitus, type 2)    Anemia    Hypotension            Consultant(s) Notes Reviewed:  [  ] YES     Care Discussed with [X] Consultants  [ x ] Patient  [  ] Family [  ] HCP [  ]   [ x ] Social Service  [x  ] RN, [  ] Physical Therapy,[  ] Palliative care team  DVT PPX: [ x ] Lovenox, [  ] S C Heparin, [  ] Coumadin, [  ] Xarelto, [  ] Eliquis, [  ] Pradaxa, [  ] IV Heparin drip, [  ] SCD [  ] Contraindication 2 to GI Bleed,[  ] Ambulation [  ] Contraindicated 2 to  bleed [  ] Contraindicated 2 to Brain Bleed  Advanced directive: [ x ] None, [  ] DNR/DNI

## 2023-10-06 LAB
ALBUMIN SERPL ELPH-MCNC: 3.5 G/DL — SIGNIFICANT CHANGE UP (ref 3.3–5)
ALP SERPL-CCNC: 62 U/L — SIGNIFICANT CHANGE UP (ref 40–120)
ALT FLD-CCNC: 22 U/L — SIGNIFICANT CHANGE UP (ref 12–78)
ANION GAP SERPL CALC-SCNC: 6 MMOL/L — SIGNIFICANT CHANGE UP (ref 5–17)
AST SERPL-CCNC: 21 U/L — SIGNIFICANT CHANGE UP (ref 15–37)
BILIRUB SERPL-MCNC: 1.1 MG/DL — SIGNIFICANT CHANGE UP (ref 0.2–1.2)
BUN SERPL-MCNC: 33 MG/DL — HIGH (ref 7–23)
CALCIUM SERPL-MCNC: 9.1 MG/DL — SIGNIFICANT CHANGE UP (ref 8.5–10.1)
CHLORIDE SERPL-SCNC: 105 MMOL/L — SIGNIFICANT CHANGE UP (ref 96–108)
CO2 SERPL-SCNC: 30 MMOL/L — SIGNIFICANT CHANGE UP (ref 22–31)
CREAT SERPL-MCNC: 1 MG/DL — SIGNIFICANT CHANGE UP (ref 0.5–1.3)
EGFR: 75 ML/MIN/1.73M2 — SIGNIFICANT CHANGE UP
GLUCOSE BLDC GLUCOMTR-MCNC: 101 MG/DL — HIGH (ref 70–99)
GLUCOSE BLDC GLUCOMTR-MCNC: 101 MG/DL — HIGH (ref 70–99)
GLUCOSE BLDC GLUCOMTR-MCNC: 114 MG/DL — HIGH (ref 70–99)
GLUCOSE BLDC GLUCOMTR-MCNC: 149 MG/DL — HIGH (ref 70–99)
GLUCOSE SERPL-MCNC: 97 MG/DL — SIGNIFICANT CHANGE UP (ref 70–99)
HCT VFR BLD CALC: 37.5 % — LOW (ref 39–50)
HGB BLD-MCNC: 12.5 G/DL — LOW (ref 13–17)
MCHC RBC-ENTMCNC: 32.4 PG — SIGNIFICANT CHANGE UP (ref 27–34)
MCHC RBC-ENTMCNC: 33.3 GM/DL — SIGNIFICANT CHANGE UP (ref 32–36)
MCV RBC AUTO: 97.2 FL — SIGNIFICANT CHANGE UP (ref 80–100)
NRBC # BLD: 0 /100 WBCS — SIGNIFICANT CHANGE UP (ref 0–0)
PLATELET # BLD AUTO: 114 K/UL — LOW (ref 150–400)
POTASSIUM SERPL-MCNC: 3.5 MMOL/L — SIGNIFICANT CHANGE UP (ref 3.5–5.3)
POTASSIUM SERPL-SCNC: 3.5 MMOL/L — SIGNIFICANT CHANGE UP (ref 3.5–5.3)
PROT SERPL-MCNC: 6.9 G/DL — SIGNIFICANT CHANGE UP (ref 6–8.3)
RBC # BLD: 3.86 M/UL — LOW (ref 4.2–5.8)
RBC # FLD: 14.1 % — SIGNIFICANT CHANGE UP (ref 10.3–14.5)
SODIUM SERPL-SCNC: 141 MMOL/L — SIGNIFICANT CHANGE UP (ref 135–145)
WBC # BLD: 5.79 K/UL — SIGNIFICANT CHANGE UP (ref 3.8–10.5)
WBC # FLD AUTO: 5.79 K/UL — SIGNIFICANT CHANGE UP (ref 3.8–10.5)

## 2023-10-06 PROCEDURE — 99233 SBSQ HOSP IP/OBS HIGH 50: CPT

## 2023-10-06 RX ORDER — SACUBITRIL AND VALSARTAN 24; 26 MG/1; MG/1
0.5 TABLET, FILM COATED ORAL
Refills: 0 | Status: DISCONTINUED | OUTPATIENT
Start: 2023-10-06 | End: 2023-10-09

## 2023-10-06 RX ADMIN — OLANZAPINE 10 MILLIGRAM(S): 15 TABLET, FILM COATED ORAL at 21:57

## 2023-10-06 RX ADMIN — ENOXAPARIN SODIUM 40 MILLIGRAM(S): 100 INJECTION SUBCUTANEOUS at 21:57

## 2023-10-06 RX ADMIN — Medication 650 MILLIGRAM(S): at 18:25

## 2023-10-06 RX ADMIN — DULOXETINE HYDROCHLORIDE 30 MILLIGRAM(S): 30 CAPSULE, DELAYED RELEASE ORAL at 06:15

## 2023-10-06 RX ADMIN — Medication 1 APPLICATION(S): at 06:12

## 2023-10-06 RX ADMIN — OLANZAPINE 5 MILLIGRAM(S): 15 TABLET, FILM COATED ORAL at 06:13

## 2023-10-06 RX ADMIN — Medication 1 APPLICATION(S): at 21:58

## 2023-10-06 RX ADMIN — MIDODRINE HYDROCHLORIDE 7.5 MILLIGRAM(S): 2.5 TABLET ORAL at 18:24

## 2023-10-06 RX ADMIN — PANTOPRAZOLE SODIUM 40 MILLIGRAM(S): 20 TABLET, DELAYED RELEASE ORAL at 06:14

## 2023-10-06 RX ADMIN — SACUBITRIL AND VALSARTAN 1 TABLET(S): 24; 26 TABLET, FILM COATED ORAL at 06:12

## 2023-10-06 RX ADMIN — GENTAMICIN SULFATE 1 DROP(S): 3 SOLUTION/ DROPS OPHTHALMIC at 21:30

## 2023-10-06 RX ADMIN — MIDODRINE HYDROCHLORIDE 7.5 MILLIGRAM(S): 2.5 TABLET ORAL at 12:38

## 2023-10-06 RX ADMIN — MIDODRINE HYDROCHLORIDE 7.5 MILLIGRAM(S): 2.5 TABLET ORAL at 06:16

## 2023-10-06 RX ADMIN — Medication 25 MILLIGRAM(S): at 06:14

## 2023-10-06 RX ADMIN — DIVALPROEX SODIUM 500 MILLIGRAM(S): 500 TABLET, DELAYED RELEASE ORAL at 06:14

## 2023-10-06 RX ADMIN — OLANZAPINE 5 MILLIGRAM(S): 15 TABLET, FILM COATED ORAL at 18:28

## 2023-10-06 RX ADMIN — MENTHOL AND METHYL SALICYLATE 1 APPLICATION(S): 10; 30 CREAM TOPICAL at 17:11

## 2023-10-06 RX ADMIN — MENTHOL AND METHYL SALICYLATE 1 APPLICATION(S): 10; 30 CREAM TOPICAL at 06:16

## 2023-10-06 RX ADMIN — MENTHOL AND METHYL SALICYLATE 1 APPLICATION(S): 10; 30 CREAM TOPICAL at 22:12

## 2023-10-06 RX ADMIN — Medication 1 APPLICATION(S): at 14:50

## 2023-10-06 RX ADMIN — GENTAMICIN SULFATE 1 DROP(S): 3 SOLUTION/ DROPS OPHTHALMIC at 12:37

## 2023-10-06 RX ADMIN — DIVALPROEX SODIUM 500 MILLIGRAM(S): 500 TABLET, DELAYED RELEASE ORAL at 18:24

## 2023-10-06 RX ADMIN — DULOXETINE HYDROCHLORIDE 30 MILLIGRAM(S): 30 CAPSULE, DELAYED RELEASE ORAL at 18:25

## 2023-10-06 RX ADMIN — Medication 650 MILLIGRAM(S): at 19:25

## 2023-10-06 RX ADMIN — Medication 1 APPLICATION(S): at 18:30

## 2023-10-06 RX ADMIN — ATORVASTATIN CALCIUM 40 MILLIGRAM(S): 80 TABLET, FILM COATED ORAL at 21:57

## 2023-10-06 RX ADMIN — GENTAMICIN SULFATE 1 DROP(S): 3 SOLUTION/ DROPS OPHTHALMIC at 08:59

## 2023-10-06 RX ADMIN — SACUBITRIL AND VALSARTAN 0.5 TABLET(S): 24; 26 TABLET, FILM COATED ORAL at 21:57

## 2023-10-06 NOTE — PROGRESS NOTE ADULT - PROBLEM SELECTOR PLAN 2
Chronic s/p PPM (paced ~end of Aug 2023 at [place] per daughter )  - TTE (8/2023) per daughter showed EF ~20%  - home meds: Entresto, metoprolol XL, spironolactone, Farxiga.   - dc spironolactone and farxiga  - continue entresto and metoprolol XL w/ lower hold parameters to ensure Pt gets meds  - Lower extremity edema improving  - Dash/ TLC Diet  Dr Oshea D/W ECHO, STOP Spironolactone, STOP Lasix , continue Toprol xl & Entresto 2x day  Orthostasis VS. Chronic s/p PPM (paced ~end of Aug 2023 at [place] per daughter )  - TTE (8/2023) per daughter showed EF ~20%  - home meds: Entresto, metoprolol XL, spironolactone, Farxiga.   - dc spironolactone and farxiga  - start entresto 1/2 tabs BID tonight. and metoprolol XL w/ lower hold parameters to ensure Pt gets meds  - Lower extremity edema improving  - Dash/ TLC Diet  Dr Oshea D/W ECHO, STOP Spironolactone, STOP Lasix , continue Toprol xl & Entresto 2x day  Orthostasis VS. Chronic Systolic CHF  s/p PPM (paced ~end of Aug 2023 at [place] per daughter )  - TTE (8/2023) per daughter showed EF ~20%  - home meds: Entresto, metoprolol XL, spironolactone, Farxiga.   - d/c spironolactone and farxiga as per cardio  - start entresto 1/2 tabs BID tonight. and metoprolol XL w/ lower hold parameters to ensure Pt gets meds  - Lower extremity edema improving  - Dash/ TLC Diet  Dr Clifton  D/W ECHO, STOP Spironolactone, STOP Lasix , continue Toprol xl & Entresto  1/2 tab 2x day  Orthostasis VS.

## 2023-10-06 NOTE — PROGRESS NOTE ADULT - PROBLEM SELECTOR PLAN 1
Acute paranoia in setting of hx of dementia, ?baseline mental disorder ? Psych Hx- ?Psychosis/ Agitation: unclear hx  - Previous hospitalization at [facility] for similar problem. Unknown if Pt was taking meds prior to admission.    - No infectious or metabolic concomitant disorder. Normal thyroid profile.   - Continue current Zyrexa 5 mg 2x day, Zyprexa 10 mg Q HS regimen as per psych.  - Continue Depakote 500mg BID as per Dr. Strange.    - Continue duloxetine 30 mg 2x day.  - STOP amitriptyline, as per psych recs  - Psych (Dr. Strange) - no decision making capacity, ok to dc  - SW consulted for d/c plan: approved, but plan for Monday given no availabilities.  - Now off constant observation - no acute agitation observed Acute paranoia in setting of hx of dementia, ?baseline mental disorder ? Psych Hx- ?Psychosis/ Agitation: unclear hx  - Previous hospitalization at [facility] for similar problem. Unknown if Pt was taking meds prior to admission.    - No infectious or metabolic concomitant disorder. Normal thyroid profile.   - Continue current Zyrexa 5 mg 2x day, Zyprexa 10 mg Q HS regimen as per psych.  - Continue Depakote 500mg BID as per Dr. Strange.    - Continue duloxetine 30 mg 2x day.  - STOP amitriptyline, as per psych recs  - Psych (Dr. Strange) - no decision making capacity, ok to dc  - SW consulted for d/c plan: approved, but plan for Monday given no availabilities.  -  - no acute agitation observed

## 2023-10-06 NOTE — PROGRESS NOTE ADULT - SUBJECTIVE AND OBJECTIVE BOX
Patient is a 83y old  Male who presents with a chief complaint of paranoia (06 Oct 2023 09:04)    HPI:  82y/o M PMH dementia, HFrEF s/p PPM, HTN, HLD  presents with increased agitation. History obtained from patient's daughter Arianne over phone.   Patient moved from [place] to [place] ~2months ago and has been having issues with increased paranoia for the past month. He stays at a [facility] older assisted living community where he has been having several episodes of sundowning.   Recently he was hospitalized at [facility] for same issues from 9/1-914/23. Daughter was unable to provide sufficient history regarding hospitalization.   Patient's son, Jose has been taking care of him and knows more about his medical hx however patient's daughter states she is taking over now and did not want to bother Jose at this time.  Patient was also recently seen at [facility] ED for episodes of agitation and was told to follow up with outpatient Psychiatrist but daughter states that likely did not happened because of his increased agitation.  Patient became agitated today and staff had to call 911 for further evaluation. The facility as well as his children feel that his current psych medication regimen is not sufficient to keep him calm.   Per review of recent dispensary history, he was seeing a psychiatric NP (Barbara Bravo) in [place], [place].   Of note, he had placement of PPM in late August at [facility]. Per his daughter, his LVEF at that time was 20% before and after PPM placement.  Currently, patient states his b/l legs feel itchy but otherwise he feels fine and is agreeable.  Denies fevers, chills. sob, cp, n/v/d.    ED Course   T 97.4 F  /79 RR 18 SpO2 95% on RA  Labs H/H 12.1/37.5 Glu   UA: >1000 glucose  UTox: negaitve     In the ED, given tylenol 650mg x1 , zyprexa 5mg IVP x1  (20 Sep 2023 21:58)    INTERVAL HPI:  9/21: Pt seen and examined, sitting in chair with 1 to 1 present. Patient expressed concern about not receiving his pantoprazole, stating he cant digest his food without it, denies n/v, epigastric pain or burning. When asked about why pt came to hospital, states that he was being followed and he called the police to come help him. Says he lives with his son Jose. EMS brought patient in from  and over [facility]. Additionally states that one pill he was given this morning by staff was "phony" so he didn't take it. Contacted pt's pharmacy [facility], numerous scripts filled in [place] and [place]. Per night team sign out, daughter states that pt has been in and out of many ERs and has been started on many different medications so there are a lot of recent meds that he does not currently take to the best of her knowledge. Per daughter, her brothers are tired of assisting with their father given history of difficulties with him. She is willing to assist but does not want to be too involved and asks us to not contact her brothers. Psychiatry consulted and to see patient. JOHN/VAISHALI updated.   Soren Krishnan [phone]  9/22: Pt seen and examined at bedside. xyprexa dose increased to 5mg bedtime and 2.5mg in AM starting yesterday. Marked improvement in demeanor. A&Ox3 (person, time, place). States he is in a "police hospital" and that he came here because he asked some man in their car to call the police because he was being followed by an aide that his son hired. Said he came from "[place] [id number] and over [facility]. No complaints or concerns today. States he has to get things ready to move back to [place]. Psych is following. DSC planning pending placement as his previous community does not want pt to return and family does not want to take patient. Replace PO K   9/23: Pt seen and examined at bedside. Depakote increased to 500mg PO BID, amitriptyline stopped as per psych recs. Pt with no complaints or concerns. Pleasant on encounter, says he is doing well and thanks provider for checking in on him.  Reports that he is waiting to go home. Patient informed we are working to get him out of the hospital. DSC planning pending placement. JOHN sent referral to [facility]. Awaiting decision.   9/24:  Pt seen and examined at bedside. Pt reports that is feeling well and does not have any symptoms, pain or concern. Pt is tolerating PO and eating well.  Pt is cooperative with evaluation and pleasant.  Waiting for placement for dc. Awaiting placement.   9/25: Pt seen and examined at bedside. Reports last night he was looking for 3 medications that were started in the hospital at night. Reassured that he is receiving all the proper medications he needs. Appetite is good. Pt has no complaints at this time. Now off 1:1.  9/26: Pt seen and examined at bedside. Was not agitated overnight. Pt is very tired this morning. Reports chronic low back pain, but is not concerned. Otherwise he has no active complaints at  this time. Denies chest pain, abdominal pain, SOB. D/W Son Mykel today   9/27: Pt seen and examined at bedside. No agitation overnight. Pt is tired this morning. Has no complaints other than his chronic lower back pain. Denies chest pain, abdominal pain SOB. Pt wants to go home. D/W Son Angelito today   9/28: Pt seen and examined at bedside. No agitation overnight. Pt is tired this morning and wants to sleep. No complaints other than his chronic low back pain. Denies chest pain, SOB, abdominal pain. Pt wants to go.  9/29: Pt seen and examined at bedside. No agitation. Pt is more awake. Continues to report chronic low back pain. No acute complaints at this time. Denies chest pain, SOB, abdominal pain. Wants to go.Social work for d/c planning when bed is available.  9/30: Pt seen and examined at bedside. No agitation overnight. Pt is AAOx1. Knows he is in a hospital but unsure which one. Patient is pleasant and has no acute complaints at this time. Denies fever, chills, CP, palpitations, SOB, abd pain, nausea, vomiting. Low BP S/P 1 Bolus NS  10/1: Pt seen and examined at bedside. No agitation overnight. Patient has no complaints at this time. Just very tired. Wishes to go home. Denies fever, chills, chest pain, SOB, abdominal pain. stable for d/c  10/2: Patient was seen and examined at bedside. No agitation observed overnight. Patient has no complaints. Wishes to go home. Denies fever, chills, chest pain, SOB, abdominal pain. Pt wants to be d/don home with family, Pt is very Cooperative.  10/3: Pt seen and examined at bedside. Resting comfortably in chair. No agitation overnight. Pt really wants to go home, upset that he has been in the hospital for so long. No acute complaints at this time. Denies fever, chills, chest pain, SOB, abdominal pain. Pt is very cooperative.  10/4: Pt seen examined at bedside. Wants to go home. No new complaints at this time. very cooperative.  10/5: Pt seen and examined at bedside. No new complaints at this time. Denies dizziness at rest, but states it occurs when he gets up too fast. Very cooperative. Ambulating in Hallway , wants to be d/don today   10/6: Pt seen and examined at bedside. No new complaints at this time. Very cooperative. Wants to go home.     OVERNIGHT EVENTS: None.    Home Medications:      MEDICATIONS  (STANDING):  ammonium lactate 12% Lotion 1 Application(s) Topical two times a day  atorvastatin 40 milliGRAM(s) Oral at bedtime  bacitracin   Ointment 1 Application(s) Topical every 8 hours  budesonide  80 MICROgram(s)/formoterol 4.5 MICROgram(s) Inhaler 2 Puff(s) Inhalation two times a day  dextrose 5%. 1000 milliLiter(s) (100 mL/Hr) IV Continuous <Continuous>  dextrose 5%. 1000 milliLiter(s) (50 mL/Hr) IV Continuous <Continuous>  dextrose 50% Injectable 12.5 Gram(s) IV Push once  dextrose 50% Injectable 25 Gram(s) IV Push once  dextrose 50% Injectable 25 Gram(s) IV Push once  divalproex  milliGRAM(s) Oral two times a day  DULoxetine 30 milliGRAM(s) Oral <User Schedule>  enoxaparin Injectable 40 milliGRAM(s) SubCutaneous every 24 hours  gentamicin 0.3% Ophthalmic Solution 1 Drop(s) Both EYES five times a day  glucagon  Injectable 1 milliGRAM(s) IntraMuscular once  insulin lispro (ADMELOG) corrective regimen sliding scale   SubCutaneous three times a day before meals  insulin lispro (ADMELOG) corrective regimen sliding scale   SubCutaneous at bedtime  methyl salicylate 15%/menthol 10% Topical Cream 1 Application(s) Topical three times a day  metoprolol succinate ER 25 milliGRAM(s) Oral daily  midodrine. 7.5 milliGRAM(s) Oral three times a day  OLANZapine Disintegrating Tablet 5 milliGRAM(s) Oral two times a day  OLANZapine Disintegrating Tablet 10 milliGRAM(s) Oral at bedtime  pantoprazole    Tablet 40 milliGRAM(s) Oral before breakfast  sacubitril 24 mG/valsartan 26 mG 1 Tablet(s) Oral two times a day  sodium chloride 0.9%. 1000 milliLiter(s) (250 mL/Hr) IV Continuous <Continuous>  tiotropium 2.5 MICROgram(s) Inhaler 2 Puff(s) Inhalation daily    MEDICATIONS  (PRN):  acetaminophen     Tablet .. 650 milliGRAM(s) Oral every 6 hours PRN Temp greater or equal to 38C (100.4F), Mild Pain (1 - 3)  albuterol    0.083% 2.5 milliGRAM(s) Nebulizer every 6 hours PRN Shortness of Breath and/or Wheezing  dextrose Oral Gel 15 Gram(s) Oral once PRN Blood Glucose LESS THAN 70 milliGRAM(s)/deciliter  melatonin 5 milliGRAM(s) Oral at bedtime PRN Insomnia      Allergies    No Known Allergies    Intolerances        Social History:  Tobacco: former , quit >30 years ago  EtOH: social drinker  Recreational drug use: denies   Lives with: Assisted Living Facility  Ambulates: without assistance  ADLs: needs assistance (20 Sep 2023 21:58)      REVIEW OF SYSTEMS: i want to go home   CONSTITUTIONAL: No fever, No chills, No fatigue, No myalgia, No Body ache, No Weakness  EYES: No eye pain,  No visual disturbances, No discharge, NO Redness  ENMT:  No ear pain, No nose bleed, No vertigo; No sinus pain, NO throat pain, No Congestion  NECK: No pain, No stiffness  RESPIRATORY: No cough, NO wheezing, No  hemoptysis, NO  shortness of breath  CARDIOVASCULAR: No chest pain, palpitations  GASTROINTESTINAL: No abdominal pain, NO epigastric pain. No nausea, No vomiting; No diarrhea, No constipation. [  ] BM  GENITOURINARY: No dysuria, No frequency, No urgency, No hematuria, NO incontinence  NEUROLOGICAL: No headaches, No dizziness, No numbness, No tingling, No tremors, No weakness  EXT: No Swelling, No Pain, No Edema  SKIN:  [  ] No itching, burning, rashes, or lesions   MUSCULOSKELETAL: No joint pain ,No Jt swelling; No muscle pain, No back pain, No extremity pain  PSYCHIATRIC: No depression,  No anxiety,  No mood swings ,No difficulty sleeping at night  PAIN SCALE: [x  ] None  [  ] Other-  ROS Unable to obtain due to - [  ] Dementia  [  ] Lethargy [  ] Drowsy [  ] Sedated [  ] non verbal  REST OF REVIEW Of SYSTEM - [x  ] Normal     Vital Signs Last 24 Hrs  T(C): 36.4 (06 Oct 2023 11:52), Max: 36.5 (05 Oct 2023 21:21)  T(F): 97.6 (06 Oct 2023 11:52), Max: 97.7 (05 Oct 2023 21:21)  HR: 79 (06 Oct 2023 11:52) (79 - 95)  BP: 80/44 (06 Oct 2023 11:52) (80/44 - 110/68)  BP(mean): --  RR: 18 (06 Oct 2023 11:52) (18 - 18)  SpO2: 94% (06 Oct 2023 11:52) (91% - 94%)    Parameters below as of 06 Oct 2023 11:52  Patient On (Oxygen Delivery Method): room air      Finger Stick          PHYSICAL EXAM:  GENERAL:  [ x ] NAD , [x  ] well appearing, [  ] Agitated, [  ] Drowsy,  [  ] Lethargy, [  ] confused   HEAD:  [ x ] Normal, [  ] Other  EYES:  [ x ] EOMI, [ x ] PERRLA, [ x ] conjunctiva and sclera clear normal, [  ] Other,  [  ] Pallor,[  ] Discharge  ENMT:  [ x ] Normal, [x  ] Moist mucous membranes, [x  ] Good dentition, [x  ] No Thrush  NECK:  [ x ] Supple, [ x ] No JVD, [ x ] Normal thyroid, [  ] Lymphadenopathy [  ] Other  CHEST/LUNG:  [ x ] Clear to auscultation bilaterally, [ x ] Breath Sounds equal B/L / Decrease, [  x] poor effort  [x  ] No rales, [x  ] No rhonchi  [x  ]  No wheezing,   HEART:  [  ] Regular rate and rhythm, [  ] tachycardia, [  ] Bradycardia,  [  ] irregular  [x  ] No murmurs, No rubs, No gallops, [  ] PPM in place (Mfr:  )  ABDOMEN:  [x  ] Soft, [ x ] Nontender, [ x ] Nondistended, [  x] No mass, [x  ] Bowel sounds present, [x  ] obese  NERVOUS SYSTEM:  [ x ] Alert & Oriented X 2- 3, [ x ] Nonfocal  [  ] Confusion  [  ] Encephalopathic [  ] Sedated [  ] Unable to assess, [  ] Dementia [x  ] Other-Forgetful  EXTREMITIES: [  x] 2+ Peripheral Pulses, No clubbing, No cyanosis,  [x  ] Ankle very minimal pitting  edema B/L lower EXT. [ x ]  Severe PVD stasis skin changes B/L Lower EXT, [ x ] wound- B/L Hand -small multiple open wound-   LYMPH: No lymphadenopathy noted  SKIN:  [  ] No rashes or lesions, [  ] Pressure Ulcers, [ x ] ecchymosis- b/l upper Ext , [  ] Skin Tears, [ x ] Other-scabs -small -B/L lower Ext    DIET: Diet, Regular:   Consistent Carbohydrate Evening Snack  DASH/TLC Sodium & Cholesterol Restricted (09-20-23 @ 22:36)      LABS:                        12.5   5.79  )-----------( 114      ( 06 Oct 2023 07:50 )             37.5     06 Oct 2023 07:50    141    |  105    |  33     ----------------------------<  97     3.5     |  30     |  1.00     Ca    9.1        06 Oct 2023 07:50    TPro  6.9    /  Alb  3.5    /  TBili  1.1    /  DBili  x      /  AST  21     /  ALT  22     /  AlkPhos  62     06 Oct 2023 07:50      Urinalysis Basic - ( 06 Oct 2023 07:50 )    Color: x / Appearance: x / SG: x / pH: x  Gluc: 97 mg/dL / Ketone: x  / Bili: x / Urobili: x   Blood: x / Protein: x / Nitrite: x   Leuk Esterase: x / RBC: x / WBC x   Sq Epi: x / Non Sq Epi: x / Bacteria: x                           Anemia Panel:      Thyroid Panel:                RADIOLOGY & ADDITIONAL TESTS:  < from: TTE W or WO Ultrasound Enhancing Agent (10.04.23 @ 18:11) >  CONCLUSIONS:      1. Left ventricular cavity is moderately dilated. Left ventricular systolic function is severely decreased with an ejection fraction visually estimated at 20 %   2. Normal right ventricular cavity size and reduced systolic function.   3. Device lead is visualized in the right heart.   4. The aortic valve is calcified without severe valvular pathology.   5. Trace aortic regurgitation.   6. Mild to moderate mitral regurgitation.   7. Trace tricuspid regurgitation.    < end of copied text >      HEALTH ISSUES - PROBLEM Dx:  Nikolaia    HFrEF (heart failure with reduced ejection fraction)    HLD (hyperlipidemia)    Need for prophylactic measure    Medication management    DM2 (diabetes mellitus, type 2)    Anemia    Hypotension            Consultant(s) Notes Reviewed:  [ x ] YES     Care Discussed with [X] Consultants  [ x ] Patient  [  ] Family [  ] HCP [  ]   [ x ] Social Service  [x  ] RN, [  ] Physical Therapy,[  ] Palliative care team  DVT PPX: [ x ] Lovenox, [  ] S C Heparin, [  ] Coumadin, [  ] Xarelto, [  ] Eliquis, [  ] Pradaxa, [  ] IV Heparin drip, [  ] SCD [  ] Contraindication 2 to GI Bleed,[  ] Ambulation [  ] Contraindicated 2 to  bleed [  ] Contraindicated 2 to Brain Bleed  Advanced directive: [ x ] None, [  ] DNR/DNI Patient is a 83y old  Male who presents with a chief complaint of paranoia (06 Oct 2023 09:04)    HPI:  82y/o M PMH dementia, HFrEF s/p PPM, HTN, HLD  presents with increased agitation. History obtained from patient's daughter Arianne over phone.   Patient moved from [place] to [place] ~2months ago and has been having issues with increased paranoia for the past month. He stays at a 55 [facility] older assisted living community where he has been having several episodes of sundowning.   Recently he was hospitalized at [facility] for same issues from 9/1-914/23. Daughter was unable to provide sufficient history regarding hospitalization.   Patient's son, Jose has been taking care of him and knows more about his medical hx however patient's daughter states she is taking over now and did not want to bother Jose at this time.  Patient was also recently seen at [facility] ED for episodes of agitation and was told to follow up with outpatient Psychiatrist but daughter states that likely did not happened because of his increased agitation.  Patient became agitated today and staff had to call 911 for further evaluation. The facility as well as his children feel that his current psych medication regimen is not sufficient to keep him calm.   Per review of recent dispensary history, he was seeing a psychiatric NP (Barbara Bravo) in [place], [place].   Of note, he had placement of PPM in late August at [facility]. Per his daughter, his LVEF at that time was 20% before and after PPM placement.  Currently, patient states his b/l legs feel itchy but otherwise he feels fine and is agreeable.  Denies fevers, chills. sob, cp, n/v/d.    ED Course   T 97.4 F  /79 RR 18 SpO2 95% on RA  Labs H/H 12.1/37.5 Glu   UA: >1000 glucose  UTox: negaitve     In the ED, given tylenol 650mg x1 , zyprexa 5mg IVP x1  (20 Sep 2023 21:58)    INTERVAL HPI:  9/21: Pt seen and examined, sitting in chair with 1 to 1 present. Patient expressed concern about not receiving his pantoprazole, stating he cant digest his food without it, denies n/v, epigastric pain or burning. When asked about why pt came to hospital, states that he was being followed and he called the police to come help him. Says he lives with his son Jose. EMS brought patient in from  and over [facility]. Additionally states that one pill he was given this morning by staff was "phony" so he didn't take it. Contacted pt's pharmacy [facility], numerous scripts filled in [place] and [place]. Per night team sign out, daughter states that pt has been in and out of many ERs and has been started on many different medications so there are a lot of recent meds that he does not currently take to the best of her knowledge. Per daughter, her brothers are tired of assisting with their father given history of difficulties with him. She is willing to assist but does not want to be too involved and asks us to not contact her brothers. Psychiatry consulted and to see patient. JOHN/VAISHALI updated.   Soren Krishnan [phone]  9/22: Pt seen and examined at bedside. xyprexa dose increased to 5mg bedtime and 2.5mg in AM starting yesterday. Marked improvement in demeanor. A&Ox3 (person, time, place). States he is in a "police hospital" and that he came here because he asked some man in their car to call the police because he was being followed by an aide that his son hired. Said he came from "[place] [id number] and over [facility]. No complaints or concerns today. States he has to get things ready to move back to [place]. Psych is following. DSC planning pending placement as his previous community does not want pt to return and family does not want to take patient. Replace PO K   9/23: Pt seen and examined at bedside. Depakote increased to 500mg PO BID, amitriptyline stopped as per psych recs. Pt with no complaints or concerns. Pleasant on encounter, says he is doing well and thanks provider for checking in on him.  Reports that he is waiting to go home. Patient informed we are working to get him out of the hospital. DSC planning pending placement. JOHN sent referral to [facility]. Awaiting decision.   9/24:  Pt seen and examined at bedside. Pt reports that is feeling well and does not have any symptoms, pain or concern. Pt is tolerating PO and eating well.  Pt is cooperative with evaluation and pleasant.  Waiting for placement for dc. Awaiting placement.   9/25: Pt seen and examined at bedside. Reports last night he was looking for 3 medications that were started in the hospital at night. Reassured that he is receiving all the proper medications he needs. Appetite is good. Pt has no complaints at this time. Now off 1:1.  9/26: Pt seen and examined at bedside. Was not agitated overnight. Pt is very tired this morning. Reports chronic low back pain, but is not concerned. Otherwise he has no active complaints at  this time. Denies chest pain, abdominal pain, SOB. D/W Son Mykel today   9/27: Pt seen and examined at bedside. No agitation overnight. Pt is tired this morning. Has no complaints other than his chronic lower back pain. Denies chest pain, abdominal pain SOB. Pt wants to go home. D/W Son Angelito today   9/28: Pt seen and examined at bedside. No agitation overnight. Pt is tired this morning and wants to sleep. No complaints other than his chronic low back pain. Denies chest pain, SOB, abdominal pain. Pt wants to go.  9/29: Pt seen and examined at bedside. No agitation. Pt is more awake. Continues to report chronic low back pain. No acute complaints at this time. Denies chest pain, SOB, abdominal pain. Wants to go.Social work for d/c planning when bed is available.  9/30: Pt seen and examined at bedside. No agitation overnight. Pt is AAOx1. Knows he is in a hospital but unsure which one. Patient is pleasant and has no acute complaints at this time. Denies fever, chills, CP, palpitations, SOB, abd pain, nausea, vomiting. Low BP S/P 1 Bolus NS  10/1: Pt seen and examined at bedside. No agitation overnight. Patient has no complaints at this time. Just very tired. Wishes to go home. Denies fever, chills, chest pain, SOB, abdominal pain. stable for d/c  10/2: Patient was seen and examined at bedside. No agitation observed overnight. Patient has no complaints. Wishes to go home. Denies fever, chills, chest pain, SOB, abdominal pain. Pt wants to be d/don home with family, Pt is very Cooperative.  10/3: Pt seen and examined at bedside. Resting comfortably in chair. No agitation overnight. Pt really wants to go home, upset that he has been in the hospital for so long. No acute complaints at this time. Denies fever, chills, chest pain, SOB, abdominal pain. Pt is very cooperative.  10/4: Pt seen examined at bedside. Wants to go home. No new complaints at this time. very cooperative.  10/5: Pt seen and examined at bedside. No new complaints at this time. Denies dizziness at rest, but states it occurs when he gets up too fast. Very cooperative. Ambulating in Hallway , wants to be d/don today   10/6: Pt seen and examined at bedside. No new complaints at this time. Very cooperative. Wants to go home.     OVERNIGHT EVENTS: None.    Home Medications:      MEDICATIONS  (STANDING):  ammonium lactate 12% Lotion 1 Application(s) Topical two times a day  atorvastatin 40 milliGRAM(s) Oral at bedtime  bacitracin   Ointment 1 Application(s) Topical every 8 hours  budesonide  80 MICROgram(s)/formoterol 4.5 MICROgram(s) Inhaler 2 Puff(s) Inhalation two times a day  dextrose 5%. 1000 milliLiter(s) (100 mL/Hr) IV Continuous <Continuous>  dextrose 5%. 1000 milliLiter(s) (50 mL/Hr) IV Continuous <Continuous>  dextrose 50% Injectable 12.5 Gram(s) IV Push once  dextrose 50% Injectable 25 Gram(s) IV Push once  dextrose 50% Injectable 25 Gram(s) IV Push once  divalproex  milliGRAM(s) Oral two times a day  DULoxetine 30 milliGRAM(s) Oral <User Schedule>  enoxaparin Injectable 40 milliGRAM(s) SubCutaneous every 24 hours  gentamicin 0.3% Ophthalmic Solution 1 Drop(s) Both EYES five times a day  glucagon  Injectable 1 milliGRAM(s) IntraMuscular once  insulin lispro (ADMELOG) corrective regimen sliding scale   SubCutaneous three times a day before meals  insulin lispro (ADMELOG) corrective regimen sliding scale   SubCutaneous at bedtime  methyl salicylate 15%/menthol 10% Topical Cream 1 Application(s) Topical three times a day  metoprolol succinate ER 25 milliGRAM(s) Oral daily  midodrine. 7.5 milliGRAM(s) Oral three times a day  OLANZapine Disintegrating Tablet 5 milliGRAM(s) Oral two times a day  OLANZapine Disintegrating Tablet 10 milliGRAM(s) Oral at bedtime  pantoprazole    Tablet 40 milliGRAM(s) Oral before breakfast  sacubitril 24 mG/valsartan 26 mG 1 Tablet(s) Oral two times a day  sodium chloride 0.9%. 1000 milliLiter(s) (250 mL/Hr) IV Continuous <Continuous>  tiotropium 2.5 MICROgram(s) Inhaler 2 Puff(s) Inhalation daily    MEDICATIONS  (PRN):  acetaminophen     Tablet .. 650 milliGRAM(s) Oral every 6 hours PRN Temp greater or equal to 38C (100.4F), Mild Pain (1 - 3)  albuterol    0.083% 2.5 milliGRAM(s) Nebulizer every 6 hours PRN Shortness of Breath and/or Wheezing  dextrose Oral Gel 15 Gram(s) Oral once PRN Blood Glucose LESS THAN 70 milliGRAM(s)/deciliter  melatonin 5 milliGRAM(s) Oral at bedtime PRN Insomnia      Allergies    No Known Allergies    Intolerances        Social History:  Tobacco: former , quit >30 years ago  EtOH: social drinker  Recreational drug use: denies   Lives with: Assisted Living Facility  Ambulates: without assistance  ADLs: needs assistance (20 Sep 2023 21:58)      REVIEW OF SYSTEMS: i want to go home   CONSTITUTIONAL: No fever, No chills, No fatigue, No myalgia, No Body ache, No Weakness  EYES: No eye pain,  No visual disturbances, No discharge, NO Redness  ENMT:  No ear pain, No nose bleed, No vertigo; No sinus pain, NO throat pain, No Congestion  NECK: No pain, No stiffness  RESPIRATORY: No cough, NO wheezing, No  hemoptysis, NO  shortness of breath  CARDIOVASCULAR: No chest pain, palpitations  GASTROINTESTINAL: No abdominal pain, NO epigastric pain. No nausea, No vomiting; No diarrhea, No constipation. [  ] BM  GENITOURINARY: No dysuria, No frequency, No urgency, No hematuria, NO incontinence  NEUROLOGICAL: No headaches, No dizziness, No numbness, No tingling, No tremors, No weakness  EXT: No Swelling, No Pain, No Edema  SKIN:  [  ] No itching, burning, rashes, or lesions _+ Scab on Hands  MUSCULOSKELETAL: No joint pain ,No Jt swelling; No muscle pain, No back pain, No extremity pain  PSYCHIATRIC: No depression,  No anxiety,  No mood swings ,No difficulty sleeping at night  PAIN SCALE: [x  ] None  [  ] Other-  ROS Unable to obtain due to - [  ] Dementia  [  ] Lethargy [  ] Drowsy [  ] Sedated [  ] non verbal  REST OF REVIEW Of SYSTEM - [x  ] Normal     Vital Signs Last 24 Hrs  T(C): 36.4 (06 Oct 2023 11:52), Max: 36.5 (05 Oct 2023 21:21)  T(F): 97.6 (06 Oct 2023 11:52), Max: 97.7 (05 Oct 2023 21:21)  HR: 79 (06 Oct 2023 11:52) (79 - 95)  BP: 80/44 (06 Oct 2023 11:52) (80/44 - 110/68)  BP(mean): --  RR: 18 (06 Oct 2023 11:52) (18 - 18)  SpO2: 94% (06 Oct 2023 11:52) (91% - 94%)    Parameters below as of 06 Oct 2023 11:52  Patient On (Oxygen Delivery Method): room air      Finger Stick          PHYSICAL EXAM:  GENERAL:  [ x ] NAD , [x  ] well appearing, [  ] Agitated, [  ] Drowsy,  [  ] Lethargy, [  ] confused   HEAD:  [ x ] Normal, [  ] Other  EYES:  [ x ] EOMI, [ x ] PERRLA, [ x ] conjunctiva and sclera clear normal, [  ] Other,  [  ] Pallor,[  ] Discharge  ENMT:  [ x ] Normal, [x  ] Moist mucous membranes, [x  ] Good dentition, [x  ] No Thrush  NECK:  [ x ] Supple, [ x ] No JVD, [ x ] Normal thyroid, [  ] Lymphadenopathy [  ] Other  CHEST/LUNG:  [ x ] Clear to auscultation bilaterally, [ x ] Breath Sounds equal B/L / Decrease, [  x] poor effort  [x  ] No rales, [x  ] No rhonchi  [x  ]  No wheezing,   HEART:  [  ] Regular rate and rhythm, [  ] tachycardia, [  ] Bradycardia,  [  ] irregular  [x  ] No murmurs, No rubs, No gallops, [  ] PPM in place (Mfr:  )  ABDOMEN:  [x  ] Soft, [ x ] Nontender, [ x ] Nondistended, [  x] No mass, [x  ] Bowel sounds present, [x  ] obese  NERVOUS SYSTEM:  [ x ] Alert & Oriented X 2- 3, [ x ] Nonfocal  [  ] Confusion  [  ] Encephalopathic [  ] Sedated [  ] Unable to assess, [  ] Dementia [x  ] Other-Forgetful  EXTREMITIES: [  x] 2+ Peripheral Pulses, No clubbing, No cyanosis,  [x  ] Ankle very minimal pitting  edema B/L lower EXT. [ x ]  Severe PVD stasis skin changes B/L Lower EXT, [ x ] wound- B/L Hand -small multiple open wound-   LYMPH: No lymphadenopathy noted  SKIN:  [  ] No rashes or lesions, [  ] Pressure Ulcers, [ x ] ecchymosis- b/l upper Ext , [  ] Skin Tears, [ x ] Other-scabs -small -B/L lower Ext    DIET: Diet, Regular:   Consistent Carbohydrate Evening Snack  DASH/TLC Sodium & Cholesterol Restricted (09-20-23 @ 22:36)      LABS:                        12.5   5.79  )-----------( 114      ( 06 Oct 2023 07:50 )             37.5     06 Oct 2023 07:50    141    |  105    |  33     ----------------------------<  97     3.5     |  30     |  1.00     Ca    9.1        06 Oct 2023 07:50    TPro  6.9    /  Alb  3.5    /  TBili  1.1    /  DBili  x      /  AST  21     /  ALT  22     /  AlkPhos  62     06 Oct 2023 07:50      Urinalysis Basic - ( 06 Oct 2023 07:50 )    Color: x / Appearance: x / SG: x / pH: x  Gluc: 97 mg/dL / Ketone: x  / Bili: x / Urobili: x   Blood: x / Protein: x / Nitrite: x   Leuk Esterase: x / RBC: x / WBC x   Sq Epi: x / Non Sq Epi: x / Bacteria: x          RADIOLOGY & ADDITIONAL TESTS:  < from: TTE W or WO Ultrasound Enhancing Agent (10.04.23 @ 18:11) >  CONCLUSIONS:      1. Left ventricular cavity is moderately dilated. Left ventricular systolic function is severely decreased with an ejection fraction visually estimated at 20 %   2. Normal right ventricular cavity size and reduced systolic function.   3. Device lead is visualized in the right heart.   4. The aortic valve is calcified without severe valvular pathology.   5. Trace aortic regurgitation.   6. Mild to moderate mitral regurgitation.   7. Trace tricuspid regurgitation.    < end of copied text >      HEALTH ISSUES - PROBLEM Dx:  Nikolaia    HFrEF (heart failure with reduced ejection fraction)    HLD (hyperlipidemia)    Need for prophylactic measure    Medication management    DM2 (diabetes mellitus, type 2)    Anemia    Hypotension            Consultant(s) Notes Reviewed:  [ x ] YES     Care Discussed with [X] Consultants  [ x ] Patient  [  ] Family [  ] HCP [  ]   [ x ] Social Service  [x  ] RN, [  ] Physical Therapy,[  ] Palliative care team  DVT PPX: [ x ] Lovenox, [  ] S C Heparin, [  ] Coumadin, [  ] Xarelto, [  ] Eliquis, [  ] Pradaxa, [  ] IV Heparin drip, [  ] SCD [  ] Contraindication 2 to GI Bleed,[  ] Ambulation [  ] Contraindicated 2 to  bleed [  ] Contraindicated 2 to Brain Bleed  Advanced directive: [ x ] None, [  ] DNR/DNI

## 2023-10-06 NOTE — PROGRESS NOTE ADULT - PROBLEM SELECTOR PLAN 3
BP borderline hypotensive today when getting up, standing, however pt is asx  - likely due to lowered hold parameters of home meds Lasix,  Entresto, metoprolol XL, spironolactone, Farxiga.   - continue midodrine 7.5 mg 3x day for soft BP.  - continues to be hypotensive, but asymptomatic.   - dced spironolactone,  lasix-HOLD  - will continue  Entresto and Toprol as per Dr Oshea -ALl meds d/w cardio-Lower SBP parameters  HOLD meds when SBP <90  - TTE: LVEF 20%, severe aortic stenosis (unchanged)  - Cardiology consulted--Dr Oshea - d/w BP borderline hypotensive today when getting up, standing, however pt is asx  - likely due to lowered hold parameters of home meds Lasix,  Entresto, metoprolol XL, spironolactone, Farxiga.   - continue midodrine 7.5 mg 3x day for soft BP.  - continues to be hypotensive, but asymptomatic.   - dced spironolactone,  lasix-HOLD  - will continue  Entresto and Toprol as per Dr Oshea -ALl meds d/w cardio-Lower SBP parameters  HOLD meds when SBP <90  - TTE: LVEF 20%, severe aortic stenosis (unchanged)  - Cardiology consulted--Dr Clifton - d/w -Lower dose of Entresto 1/2 tab 2x day

## 2023-10-06 NOTE — PROGRESS NOTE ADULT - ASSESSMENT
84 y/o M PMH dementia, DM2, HFrEF s/p PPM, HTN, HLD presents with increased agitation.  Admitted for increased paranoia. Consulted for evaluation of hypotension.    Hypotension/HFrEF s/p ppm (8/2023)/HTN  - BP improved to systolic 110's overnight  - Continue home Midodrine    - No obvious evidence of volume overload  - Continue Toprol XL and ARNI  - Previous TTE (8/2023): EF ~20%.  Repeat: EF 20%, reduced RVF, mild-mod MR  - Goal is GDMT.  Resume Aldactone when SBP remains stable at ~110  - Continue holding Lasix    - EKG shows Atrial sensed v-paced rhythm  - No anginal complaints    - Monitor and replete lytes, keep K>4, Mg>2.  - Will continue to follow.    Frida Lackey DNP, NP-C, AGACNP-C  Cardiology   Call TEAMS

## 2023-10-06 NOTE — PROGRESS NOTE ADULT - PROBLEM SELECTOR PLAN 5
Discussed patient medication list with daughter (Arianne) who confirmed most medications found on surescripts recent dispensary history) however she is not sure of any changes that may have happened when he was hospitalized or when he went to the ED  - Patient has list with him, copy in chart however unclear if that list is up to date  - Current med rec completed based on recent dispensary history  - [facility] pharmacy called: additional medications from pharmacy include allopurinol 300qD, lisinopril 20mg qD, mirabegron 25mg qD, trazodone 50mg qD at bedtime. medications not mentioned by daughter. daughter notes pt has been to numerous different hospitals/ERs and has come back with numerous different medications with no follow up  - plan to hold these additional meds, per psych consult for use of trazodone  - RESOLVED

## 2023-10-06 NOTE — PROGRESS NOTE ADULT - ATTENDING COMMENTS
82y/o M PMH dementia, DM2, HFrEF s/p PPM, HTN, HLD presents with increased agitation.  Admit for increased paranoia & Placement .  pt seen, examined, case & care plan d/w pt, residents at detail. d/w dtr Arianne on 9/21 at [facility] Detail about pt's condition, Care plan Meds   Consult;  Psych Dr Strange  -Dr Strange follow up -  stable for d/c   Cardiology Dr Clifton  D/W ECHO, STOP Spironolactone, STOP Lasix , BP Low -Decrease Entresto 1/2 tab 2x day    Social service d/w about d/c planning to [facility] on Monday   PT Eval.-Pt is ambulating in [place] ways   D/W Social work -follow up for d/c -Placement to new [facility] as provided by family .ALl meds sent out to Pharmacy   PO diet  D/C planning when bed available at [facility]   Total care time is 45  minutes.

## 2023-10-06 NOTE — PROGRESS NOTE ADULT - SUBJECTIVE AND OBJECTIVE BOX
[facility] -- Nicolle Townsend, Etienne Pederson Savella, , Roman Man  Office # [phone]    Follow Up:  HYpotension    Subjective/Observations: Asleep, laying supine on RA.  Arousable to name-calling.  Denies any form of respiratory or cardiac discomfort.  No overnight events    REVIEW OF SYSTEMS: All other review of systems is negative unless indicated above  PAST MEDICAL & SURGICAL HISTORY:  HFrEF (heart failure with reduced ejection fraction)  HTN (hypertension)  HLD (hyperlipidemia)  Type 2 diabetes mellitus  Pacemaker    MEDICATIONS  (STANDING):  ammonium lactate 12% Lotion 1 Application(s) Topical two times a day  atorvastatin 40 milliGRAM(s) Oral at bedtime  bacitracin   Ointment 1 Application(s) Topical every 8 hours  budesonide  80 MICROgram(s)/formoterol 4.5 MICROgram(s) Inhaler 2 Puff(s) Inhalation two times a day  dextrose 5%. 1000 milliLiter(s) (100 mL/Hr) IV Continuous <Continuous>  dextrose 5%. 1000 milliLiter(s) (50 mL/Hr) IV Continuous <Continuous>  dextrose 50% Injectable 12.5 Gram(s) IV Push once  dextrose 50% Injectable 25 Gram(s) IV Push once  dextrose 50% Injectable 25 Gram(s) IV Push once  divalproex  milliGRAM(s) Oral two times a day  DULoxetine 30 milliGRAM(s) Oral <User Schedule>  enoxaparin Injectable 40 milliGRAM(s) SubCutaneous every 24 hours  gentamicin 0.3% Ophthalmic Solution 1 Drop(s) Both EYES five times a day  glucagon  Injectable 1 milliGRAM(s) IntraMuscular once  insulin lispro (ADMELOG) corrective regimen sliding scale   SubCutaneous at bedtime  insulin lispro (ADMELOG) corrective regimen sliding scale   SubCutaneous three times a day before meals  methyl salicylate 15%/menthol 10% Topical Cream 1 Application(s) Topical three times a day  metoprolol succinate ER 25 milliGRAM(s) Oral daily  midodrine. 7.5 milliGRAM(s) Oral three times a day  OLANZapine Disintegrating Tablet 10 milliGRAM(s) Oral at bedtime  OLANZapine Disintegrating Tablet 5 milliGRAM(s) Oral two times a day  pantoprazole    Tablet 40 milliGRAM(s) Oral before breakfast  sacubitril 24 mG/valsartan 26 mG 1 Tablet(s) Oral two times a day  sodium chloride 0.9%. 1000 milliLiter(s) (250 mL/Hr) IV Continuous <Continuous>  tiotropium 2.5 MICROgram(s) Inhaler 2 Puff(s) Inhalation daily    MEDICATIONS  (PRN):  acetaminophen     Tablet .. 650 milliGRAM(s) Oral every 6 hours PRN Temp greater or equal to 38C (100.4F), Mild Pain (1 - 3)  albuterol    0.083% 2.5 milliGRAM(s) Nebulizer every 6 hours PRN Shortness of Breath and/or Wheezing  dextrose Oral Gel 15 Gram(s) Oral once PRN Blood Glucose LESS THAN 70 milliGRAM(s)/deciliter  melatonin 5 milliGRAM(s) Oral at bedtime PRN Insomnia    Allergies    No Known Allergies    Intolerances      Vital Signs Last 24 Hrs  T(C): 36.3 (06 Oct 2023 05:49), Max: 36.9 (05 Oct 2023 13:17)  T(F): 97.4 (06 Oct 2023 05:49), Max: 98.5 (05 Oct 2023 13:17)  HR: 91 (06 Oct 2023 05:49) (84 - 95)  BP: 110/68 (06 Oct 2023 05:49) (92/52 - 110/68)  BP(mean): --  RR: 18 (06 Oct 2023 05:49) (18 - 18)  SpO2: 91% (06 Oct 2023 05:49) (91% - 93%)    Parameters below as of 06 Oct 2023 05:49  Patient On (Oxygen Delivery Method): room air    I&O's Summary      PHYSICAL EXAM:  TELE: NSR  Constitutional: NAD, asleep but arousable, obese  HEENT: Moist Mucous Membranes, Anicteric  Pulmonary: Non-labored, breath sounds are clear bilaterally, No wheezing, rales or rhonchi  Cardiovascular: Regular, S1 and S2, +murmurs, no rubs, gallops or clicks  Gastrointestinal: Bowel Sounds present, soft, nontender.   Lymph: No peripheral edema. No lymphadenopathy.  Skin: No visible rashes or ulcers.  Psych:  Mood & affect appropriate  LABS: All Labs Reviewed:                        14.3   8.04  )-----------( 146      ( 04 Oct 2023 07:35 )             43.7     04 Oct 2023 07:35    140    |  102    |  40     ----------------------------<  111    3.8     |  32     |  1.20     Ca    9.6        04 Oct 2023 07:35    TPro  7.9    /  Alb  3.9    /  TBili  1.1    /  DBili  x      /  AST  22     /  ALT  23     /  AlkPhos  74     04 Oct 2023 07:35    12 Lead ECG:   Ventricular Rate 97 BPM    Atrial Rate 97 BPM    P-R Interval 178 ms    QRS Duration 140 ms    Q-T Interval 396 ms    QTC Calculation(Bazett) 502 ms    P Axis 64 degrees    R Axis -75 degrees    T Axis 89 degrees    Diagnosis Line Atrial-sensed ventricular-paced rhythm  Biventricular pacemaker detected  Confirmed by SAGAR PEDERSON ([id number]) on 9/21/2023 12:10:56 PM (09-20-23 @ 19:35)  TRANSTHORACIC ECHOCARDIOGRAM REPORT  ________________________________________________________________________________                                      _______  Pt. Name:       AELSSANDRO HERNANDEZ Study Date:    10/4/2023  MRN:            [id number]         YOB: 1940  Accession #:    [id number]         Age:           83 years  Account#:       [id number]        Gender:        M  Heart Rate:                       Height:        70.08 in (178.00 cm)  Rhythm:     Weight:        189.59 lb (86.00 kg)  Blood Pressure: 84/52 mmHg        BSA/BMI:       2.04 m² / 27.14 kg/m²  ________________________________________________________________________________________  Referring Physician:    [id number] Andrés Viramontes Physician: Magdalena Owens MD  Primary Sonographer:    Swati Faustin [facility]    CPT:               ECHO TTE WO CON COMP W DOPP - 90156.m  Indication(s):     Heart failure, unspecified - I50.9  Procedure:         Transthoracic echocardiogram with 2-D, M-mode and complete                     spectral and color flow Doppler.  Ordering Location: [facility]  _______________________________________________________________________________________     CONCLUSIONS:      1. Left ventricular cavity is moderately dilated. Left ventricular systolic function is severely decreased with an ejection fraction visually estimated at 20 %   2. Normal right ventricular cavity size and reduced systolic function.   3. Device lead is visualized in the right heart.   4. The aortic valve is calcified without severe valvular pathology.   5. Trace aortic regurgitation.   6. Mild to moderate mitral regurgitation.   7. Trace tricuspid regurgitation.  _____________________________________________________________________________________  FINDINGS:     Left Ventricle:  The left ventricular cavity is moderately dilated. Left ventricular systolic function is severely decreased with an ejection fraction visually estimated at 20%. Severely dilated left ventricular systolic cavity size. The left ventricular diastolic function is indeterminate.     Right Ventricle:  The right ventricular cavity is normal in size and reduced systolic function. A device lead is visualized in the right heart.     Left Atrium:  The left atrium is normal in size with an indexed volume of 33.15 ml/m².     Right Atrium:  The right atrium is normal in size with an indexed volume of 21.89 ml/m².     Aortic Valve:  The aortic valve is calcified without severe valvular pathology. There is trace aortic regurgitation.     Mitral Valve:  There is mitral valve thickening of the anterior and posterior leaflets. There is mild to moderate mitral regurgitation.     Tricuspid Valve:  Structurally normal tricuspid valve with normal leaflet excursion. There is trace tricuspid regurgitation. Estimated pulmonary artery systolic pressure is 18 mmHg.     Pulmonic Valve:  The pulmonic valve was not well visualized.     Aorta:  The aortic root at the sinuses of Valsalva is normal in size, measuring 3.30 cm (indexed 1.62 cm/m²).  ____________________________________________________________________  Quantitative Data:  Left Ventricle Measurements: (Indexed to BSA)     IVSd (2D):   1.0 cm  LVPWd (2D):  0.9 cm  LVIDd (2D):  6.4 cm  LVIDs (2D):  5.6 cm  LV Mass:     254 g  124.3 g/m²  Visualized LV EF%: 20%     MV E Vmax:    0.78 m/s  MV A Vmax:    1.13 m/s  MV E/A:       0.69  e' lateral:   5.66 cm/s  e' medial:    7.72 cm/s  E/e' lateral: 13.80  E/e' medial:  10.12  E/e' Average: 11.67  MV DT:        190 msec    Aorta Measurements: (normal range) (Indexed to BSA)     Sinuses of Valsalva: 3.30 cm (3.1 - 3.7 cm)  Left Atrium Measurements: (Indexed to BSA)  LA Diam 2D: 3.90 cm    Right Ventricle Measurements: Right Atrial Measurements:     RV Base (RVID1):  3.5 cm      RA Vol:       44.70 ml  RV Mid (RVID2):   2.7 cm      RA Vol Index: 21.89 ml/m²  RV Major (RVID3): 9.6 cm    LVOT / RVOT/ Qp/Qs Data: (Indexed to BSA)  LVOT Vmax: 1.09 m/s    Aortic Valve Measurements:  AV Vmax:          1.9 m/s  AV Peak Gradient: 14.0 mmHg    Mitral Valve Measurements:     MV E Vmax: 0.8 m/s         MR Vmax:          4.57 m/s  MV A Vmax: 1.1 m/s         MR Peak Gradient: 83.5 mmHg  MV E/A:    0.7  Tricuspid Valve Measurements:     TR Vmax:          1.9 m/s  TR Peak Gradient: 14.6 mmHg  PASP:             18 mmHg    ________________________________________________________________________________________  Electronically signed on 10/5/2023 at 8:26:35 AM by Magdalena Owens MD         *** Final ***      [facility] -- Nicolle Townsend, Etienne Pederson Savella, , Roman Man  Office # [phone]    Follow Up:  Hypotension    Subjective/Observations: Asleep, laying supine on RA.  Arousable to name-calling.  Denies any form of respiratory or cardiac discomfort.  No overnight events    REVIEW OF SYSTEMS: All other review of systems is negative unless indicated above  PAST MEDICAL & SURGICAL HISTORY:  HFrEF (heart failure with reduced ejection fraction)  HTN (hypertension)  HLD (hyperlipidemia)  Type 2 diabetes mellitus  Pacemaker    MEDICATIONS  (STANDING):  ammonium lactate 12% Lotion 1 Application(s) Topical two times a day  atorvastatin 40 milliGRAM(s) Oral at bedtime  bacitracin   Ointment 1 Application(s) Topical every 8 hours  budesonide  80 MICROgram(s)/formoterol 4.5 MICROgram(s) Inhaler 2 Puff(s) Inhalation two times a day  dextrose 5%. 1000 milliLiter(s) (100 mL/Hr) IV Continuous <Continuous>  dextrose 5%. 1000 milliLiter(s) (50 mL/Hr) IV Continuous <Continuous>  dextrose 50% Injectable 12.5 Gram(s) IV Push once  dextrose 50% Injectable 25 Gram(s) IV Push once  dextrose 50% Injectable 25 Gram(s) IV Push once  divalproex  milliGRAM(s) Oral two times a day  DULoxetine 30 milliGRAM(s) Oral <User Schedule>  enoxaparin Injectable 40 milliGRAM(s) SubCutaneous every 24 hours  gentamicin 0.3% Ophthalmic Solution 1 Drop(s) Both EYES five times a day  glucagon  Injectable 1 milliGRAM(s) IntraMuscular once  insulin lispro (ADMELOG) corrective regimen sliding scale   SubCutaneous at bedtime  insulin lispro (ADMELOG) corrective regimen sliding scale   SubCutaneous three times a day before meals  methyl salicylate 15%/menthol 10% Topical Cream 1 Application(s) Topical three times a day  metoprolol succinate ER 25 milliGRAM(s) Oral daily  midodrine. 7.5 milliGRAM(s) Oral three times a day  OLANZapine Disintegrating Tablet 10 milliGRAM(s) Oral at bedtime  OLANZapine Disintegrating Tablet 5 milliGRAM(s) Oral two times a day  pantoprazole    Tablet 40 milliGRAM(s) Oral before breakfast  sacubitril 24 mG/valsartan 26 mG 1 Tablet(s) Oral two times a day  sodium chloride 0.9%. 1000 milliLiter(s) (250 mL/Hr) IV Continuous <Continuous>  tiotropium 2.5 MICROgram(s) Inhaler 2 Puff(s) Inhalation daily    MEDICATIONS  (PRN):  acetaminophen     Tablet .. 650 milliGRAM(s) Oral every 6 hours PRN Temp greater or equal to 38C (100.4F), Mild Pain (1 - 3)  albuterol    0.083% 2.5 milliGRAM(s) Nebulizer every 6 hours PRN Shortness of Breath and/or Wheezing  dextrose Oral Gel 15 Gram(s) Oral once PRN Blood Glucose LESS THAN 70 milliGRAM(s)/deciliter  melatonin 5 milliGRAM(s) Oral at bedtime PRN Insomnia    Allergies    No Known Allergies    Intolerances      Vital Signs Last 24 Hrs  T(C): 36.3 (06 Oct 2023 05:49), Max: 36.9 (05 Oct 2023 13:17)  T(F): 97.4 (06 Oct 2023 05:49), Max: 98.5 (05 Oct 2023 13:17)  HR: 91 (06 Oct 2023 05:49) (84 - 95)  BP: 110/68 (06 Oct 2023 05:49) (92/52 - 110/68)  BP(mean): --  RR: 18 (06 Oct 2023 05:49) (18 - 18)  SpO2: 91% (06 Oct 2023 05:49) (91% - 93%)    Parameters below as of 06 Oct 2023 05:49  Patient On (Oxygen Delivery Method): room air    I&O's Summary      PHYSICAL EXAM:  TELE: NSR  Constitutional: NAD, asleep but arousable, obese  HEENT: Moist Mucous Membranes, Anicteric  Pulmonary: Non-labored, breath sounds are clear bilaterally, No wheezing, rales or rhonchi  Cardiovascular: Regular, S1 and S2, +murmurs, no rubs, gallops or clicks  Gastrointestinal: Bowel Sounds present, soft, nontender.   Lymph: No peripheral edema. No lymphadenopathy.  Skin: No visible rashes or ulcers.  Psych:  Mood & affect appropriate  LABS: All Labs Reviewed:                        14.3   8.04  )-----------( 146      ( 04 Oct 2023 07:35 )             43.7     04 Oct 2023 07:35    140    |  102    |  40     ----------------------------<  111    3.8     |  32     |  1.20     Ca    9.6        04 Oct 2023 07:35    TPro  7.9    /  Alb  3.9    /  TBili  1.1    /  DBili  x      /  AST  22     /  ALT  23     /  AlkPhos  74     04 Oct 2023 07:35    12 Lead ECG:   Ventricular Rate 97 BPM    Atrial Rate 97 BPM    P-R Interval 178 ms    QRS Duration 140 ms    Q-T Interval 396 ms    QTC Calculation(Bazett) 502 ms    P Axis 64 degrees    R Axis -75 degrees    T Axis 89 degrees    Diagnosis Line Atrial-sensed ventricular-paced rhythm  Biventricular pacemaker detected  Confirmed by SAGAR PEDERSON ([id number]) on 9/21/2023 12:10:56 PM (09-20-23 @ 19:35)  TRANSTHORACIC ECHOCARDIOGRAM REPORT  ________________________________________________________________________________                                      _______  Pt. Name:       ALESSANDRO HERNANDEZ Study Date:    10/4/2023  MRN:            [id number]         YOB: 1940  Accession #:    [id number]         Age:           83 years  Account#:       [id number]        Gender:        M  Heart Rate:                       Height:        70.08 in (178.00 cm)  Rhythm:     Weight:        189.59 lb (86.00 kg)  Blood Pressure: 84/52 mmHg        BSA/BMI:       2.04 m² / 27.14 kg/m²  ________________________________________________________________________________________  Referring Physician:    [id number] Andrés Viramontes Physician: Magdalena Owens MD  Primary Sonographer:    Swati Faustin [facility]    CPT:               ECHO TTE WO CON COMP W DOPP - 21556.m  Indication(s):     Heart failure, unspecified - I50.9  Procedure:         Transthoracic echocardiogram with 2-D, M-mode and complete                     spectral and color flow Doppler.  Ordering Location: [facility]  _______________________________________________________________________________________     CONCLUSIONS:      1. Left ventricular cavity is moderately dilated. Left ventricular systolic function is severely decreased with an ejection fraction visually estimated at 20 %   2. Normal right ventricular cavity size and reduced systolic function.   3. Device lead is visualized in the right heart.   4. The aortic valve is calcified without severe valvular pathology.   5. Trace aortic regurgitation.   6. Mild to moderate mitral regurgitation.   7. Trace tricuspid regurgitation.  _____________________________________________________________________________________  FINDINGS:     Left Ventricle:  The left ventricular cavity is moderately dilated. Left ventricular systolic function is severely decreased with an ejection fraction visually estimated at 20%. Severely dilated left ventricular systolic cavity size. The left ventricular diastolic function is indeterminate.     Right Ventricle:  The right ventricular cavity is normal in size and reduced systolic function. A device lead is visualized in the right heart.     Left Atrium:  The left atrium is normal in size with an indexed volume of 33.15 ml/m².     Right Atrium:  The right atrium is normal in size with an indexed volume of 21.89 ml/m².     Aortic Valve:  The aortic valve is calcified without severe valvular pathology. There is trace aortic regurgitation.     Mitral Valve:  There is mitral valve thickening of the anterior and posterior leaflets. There is mild to moderate mitral regurgitation.     Tricuspid Valve:  Structurally normal tricuspid valve with normal leaflet excursion. There is trace tricuspid regurgitation. Estimated pulmonary artery systolic pressure is 18 mmHg.     Pulmonic Valve:  The pulmonic valve was not well visualized.     Aorta:  The aortic root at the sinuses of Valsalva is normal in size, measuring 3.30 cm (indexed 1.62 cm/m²).  ____________________________________________________________________  Quantitative Data:  Left Ventricle Measurements: (Indexed to BSA)     IVSd (2D):   1.0 cm  LVPWd (2D):  0.9 cm  LVIDd (2D):  6.4 cm  LVIDs (2D):  5.6 cm  LV Mass:     254 g  124.3 g/m²  Visualized LV EF%: 20%     MV E Vmax:    0.78 m/s  MV A Vmax:    1.13 m/s  MV E/A:       0.69  e' lateral:   5.66 cm/s  e' medial:    7.72 cm/s  E/e' lateral: 13.80  E/e' medial:  10.12  E/e' Average: 11.67  MV DT:        190 msec    Aorta Measurements: (normal range) (Indexed to BSA)     Sinuses of Valsalva: 3.30 cm (3.1 - 3.7 cm)  Left Atrium Measurements: (Indexed to BSA)  LA Diam 2D: 3.90 cm    Right Ventricle Measurements: Right Atrial Measurements:     RV Base (RVID1):  3.5 cm      RA Vol:       44.70 ml  RV Mid (RVID2):   2.7 cm      RA Vol Index: 21.89 ml/m²  RV Major (RVID3): 9.6 cm    LVOT / RVOT/ Qp/Qs Data: (Indexed to BSA)  LVOT Vmax: 1.09 m/s    Aortic Valve Measurements:  AV Vmax:          1.9 m/s  AV Peak Gradient: 14.0 mmHg    Mitral Valve Measurements:     MV E Vmax: 0.8 m/s         MR Vmax:          4.57 m/s  MV A Vmax: 1.1 m/s         MR Peak Gradient: 83.5 mmHg  MV E/A:    0.7  Tricuspid Valve Measurements:     TR Vmax:          1.9 m/s  TR Peak Gradient: 14.6 mmHg  PASP:             18 mmHg    ________________________________________________________________________________________  Electronically signed on 10/5/2023 at 8:26:35 AM by Magdalena Owens MD         *** Final ***

## 2023-10-07 LAB
GLUCOSE BLDC GLUCOMTR-MCNC: 116 MG/DL — HIGH (ref 70–99)
GLUCOSE BLDC GLUCOMTR-MCNC: 118 MG/DL — HIGH (ref 70–99)
GLUCOSE BLDC GLUCOMTR-MCNC: 122 MG/DL — HIGH (ref 70–99)
GLUCOSE BLDC GLUCOMTR-MCNC: 129 MG/DL — HIGH (ref 70–99)

## 2023-10-07 PROCEDURE — 99232 SBSQ HOSP IP/OBS MODERATE 35: CPT

## 2023-10-07 RX ADMIN — DULOXETINE HYDROCHLORIDE 30 MILLIGRAM(S): 30 CAPSULE, DELAYED RELEASE ORAL at 05:10

## 2023-10-07 RX ADMIN — Medication 1 APPLICATION(S): at 21:30

## 2023-10-07 RX ADMIN — MENTHOL AND METHYL SALICYLATE 1 APPLICATION(S): 10; 30 CREAM TOPICAL at 05:09

## 2023-10-07 RX ADMIN — DIVALPROEX SODIUM 500 MILLIGRAM(S): 500 TABLET, DELAYED RELEASE ORAL at 05:10

## 2023-10-07 RX ADMIN — Medication 1 APPLICATION(S): at 05:09

## 2023-10-07 RX ADMIN — GENTAMICIN SULFATE 1 DROP(S): 3 SOLUTION/ DROPS OPHTHALMIC at 20:34

## 2023-10-07 RX ADMIN — GENTAMICIN SULFATE 1 DROP(S): 3 SOLUTION/ DROPS OPHTHALMIC at 15:58

## 2023-10-07 RX ADMIN — GENTAMICIN SULFATE 1 DROP(S): 3 SOLUTION/ DROPS OPHTHALMIC at 10:40

## 2023-10-07 RX ADMIN — Medication 650 MILLIGRAM(S): at 16:46

## 2023-10-07 RX ADMIN — ENOXAPARIN SODIUM 40 MILLIGRAM(S): 100 INJECTION SUBCUTANEOUS at 20:34

## 2023-10-07 RX ADMIN — BUDESONIDE AND FORMOTEROL FUMARATE DIHYDRATE 2 PUFF(S): 160; 4.5 AEROSOL RESPIRATORY (INHALATION) at 06:49

## 2023-10-07 RX ADMIN — GENTAMICIN SULFATE 1 DROP(S): 3 SOLUTION/ DROPS OPHTHALMIC at 07:47

## 2023-10-07 RX ADMIN — MENTHOL AND METHYL SALICYLATE 1 APPLICATION(S): 10; 30 CREAM TOPICAL at 10:52

## 2023-10-07 RX ADMIN — BUDESONIDE AND FORMOTEROL FUMARATE DIHYDRATE 2 PUFF(S): 160; 4.5 AEROSOL RESPIRATORY (INHALATION) at 19:12

## 2023-10-07 RX ADMIN — SACUBITRIL AND VALSARTAN 0.5 TABLET(S): 24; 26 TABLET, FILM COATED ORAL at 05:24

## 2023-10-07 RX ADMIN — OLANZAPINE 5 MILLIGRAM(S): 15 TABLET, FILM COATED ORAL at 05:09

## 2023-10-07 RX ADMIN — ATORVASTATIN CALCIUM 40 MILLIGRAM(S): 80 TABLET, FILM COATED ORAL at 21:30

## 2023-10-07 RX ADMIN — MIDODRINE HYDROCHLORIDE 7.5 MILLIGRAM(S): 2.5 TABLET ORAL at 16:17

## 2023-10-07 RX ADMIN — Medication 1 APPLICATION(S): at 12:49

## 2023-10-07 RX ADMIN — MENTHOL AND METHYL SALICYLATE 1 APPLICATION(S): 10; 30 CREAM TOPICAL at 21:29

## 2023-10-07 RX ADMIN — DULOXETINE HYDROCHLORIDE 30 MILLIGRAM(S): 30 CAPSULE, DELAYED RELEASE ORAL at 15:59

## 2023-10-07 RX ADMIN — TIOTROPIUM BROMIDE 2 PUFF(S): 18 CAPSULE ORAL; RESPIRATORY (INHALATION) at 06:49

## 2023-10-07 RX ADMIN — Medication 650 MILLIGRAM(S): at 16:16

## 2023-10-07 RX ADMIN — Medication 25 MILLIGRAM(S): at 05:09

## 2023-10-07 RX ADMIN — OLANZAPINE 5 MILLIGRAM(S): 15 TABLET, FILM COATED ORAL at 17:25

## 2023-10-07 RX ADMIN — Medication 1 APPLICATION(S): at 17:24

## 2023-10-07 RX ADMIN — PANTOPRAZOLE SODIUM 40 MILLIGRAM(S): 20 TABLET, DELAYED RELEASE ORAL at 05:24

## 2023-10-07 RX ADMIN — DIVALPROEX SODIUM 500 MILLIGRAM(S): 500 TABLET, DELAYED RELEASE ORAL at 17:24

## 2023-10-07 RX ADMIN — SACUBITRIL AND VALSARTAN 0.5 TABLET(S): 24; 26 TABLET, FILM COATED ORAL at 17:26

## 2023-10-07 RX ADMIN — OLANZAPINE 10 MILLIGRAM(S): 15 TABLET, FILM COATED ORAL at 21:30

## 2023-10-07 RX ADMIN — MIDODRINE HYDROCHLORIDE 7.5 MILLIGRAM(S): 2.5 TABLET ORAL at 10:40

## 2023-10-07 RX ADMIN — MIDODRINE HYDROCHLORIDE 7.5 MILLIGRAM(S): 2.5 TABLET ORAL at 05:09

## 2023-10-07 NOTE — PROGRESS NOTE ADULT - ASSESSMENT
84 y/o M PMH dementia, DM2, HFrEF s/p PPM, HTN, HLD presents with increased agitation.  Admitted for increased paranoia. Consulted for evaluation of hypotension.    Hypotension/HFrEF s/p ppm (8/2023)/HTN, HLD  - Previous TTE (8/2023): EF ~20%.  Repeat: EF 20%, reduced RVF, mild-mod MR  - BP remains low in 90s   - Continue Toprol XL and ARNI  - Continue home Midodrine  - No obvious evidence of volume overload  - Goal is GDMT. Resume Aldactone when SBP remains stable at ~110  - Continue holding Lasix    - EKG shows Atrial sensed v-paced rhythm  - No anginal complaints  - Continue Lipitor     - Monitor and replete lytes, keep K>4, Mg>2.  - Will continue to follow.    Ct Warner, MS FNP, AGACNP  Nurse Practitioner- Cardiology   Please call on TEAMS

## 2023-10-07 NOTE — PROGRESS NOTE ADULT - SUBJECTIVE AND OBJECTIVE BOX
Patient is a 83y old  Male who presents with a chief complaint of paranoia (06 Oct 2023 14:26)    HPI:  82y/o M PMH dementia, HFrEF s/p PPM, HTN, HLD  presents with increased agitation. History obtained from patient's daughter Arianne over phone.   Patient moved from [place] to [place] ~2months ago and has been having issues with increased paranoia for the past month. He stays at a [facility] assisted living community where he has been having several episodes of sundowning.   Recently he was hospitalized at [facility] for same issues from 9/1-914/23. Daughter was unable to provide sufficient history regarding hospitalization.   Patient's son, Jose has been taking care of him and knows more about his medical hx however patient's daughter states she is taking over now and did not want to bother Jose at this time.  Patient was also recently seen at [facility] ED for episodes of agitation and was told to follow up with outpatient Psychiatrist but daughter states that likely did not happened because of his increased agitation.  Patient became agitated today and staff had to call 911 for further evaluation. The facility as well as his children feel that his current psych medication regimen is not sufficient to keep him calm.   Per review of recent dispensary history, he was seeing a psychiatric NP (Barbara Bravo) in [place], [place].   Of note, he had placement of PPM in late August at [facility]. Per his daughter, his LVEF at that time was 20% before and after PPM placement.  Currently, patient states his b/l legs feel itchy but otherwise he feels fine and is agreeable.  Denies fevers, chills. sob, cp, n/v/d.    ED Course   T 97.4 F  /79 RR 18 SpO2 95% on RA  Labs H/H 12.1/37.5 Glu   UA: >1000 glucose  UTox: negaitve     In the ED, given tylenol 650mg x1 , zyprexa 5mg IVP x1  (20 Sep 2023 21:58)    INTERVAL HPI:  9/21: Pt seen and examined, sitting in chair with 1 to 1 present. Patient expressed concern about not receiving his pantoprazole, stating he cant digest his food without it, denies n/v, epigastric pain or burning. When asked about why pt came to hospital, states that he was being followed and he called the police to come help him. Says he lives with his son Jose. EMS brought patient in from  and over [facility]. Additionally states that one pill he was given this morning by staff was "phony" so he didn't take it. Contacted pt's pharmacy [facility], numerous scripts filled in [place] and [place]. Per night team sign out, daughter states that pt has been in and out of many ERs and has been started on many different medications so there are a lot of recent meds that he does not currently take to the best of her knowledge. Per daughter, her brothers are tired of assisting with their father given history of difficulties with him. She is willing to assist but does not want to be too involved and asks us to not contact her brothers. Psychiatry consulted and to see patient. JOHN/VAISHALI updated.   Soren Krishnan [phone]  9/22: Pt seen and examined at bedside. xyprexa dose increased to 5mg bedtime and 2.5mg in AM starting yesterday. Marked improvement in demeanor. A&Ox3 (person, time, place). States he is in a "police hospital" and that he came here because he asked some man in their car to call the police because he was being followed by an aide that his son hired. Said he came from "[place] [id number] and over [facility]. No complaints or concerns today. States he has to get things ready to move back to [place]. Psych is following. DSC planning pending placement as his previous community does not want pt to return and family does not want to take patient. Replace PO K   9/23: Pt seen and examined at bedside. Depakote increased to 500mg PO BID, amitriptyline stopped as per psych recs. Pt with no complaints or concerns. Pleasant on encounter, says he is doing well and thanks provider for checking in on him.  Reports that he is waiting to go home. Patient informed we are working to get him out of the hospital. DSC planning pending placement. JOHN sent referral to [facility]. Awaiting decision.   9/24:  Pt seen and examined at bedside. Pt reports that is feeling well and does not have any symptoms, pain or concern. Pt is tolerating PO and eating well.  Pt is cooperative with evaluation and pleasant.  Waiting for placement for dc. Awaiting placement.   9/25: Pt seen and examined at bedside. Reports last night he was looking for 3 medications that were started in the hospital at night. Reassured that he is receiving all the proper medications he needs. Appetite is good. Pt has no complaints at this time. Now off 1:1.  9/26: Pt seen and examined at bedside. Was not agitated overnight. Pt is very tired this morning. Reports chronic low back pain, but is not concerned. Otherwise he has no active complaints at  this time. Denies chest pain, abdominal pain, SOB. D/W Son Mykel today   9/27: Pt seen and examined at bedside. No agitation overnight. Pt is tired this morning. Has no complaints other than his chronic lower back pain. Denies chest pain, abdominal pain SOB. Pt wants to go home. D/W Son Angelito today   9/28: Pt seen and examined at bedside. No agitation overnight. Pt is tired this morning and wants to sleep. No complaints other than his chronic low back pain. Denies chest pain, SOB, abdominal pain. Pt wants to go.  9/29: Pt seen and examined at bedside. No agitation. Pt is more awake. Continues to report chronic low back pain. No acute complaints at this time. Denies chest pain, SOB, abdominal pain. Wants to go.Social work for d/c planning when bed is available.  9/30: Pt seen and examined at bedside. No agitation overnight. Pt is AAOx1. Knows he is in a hospital but unsure which one. Patient is pleasant and has no acute complaints at this time. Denies fever, chills, CP, palpitations, SOB, abd pain, nausea, vomiting. Low BP S/P 1 Bolus NS  10/1: Pt seen and examined at bedside. No agitation overnight. Patient has no complaints at this time. Just very tired. Wishes to go home. Denies fever, chills, chest pain, SOB, abdominal pain. stable for d/c  10/2: Patient was seen and examined at bedside. No agitation observed overnight. Patient has no complaints. Wishes to go home. Denies fever, chills, chest pain, SOB, abdominal pain. Pt wants to be d/don home with family, Pt is very Cooperative.  10/3: Pt seen and examined at bedside. Resting comfortably in chair. No agitation overnight. Pt really wants to go home, upset that he has been in the hospital for so long. No acute complaints at this time. Denies fever, chills, chest pain, SOB, abdominal pain. Pt is very cooperative.  10/4: Pt seen examined at bedside. Wants to go home. No new complaints at this time. very cooperative.  10/5: Pt seen and examined at bedside. No new complaints at this time. Denies dizziness at rest, but states it occurs when he gets up too fast. Very cooperative. Ambulating in Hallway , wants to be d/don today   10/6: Pt seen and examined at bedside. No new complaints at this time. Pt Very cooperative. Wants to go home.   10/7: Pt seen and examined at bedside. No new complaints at this time. Pt still very cooperative. Wants to go home.     OVERNIGHT EVENTS: none    Home Medications:      MEDICATIONS  (STANDING):  ammonium lactate 12% Lotion 1 Application(s) Topical two times a day  atorvastatin 40 milliGRAM(s) Oral at bedtime  bacitracin   Ointment 1 Application(s) Topical every 8 hours  budesonide  80 MICROgram(s)/formoterol 4.5 MICROgram(s) Inhaler 2 Puff(s) Inhalation two times a day  dextrose 5%. 1000 milliLiter(s) (100 mL/Hr) IV Continuous <Continuous>  dextrose 5%. 1000 milliLiter(s) (50 mL/Hr) IV Continuous <Continuous>  dextrose 50% Injectable 25 Gram(s) IV Push once  dextrose 50% Injectable 12.5 Gram(s) IV Push once  dextrose 50% Injectable 25 Gram(s) IV Push once  divalproex  milliGRAM(s) Oral two times a day  DULoxetine 30 milliGRAM(s) Oral <User Schedule>  enoxaparin Injectable 40 milliGRAM(s) SubCutaneous every 24 hours  gentamicin 0.3% Ophthalmic Solution 1 Drop(s) Both EYES five times a day  glucagon  Injectable 1 milliGRAM(s) IntraMuscular once  insulin lispro (ADMELOG) corrective regimen sliding scale   SubCutaneous three times a day before meals  insulin lispro (ADMELOG) corrective regimen sliding scale   SubCutaneous at bedtime  methyl salicylate 15%/menthol 10% Topical Cream 1 Application(s) Topical three times a day  metoprolol succinate ER 25 milliGRAM(s) Oral daily  midodrine. 7.5 milliGRAM(s) Oral three times a day  OLANZapine Disintegrating Tablet 5 milliGRAM(s) Oral two times a day  OLANZapine Disintegrating Tablet 10 milliGRAM(s) Oral at bedtime  pantoprazole    Tablet 40 milliGRAM(s) Oral before breakfast  sacubitril 24 mG/valsartan 26 mG 0.5 Tablet(s) Oral two times a day  sodium chloride 0.9%. 1000 milliLiter(s) (250 mL/Hr) IV Continuous <Continuous>  tiotropium 2.5 MICROgram(s) Inhaler 2 Puff(s) Inhalation daily    MEDICATIONS  (PRN):  acetaminophen     Tablet .. 650 milliGRAM(s) Oral every 6 hours PRN Temp greater or equal to 38C (100.4F), Mild Pain (1 - 3)  albuterol    0.083% 2.5 milliGRAM(s) Nebulizer every 6 hours PRN Shortness of Breath and/or Wheezing  dextrose Oral Gel 15 Gram(s) Oral once PRN Blood Glucose LESS THAN 70 milliGRAM(s)/deciliter  melatonin 5 milliGRAM(s) Oral at bedtime PRN Insomnia      Allergies    No Known Allergies    Intolerances        Social History:  Tobacco: former , quit >30 years ago  EtOH: social drinker  Recreational drug use: denies   Lives with: Assisted Living Facility  Ambulates: without assistance  ADLs: needs assistance (20 Sep 2023 21:58)      REVIEW OF SYSTEMS: i want to go home   CONSTITUTIONAL: No fever, No chills, No fatigue, No myalgia, No Body ache, No Weakness  EYES: No eye pain,  No visual disturbances, No discharge, NO Redness  ENMT:  No ear pain, No nose bleed, No vertigo; No sinus pain, NO throat pain, No Congestion  NECK: No pain, No stiffness  RESPIRATORY: No cough, NO wheezing, No  hemoptysis, NO  shortness of breath  CARDIOVASCULAR: No chest pain, palpitations  GASTROINTESTINAL: No abdominal pain, NO epigastric pain. No nausea, No vomiting; No diarrhea, No constipation. [  ] BM  GENITOURINARY: No dysuria, No frequency, No urgency, No hematuria, NO incontinence  NEUROLOGICAL: No headaches, No dizziness, No numbness, No tingling, No tremors, No weakness  EXT: No Swelling, No Pain, No Edema  SKIN:  [  ] No itching, burning, rashes, or lesions _+ Scab on Hands  MUSCULOSKELETAL: No joint pain ,No Jt swelling; No muscle pain, No back pain, No extremity pain  PSYCHIATRIC: No depression,  No anxiety,  No mood swings ,No difficulty sleeping at night  PAIN SCALE: [x  ] None  [  ] Other-  ROS Unable to obtain due to - [  ] Dementia  [  ] Lethargy [  ] Drowsy [  ] Sedated [  ] non verbal  REST OF REVIEW Of SYSTEM - [x  ] Normal     Vital Signs Last 24 Hrs  T(C): 36.7 (07 Oct 2023 12:15), Max: 36.7 (07 Oct 2023 04:43)  T(F): 98 (07 Oct 2023 12:15), Max: 98 (07 Oct 2023 04:43)  HR: 86 (07 Oct 2023 12:15) (81 - 91)  BP: 92/62 (07 Oct 2023 12:15) (92/62 - 99/61)  BP(mean): --  RR: 18 (07 Oct 2023 12:15) (17 - 18)  SpO2: 93% (07 Oct 2023 12:15) (91% - 93%)    Parameters below as of 07 Oct 2023 12:15  Patient On (Oxygen Delivery Method): room air      Finger Stick        PHYSICAL EXAM:  GENERAL:  [ x ] NAD , [x  ] well appearing, [  ] Agitated, [  ] Drowsy,  [  ] Lethargy, [  ] confused   HEAD:  [ x ] Normal, [  ] Other  EYES:  [ x ] EOMI, [ x ] PERRLA, [ x ] conjunctiva and sclera clear normal, [  ] Other,  [  ] Pallor,[  ] Discharge  ENMT:  [ x ] Normal, [x  ] Moist mucous membranes, [x  ] Good dentition, [x  ] No Thrush  NECK:  [ x ] Supple, [ x ] No JVD, [ x ] Normal thyroid, [  ] Lymphadenopathy [  ] Other  CHEST/LUNG:  [ x ] Clear to auscultation bilaterally, [ x ] Breath Sounds equal B/L / Decrease, [  ] poor effort  [x  ] No rales, [x  ] No rhonchi  [x  ]  No wheezing,   HEART:  [ x ] Regular rate and rhythm, [  ] tachycardia, [  ] Bradycardia,  [  ] irregular  [x  ] No murmurs, No rubs, No gallops, [  ] PPM in place (Mfr:  )  ABDOMEN:  [x  ] Soft, [ x ] Nontender, [ x ] Nondistended, [  x] No mass, [x  ] Bowel sounds present, [x  ] obese  NERVOUS SYSTEM:  [ x ] Alert & Oriented X 2- 3, [ x ] Nonfocal  [  ] Confusion  [  ] Encephalopathic [  ] Sedated [  ] Unable to assess, [  ] Dementia [x  ] Other-Forgetful  EXTREMITIES: [  x] 2+ Peripheral Pulses, No clubbing, No cyanosis,  [x  ] Ankle very minimal pitting  edema B/L lower EXT. [ x ]  Severe PVD stasis skin changes B/L Lower EXT, [ x ] wound- B/L Hand -small multiple open wound-   LYMPH: No lymphadenopathy noted  SKIN:  [  ] No rashes or lesions, [  ] Pressure Ulcers, [ x ] ecchymosis- b/l upper Ext , [  ] Skin Tears, [ x ] Other-scabs -small -B/L lower Ext      DIET: Diet, Regular:   Consistent Carbohydrate Evening Snack  DASH/TLC Sodium & Cholesterol Restricted (09-20-23 @ 22:36)      LABS:      Ca    9.1        06 Oct 2023 07:50        Urinalysis Basic - ( 06 Oct 2023 07:50 )    Color: x / Appearance: x / SG: x / pH: x  Gluc: 97 mg/dL / Ketone: x  / Bili: x / Urobili: x   Blood: x / Protein: x / Nitrite: x   Leuk Esterase: x / RBC: x / WBC x   Sq Epi: x / Non Sq Epi: x / Bacteria: x            HEALTH ISSUES - PROBLEM Dx:  Paranoia    HFrEF (heart failure with reduced ejection fraction)    Hypotension    Anemia    Medication management    DM2 (diabetes mellitus, type 2)    HLD (hyperlipidemia)    Need for prophylactic measure        Consultant(s) Notes Reviewed:  [ x ] YES     Care Discussed with [X] Consultants  [ x ] Patient  [  ] Family [  ] HCP [  ]   [ x ] Social Service  [x  ] RN, [  ] Physical Therapy,[  ] Palliative care team  DVT PPX: [ x ] Lovenox, [  ] S C Heparin, [  ] Coumadin, [  ] Xarelto, [  ] Eliquis, [  ] Pradaxa, [  ] IV Heparin drip, [  ] SCD [  ] Contraindication 2 to GI Bleed,[  ] Ambulation [  ] Contraindicated 2 to  bleed [  ] Contraindicated 2 to Brain Bleed  Advanced directive: [ x ] None, [  ] DNR/DNI

## 2023-10-07 NOTE — PROGRESS NOTE ADULT - ATTENDING COMMENTS
82y/o M PMH dementia, DM2, HFrEF s/p PPM, HTN, HLD presents with increased agitation.  Admit for increased paranoia & Placement .  pt seen, examined, case & care plan d/w pt, residents at detail. d/w dtr Arianne on 9/21 at [facility] Detail about pt's condition, Care plan Meds   Consult;  Psych Dr Strange  -Dr Strange follow up- stable for d/c   Cardiology Dr Clifton  D/W ECHO, STOP Spironolactone, STOP Lasix , BP Low -Decrease Entresto 1/2 tab 2x day    Social service d/w about d/c planning to [facility] on Monday   PT Eval.-Pt is ambulating in [place] ways   D/W Social work -follow up for d/c -Placement to new [facility] as provided by family .ALl meds sent out to Pharmacy   PO diet  D/C planning when bed available at [facility]   Total care time is 45 minutes.

## 2023-10-07 NOTE — PROGRESS NOTE ADULT - SUBJECTIVE AND OBJECTIVE BOX
[facility] -- Nicolle Townsend Pannella, Patel, Savella, Goodger, Cohen: Office # [phone]    Follow Up:  Hypotension    Subjective/Observations: Patient awake, alert, Denies any form of respiratory or cardiac discomfort.  Tolerating room air.     REVIEW OF SYSTEMS: All other review of systems are negative unless indicated above    PAST MEDICAL & SURGICAL HISTORY:  HFrEF (heart failure with reduced ejection fraction)      HTN (hypertension)      HLD (hyperlipidemia)      Type 2 diabetes mellitus      Pacemaker    MEDICATIONS  (STANDING):  ammonium lactate 12% Lotion 1 Application(s) Topical two times a day  atorvastatin 40 milliGRAM(s) Oral at bedtime  bacitracin   Ointment 1 Application(s) Topical every 8 hours  budesonide  80 MICROgram(s)/formoterol 4.5 MICROgram(s) Inhaler 2 Puff(s) Inhalation two times a day  dextrose 5%. 1000 milliLiter(s) (50 mL/Hr) IV Continuous <Continuous>  dextrose 5%. 1000 milliLiter(s) (100 mL/Hr) IV Continuous <Continuous>  dextrose 50% Injectable 25 Gram(s) IV Push once  dextrose 50% Injectable 12.5 Gram(s) IV Push once  dextrose 50% Injectable 25 Gram(s) IV Push once  divalproex  milliGRAM(s) Oral two times a day  DULoxetine 30 milliGRAM(s) Oral <User Schedule>  enoxaparin Injectable 40 milliGRAM(s) SubCutaneous every 24 hours  gentamicin 0.3% Ophthalmic Solution 1 Drop(s) Both EYES five times a day  glucagon  Injectable 1 milliGRAM(s) IntraMuscular once  insulin lispro (ADMELOG) corrective regimen sliding scale   SubCutaneous at bedtime  insulin lispro (ADMELOG) corrective regimen sliding scale   SubCutaneous three times a day before meals  methyl salicylate 15%/menthol 10% Topical Cream 1 Application(s) Topical three times a day  metoprolol succinate ER 25 milliGRAM(s) Oral daily  midodrine. 7.5 milliGRAM(s) Oral three times a day  OLANZapine Disintegrating Tablet 5 milliGRAM(s) Oral two times a day  OLANZapine Disintegrating Tablet 10 milliGRAM(s) Oral at bedtime  pantoprazole    Tablet 40 milliGRAM(s) Oral before breakfast  sacubitril 24 mG/valsartan 26 mG 0.5 Tablet(s) Oral two times a day  sodium chloride 0.9%. 1000 milliLiter(s) (250 mL/Hr) IV Continuous <Continuous>  tiotropium 2.5 MICROgram(s) Inhaler 2 Puff(s) Inhalation daily    MEDICATIONS  (PRN):  acetaminophen     Tablet .. 650 milliGRAM(s) Oral every 6 hours PRN Temp greater or equal to 38C (100.4F), Mild Pain (1 - 3)  albuterol    0.083% 2.5 milliGRAM(s) Nebulizer every 6 hours PRN Shortness of Breath and/or Wheezing  dextrose Oral Gel 15 Gram(s) Oral once PRN Blood Glucose LESS THAN 70 milliGRAM(s)/deciliter  melatonin 5 milliGRAM(s) Oral at bedtime PRN Insomnia    Allergies    No Known Allergies    Intolerances      Vital Signs Last 24 Hrs  T(C): 36.7 (07 Oct 2023 12:15), Max: 36.7 (07 Oct 2023 04:43)  T(F): 98 (07 Oct 2023 12:15), Max: 98 (07 Oct 2023 04:43)  HR: 86 (07 Oct 2023 12:15) (81 - 91)  BP: 92/62 (07 Oct 2023 12:15) (92/62 - 99/61)  BP(mean): --  RR: 18 (07 Oct 2023 12:15) (17 - 18)  SpO2: 93% (07 Oct 2023 12:15) (91% - 93%)    Parameters below as of 07 Oct 2023 12:15  Patient On (Oxygen Delivery Method): room air      I&O's Summary        TELE: Not on telemetry   PHYSICAL EXAM:  Constitutional: NAD, awake and alert  HEENT: Moist Mucous Membranes, Anicteric  Pulmonary: Non-labored, breath sounds are clear bilaterally, No wheezing, rales or rhonchi  Cardiovascular: Regular, S1 and S2, + murmurs, No rubs, gallops or clicks  Gastrointestinal:  soft, nontender, nondistended   Lymph: No peripheral edema. No lymphadenopathy.   Skin: No visible rashes or ulcers.  Psych:  Mood & affect appropriate      LABS: All Labs Reviewed:                        12.5   5.79  )-----------( 114      ( 06 Oct 2023 07:50 )             37.5     06 Oct 2023 07:50    141    |  105    |  33     ----------------------------<  97     3.5     |  30     |  1.00     Ca    9.1        06 Oct 2023 07:50    TPro  6.9    /  Alb  3.5    /  TBili  1.1    /  DBili  x      /  AST  21     /  ALT  22     /  AlkPhos  62     06 Oct 2023 07:50   LIVER FUNCTIONS - ( 06 Oct 2023 07:50 )  Alb: 3.5 g/dL / Pro: 6.9 g/dL / ALK PHOS: 62 U/L / ALT: 22 U/L / AST: 21 U/L / GGT: x           Triiodothyronine, Total (T3 Total): 104 ng/dL (09-21-23 @ 06:55)    12 Lead ECG:   Ventricular Rate 97 BPM    Atrial Rate 97 BPM    P-R Interval 178 ms    QRS Duration 140 ms    Q-T Interval 396 ms    QTC Calculation(Bazett) 502 ms    P Axis 64 degrees    R Axis -75 degrees    T Axis 89 degrees    Diagnosis Line Atrial-sensed ventricular-paced rhythm  Biventricular pacemaker detected  Confirmed by SAGAR PEDERSON ([id number]) on 9/21/2023 12:10:56 PM (09-20-23 @ 19:35)      TRANSTHORACIC ECHOCARDIOGRAM REPORT  ________________________________________________________________________________                                      _______       Pt. Name:       ALESSANDRO HERNANDEZ Study Date:    10/4/2023  MRN:            [id number]         YOB: 1940  Accession #:    [id number]         Age:           83 years  Account#:       [id number]        Gender:        M  Heart Rate:                       Height:        70.08 in (178.00 cm)  Rhythm:     Weight:        189.59 lb (86.00 kg)  Blood Pressure: 84/52 mmHg        BSA/BMI:       2.04 m² / 27.14 kg/m²  ________________________________________________________________________________________  Referring Physician:    [id number] Andrés Bauer  Interpreting Physician: Magdalena Owens MD  Primary Sonographer:    Swati Faustin RD    CPT:               ECHO TTE WO CON COMP W DOPP - 77692.m  Indication(s):     Heart failure, unspecified - I50.9  Procedure:         Transthoracic echocardiogram with 2-D, M-mode and complete                     spectral and color flow Doppler.  Ordering Location: [facility]    _______________________________________________________________________________________     CONCLUSIONS:      1. Left ventricular cavity is moderately dilated. Left ventricular systolic function is severely decreased with an ejection fraction visually estimated at 20 %   2. Normal right ventricular cavity size and reduced systolic function.   3. Device lead is visualized in the right heart.   4. The aortic valve is calcified without severe valvular pathology.   5. Trace aortic regurgitation.   6. Mild to moderate mitral regurgitation.   7. Trace tricuspid regurgitation.    ________________________________________________________________________________________  FINDINGS:     Left Ventricle:  The left ventricular cavity is moderately dilated. Left ventricular systolic function is severely decreased with an ejection fraction visually estimated at 20%. Severely dilated left ventricular systolic cavity size. The left ventricular diastolic function is indeterminate.     Right Ventricle:  The right ventricular cavity is normal in size and reduced systolic function. A device lead is visualized in the right heart.     Left Atrium:  The left atrium is normal in size with an indexed volume of 33.15 ml/m².     Right Atrium:  The right atrium is normal in size with an indexed volume of 21.89 ml/m².     Aortic Valve:  The aortic valve is calcified without severe valvular pathology. There is trace aortic regurgitation.     Mitral Valve:  There is mitral valve thickening of the anterior and posterior leaflets. There is mild to moderate mitral regurgitation.     Tricuspid Valve:  Structurally normal tricuspid valve with normal leaflet excursion. There is trace tricuspid regurgitation. Estimated pulmonary artery systolic pressure is 18 mmHg.     Pulmonic Valve:  The pulmonic valve was not well visualized.     Aorta:  The aortic root at the sinuses of Valsalva is normal in size, measuring 3.30 cm (indexed 1.62 cm/m²).  ____________________________________________________________________  Quantitative Data:  Left Ventricle Measurements: (Indexed to BSA)     IVSd (2D):   1.0 cm  LVPWd (2D):  0.9 cm  LVIDd (2D):  6.4 cm  LVIDs (2D):  5.6 cm  LV Mass:     254 g  124.3 g/m²  Visualized LV EF%: 20%     MV E Vmax:    0.78 m/s  MV A Vmax:    1.13 m/s  MV E/A:       0.69  e' lateral:   5.66 cm/s  e' medial:    7.72 cm/s  E/e' lateral: 13.80  E/e' medial:  10.12  E/e' Average: 11.67  MV DT:        190 msec    Aorta Measurements: (normal range) (Indexed to BSA)     Sinuses of Valsalva: 3.30 cm (3.1 - 3.7 cm)       Left Atrium Measurements: (Indexed to BSA)  LA Diam 2D: 3.90 cm    Right Ventricle Measurements: Right Atrial Measurements:     RV Base (RVID1):  3.5 cm      RA Vol:       44.70 ml  RV Mid (RVID2):   2.7 cm      RA Vol Index: 21.89 ml/m²  RV Major (RVID3): 9.6 cm       LVOT / RVOT/ Qp/Qs Data: (Indexed to BSA)  LVOT Vmax: 1.09 m/s    Aortic Valve Measurements:  AV Vmax:          1.9 m/s  AV Peak Gradient: 14.0 mmHg    Mitral Valve Measurements:     MV E Vmax: 0.8 m/s         MR Vmax:          4.57 m/s  MV A Vmax: 1.1 m/s         MR Peak Gradient: 83.5 mmHg  MV E/A:    0.7       Tricuspid Valve Measurements:     TR Vmax:          1.9 m/s  TR Peak Gradient: 14.6 mmHg  PASP:             18 mmHg    ________________________________________________________________________________________  Electronically signed on 10/5/2023 at 8:26:35 AM by Magdalena Owens MD    *** Final ***

## 2023-10-07 NOTE — PROGRESS NOTE ADULT - PROBLEM SELECTOR PLAN 1
Acute paranoia in setting of hx of dementia, ?baseline mental disorder ? Psych Hx- ?Psychosis/ Agitation: unclear hx  - Previous hospitalization at [place] for similar problem. Unknown if Pt was taking meds prior to admission.    - No infectious or metabolic concomitant disorder. Normal thyroid profile.   - Continue current Zyrexa 5 mg 2x day, Zyprexa 10 mg Q HS regimen as per psych.  - Continue Depakote 500mg BID as per Dr. Strange.    - Continue duloxetine 30 mg 2x day.  - STOP amitriptyline, as per psych recs  - Psych (Dr. Strange) - no decision making capacity, ok to dc  - SW consulted for d/c plan: approved, but plan for Monday given no availabilities.  -  - no acute agitation observed

## 2023-10-07 NOTE — PROGRESS NOTE ADULT - PROBLEM SELECTOR PLAN 3
BP borderline hypotensive today when getting up, standing, however pt is asx  - likely due to lowered hold parameters of home meds Lasix,  Entresto, metoprolol XL, spironolactone, Farxiga.   - continue midodrine 7.5 mg 3x day for soft BP.  - continues to be hypotensive, but asymptomatic.   - dced spironolactone,  lasix-HOLD  - will continue  Entresto and Toprol as per Dr Oshea -ALl meds d/w cardio-Lower SBP parameters  HOLD meds when SBP <90  - TTE: LVEF 20%, severe aortic stenosis (unchanged)  - Cardiology consulted--Dr Clifton - d/w -Lower dose of Entresto 1/2 tab 2x day

## 2023-10-07 NOTE — PROGRESS NOTE ADULT - PROBLEM SELECTOR PLAN 5
Discussed patient medication list with daughter (Arianne) who confirmed most medications found on surescripts recent dispensary history) however she is not sure of any changes that may have happened when he was hospitalized or when he went to the ED  - Patient has list with him, copy in chart however unclear if that list is up to date  - Current med rec completed based on recent dispensary history  - [facility] pharmacy called: additional medications from pharmacy include allopurinol 300qD, lisinopril 20mg qD, mirabegron 25mg qD, trazodone 50mg qD at bedtime. medications not mentioned by daughter. daughter notes pt has been to numerous different hospitals/ERs and has come back with numerous different medications with no follow up  - plan to hold these additional meds, per psych consult for use of trazodone  - RESOLVED

## 2023-10-08 LAB
GLUCOSE BLDC GLUCOMTR-MCNC: 109 MG/DL — HIGH (ref 70–99)
GLUCOSE BLDC GLUCOMTR-MCNC: 110 MG/DL — HIGH (ref 70–99)
GLUCOSE BLDC GLUCOMTR-MCNC: 121 MG/DL — HIGH (ref 70–99)

## 2023-10-08 PROCEDURE — 99232 SBSQ HOSP IP/OBS MODERATE 35: CPT

## 2023-10-08 RX ORDER — FLUTICASONE PROPIONATE 50 MCG
1 SPRAY, SUSPENSION NASAL
Refills: 0 | Status: DISCONTINUED | OUTPATIENT
Start: 2023-10-08 | End: 2023-10-09

## 2023-10-08 RX ORDER — SACUBITRIL AND VALSARTAN 24; 26 MG/1; MG/1
0.5 TABLET, FILM COATED ORAL
Qty: 60 | Refills: 0
Start: 2023-10-08 | End: 2023-11-06

## 2023-10-08 RX ADMIN — GENTAMICIN SULFATE 1 DROP(S): 3 SOLUTION/ DROPS OPHTHALMIC at 20:46

## 2023-10-08 RX ADMIN — BUDESONIDE AND FORMOTEROL FUMARATE DIHYDRATE 2 PUFF(S): 160; 4.5 AEROSOL RESPIRATORY (INHALATION) at 17:26

## 2023-10-08 RX ADMIN — TIOTROPIUM BROMIDE 2 PUFF(S): 18 CAPSULE ORAL; RESPIRATORY (INHALATION) at 05:43

## 2023-10-08 RX ADMIN — Medication 1 APPLICATION(S): at 21:06

## 2023-10-08 RX ADMIN — DULOXETINE HYDROCHLORIDE 30 MILLIGRAM(S): 30 CAPSULE, DELAYED RELEASE ORAL at 17:40

## 2023-10-08 RX ADMIN — BUDESONIDE AND FORMOTEROL FUMARATE DIHYDRATE 2 PUFF(S): 160; 4.5 AEROSOL RESPIRATORY (INHALATION) at 05:43

## 2023-10-08 RX ADMIN — MENTHOL AND METHYL SALICYLATE 1 APPLICATION(S): 10; 30 CREAM TOPICAL at 05:46

## 2023-10-08 RX ADMIN — Medication 1 APPLICATION(S): at 05:45

## 2023-10-08 RX ADMIN — Medication 1 APPLICATION(S): at 17:43

## 2023-10-08 RX ADMIN — DIVALPROEX SODIUM 500 MILLIGRAM(S): 500 TABLET, DELAYED RELEASE ORAL at 05:44

## 2023-10-08 RX ADMIN — DULOXETINE HYDROCHLORIDE 30 MILLIGRAM(S): 30 CAPSULE, DELAYED RELEASE ORAL at 05:45

## 2023-10-08 RX ADMIN — ENOXAPARIN SODIUM 40 MILLIGRAM(S): 100 INJECTION SUBCUTANEOUS at 20:47

## 2023-10-08 RX ADMIN — OLANZAPINE 5 MILLIGRAM(S): 15 TABLET, FILM COATED ORAL at 06:04

## 2023-10-08 RX ADMIN — OLANZAPINE 10 MILLIGRAM(S): 15 TABLET, FILM COATED ORAL at 21:07

## 2023-10-08 RX ADMIN — PANTOPRAZOLE SODIUM 40 MILLIGRAM(S): 20 TABLET, DELAYED RELEASE ORAL at 05:46

## 2023-10-08 RX ADMIN — MIDODRINE HYDROCHLORIDE 7.5 MILLIGRAM(S): 2.5 TABLET ORAL at 17:41

## 2023-10-08 RX ADMIN — MENTHOL AND METHYL SALICYLATE 1 APPLICATION(S): 10; 30 CREAM TOPICAL at 21:07

## 2023-10-08 RX ADMIN — Medication 5 MILLIGRAM(S): at 00:01

## 2023-10-08 RX ADMIN — MIDODRINE HYDROCHLORIDE 7.5 MILLIGRAM(S): 2.5 TABLET ORAL at 05:44

## 2023-10-08 RX ADMIN — OLANZAPINE 5 MILLIGRAM(S): 15 TABLET, FILM COATED ORAL at 17:41

## 2023-10-08 RX ADMIN — GENTAMICIN SULFATE 1 DROP(S): 3 SOLUTION/ DROPS OPHTHALMIC at 09:10

## 2023-10-08 RX ADMIN — GENTAMICIN SULFATE 1 DROP(S): 3 SOLUTION/ DROPS OPHTHALMIC at 17:42

## 2023-10-08 RX ADMIN — Medication 1 SPRAY(S): at 17:43

## 2023-10-08 RX ADMIN — GENTAMICIN SULFATE 1 DROP(S): 3 SOLUTION/ DROPS OPHTHALMIC at 12:20

## 2023-10-08 RX ADMIN — Medication 25 MILLIGRAM(S): at 05:44

## 2023-10-08 RX ADMIN — ATORVASTATIN CALCIUM 40 MILLIGRAM(S): 80 TABLET, FILM COATED ORAL at 21:07

## 2023-10-08 RX ADMIN — Medication 1 APPLICATION(S): at 05:46

## 2023-10-08 RX ADMIN — SACUBITRIL AND VALSARTAN 0.5 TABLET(S): 24; 26 TABLET, FILM COATED ORAL at 06:04

## 2023-10-08 RX ADMIN — MIDODRINE HYDROCHLORIDE 7.5 MILLIGRAM(S): 2.5 TABLET ORAL at 12:07

## 2023-10-08 RX ADMIN — MENTHOL AND METHYL SALICYLATE 1 APPLICATION(S): 10; 30 CREAM TOPICAL at 14:26

## 2023-10-08 RX ADMIN — Medication 1 APPLICATION(S): at 14:23

## 2023-10-08 NOTE — PROGRESS NOTE ADULT - PROBLEM SELECTOR PLAN 2
Chronic Systolic CHF  s/p PPM (paced ~end of Aug 2023 at [facility] per daughter )  - TTE (8/2023) per daughter showed EF ~20%  - home meds: Entresto, metoprolol XL, spironolactone, Farxiga.   - d/c spironolactone and farxiga as per cardio  - continue entresto 1/2 tabs BID tonight. and metoprolol XL w/ lower hold parameters to ensure Pt gets meds  - Lower extremity edema improving  - Dash/ TLC Diet  - Dr Clifton  D/W ECHO, STOP Spironolactone, STOP Lasix , continue Toprol xl & Entresto  1/2 tab 2x day  Orthostasis VS.

## 2023-10-08 NOTE — PROGRESS NOTE ADULT - ATTENDING COMMENTS
82y/o M PMH dementia, DM2, HFrEF s/p PPM, HTN, HLD presents with increased agitation.  Admit for increased paranoia & Placement .  pt seen, examined, case & care plan d/w pt, residents at detail. d/w dtr Arianne on 9/21 at [facility] Detail about pt's condition, Care plan Meds   Consult;  Psych Dr Strange  -Dr Strange follow up- stable for d/c   Cardiology Dr Clifton  D/W ECHO, STOP Spironolactone, STOP Lasix , BP Low -Decrease Entresto 1/2 tab 2x day    Social service d/w about d/c planning to [facility] on Monday   PT Eval.-Pt is ambulating in [place] ways   D/W Social work -follow up for d/c -Placement to new [facility] as provided by family .ALl meds sent out to Pharmacy   PO diet  D/C planning when bed available at [facility]   Total care time is 45 minutes.

## 2023-10-08 NOTE — PROGRESS NOTE ADULT - PROBLEM SELECTOR PLAN 1
Acute paranoia in setting of hx of dementia, ?baseline mental disorder ? Psych Hx- ?Psychosis/ Agitation: unclear hx  - Previous hospitalization at [facility] for similar problem. Unknown if Pt was taking meds prior to admission.    - No infectious or metabolic concomitant disorder. Normal thyroid profile.   - Continue current Zyrexa 5 mg 2x day, Zyprexa 10 mg Q HS regimen as per psych.  - Continue Depakote 500mg BID as per Dr. Strange.    - Continue duloxetine 30 mg 2x day.  - STOP amitriptyline, as per psych recs  - Psych (Dr. Strange) - no decision making capacity, ok to dc  - SW consulted for d/c plan: approved, but plan for Monday (10/9) given no availabilities.  - no acute agitation observed

## 2023-10-08 NOTE — PROGRESS NOTE ADULT - ASSESSMENT
82 y/o M PMH dementia, DM2, HFrEF s/p PPM, HTN, HLD presents with increased agitation.  Admitted for increased paranoia. Consulted for evaluation of hypotension.    Hypotension/HFrEF s/p ppm (8/2023)/HTN, HLD  - Previous TTE (8/2023): EF ~20%.  Repeat: EF 20%, reduced RVF, mild-mod MR  - BP remains low in 90s -110s  - Continue Toprol XL and ARNI  - Continue home Midodrine  - No obvious evidence of volume overload  - Goal is GDMT. Resume Aldactone when SBP remains stable at ~110  - Continue holding Lasix    - EKG shows Atrial sensed v-paced rhythm  - No anginal complaints  - Continue Lipitor     - Monitor and replete lytes, keep K>4, Mg>2.  - Will continue to follow.    Ct Warner MS FNP, AGACNP  Nurse Practitioner- Cardiology   Please call on TEAMS     82 y/o M PMH dementia, DM2, HFrEF s/p PPM, HTN, HLD presents with increased agitation.  Admitted for increased paranoia. Consulted for evaluation of hypotension.    Hypotension/HFrEF s/p ppm (8/2023)/HTN, HLD  - Previous TTE (8/2023): EF ~20%.  Repeat: EF 20%, reduced RVF, mild-mod MR  - BP remains low in 90s -110s but more stable  - Continue Toprol XL and ARNI  - Continue home Midodrine  - No obvious evidence of volume overload  - Goal is GDMT. Resume Aldactone when SBP remains stable at ~110  - Continue holding Lasix    - EKG shows Atrial sensed v-paced rhythm  - No anginal complaints  - Continue Lipitor     - Monitor and replete lytes, keep K>4, Mg>2.  - Will continue to follow.    Ct Warner, MS FNP, AGACNP  Nurse Practitioner- Cardiology   Please call on TEAMS     82 y/o M PMH dementia, DM2, HFrEF s/p PPM, HTN, HLD presents with increased agitation.  Admitted for increased paranoia. Consulted for evaluation of hypotension.    Hypotension/HFrEF s/p ppm (8/2023)/HTN, HLD  - Previous TTE (8/2023): EF ~20%.  Repeat: EF 20%, reduced RVF, mild-mod MR  - BP remains low in 90s -120s but more stable  - Continue Toprol XL and ARNI  - Continue home Midodrine  - No obvious evidence of volume overload  - Goal is GDMT  - Resume Aldactone when SBP remains stable at ~110  - Would attempt to resume low dose lasix 20 mg if BP remains stable     - EKG shows Atrial sensed v-paced rhythm  - No anginal complaints  - Continue Lipitor     - Lovenox for DVT ppx   - Monitor and replete lytes, keep K>4, Mg>2.  - Will continue to follow.    Ct Warner, MS FNP, AGACNP  Nurse Practitioner- Cardiology   Please call on TEAMS

## 2023-10-08 NOTE — PROGRESS NOTE ADULT - SUBJECTIVE AND OBJECTIVE BOX
Patient is a 83y old  Male who presents with a chief complaint of paranoia (07 Oct 2023 15:18)    HPI:  84y/o M PMH dementia, HFrEF s/p PPM, HTN, HLD  presents with increased agitation. History obtained from patient's daughter Arianne over phone.   Patient moved from [place] to [place] ~2months ago and has been having issues with increased paranoia for the past month. He stays at a [facility] older assisted living community where he has been having several episodes of sundowning.   Recently he was hospitalized at [facility] for same issues from 9/1-914/23. Daughter was unable to provide sufficient history regarding hospitalization.   Patient's son, Jose has been taking care of him and knows more about his medical hx however patient's daughter states she is taking over now and did not want to bother Jose at this time.  Patient was also recently seen at [facility] ED for episodes of agitation and was told to follow up with outpatient Psychiatrist but daughter states that likely did not happened because of his increased agitation.  Patient became agitated today and staff had to call 911 for further evaluation. The facility as well as his children feel that his current psych medication regimen is not sufficient to keep him calm.   Per review of recent dispensary history, he was seeing a psychiatric NP (Barbara Bravo) in [place], [place].   Of note, he had placement of PPM in late August at [facility]. Per his daughter, his LVEF at that time was 20% before and after PPM placement.  Currently, patient states his b/l legs feel itchy but otherwise he feels fine and is agreeable.  Denies fevers, chills. sob, cp, n/v/d.    ED Course   T 97.4 F  /79 RR 18 SpO2 95% on RA  Labs H/H 12.1/37.5 Glu   UA: >1000 glucose  UTox: negaitve     In the ED, given tylenol 650mg x1 , zyprexa 5mg IVP x1  (20 Sep 2023 21:58)    INTERVAL HPI:  9/21: Pt seen and examined, sitting in chair with 1 to 1 present. Patient expressed concern about not receiving his pantoprazole, stating he cant digest his food without it, denies n/v, epigastric pain or burning. When asked about why pt came to hospital, states that he was being followed and he called the police to come help him. Says he lives with his son Jose. EMS brought patient in from  and over [facility]. Additionally states that one pill he was given this morning by staff was "phony" so he didn't take it. Contacted pt's pharmacy [facility], numerous scripts filled in [place] and [place]. Per night team sign out, daughter states that pt has been in and out of many ERs and has been started on many different medications so there are a lot of recent meds that he does not currently take to the best of her knowledge. Per daughter, her brothers are tired of assisting with their father given history of difficulties with him. She is willing to assist but does not want to be too involved and asks us to not contact her brothers. Psychiatry consulted and to see patient. JOHN/VAISHALI updated.   Soren Krishnan [phone]  9/22: Pt seen and examined at bedside. xyprexa dose increased to 5mg bedtime and 2.5mg in AM starting yesterday. Marked improvement in demeanor. A&Ox3 (person, time, place). States he is in a "police hospital" and that he came here because he asked some man in their car to call the police because he was being followed by an aide that his son hired. Said he came from "[place] [id number] and over [facility]. No complaints or concerns today. States he has to get things ready to move back to [place]. Psych is following. DSC planning pending placement as his previous community does not want pt to return and family does not want to take patient. Replace PO K   9/23: Pt seen and examined at bedside. Depakote increased to 500mg PO BID, amitriptyline stopped as per psych recs. Pt with no complaints or concerns. Pleasant on encounter, says he is doing well and thanks provider for checking in on him.  Reports that he is waiting to go home. Patient informed we are working to get him out of the hospital. DSC planning pending placement. JOHN sent referral to [facility]. Awaiting decision.   9/24:  Pt seen and examined at bedside. Pt reports that is feeling well and does not have any symptoms, pain or concern. Pt is tolerating PO and eating well.  Pt is cooperative with evaluation and pleasant.  Waiting for placement for dc. Awaiting placement.   9/25: Pt seen and examined at bedside. Reports last night he was looking for 3 medications that were started in the hospital at night. Reassured that he is receiving all the proper medications he needs. Appetite is good. Pt has no complaints at this time. Now off 1:1.  9/26: Pt seen and examined at bedside. Was not agitated overnight. Pt is very tired this morning. Reports chronic low back pain, but is not concerned. Otherwise he has no active complaints at  this time. Denies chest pain, abdominal pain, SOB. D/W Son Mykel today   9/27: Pt seen and examined at bedside. No agitation overnight. Pt is tired this morning. Has no complaints other than his chronic lower back pain. Denies chest pain, abdominal pain SOB. Pt wants to go home. D/W Son Angelito today   9/28: Pt seen and examined at bedside. No agitation overnight. Pt is tired this morning and wants to sleep. No complaints other than his chronic low back pain. Denies chest pain, SOB, abdominal pain. Pt wants to go.  9/29: Pt seen and examined at bedside. No agitation. Pt is more awake. Continues to report chronic low back pain. No acute complaints at this time. Denies chest pain, SOB, abdominal pain. Wants to go.Social work for d/c planning when bed is available.  9/30: Pt seen and examined at bedside. No agitation overnight. Pt is AAOx1. Knows he is in a hospital but unsure which one. Patient is pleasant and has no acute complaints at this time. Denies fever, chills, CP, palpitations, SOB, abd pain, nausea, vomiting. Low BP S/P 1 Bolus NS  10/1: Pt seen and examined at bedside. No agitation overnight. Patient has no complaints at this time. Just very tired. Wishes to go home. Denies fever, chills, chest pain, SOB, abdominal pain. stable for d/c  10/2: Patient was seen and examined at bedside. No agitation observed overnight. Patient has no complaints. Wishes to go home. Denies fever, chills, chest pain, SOB, abdominal pain. Pt wants to be d/don home with family, Pt is very Cooperative.  10/3: Pt seen and examined at bedside. Resting comfortably in chair. No agitation overnight. Pt really wants to go home, upset that he has been in the hospital for so long. No acute complaints at this time. Denies fever, chills, chest pain, SOB, abdominal pain. Pt is very cooperative.  10/4: Pt seen examined at bedside. Wants to go home. No new complaints at this time. very cooperative.  10/5: Pt seen and examined at bedside. No new complaints at this time. Denies dizziness at rest, but states it occurs when he gets up too fast. Very cooperative. Ambulating in Hallway , wants to be d/don today   10/6: Pt seen and examined at bedside. No new complaints at this time. Pt Very cooperative. Wants to go home.   10/7: Pt seen and examined at bedside. No new complaints at this time. Pt still very cooperative. Wants to go home.   10/8: Pt seen and examined at bedside. No new complaints at this time. Very cooperative. No agitations. Wants to go home. Denies any HA, dizziness at rest. Reports some dizziness when getting up too quickly.     OVERNIGHT EVENTS: None.    Home Medications:      MEDICATIONS  (STANDING):  ammonium lactate 12% Lotion 1 Application(s) Topical two times a day  atorvastatin 40 milliGRAM(s) Oral at bedtime  bacitracin   Ointment 1 Application(s) Topical every 8 hours  budesonide  80 MICROgram(s)/formoterol 4.5 MICROgram(s) Inhaler 2 Puff(s) Inhalation two times a day  dextrose 5%. 1000 milliLiter(s) (50 mL/Hr) IV Continuous <Continuous>  dextrose 5%. 1000 milliLiter(s) (100 mL/Hr) IV Continuous <Continuous>  dextrose 50% Injectable 25 Gram(s) IV Push once  dextrose 50% Injectable 12.5 Gram(s) IV Push once  dextrose 50% Injectable 25 Gram(s) IV Push once  divalproex  milliGRAM(s) Oral two times a day  DULoxetine 30 milliGRAM(s) Oral <User Schedule>  enoxaparin Injectable 40 milliGRAM(s) SubCutaneous every 24 hours  gentamicin 0.3% Ophthalmic Solution 1 Drop(s) Both EYES five times a day  glucagon  Injectable 1 milliGRAM(s) IntraMuscular once  insulin lispro (ADMELOG) corrective regimen sliding scale   SubCutaneous at bedtime  insulin lispro (ADMELOG) corrective regimen sliding scale   SubCutaneous three times a day before meals  methyl salicylate 15%/menthol 10% Topical Cream 1 Application(s) Topical three times a day  metoprolol succinate ER 25 milliGRAM(s) Oral daily  midodrine. 7.5 milliGRAM(s) Oral three times a day  OLANZapine Disintegrating Tablet 5 milliGRAM(s) Oral two times a day  OLANZapine Disintegrating Tablet 10 milliGRAM(s) Oral at bedtime  pantoprazole    Tablet 40 milliGRAM(s) Oral before breakfast  sacubitril 24 mG/valsartan 26 mG 0.5 Tablet(s) Oral two times a day  sodium chloride 0.9%. 1000 milliLiter(s) (250 mL/Hr) IV Continuous <Continuous>  tiotropium 2.5 MICROgram(s) Inhaler 2 Puff(s) Inhalation daily    MEDICATIONS  (PRN):  acetaminophen     Tablet .. 650 milliGRAM(s) Oral every 6 hours PRN Temp greater or equal to 38C (100.4F), Mild Pain (1 - 3)  albuterol    0.083% 2.5 milliGRAM(s) Nebulizer every 6 hours PRN Shortness of Breath and/or Wheezing  dextrose Oral Gel 15 Gram(s) Oral once PRN Blood Glucose LESS THAN 70 milliGRAM(s)/deciliter  melatonin 5 milliGRAM(s) Oral at bedtime PRN Insomnia      Allergies    No Known Allergies    Intolerances        Social History:  Tobacco: former , quit >30 years ago  EtOH: social drinker  Recreational drug use: denies   Lives with: Assisted Living Facility  Ambulates: without assistance  ADLs: needs assistance (20 Sep 2023 21:58)      REVIEW OF SYSTEMS:  CONSTITUTIONAL: No fever, No chills, No fatigue, No myalgia, No Body ache, No Weakness  EYES: No eye pain,  No visual disturbances, No discharge, NO Redness  ENMT:  No ear pain, No nose bleed, No vertigo; No sinus pain, NO throat pain, No Congestion  NECK: No pain, No stiffness  RESPIRATORY: No cough, NO wheezing, No  hemoptysis, NO  shortness of breath  CARDIOVASCULAR: No chest pain, palpitations  GASTROINTESTINAL: No abdominal pain, NO epigastric pain. No nausea, No vomiting; No diarrhea, No constipation. [ x ] BM  GENITOURINARY: No dysuria, No frequency, No urgency, No hematuria, NO incontinence  NEUROLOGICAL: No headaches, No dizziness, No numbness, No tingling, No tremors, No weakness  EXT: No Swelling, No Pain, No Edema  SKIN:  [  ] No itching, burning, rashes, or lesions _+ Scab on Hands  MUSCULOSKELETAL: No joint pain ,No Jt swelling; No muscle pain, No back pain, No extremity pain  PSYCHIATRIC: No depression,  No anxiety,  No mood swings ,No difficulty sleeping at night  PAIN SCALE: [x  ] None  [  ] Other-  ROS Unable to obtain due to - [  ] Dementia  [  ] Lethargy [  ] Drowsy [  ] Sedated [  ] non verbal  REST OF REVIEW Of SYSTEM - [x  ] Normal     Vital Signs Last 24 Hrs  T(C): 36.4 (08 Oct 2023 05:37), Max: 36.7 (07 Oct 2023 12:15)  T(F): 97.5 (08 Oct 2023 05:37), Max: 98 (07 Oct 2023 12:15)  HR: 91 (08 Oct 2023 05:37) (79 - 91)  BP: 111/66 (08 Oct 2023 05:37) (92/62 - 111/66)  BP(mean): --  RR: 17 (08 Oct 2023 05:37) (17 - 18)  SpO2: 92% (08 Oct 2023 05:37) (91% - 96%)    Parameters below as of 08 Oct 2023 05:37  Patient On (Oxygen Delivery Method): room air      Finger Stick          PHYSICAL EXAM:  GENERAL:  [ x ] NAD , [ x ] well appearing, [  ] Agitated, [  ] Drowsy,  [  ] Lethargy, [  ] confused   HEAD:  [ x ] Normal, [  ] Other  EYES:  [ x ] EOMI, [ x ] PERRLA, [ x ] conjunctiva and sclera clear normal, [  ] Other,  [  ] Pallor,[  ] Discharge  ENMT:  [ x ] Normal, [ x ] Moist mucous membranes, [ x ] Good dentition, [ x ] No Thrush  NECK:  [ x ] Supple, [ x ] No JVD, [ x ] Normal thyroid, [  ] Lymphadenopathy [  ] Other  CHEST/LUNG:  [ x ] Clear to auscultation bilaterally, [ x ] Breath Sounds equal B/L / Decrease, [  ] poor effort  [ x ] No rales, [x  ] No rhonchi  [ x ]  No wheezing,   HEART:  [ x ] Regular rate and rhythm, [  ] tachycardia, [  ] Bradycardia,  [  ] irregular  [x  ] No murmurs, No rubs, No gallops, [  ] PPM in place (Mfr:  )  ABDOMEN:  [ x ] Soft, [ x ] Nontender, [ x ] Nondistended, [ x ] No mass, [ x ] Bowel sounds present, [ x ] obese  NERVOUS SYSTEM:  [ x ] Alert & Oriented X 2- 3, [ x ] Nonfocal  [  ] Confusion  [  ] Encephalopathic [  ] Sedated [  ] Unable to assess, [  ] Dementia [ x ] Other-Forgetful  EXTREMITIES: [ x ] 2+ Peripheral Pulses, No clubbing, No cyanosis,  [ x ] Ankle very minimal pitting  edema B/L lower EXT. [ x ]  Severe PVD stasis skin changes B/L Lower EXT, [ x ] wound- B/L Hand -small multiple open wound-   LYMPH: No lymphadenopathy noted  SKIN:  [  ] No rashes or lesions, [  ] Pressure Ulcers, [ x ] ecchymosis- b/l upper Ext , [  ] Skin Tears, [ x ] Other-scabs -small -B/L lower Ext    DIET: Diet, Regular:   Consistent Carbohydrate Evening Snack  DASH/TLC Sodium & Cholesterol Restricted (09-20-23 @ 22:36)      LABS:                                   Anemia Panel:      Thyroid Panel:                RADIOLOGY & ADDITIONAL TESTS:  None    HEALTH ISSUES - PROBLEM Dx:  Paranoia    HFrEF (heart failure with reduced ejection fraction)    HLD (hyperlipidemia)    Need for prophylactic measure    Medication management    DM2 (diabetes mellitus, type 2)    Anemia    Hypotension            Consultant(s) Notes Reviewed:  [ x ] YES     Care Discussed with [X] Consultants  [ x ] Patient  [  ] Family [  ] HCP [  ]   [ x ] Social Service  [x  ] RN, [  ] Physical Therapy,[  ] Palliative care team  DVT PPX: [ x ] Lovenox, [  ] S C Heparin, [  ] Coumadin, [  ] Xarelto, [  ] Eliquis, [  ] Pradaxa, [  ] IV Heparin drip, [  ] SCD [  ] Contraindication 2 to GI Bleed,[  ] Ambulation [  ] Contraindicated 2 to  bleed [  ] Contraindicated 2 to Brain Bleed  Advanced directive: [ x ] None, [  ] DNR/DNI Patient is a 83y old  Male who presents with a chief complaint of paranoia (07 Oct 2023 15:18)    HPI:  84y/o M PMH dementia, HFrEF s/p PPM, HTN, HLD  presents with increased agitation. History obtained from patient's daughter Arianne over phone.   Patient moved from [place] to [place] ~2months ago and has been having issues with increased paranoia for the past month. He stays at a 55 [facility] older assisted living community where he has been having several episodes of sundowning.   Recently he was hospitalized at [facility] for same issues from 9/1-914/23. Daughter was unable to provide sufficient history regarding hospitalization.   Patient's son, Jose has been taking care of him and knows more about his medical hx however patient's daughter states she is taking over now and did not want to bother Jose at this time.  Patient was also recently seen at [facility] ED for episodes of agitation and was told to follow up with outpatient Psychiatrist but daughter states that likely did not happened because of his increased agitation.  Patient became agitated today and staff had to call 911 for further evaluation. The facility as well as his children feel that his current psych medication regimen is not sufficient to keep him calm.   Per review of recent dispensary history, he was seeing a psychiatric NP (Barbara Bravo) in [place], [place].   Of note, he had placement of PPM in late August at [facility]. Per his daughter, his LVEF at that time was 20% before and after PPM placement.  Currently, patient states his b/l legs feel itchy but otherwise he feels fine and is agreeable.  Denies fevers, chills. sob, cp, n/v/d.    ED Course   T 97.4 F  /79 RR 18 SpO2 95% on RA  Labs H/H 12.1/37.5 Glu   UA: >1000 glucose  UTox: negaitve     In the ED, given tylenol 650mg x1 , zyprexa 5mg IVP x1  (20 Sep 2023 21:58)    INTERVAL HPI:  9/21: Pt seen and examined, sitting in chair with 1 to 1 present. Patient expressed concern about not receiving his pantoprazole, stating he cant digest his food without it, denies n/v, epigastric pain or burning. When asked about why pt came to hospital, states that he was being followed and he called the police to come help him. Says he lives with his son Jose. EMS brought patient in from  and over [facility]. Additionally states that one pill he was given this morning by staff was "phony" so he didn't take it. Contacted pt's pharmacy [facility], numerous scripts filled in [place] and [place]. Per night team sign out, daughter states that pt has been in and out of many ERs and has been started on many different medications so there are a lot of recent meds that he does not currently take to the best of her knowledge. Per daughter, her brothers are tired of assisting with their father given history of difficulties with him. She is willing to assist but does not want to be too involved and asks us to not contact her brothers. Psychiatry consulted and to see patient. JOHN/VAISHALI updated.   Soren Krishnan [phone]  9/22: Pt seen and examined at bedside. xyprexa dose increased to 5mg bedtime and 2.5mg in AM starting yesterday. Marked improvement in demeanor. A&Ox3 (person, time, place). States he is in a "police hospital" and that he came here because he asked some man in their car to call the police because he was being followed by an aide that his son hired. Said he came from "[place] [id number] and over [facility]. No complaints or concerns today. States he has to get things ready to move back to [place]. Psych is following. DSC planning pending placement as his previous community does not want pt to return and family does not want to take patient. Replace PO K   9/23: Pt seen and examined at bedside. Depakote increased to 500mg PO BID, amitriptyline stopped as per psych recs. Pt with no complaints or concerns. Pleasant on encounter, says he is doing well and thanks provider for checking in on him.  Reports that he is waiting to go home. Patient informed we are working to get him out of the hospital. DSC planning pending placement. JOHN sent referral to [facility]. Awaiting decision.   9/24:  Pt seen and examined at bedside. Pt reports that is feeling well and does not have any symptoms, pain or concern. Pt is tolerating PO and eating well.  Pt is cooperative with evaluation and pleasant.  Waiting for placement for dc. Awaiting placement.   9/25: Pt seen and examined at bedside. Reports last night he was looking for 3 medications that were started in the hospital at night. Reassured that he is receiving all the proper medications he needs. Appetite is good. Pt has no complaints at this time. Now off 1:1.  9/26: Pt seen and examined at bedside. Was not agitated overnight. Pt is very tired this morning. Reports chronic low back pain, but is not concerned. Otherwise he has no active complaints at  this time. Denies chest pain, abdominal pain, SOB. D/W Son Mykel today   9/27: Pt seen and examined at bedside. No agitation overnight. Pt is tired this morning. Has no complaints other than his chronic lower back pain. Denies chest pain, abdominal pain SOB. Pt wants to go home. D/W Son Angelito today   9/28: Pt seen and examined at bedside. No agitation overnight. Pt is tired this morning and wants to sleep. No complaints other than his chronic low back pain. Denies chest pain, SOB, abdominal pain. Pt wants to go.  9/29: Pt seen and examined at bedside. No agitation. Pt is more awake. Continues to report chronic low back pain. No acute complaints at this time. Denies chest pain, SOB, abdominal pain. Wants to go.Social work for d/c planning when bed is available.  9/30: Pt seen and examined at bedside. No agitation overnight. Pt is AAOx1. Knows he is in a hospital but unsure which one. Patient is pleasant and has no acute complaints at this time. Denies fever, chills, CP, palpitations, SOB, abd pain, nausea, vomiting. Low BP S/P 1 Bolus NS  10/1: Pt seen and examined at bedside. No agitation overnight. Patient has no complaints at this time. Just very tired. Wishes to go home. Denies fever, chills, chest pain, SOB, abdominal pain. stable for d/c  10/2: Patient was seen and examined at bedside. No agitation observed overnight. Patient has no complaints. Wishes to go home. Denies fever, chills, chest pain, SOB, abdominal pain. Pt wants to be d/don home with family, Pt is very Cooperative.  10/3: Pt seen and examined at bedside. Resting comfortably in chair. No agitation overnight. Pt really wants to go home, upset that he has been in the hospital for so long. No acute complaints at this time. Denies fever, chills, chest pain, SOB, abdominal pain. Pt is very cooperative.  10/4: Pt seen examined at bedside. Wants to go home. No new complaints at this time. very cooperative.  10/5: Pt seen and examined at bedside. No new complaints at this time. Denies dizziness at rest, but states it occurs when he gets up too fast. Very cooperative. Ambulating in Hallway , wants to be d/don today   10/6: Pt seen and examined at bedside. No new complaints at this time. Pt Very cooperative. Wants to go home.   10/7: Pt seen and examined at bedside. No new complaints at this time. Pt still very cooperative. Wants to go home.   10/8: Pt seen and examined at bedside. No new complaints at this time. Very cooperative. No agitations. Wants to go home. Denies any HA, dizziness at rest. Reports some dizziness when getting up too quickly.     OVERNIGHT EVENTS: None.    Home Medications:      MEDICATIONS  (STANDING):  ammonium lactate 12% Lotion 1 Application(s) Topical two times a day  atorvastatin 40 milliGRAM(s) Oral at bedtime  bacitracin   Ointment 1 Application(s) Topical every 8 hours  budesonide  80 MICROgram(s)/formoterol 4.5 MICROgram(s) Inhaler 2 Puff(s) Inhalation two times a day  dextrose 5%. 1000 milliLiter(s) (50 mL/Hr) IV Continuous <Continuous>  dextrose 5%. 1000 milliLiter(s) (100 mL/Hr) IV Continuous <Continuous>  dextrose 50% Injectable 25 Gram(s) IV Push once  dextrose 50% Injectable 12.5 Gram(s) IV Push once  dextrose 50% Injectable 25 Gram(s) IV Push once  divalproex  milliGRAM(s) Oral two times a day  DULoxetine 30 milliGRAM(s) Oral <User Schedule>  enoxaparin Injectable 40 milliGRAM(s) SubCutaneous every 24 hours  gentamicin 0.3% Ophthalmic Solution 1 Drop(s) Both EYES five times a day  glucagon  Injectable 1 milliGRAM(s) IntraMuscular once  insulin lispro (ADMELOG) corrective regimen sliding scale   SubCutaneous at bedtime  insulin lispro (ADMELOG) corrective regimen sliding scale   SubCutaneous three times a day before meals  methyl salicylate 15%/menthol 10% Topical Cream 1 Application(s) Topical three times a day  metoprolol succinate ER 25 milliGRAM(s) Oral daily  midodrine. 7.5 milliGRAM(s) Oral three times a day  OLANZapine Disintegrating Tablet 5 milliGRAM(s) Oral two times a day  OLANZapine Disintegrating Tablet 10 milliGRAM(s) Oral at bedtime  pantoprazole    Tablet 40 milliGRAM(s) Oral before breakfast  sacubitril 24 mG/valsartan 26 mG 0.5 Tablet(s) Oral two times a day  sodium chloride 0.9%. 1000 milliLiter(s) (250 mL/Hr) IV Continuous <Continuous>  tiotropium 2.5 MICROgram(s) Inhaler 2 Puff(s) Inhalation daily    MEDICATIONS  (PRN):  acetaminophen     Tablet .. 650 milliGRAM(s) Oral every 6 hours PRN Temp greater or equal to 38C (100.4F), Mild Pain (1 - 3)  albuterol    0.083% 2.5 milliGRAM(s) Nebulizer every 6 hours PRN Shortness of Breath and/or Wheezing  dextrose Oral Gel 15 Gram(s) Oral once PRN Blood Glucose LESS THAN 70 milliGRAM(s)/deciliter  melatonin 5 milliGRAM(s) Oral at bedtime PRN Insomnia      Allergies    No Known Allergies    Intolerances        Social History:  Tobacco: former , quit >30 years ago  EtOH: social drinker  Recreational drug use: denies   Lives with: Assisted Living Facility  Ambulates: without assistance  ADLs: needs assistance (20 Sep 2023 21:58)      REVIEW OF SYSTEMS:  CONSTITUTIONAL: No fever, No chills, No fatigue, No myalgia, No Body ache, No Weakness  EYES: No eye pain,  No visual disturbances, No discharge, NO Redness  ENMT:  No ear pain, No nose bleed, No vertigo; No sinus pain, NO throat pain, No Congestion  NECK: No pain, No stiffness  RESPIRATORY: No cough, NO wheezing, No  hemoptysis, NO  shortness of breath  CARDIOVASCULAR: No chest pain, palpitations  GASTROINTESTINAL: No abdominal pain, NO epigastric pain. No nausea, No vomiting; No diarrhea, No constipation. [ x ] BM  GENITOURINARY: No dysuria, No frequency, No urgency, No hematuria, NO incontinence  NEUROLOGICAL: No headaches, No dizziness, No numbness, No tingling, No tremors, No weakness  EXT: No Swelling, No Pain, No Edema  SKIN:  [x  ] No itching, burning, rashes, or lesions _+ Scab on Hands  MUSCULOSKELETAL: No joint pain ,No Jt swelling; No muscle pain, No back pain, No extremity pain  PSYCHIATRIC: No depression,  No anxiety,  No mood swings ,No difficulty sleeping at night  PAIN SCALE: [x  ] None  [  ] Other-  ROS Unable to obtain due to - [  ] Dementia  [  ] Lethargy [  ] Drowsy [  ] Sedated [  ] non verbal  REST OF REVIEW Of SYSTEM - [x  ] Normal     Vital Signs Last 24 Hrs  T(C): 36.4 (08 Oct 2023 05:37), Max: 36.7 (07 Oct 2023 12:15)  T(F): 97.5 (08 Oct 2023 05:37), Max: 98 (07 Oct 2023 12:15)  HR: 91 (08 Oct 2023 05:37) (79 - 91)  BP: 111/66 (08 Oct 2023 05:37) (92/62 - 111/66)  BP(mean): --  RR: 17 (08 Oct 2023 05:37) (17 - 18)  SpO2: 92% (08 Oct 2023 05:37) (91% - 96%)    Parameters below as of 08 Oct 2023 05:37  Patient On (Oxygen Delivery Method): room air      Finger Stick          PHYSICAL EXAM:  GENERAL:  [ x ] NAD , [ x ] well appearing, [  ] Agitated, [  ] Drowsy,  [  ] Lethargy, [  ] confused   HEAD:  [ x ] Normal, [  ] Other  EYES:  [ x ] EOMI, [ x ] PERRLA, [ x ] conjunctiva and sclera clear normal, [  ] Other,  [  ] Pallor,[  ] Discharge  ENMT:  [ x ] Normal, [ x ] Moist mucous membranes, [ x ] Good dentition, [ x ] No Thrush  NECK:  [ x ] Supple, [ x ] No JVD, [ x ] Normal thyroid, [  ] Lymphadenopathy [  ] Other  CHEST/LUNG:  [ x ] Clear to auscultation bilaterally, [ x ] Breath Sounds equal B/L / Decrease, [  ] poor effort  [ x ] No rales, [x  ] No rhonchi  [ x ]  No wheezing,   HEART:  [ x ] Regular rate and rhythm, [  ] tachycardia, [  ] Bradycardia,  [  ] irregular  [x  ] No murmurs, No rubs, No gallops, [  ] PPM in place (Mfr:  )  ABDOMEN:  [ x ] Soft, [ x ] Nontender, [ x ] Nondistended, [ x ] No mass, [ x ] Bowel sounds present, [ x ] obese  NERVOUS SYSTEM:  [ x ] Alert & Oriented X 2- 3, [ x ] Nonfocal  [  ] Confusion  [  ] Encephalopathic [  ] Sedated [  ] Unable to assess, [  ] Dementia [ x ] Other-Forgetful  EXTREMITIES: [ x ] 2+ Peripheral Pulses, No clubbing, No cyanosis,  [ x ] Ankle very minimal pitting  edema B/L lower EXT. [ x ]  Severe PVD stasis skin changes B/L Lower EXT, [ x ] wound- B/L Hand -small multiple open wound-   LYMPH: No lymphadenopathy noted  SKIN:  [  ] No rashes or lesions, [  ] Pressure Ulcers, [ x ] ecchymosis- b/l upper Ext , [  ] Skin Tears, [ x ] Other-scabs -small -B/L lower Ext    DIET: Diet, Regular:   Consistent Carbohydrate Evening Snack  DASH/TLC Sodium & Cholesterol Restricted (09-20-23 @ 22:36)      LABS:          RADIOLOGY & ADDITIONAL TESTS:  None    HEALTH ISSUES - PROBLEM Dx:  Paranoia    HFrEF (heart failure with reduced ejection fraction)    HLD (hyperlipidemia)    Need for prophylactic measure    Medication management    DM2 (diabetes mellitus, type 2)    Anemia    Hypotension            Consultant(s) Notes Reviewed:  [ x ] YES     Care Discussed with [X] Consultants  [ x ] Patient  [  ] Family [  ] HCP [  ]   [ x ] Social Service  [x  ] RN, [  ] Physical Therapy,[  ] Palliative care team  DVT PPX: [ x ] Lovenox, [  ] S C Heparin, [  ] Coumadin, [  ] Xarelto, [  ] Eliquis, [  ] Pradaxa, [  ] IV Heparin drip, [  ] SCD [  ] Contraindication 2 to GI Bleed,[  ] Ambulation [  ] Contraindicated 2 to  bleed [  ] Contraindicated 2 to Brain Bleed  Advanced directive: [ x ] None, [  ] DNR/DNI

## 2023-10-08 NOTE — PROGRESS NOTE ADULT - SUBJECTIVE AND OBJECTIVE BOX
[facility] -- Nicolle Townsend Pannella, Patel, Savella, Goodger, Cohen: Office # [phone]    Follow Up:  Hypotension    Subjective/Observations: Patient awake, alert, OOB to chair. Denies chest pain, palpitations and dizziness. Denies any difficulty breathing. No orthopnea and PND. Tolerating room air.     REVIEW OF SYSTEMS: All other review of systems are negative unless indicated above    PAST MEDICAL & SURGICAL HISTORY:  HFrEF (heart failure with reduced ejection fraction)      HTN (hypertension)      HLD (hyperlipidemia)      Type 2 diabetes mellitus      Pacemaker    MEDICATIONS  (STANDING):  ammonium lactate 12% Lotion 1 Application(s) Topical two times a day  atorvastatin 40 milliGRAM(s) Oral at bedtime  bacitracin   Ointment 1 Application(s) Topical every 8 hours  budesonide  80 MICROgram(s)/formoterol 4.5 MICROgram(s) Inhaler 2 Puff(s) Inhalation two times a day  dextrose 5%. 1000 milliLiter(s) (50 mL/Hr) IV Continuous <Continuous>  dextrose 5%. 1000 milliLiter(s) (100 mL/Hr) IV Continuous <Continuous>  dextrose 50% Injectable 25 Gram(s) IV Push once  dextrose 50% Injectable 12.5 Gram(s) IV Push once  dextrose 50% Injectable 25 Gram(s) IV Push once  divalproex  milliGRAM(s) Oral two times a day  DULoxetine 30 milliGRAM(s) Oral <User Schedule>  enoxaparin Injectable 40 milliGRAM(s) SubCutaneous every 24 hours  gentamicin 0.3% Ophthalmic Solution 1 Drop(s) Both EYES five times a day  glucagon  Injectable 1 milliGRAM(s) IntraMuscular once  insulin lispro (ADMELOG) corrective regimen sliding scale   SubCutaneous three times a day before meals  insulin lispro (ADMELOG) corrective regimen sliding scale   SubCutaneous at bedtime  methyl salicylate 15%/menthol 10% Topical Cream 1 Application(s) Topical three times a day  metoprolol succinate ER 25 milliGRAM(s) Oral daily  midodrine. 7.5 milliGRAM(s) Oral three times a day  OLANZapine Disintegrating Tablet 5 milliGRAM(s) Oral two times a day  OLANZapine Disintegrating Tablet 10 milliGRAM(s) Oral at bedtime  pantoprazole    Tablet 40 milliGRAM(s) Oral before breakfast  sacubitril 24 mG/valsartan 26 mG 0.5 Tablet(s) Oral two times a day  sodium chloride 0.9%. 1000 milliLiter(s) (250 mL/Hr) IV Continuous <Continuous>  tiotropium 2.5 MICROgram(s) Inhaler 2 Puff(s) Inhalation daily    MEDICATIONS  (PRN):  acetaminophen     Tablet .. 650 milliGRAM(s) Oral every 6 hours PRN Temp greater or equal to 38C (100.4F), Mild Pain (1 - 3)  albuterol    0.083% 2.5 milliGRAM(s) Nebulizer every 6 hours PRN Shortness of Breath and/or Wheezing  dextrose Oral Gel 15 Gram(s) Oral once PRN Blood Glucose LESS THAN 70 milliGRAM(s)/deciliter  melatonin 5 milliGRAM(s) Oral at bedtime PRN Insomnia    Allergies    No Known Allergies    Intolerances      Vital Signs Last 24 Hrs  T(C): 36.4 (08 Oct 2023 11:27), Max: 36.7 (07 Oct 2023 12:15)  T(F): 97.6 (08 Oct 2023 11:27), Max: 98 (07 Oct 2023 12:15)  HR: 85 (08 Oct 2023 11:27) (79 - 91)  BP: 99/61 (08 Oct 2023 11:27) (92/62 - 111/66)  BP(mean): --  RR: 18 (08 Oct 2023 11:27) (17 - 18)  SpO2: 91% (08 Oct 2023 11:27) (91% - 96%)    Parameters below as of 08 Oct 2023 11:27  Patient On (Oxygen Delivery Method): room air      I&O's Summary        TELE: Not on telemetry   PHYSICAL EXAM:  Constitutional: NAD, awake and alert  HEENT: Moist Mucous Membranes, Anicteric  Pulmonary: Non-labored, breath sounds are clear bilaterally, No wheezing, rales or rhonchi  Cardiovascular: Regular, S1 and S2, + murmurs, No rubs, gallops or clicks  Gastrointestinal:  soft, nontender, nondistended   Lymph: No peripheral edema. No lymphadenopathy.   Skin: No visible rashes or ulcers.  Psych:  Mood & affect appropriate    LABS: All Labs Reviewed:                        12.5   5.79  )-----------( 114      ( 06 Oct 2023 07:50 )             37.5     06 Oct 2023 07:50    141    |  105    |  33     ----------------------------<  97     3.5     |  30     |  1.00     Ca    9.1        06 Oct 2023 07:50    TPro  6.9    /  Alb  3.5    /  TBili  1.1    /  DBili  x      /  AST  21     /  ALT  22     /  AlkPhos  62     06 Oct 2023 07:50     Triiodothyronine, Total (T3 Total): 104 ng/dL (09-21-23 @ 06:55)    12 Lead ECG:   Ventricular Rate 97 BPM    Atrial Rate 97 BPM    P-R Interval 178 ms    QRS Duration 140 ms    Q-T Interval 396 ms    QTC Calculation(Bazett) 502 ms    P Axis 64 degrees    R Axis -75 degrees    T Axis 89 degrees    Diagnosis Line Atrial-sensed ventricular-paced rhythm  Biventricular pacemaker detected  Confirmed by SAGAR PEDERSON ([id number]) on 9/21/2023 12:10:56 PM (09-20-23 @ 19:35)      TRANSTHORACIC ECHOCARDIOGRAM REPORT  ________________________________________________________________________________                                      _______       Pt. Name:       ALESSANDRO HERNANDEZ Study Date:    10/4/2023  MRN:            [id number]         YOB: 1940  Accession #:    [id number]         Age:           83 years  Account#:       [id number]        Gender:        M  Heart Rate:                       Height:        70.08 in (178.00 cm)  Rhythm:     Weight:        189.59 lb (86.00 kg)  Blood Pressure: 84/52 mmHg        BSA/BMI:       2.04 m² / 27.14 kg/m²  ________________________________________________________________________________________  Referring Physician:    [id number] Andrés Bauer  Interpreting Physician: Magdalena Owens MD  Primary Sonographer:    Swati Faustin CS    CPT:               ECHO TTE WO CON COMP W DOPP - 94424.m  Indication(s):     Heart failure, unspecified - I50.9  Procedure:         Transthoracic echocardiogram with 2-D, M-mode and complete                     spectral and color flow Doppler.  Ordering Location: [facility]    _______________________________________________________________________________________     CONCLUSIONS:      1. Left ventricular cavity is moderately dilated. Left ventricular systolic function is severely decreased with an ejection fraction visually estimated at 20 %   2. Normal right ventricular cavity size and reduced systolic function.   3. Device lead is visualized in the right heart.   4. The aortic valve is calcified without severe valvular pathology.   5. Trace aortic regurgitation.   6. Mild to moderate mitral regurgitation.   7. Trace tricuspid regurgitation.    ________________________________________________________________________________________  FINDINGS:     Left Ventricle:  The left ventricular cavity is moderately dilated. Left ventricular systolic function is severely decreased with an ejection fraction visually estimated at 20%. Severely dilated left ventricular systolic cavity size. The left ventricular diastolic function is indeterminate.     Right Ventricle:  The right ventricular cavity is normal in size and reduced systolic function. A device lead is visualized in the right heart.     Left Atrium:  The left atrium is normal in size with an indexed volume of 33.15 ml/m².     Right Atrium:  The right atrium is normal in size with an indexed volume of 21.89 ml/m².     Aortic Valve:  The aortic valve is calcified without severe valvular pathology. There is trace aortic regurgitation.     Mitral Valve:  There is mitral valve thickening of the anterior and posterior leaflets. There is mild to moderate mitral regurgitation.     Tricuspid Valve:  Structurally normal tricuspid valve with normal leaflet excursion. There is trace tricuspid regurgitation. Estimated pulmonary artery systolic pressure is 18 mmHg.     Pulmonic Valve:  The pulmonic valve was not well visualized.     Aorta:  The aortic root at the sinuses of Valsalva is normal in size, measuring 3.30 cm (indexed 1.62 cm/m²).  ____________________________________________________________________  Quantitative Data:  Left Ventricle Measurements: (Indexed to BSA)     IVSd (2D):   1.0 cm  LVPWd (2D):  0.9 cm  LVIDd (2D):  6.4 cm  LVIDs (2D):  5.6 cm  LV Mass:     254 g  124.3 g/m²  Visualized LV EF%: 20%     MV E Vmax:    0.78 m/s  MV A Vmax:    1.13 m/s  MV E/A:       0.69  e' lateral:   5.66 cm/s  e' medial:    7.72 cm/s  E/e' lateral: 13.80  E/e' medial:  10.12  E/e' Average: 11.67  MV DT:        190 msec    Aorta Measurements: (normal range) (Indexed to BSA)     Sinuses of Valsalva: 3.30 cm (3.1 - 3.7 cm)       Left Atrium Measurements: (Indexed to BSA)  LA Diam 2D: 3.90 cm    Right Ventricle Measurements: Right Atrial Measurements:     RV Base (RVID1):  3.5 cm      RA Vol:       44.70 ml  RV Mid (RVID2):   2.7 cm      RA Vol Index: 21.89 ml/m²  RV Major (RVID3): 9.6 cm       LVOT / RVOT/ Qp/Qs Data: (Indexed to BSA)  LVOT Vmax: 1.09 m/s    Aortic Valve Measurements:  AV Vmax:          1.9 m/s  AV Peak Gradient: 14.0 mmHg    Mitral Valve Measurements:     MV E Vmax: 0.8 m/s         MR Vmax:          4.57 m/s  MV A Vmax: 1.1 m/s         MR Peak Gradient: 83.5 mmHg  MV E/A:    0.7       Tricuspid Valve Measurements:     TR Vmax:          1.9 m/s  TR Peak Gradient: 14.6 mmHg  PASP:             18 mmHg    ________________________________________________________________________________________  Electronically signed on 10/5/2023 at 8:26:35 AM by Magdalena Owens MD         *** Final ***   [facility] -- Nicolle Townsend Pannella, Patel, Savella, Goodger, Cohen: Office # [phone]    Follow Up:  Hypotension    Subjective/Observations: Patient awake, alert, OOB to chair. Denies chest pain, palpitations and dizziness. Denies any difficulty breathing. No orthopnea and PND. Tolerating room air.     REVIEW OF SYSTEMS: All other review of systems are negative unless indicated above    PAST MEDICAL & SURGICAL HISTORY:  HFrEF (heart failure with reduced ejection fraction)      HTN (hypertension)      HLD (hyperlipidemia)      Type 2 diabetes mellitus      Pacemaker    MEDICATIONS  (STANDING):  ammonium lactate 12% Lotion 1 Application(s) Topical two times a day  atorvastatin 40 milliGRAM(s) Oral at bedtime  bacitracin   Ointment 1 Application(s) Topical every 8 hours  budesonide  80 MICROgram(s)/formoterol 4.5 MICROgram(s) Inhaler 2 Puff(s) Inhalation two times a day  dextrose 5%. 1000 milliLiter(s) (50 mL/Hr) IV Continuous <Continuous>  dextrose 5%. 1000 milliLiter(s) (100 mL/Hr) IV Continuous <Continuous>  dextrose 50% Injectable 25 Gram(s) IV Push once  dextrose 50% Injectable 12.5 Gram(s) IV Push once  dextrose 50% Injectable 25 Gram(s) IV Push once  divalproex  milliGRAM(s) Oral two times a day  DULoxetine 30 milliGRAM(s) Oral <User Schedule>  enoxaparin Injectable 40 milliGRAM(s) SubCutaneous every 24 hours  gentamicin 0.3% Ophthalmic Solution 1 Drop(s) Both EYES five times a day  glucagon  Injectable 1 milliGRAM(s) IntraMuscular once  insulin lispro (ADMELOG) corrective regimen sliding scale   SubCutaneous three times a day before meals  insulin lispro (ADMELOG) corrective regimen sliding scale   SubCutaneous at bedtime  methyl salicylate 15%/menthol 10% Topical Cream 1 Application(s) Topical three times a day  metoprolol succinate ER 25 milliGRAM(s) Oral daily  midodrine. 7.5 milliGRAM(s) Oral three times a day  OLANZapine Disintegrating Tablet 5 milliGRAM(s) Oral two times a day  OLANZapine Disintegrating Tablet 10 milliGRAM(s) Oral at bedtime  pantoprazole    Tablet 40 milliGRAM(s) Oral before breakfast  sacubitril 24 mG/valsartan 26 mG 0.5 Tablet(s) Oral two times a day  sodium chloride 0.9%. 1000 milliLiter(s) (250 mL/Hr) IV Continuous <Continuous>  tiotropium 2.5 MICROgram(s) Inhaler 2 Puff(s) Inhalation daily    MEDICATIONS  (PRN):  acetaminophen     Tablet .. 650 milliGRAM(s) Oral every 6 hours PRN Temp greater or equal to 38C (100.4F), Mild Pain (1 - 3)  albuterol    0.083% 2.5 milliGRAM(s) Nebulizer every 6 hours PRN Shortness of Breath and/or Wheezing  dextrose Oral Gel 15 Gram(s) Oral once PRN Blood Glucose LESS THAN 70 milliGRAM(s)/deciliter  melatonin 5 milliGRAM(s) Oral at bedtime PRN Insomnia    Allergies    No Known Allergies    Intolerances      Vital Signs Last 24 Hrs  T(C): 36.4 (08 Oct 2023 11:27), Max: 36.7 (07 Oct 2023 12:15)  T(F): 97.6 (08 Oct 2023 11:27), Max: 98 (07 Oct 2023 12:15)  HR: 85 (08 Oct 2023 11:27) (79 - 91)  BP: 99/61 (08 Oct 2023 11:27) (92/62 - 111/66)  BP(mean): --  RR: 18 (08 Oct 2023 11:27) (17 - 18)  SpO2: 91% (08 Oct 2023 11:27) (91% - 96%)    Parameters below as of 08 Oct 2023 11:27  Patient On (Oxygen Delivery Method): room air      I&O's Summary        TELE: Not on telemetry   PHYSICAL EXAM:  Constitutional: NAD, awake and alert  HEENT: Moist Mucous Membranes, Anicteric  Pulmonary: Non-labored, breath sounds are clear bilaterally, No wheezing, rales or rhonchi  Cardiovascular: Regular, S1 and S2, + murmurs, No rubs, gallops or clicks  Gastrointestinal:  soft, nontender, nondistended   Lymph: trace peripheral edema. No lymphadenopathy.   Skin: No visible rashes or ulcers.  Psych:  Mood & affect appropriate    LABS: All Labs Reviewed:                        12.5   5.79  )-----------( 114      ( 06 Oct 2023 07:50 )             37.5     06 Oct 2023 07:50    141    |  105    |  33     ----------------------------<  97     3.5     |  30     |  1.00     Ca    9.1        06 Oct 2023 07:50    TPro  6.9    /  Alb  3.5    /  TBili  1.1    /  DBili  x      /  AST  21     /  ALT  22     /  AlkPhos  62     06 Oct 2023 07:50     Triiodothyronine, Total (T3 Total): 104 ng/dL (09-21-23 @ 06:55)    12 Lead ECG:   Ventricular Rate 97 BPM    Atrial Rate 97 BPM    P-R Interval 178 ms    QRS Duration 140 ms    Q-T Interval 396 ms    QTC Calculation(Bazett) 502 ms    P Axis 64 degrees    R Axis -75 degrees    T Axis 89 degrees    Diagnosis Line Atrial-sensed ventricular-paced rhythm  Biventricular pacemaker detected  Confirmed by SAGAR PEDERSON ([id number]) on 9/21/2023 12:10:56 PM (09-20-23 @ 19:35)      TRANSTHORACIC ECHOCARDIOGRAM REPORT  ________________________________________________________________________________                                      _______       Pt. Name:       ALESSANDRO HERNANDEZ Study Date:    10/4/2023  MRN:            [id number]         YOB: 1940  Accession #:    [id number]         Age:           83 years  Account#:       [id number]        Gender:        M  Heart Rate:                       Height:        70.08 in (178.00 cm)  Rhythm:     Weight:        189.59 lb (86.00 kg)  Blood Pressure: 84/52 mmHg        BSA/BMI:       2.04 m² / 27.14 kg/m²  ________________________________________________________________________________________  Referring Physician:    [id number] Andrés Bauer  Interpreting Physician: Magdalena Owens MD  Primary Sonographer:    Swati Faustin CS    CPT:               ECHO TTE WO CON COMP W DOPP - 47361.m  Indication(s):     Heart failure, unspecified - I50.9  Procedure:         Transthoracic echocardiogram with 2-D, M-mode and complete                     spectral and color flow Doppler.  Ordering Location: [facility]    _______________________________________________________________________________________     CONCLUSIONS:      1. Left ventricular cavity is moderately dilated. Left ventricular systolic function is severely decreased with an ejection fraction visually estimated at 20 %   2. Normal right ventricular cavity size and reduced systolic function.   3. Device lead is visualized in the right heart.   4. The aortic valve is calcified without severe valvular pathology.   5. Trace aortic regurgitation.   6. Mild to moderate mitral regurgitation.   7. Trace tricuspid regurgitation.    ________________________________________________________________________________________  FINDINGS:     Left Ventricle:  The left ventricular cavity is moderately dilated. Left ventricular systolic function is severely decreased with an ejection fraction visually estimated at 20%. Severely dilated left ventricular systolic cavity size. The left ventricular diastolic function is indeterminate.     Right Ventricle:  The right ventricular cavity is normal in size and reduced systolic function. A device lead is visualized in the right heart.     Left Atrium:  The left atrium is normal in size with an indexed volume of 33.15 ml/m².     Right Atrium:  The right atrium is normal in size with an indexed volume of 21.89 ml/m².     Aortic Valve:  The aortic valve is calcified without severe valvular pathology. There is trace aortic regurgitation.     Mitral Valve:  There is mitral valve thickening of the anterior and posterior leaflets. There is mild to moderate mitral regurgitation.     Tricuspid Valve:  Structurally normal tricuspid valve with normal leaflet excursion. There is trace tricuspid regurgitation. Estimated pulmonary artery systolic pressure is 18 mmHg.     Pulmonic Valve:  The pulmonic valve was not well visualized.     Aorta:  The aortic root at the sinuses of Valsalva is normal in size, measuring 3.30 cm (indexed 1.62 cm/m²).  ____________________________________________________________________  Quantitative Data:  Left Ventricle Measurements: (Indexed to BSA)     IVSd (2D):   1.0 cm  LVPWd (2D):  0.9 cm  LVIDd (2D):  6.4 cm  LVIDs (2D):  5.6 cm  LV Mass:     254 g  124.3 g/m²  Visualized LV EF%: 20%     MV E Vmax:    0.78 m/s  MV A Vmax:    1.13 m/s  MV E/A:       0.69  e' lateral:   5.66 cm/s  e' medial:    7.72 cm/s  E/e' lateral: 13.80  E/e' medial:  10.12  E/e' Average: 11.67  MV DT:        190 msec    Aorta Measurements: (normal range) (Indexed to BSA)     Sinuses of Valsalva: 3.30 cm (3.1 - 3.7 cm)       Left Atrium Measurements: (Indexed to BSA)  LA Diam 2D: 3.90 cm    Right Ventricle Measurements: Right Atrial Measurements:     RV Base (RVID1):  3.5 cm      RA Vol:       44.70 ml  RV Mid (RVID2):   2.7 cm      RA Vol Index: 21.89 ml/m²  RV Major (RVID3): 9.6 cm       LVOT / RVOT/ Qp/Qs Data: (Indexed to BSA)  LVOT Vmax: 1.09 m/s    Aortic Valve Measurements:  AV Vmax:          1.9 m/s  AV Peak Gradient: 14.0 mmHg    Mitral Valve Measurements:     MV E Vmax: 0.8 m/s         MR Vmax:          4.57 m/s  MV A Vmax: 1.1 m/s         MR Peak Gradient: 83.5 mmHg  MV E/A:    0.7       Tricuspid Valve Measurements:     TR Vmax:          1.9 m/s  TR Peak Gradient: 14.6 mmHg  PASP:             18 mmHg    ________________________________________________________________________________________  Electronically signed on 10/5/2023 at 8:26:35 AM by Magdalena Owens MD         *** Final ***

## 2023-10-08 NOTE — PROGRESS NOTE ADULT - PROBLEM SELECTOR PLAN 3
BP borderline hypotensive today when getting up, standing, however pt is asx  - likely due to lowered hold parameters of home meds Lasix,  Entresto, metoprolol XL, spironolactone, Farxiga.   - continue midodrine 7.5 mg 3x day for soft BP.  - continues to be hypotensive, but asymptomatic.   - dced spironolactone and lasix  - will continue  Entresto and Toprol as per Dr Oshea -ALl meds d/w cardio-Lower SBP parameters  HOLD meds when SBP <90  - TTE: LVEF 20%, severe aortic stenosis (unchanged)  - Cardiology consulted--Dr Clifton - d/w -Lower dose of Entresto 1/2 tab 2x day

## 2023-10-09 ENCOUNTER — TRANSCRIPTION ENCOUNTER (OUTPATIENT)
Age: 83
End: 2023-10-09

## 2023-10-09 VITALS
RESPIRATION RATE: 17 BRPM | TEMPERATURE: 99 F | HEART RATE: 95 BPM | SYSTOLIC BLOOD PRESSURE: 100 MMHG | OXYGEN SATURATION: 94 % | DIASTOLIC BLOOD PRESSURE: 62 MMHG

## 2023-10-09 DIAGNOSIS — I10 ESSENTIAL (PRIMARY) HYPERTENSION: ICD-10-CM

## 2023-10-09 LAB
GLUCOSE BLDC GLUCOMTR-MCNC: 101 MG/DL — HIGH (ref 70–99)
GLUCOSE BLDC GLUCOMTR-MCNC: 110 MG/DL — HIGH (ref 70–99)

## 2023-10-09 PROCEDURE — 99232 SBSQ HOSP IP/OBS MODERATE 35: CPT

## 2023-10-09 RX ORDER — TIOTROPIUM BROMIDE 18 UG/1
2 CAPSULE ORAL; RESPIRATORY (INHALATION)
Qty: 1 | Refills: 0
Start: 2023-10-09 | End: 2023-11-07

## 2023-10-09 RX ORDER — BUDESONIDE AND FORMOTEROL FUMARATE DIHYDRATE 160; 4.5 UG/1; UG/1
1 AEROSOL RESPIRATORY (INHALATION)
Qty: 1 | Refills: 0
Start: 2023-10-09 | End: 2023-11-07

## 2023-10-09 RX ADMIN — Medication 1 APPLICATION(S): at 05:20

## 2023-10-09 RX ADMIN — PANTOPRAZOLE SODIUM 40 MILLIGRAM(S): 20 TABLET, DELAYED RELEASE ORAL at 05:22

## 2023-10-09 RX ADMIN — MENTHOL AND METHYL SALICYLATE 1 APPLICATION(S): 10; 30 CREAM TOPICAL at 05:20

## 2023-10-09 RX ADMIN — GENTAMICIN SULFATE 1 DROP(S): 3 SOLUTION/ DROPS OPHTHALMIC at 08:35

## 2023-10-09 RX ADMIN — DULOXETINE HYDROCHLORIDE 30 MILLIGRAM(S): 30 CAPSULE, DELAYED RELEASE ORAL at 05:22

## 2023-10-09 RX ADMIN — Medication 1 SPRAY(S): at 05:21

## 2023-10-09 RX ADMIN — DIVALPROEX SODIUM 500 MILLIGRAM(S): 500 TABLET, DELAYED RELEASE ORAL at 05:22

## 2023-10-09 RX ADMIN — Medication 1 APPLICATION(S): at 05:21

## 2023-10-09 RX ADMIN — BUDESONIDE AND FORMOTEROL FUMARATE DIHYDRATE 2 PUFF(S): 160; 4.5 AEROSOL RESPIRATORY (INHALATION) at 05:24

## 2023-10-09 RX ADMIN — GENTAMICIN SULFATE 1 DROP(S): 3 SOLUTION/ DROPS OPHTHALMIC at 12:34

## 2023-10-09 RX ADMIN — Medication 25 MILLIGRAM(S): at 05:21

## 2023-10-09 RX ADMIN — MIDODRINE HYDROCHLORIDE 7.5 MILLIGRAM(S): 2.5 TABLET ORAL at 05:22

## 2023-10-09 RX ADMIN — OLANZAPINE 5 MILLIGRAM(S): 15 TABLET, FILM COATED ORAL at 05:22

## 2023-10-09 RX ADMIN — TIOTROPIUM BROMIDE 2 PUFF(S): 18 CAPSULE ORAL; RESPIRATORY (INHALATION) at 05:24

## 2023-10-09 RX ADMIN — MIDODRINE HYDROCHLORIDE 7.5 MILLIGRAM(S): 2.5 TABLET ORAL at 12:34

## 2023-10-09 RX ADMIN — SACUBITRIL AND VALSARTAN 0.5 TABLET(S): 24; 26 TABLET, FILM COATED ORAL at 05:21

## 2023-10-09 NOTE — PROGRESS NOTE ADULT - PROBLEM SELECTOR PROBLEM 3
Hypotension
Anemia
Hypotension
Anemia
Hypotension
Anemia
Hypotension
Hypotension
Anemia
Anemia
Hypotension
Anemia
Anemia
Hypotension

## 2023-10-09 NOTE — PROGRESS NOTE ADULT - PROBLEM SELECTOR PROBLEM 1
Shannana
Shnanana
Shannana

## 2023-10-09 NOTE — PROGRESS NOTE ADULT - PROBLEM SELECTOR PROBLEM 2
HFrEF (heart failure with reduced ejection fraction)

## 2023-10-09 NOTE — PROGRESS NOTE ADULT - ASSESSMENT
Patient: Julissa Almazan    Procedure(s):  COLONOSCOPY    Diagnosis: screening  Diagnosis Additional Information: No value filed.    Anesthesia Type:   MAC     Note:    Patient transferred to:Phase II  Handoff Report: Identifed the Patient, Identified the Reponsible Provider, Reviewed the pertinent medical history, Discussed the surgical course, Reviewed Intra-OP anesthesia mangement and issues during anesthesia, Set expectations for post-procedure period and Allowed opportunity for questions and acknowledgement of understanding      Vitals: (Last set prior to Anesthesia Care Transfer)    CRNA VITALS  3/21/2019 1126 - 3/21/2019 1156      3/21/2019             Pulse:  88    Ht Rate:  87    SpO2:  100 %                Electronically Signed By: Ranjith Sheikh CRNA, APRN CRNA  March 21, 2019  11:56 AM   82y/o M PMH dementia, DM2, HFrEF s/p PPM, HTN, HLD presents with increased agitation.  Admit for increased paranoia

## 2023-10-09 NOTE — PROGRESS NOTE ADULT - ATTENDING COMMENTS
84y/o M PMH dementia, DM2, HFrEF s/p PPM, HTN, HLD presents with increased agitation.  Admit for increased paranoia & Placement .  pt seen, examined, case & care plan d/w pt, residents at detail. d/w dtr Arianne on 9/21 at [facility] about pt's condition, Care plan Meds   Consult;  Psych Dr Strange  -- stable for d/c   Cardiology Dr Clifton  D/W ECHO, STOP Spironolactone, STOP Lasix , BP Low -Decrease Entresto 1/2 tab 2x day  as per Cardiology  Social service d/w about d/c planning to [facility] today   PT Eval.-Pt is ambulating in [place] ways   D/W Social work -follow up for d/c -ALl meds sent out to Pharmacy   PO diet  D/C today.  Total  d/c care time is 60  minutes.

## 2023-10-09 NOTE — PROGRESS NOTE ADULT - SUBJECTIVE AND OBJECTIVE BOX
Patient is a 83y old  Male who presents with a chief complaint of paranoia (08 Oct 2023 11:52)    HPI:  84y/o M PMH dementia, HFrEF s/p PPM, HTN, HLD  presents with increased agitation. History obtained from patient's daughter Arianne over phone.   Patient moved from [place] to [place] ~2months ago and has been having issues with increased paranoia for the past month. He stays at a [facility] older assisted living community where he has been having several episodes of sundowning.   Recently he was hospitalized at [facility] for same issues from 9/1-914/23. Daughter was unable to provide sufficient history regarding hospitalization.   Patient's son, Jose has been taking care of him and knows more about his medical hx however patient's daughter states she is taking over now and did not want to bother Jose at this time.  Patient was also recently seen at [facility] ED for episodes of agitation and was told to follow up with outpatient Psychiatrist but daughter states that likely did not happened because of his increased agitation.  Patient became agitated today and staff had to call 911 for further evaluation. The facility as well as his children feel that his current psych medication regimen is not sufficient to keep him calm.   Per review of recent dispensary history, he was seeing a psychiatric NP (Barbara Bravo) in [place], [place].   Of note, he had placement of PPM in late August at [facility]. Per his daughter, his LVEF at that time was 20% before and after PPM placement.  Currently, patient states his b/l legs feel itchy but otherwise he feels fine and is agreeable.  Denies fevers, chills. sob, cp, n/v/d.      ED Course   T 97.4 F  /79 RR 18 SpO2 95% on RA  Labs H/H 12.1/37.5 Glu   UA: >1000 glucose  UTox: negaitve     In the ED, given tylenol 650mg x1 , zyprexa 5mg IVP x1  (20 Sep 2023 21:58)    INTERVAL HPI:  9/21: Pt seen and examined, sitting in chair with 1 to 1 present. Patient expressed concern about not receiving his pantoprazole, stating he cant digest his food without it, denies n/v, epigastric pain or burning. When asked about why pt came to hospital, states that he was being followed and he called the police to come help him. Says he lives with his son Jose. EMS brought patient in from  and over [facility]. Additionally states that one pill he was given this morning by staff was "phony" so he didn't take it. Contacted pt's pharmacy [facility], numerous scripts filled in [place] and [place]. Per night team sign out, daughter states that pt has been in and out of many ERs and has been started on many different medications so there are a lot of recent meds that he does not currently take to the best of her knowledge. Per daughter, her brothers are tired of assisting with their father given history of difficulties with him. She is willing to assist but does not want to be too involved and asks us to not contact her brothers. Psychiatry consulted and to see patient. JOHN/VAISHALI updated.   Soren Krishnan [phone]  9/22: Pt seen and examined at bedside. xyprexa dose increased to 5mg bedtime and 2.5mg in AM starting yesterday. Marked improvement in demeanor. A&Ox3 (person, time, place). States he is in a "police hospital" and that he came here because he asked some man in their car to call the police because he was being followed by an aide that his son hired. Said he came from "[place] [id number] and over [facility]. No complaints or concerns today. States he has to get things ready to move back to [place]. Psych is following. DSC planning pending placement as his previous community does not want pt to return and family does not want to take patient. Replace PO K   9/23: Pt seen and examined at bedside. Depakote increased to 500mg PO BID, amitriptyline stopped as per psych recs. Pt with no complaints or concerns. Pleasant on encounter, says he is doing well and thanks provider for checking in on him.  Reports that he is waiting to go home. Patient informed we are working to get him out of the hospital. DSC planning pending placement. JOHN sent referral to [facility]. Awaiting decision.   9/24:  Pt seen and examined at bedside. Pt reports that is feeling well and does not have any symptoms, pain or concern. Pt is tolerating PO and eating well.  Pt is cooperative with evaluation and pleasant.  Waiting for placement for dc. Awaiting placement.   9/25: Pt seen and examined at bedside. Reports last night he was looking for 3 medications that were started in the hospital at night. Reassured that he is receiving all the proper medications he needs. Appetite is good. Pt has no complaints at this time. Now off 1:1.  9/26: Pt seen and examined at bedside. Was not agitated overnight. Pt is very tired this morning. Reports chronic low back pain, but is not concerned. Otherwise he has no active complaints at  this time. Denies chest pain, abdominal pain, SOB. D/W Son Mykel today   9/27: Pt seen and examined at bedside. No agitation overnight. Pt is tired this morning. Has no complaints other than his chronic lower back pain. Denies chest pain, abdominal pain SOB. Pt wants to go home. D/W Son Angelito today   9/28: Pt seen and examined at bedside. No agitation overnight. Pt is tired this morning and wants to sleep. No complaints other than his chronic low back pain. Denies chest pain, SOB, abdominal pain. Pt wants to go.  9/29: Pt seen and examined at bedside. No agitation. Pt is more awake. Continues to report chronic low back pain. No acute complaints at this time. Denies chest pain, SOB, abdominal pain. Wants to go.Social work for d/c planning when bed is available.  9/30: Pt seen and examined at bedside. No agitation overnight. Pt is AAOx1. Knows he is in a hospital but unsure which one. Patient is pleasant and has no acute complaints at this time. Denies fever, chills, CP, palpitations, SOB, abd pain, nausea, vomiting. Low BP S/P 1 Bolus NS  10/1: Pt seen and examined at bedside. No agitation overnight. Patient has no complaints at this time. Just very tired. Wishes to go home. Denies fever, chills, chest pain, SOB, abdominal pain. stable for d/c  10/2: Patient was seen and examined at bedside. No agitation observed overnight. Patient has no complaints. Wishes to go home. Denies fever, chills, chest pain, SOB, abdominal pain. Pt wants to be d/don home with family, Pt is very Cooperative.  10/3: Pt seen and examined at bedside. Resting comfortably in chair. No agitation overnight. Pt really wants to go home, upset that he has been in the hospital for so long. No acute complaints at this time. Denies fever, chills, chest pain, SOB, abdominal pain. Pt is very cooperative.  10/4: Pt seen examined at bedside. Wants to go home. No new complaints at this time. very cooperative.  10/5: Pt seen and examined at bedside. No new complaints at this time. Denies dizziness at rest, but states it occurs when he gets up too fast. Very cooperative. Ambulating in Hallway , wants to be d/don today   10/6: Pt seen and examined at bedside. No new complaints at this time. Very cooperative. Wants to go home.   10/7: Pt seen and examined at bedside. No new complaints at this time. Pt still very cooperative. Wants to go home.   10/8: Pt seen and examined at bedside. No new complaints at this time. Very cooperative. No agitations. Wants to go home. Denies any HA, dizziness at rest. Reports some dizziness when getting up too quickly.   10/9: Patient seen and examined at bedside. No new complaints or concerns at this time. Excited and eager to go home.       OVERNIGHT EVENTS: No overnight events.     Home Medications:      MEDICATIONS  (STANDING):  ammonium lactate 12% Lotion 1 Application(s) Topical two times a day  atorvastatin 40 milliGRAM(s) Oral at bedtime  bacitracin   Ointment 1 Application(s) Topical every 8 hours  budesonide  80 MICROgram(s)/formoterol 4.5 MICROgram(s) Inhaler 2 Puff(s) Inhalation two times a day  dextrose 5%. 1000 milliLiter(s) (50 mL/Hr) IV Continuous <Continuous>  dextrose 5%. 1000 milliLiter(s) (100 mL/Hr) IV Continuous <Continuous>  dextrose 50% Injectable 25 Gram(s) IV Push once  dextrose 50% Injectable 12.5 Gram(s) IV Push once  dextrose 50% Injectable 25 Gram(s) IV Push once  divalproex  milliGRAM(s) Oral two times a day  DULoxetine 30 milliGRAM(s) Oral <User Schedule>  enoxaparin Injectable 40 milliGRAM(s) SubCutaneous every 24 hours  fluticasone propionate 50 MICROgram(s)/spray Nasal Spray 1 Spray(s) Both Nostrils two times a day  gentamicin 0.3% Ophthalmic Solution 1 Drop(s) Both EYES five times a day  glucagon  Injectable 1 milliGRAM(s) IntraMuscular once  insulin lispro (ADMELOG) corrective regimen sliding scale   SubCutaneous three times a day before meals  insulin lispro (ADMELOG) corrective regimen sliding scale   SubCutaneous at bedtime  methyl salicylate 15%/menthol 10% Topical Cream 1 Application(s) Topical three times a day  metoprolol succinate ER 25 milliGRAM(s) Oral daily  midodrine. 7.5 milliGRAM(s) Oral three times a day  OLANZapine Disintegrating Tablet 5 milliGRAM(s) Oral two times a day  OLANZapine Disintegrating Tablet 10 milliGRAM(s) Oral at bedtime  pantoprazole    Tablet 40 milliGRAM(s) Oral before breakfast  sacubitril 24 mG/valsartan 26 mG 0.5 Tablet(s) Oral two times a day  sodium chloride 0.9%. 1000 milliLiter(s) (250 mL/Hr) IV Continuous <Continuous>  tiotropium 2.5 MICROgram(s) Inhaler 2 Puff(s) Inhalation daily    MEDICATIONS  (PRN):  acetaminophen     Tablet .. 650 milliGRAM(s) Oral every 6 hours PRN Temp greater or equal to 38C (100.4F), Mild Pain (1 - 3)  albuterol    0.083% 2.5 milliGRAM(s) Nebulizer every 6 hours PRN Shortness of Breath and/or Wheezing  dextrose Oral Gel 15 Gram(s) Oral once PRN Blood Glucose LESS THAN 70 milliGRAM(s)/deciliter  melatonin 5 milliGRAM(s) Oral at bedtime PRN Insomnia      Allergies    No Known Allergies    Intolerances        Social History:  Tobacco: former , quit >30 years ago  EtOH: social drinker  Recreational drug use: denies   Lives with: Assisted Living Facility  Ambulates: without assistance  ADLs: needs assistance (20 Sep 2023 21:58)      REVIEW OF SYSTEMS:  CONSTITUTIONAL: No fever, No chills, No fatigue, No myalgia, No Body ache, No Weakness  EYES: No eye pain,  No visual disturbances, No discharge, NO Redness  ENMT:  No ear pain, No nose bleed, No vertigo; No sinus pain, NO throat pain, No Congestion  NECK: No pain, No stiffness  RESPIRATORY: No cough, NO wheezing, No  hemoptysis, NO  shortness of breath  CARDIOVASCULAR: No chest pain, palpitations  GASTROINTESTINAL: No abdominal pain, NO epigastric pain. No nausea, No vomiting; No diarrhea, No constipation. [x] BM  GENITOURINARY: No dysuria, No frequency, No urgency, No hematuria, NO incontinence  NEUROLOGICAL: No headaches, No dizziness, No numbness, No tingling, No tremors, No weakness  EXT: No Swelling, No Pain, No Edema  SKIN:  [x] No itching, burning, rashes, or lesions _+ Scab on Hands  MUSCULOSKELETAL: No joint pain ,No Jt swelling; No muscle pain, No back pain, No extremity pain  PSYCHIATRIC: No depression,  No anxiety,  No mood swings ,No difficulty sleeping at night  PAIN SCALE: [x  ] None  [  ] Other-  ROS Unable to obtain due to - [  ] Dementia  [  ] Lethargy [  ] Drowsy [  ] Sedated [  ] non verbal  REST OF REVIEW Of SYSTEM - [x] Normal     Vital Signs Last 24 Hrs  T(C): 36.6 (09 Oct 2023 04:58), Max: 36.6 (08 Oct 2023 20:29)  T(F): 97.8 (09 Oct 2023 04:58), Max: 97.8 (08 Oct 2023 20:29)  HR: 94 (09 Oct 2023 04:58) (83 - 94)  BP: 122/76 (09 Oct 2023 04:58) (94/60 - 122/76)  BP(mean): --  RR: 17 (09 Oct 2023 04:58) (17 - 18)  SpO2: 97% (09 Oct 2023 04:58) (91% - 97%)    Parameters below as of 09 Oct 2023 04:58  Patient On (Oxygen Delivery Method): room air      Finger Stick          PHYSICAL EXAM:  GENERAL:  [ x ] NAD , [x  ] well appearing, [  ] Agitated, [  ] Drowsy,  [  ] Lethargy, [  ] confused   HEAD:  [ x ] Normal, [  ] Other  EYES:  [ x ] EOMI, [ x ] PERRLA, [ x ] conjunctiva and sclera clear normal, [  ] Other,  [  ] Pallor,[  ] Discharge  ENMT:  [ x ] Normal, [x  ] Moist mucous membranes, [x  ] Good dentition, [x  ] No Thrush  NECK:  [ x ] Supple, [ x ] No JVD, [ x ] Normal thyroid, [  ] Lymphadenopathy [  ] Other  CHEST/LUNG:  [ x ] Clear to auscultation bilaterally, [ x ] Breath Sounds equal B/L / Decrease, [  x] poor effort  [x  ] No rales, [x  ] No rhonchi  [x  ]  No wheezing,   HEART:  [  ] Regular rate and rhythm, [  ] tachycardia, [  ] Bradycardia,  [  ] irregular  [x  ] No murmurs, No rubs, No gallops, [  ] PPM in place (Mfr:  )  ABDOMEN:  [x  ] Soft, [ x ] Nontender, [ x ] Nondistended, [  x] No mass, [x  ] Bowel sounds present, [x  ] obese  NERVOUS SYSTEM:  [ x ] Alert & Oriented X 2- 3, [ x ] Nonfocal  [  ] Confusion  [  ] Encephalopathic [  ] Sedated [  ] Unable to assess, [  ] Dementia [x  ] Other-Forgetful  EXTREMITIES: [  x] 2+ Peripheral Pulses, No clubbing, No cyanosis,  [x  ] Ankle very minimal pitting  edema B/L lower EXT. [ x ]  Severe PVD stasis skin changes B/L Lower EXT, [ x ] wound- B/L Hand -small multiple open wound-   LYMPH: No lymphadenopathy noted  SKIN:  [  ] No rashes or lesions, [  ] Pressure Ulcers, [ x ] ecchymosis- b/l upper Ext , [  ] Skin Tears, [ x ] Other-scabs -small -B/L lower Ext      DIET: Diet, Regular:   Consistent Carbohydrate Evening Snack  DASH/TLC Sodium & Cholesterol Restricted (09-20-23 @ 22:36)      LABS:                     Anemia Panel:      Thyroid Panel:                RADIOLOGY & ADDITIONAL TESTS:      HEALTH ISSUES - PROBLEM Dx:  Paranoia    HFrEF (heart failure with reduced ejection fraction)    HLD (hyperlipidemia)    Need for prophylactic measure    Medication management    DM2 (diabetes mellitus, type 2)    Anemia    Hypotension            Consultant(s) Notes Reviewed:  [  ] YES     Care Discussed with [X] Consultants  [  ] Patient  [  ] Family [  ] HCP [  ]   [  ] Social Service  [  ] RN, [  ] Physical Therapy,[  ] Palliative care team  DVT PPX: [  ] Lovenox, [  ] S C Heparin, [  ] Coumadin, [  ] Xarelto, [  ] Eliquis, [  ] Pradaxa, [  ] IV Heparin drip, [  ] SCD [  ] Contraindication 2 to GI Bleed,[  ] Ambulation [  ] Contraindicated 2 to  bleed [  ] Contraindicated 2 to Brain Bleed  Advanced directive: [  ] None, [  ] DNR/DNI Patient is a 83y old  Male who presents with a chief complaint of paranoia (08 Oct 2023 11:52)    HPI:  84y/o M PMH dementia, HFrEF s/p PPM, HTN, HLD  presents with increased agitation. History obtained from patient's daughter Arianne over phone.   Patient moved from [place] to [place] ~2months ago and has been having issues with increased paranoia for the past month. He stays at a 55 [facility] older assisted living community where he has been having several episodes of sundowning.   Recently he was hospitalized at [facility] for same issues from 9/1-914/23. Daughter was unable to provide sufficient history regarding hospitalization.   Patient's son, Jose has been taking care of him and knows more about his medical hx however patient's daughter states she is taking over now and did not want to bother Jose at this time.  Patient was also recently seen at [facility] ED for episodes of agitation and was told to follow up with outpatient Psychiatrist but daughter states that likely did not happened because of his increased agitation.  Patient became agitated today and staff had to call 911 for further evaluation. The facility as well as his children feel that his current psych medication regimen is not sufficient to keep him calm.   Per review of recent dispensary history, he was seeing a psychiatric NP (Barbara Bravo) in [place], [place].   Of note, he had placement of PPM in late August at [facility]. Per his daughter, his LVEF at that time was 20% before and after PPM placement.  Currently, patient states his b/l legs feel itchy but otherwise he feels fine and is agreeable.  Denies fevers, chills. sob, cp, n/v/d.      ED Course   T 97.4 F  /79 RR 18 SpO2 95% on RA  Labs H/H 12.1/37.5 Glu   UA: >1000 glucose  UTox: negaitve     In the ED, given tylenol 650mg x1 , zyprexa 5mg IVP x1  (20 Sep 2023 21:58)    INTERVAL HPI:  9/21: Pt seen and examined, sitting in chair with 1 to 1 present. Patient expressed concern about not receiving his pantoprazole, stating he cant digest his food without it, denies n/v, epigastric pain or burning. When asked about why pt came to hospital, states that he was being followed and he called the police to come help him. Says he lives with his son Jose. EMS brought patient in from  and over [facility]. Additionally states that one pill he was given this morning by staff was "phony" so he didn't take it. Contacted pt's pharmacy [facility], numerous scripts filled in [place] and [place]. Per night team sign out, daughter states that pt has been in and out of many ERs and has been started on many different medications so there are a lot of recent meds that he does not currently take to the best of her knowledge. Per daughter, her brothers are tired of assisting with their father given history of difficulties with him. She is willing to assist but does not want to be too involved and asks us to not contact her brothers. Psychiatry consulted and to see patient. JOHN/VAISHALI updated.   Soren Krishnan [phone]  9/22: Pt seen and examined at bedside. xyprexa dose increased to 5mg bedtime and 2.5mg in AM starting yesterday. Marked improvement in demeanor. A&Ox3 (person, time, place). States he is in a "police hospital" and that he came here because he asked some man in their car to call the police because he was being followed by an aide that his son hired. Said he came from "[place] [id number] and over [facility]. No complaints or concerns today. States he has to get things ready to move back to [place]. Psych is following. DSC planning pending placement as his previous community does not want pt to return and family does not want to take patient. Replace PO K   9/23: Pt seen and examined at bedside. Depakote increased to 500mg PO BID, amitriptyline stopped as per psych recs. Pt with no complaints or concerns. Pleasant on encounter, says he is doing well and thanks provider for checking in on him.  Reports that he is waiting to go home. Patient informed we are working to get him out of the hospital. DSC planning pending placement. JOHN sent referral to [facility]. Awaiting decision.   9/24:  Pt seen and examined at bedside. Pt reports that is feeling well and does not have any symptoms, pain or concern. Pt is tolerating PO and eating well.  Pt is cooperative with evaluation and pleasant.  Waiting for placement for dc. Awaiting placement.   9/25: Pt seen and examined at bedside. Reports last night he was looking for 3 medications that were started in the hospital at night. Reassured that he is receiving all the proper medications he needs. Appetite is good. Pt has no complaints at this time. Now off 1:1.  9/26: Pt seen and examined at bedside. Was not agitated overnight. Pt is very tired this morning. Reports chronic low back pain, but is not concerned. Otherwise he has no active complaints at  this time. Denies chest pain, abdominal pain, SOB. D/W Son Mykel today   9/27: Pt seen and examined at bedside. No agitation overnight. Pt is tired this morning. Has no complaints other than his chronic lower back pain. Denies chest pain, abdominal pain SOB. Pt wants to go home. D/W Son Angelito today   9/28: Pt seen and examined at bedside. No agitation overnight. Pt is tired this morning and wants to sleep. No complaints other than his chronic low back pain. Denies chest pain, SOB, abdominal pain. Pt wants to go.  9/29: Pt seen and examined at bedside. No agitation. Pt is more awake. Continues to report chronic low back pain. No acute complaints at this time. Denies chest pain, SOB, abdominal pain. Wants to go.Social work for d/c planning when bed is available.  9/30: Pt seen and examined at bedside. No agitation overnight. Pt is AAOx1. Knows he is in a hospital but unsure which one. Patient is pleasant and has no acute complaints at this time. Denies fever, chills, CP, palpitations, SOB, abd pain, nausea, vomiting. Low BP S/P 1 Bolus NS  10/1: Pt seen and examined at bedside. No agitation overnight. Patient has no complaints at this time. Just very tired. Wishes to go home. Denies fever, chills, chest pain, SOB, abdominal pain. stable for d/c  10/2: Patient was seen and examined at bedside. No agitation observed overnight. Patient has no complaints. Wishes to go home. Denies fever, chills, chest pain, SOB, abdominal pain. Pt wants to be d/don home with family, Pt is very Cooperative.  10/3: Pt seen and examined at bedside. Resting comfortably in chair. No agitation overnight. Pt really wants to go home, upset that he has been in the hospital for so long. No acute complaints at this time. Denies fever, chills, chest pain, SOB, abdominal pain. Pt is very cooperative.  10/4: Pt seen examined at bedside. Wants to go home. No new complaints at this time. very cooperative.  10/5: Pt seen and examined at bedside. No new complaints at this time. Denies dizziness at rest, but states it occurs when he gets up too fast. Very cooperative. Ambulating in Hallway , wants to be d/don today   10/6: Pt seen and examined at bedside. No new complaints at this time. Very cooperative. Wants to go home.   10/7: Pt seen and examined at bedside. No new complaints at this time. Pt still very cooperative. Wants to go home.   10/8: Pt seen and examined at bedside. No new complaints at this time. Very cooperative. No agitations. Wants to go home. Denies any HA, dizziness at rest. Reports some dizziness when getting up too quickly.   10/9: Patient seen and examined at bedside. No new complaints or concerns at this time. Excited and eager to go home.       OVERNIGHT EVENTS: No overnight events.     Home Medications:      MEDICATIONS  (STANDING):  ammonium lactate 12% Lotion 1 Application(s) Topical two times a day  atorvastatin 40 milliGRAM(s) Oral at bedtime  bacitracin   Ointment 1 Application(s) Topical every 8 hours  budesonide  80 MICROgram(s)/formoterol 4.5 MICROgram(s) Inhaler 2 Puff(s) Inhalation two times a day  dextrose 5%. 1000 milliLiter(s) (50 mL/Hr) IV Continuous <Continuous>  dextrose 5%. 1000 milliLiter(s) (100 mL/Hr) IV Continuous <Continuous>  dextrose 50% Injectable 25 Gram(s) IV Push once  dextrose 50% Injectable 12.5 Gram(s) IV Push once  dextrose 50% Injectable 25 Gram(s) IV Push once  divalproex  milliGRAM(s) Oral two times a day  DULoxetine 30 milliGRAM(s) Oral <User Schedule>  enoxaparin Injectable 40 milliGRAM(s) SubCutaneous every 24 hours  fluticasone propionate 50 MICROgram(s)/spray Nasal Spray 1 Spray(s) Both Nostrils two times a day  gentamicin 0.3% Ophthalmic Solution 1 Drop(s) Both EYES five times a day  glucagon  Injectable 1 milliGRAM(s) IntraMuscular once  insulin lispro (ADMELOG) corrective regimen sliding scale   SubCutaneous three times a day before meals  insulin lispro (ADMELOG) corrective regimen sliding scale   SubCutaneous at bedtime  methyl salicylate 15%/menthol 10% Topical Cream 1 Application(s) Topical three times a day  metoprolol succinate ER 25 milliGRAM(s) Oral daily  midodrine. 7.5 milliGRAM(s) Oral three times a day  OLANZapine Disintegrating Tablet 5 milliGRAM(s) Oral two times a day  OLANZapine Disintegrating Tablet 10 milliGRAM(s) Oral at bedtime  pantoprazole    Tablet 40 milliGRAM(s) Oral before breakfast  sacubitril 24 mG/valsartan 26 mG 0.5 Tablet(s) Oral two times a day  sodium chloride 0.9%. 1000 milliLiter(s) (250 mL/Hr) IV Continuous <Continuous>  tiotropium 2.5 MICROgram(s) Inhaler 2 Puff(s) Inhalation daily    MEDICATIONS  (PRN):  acetaminophen     Tablet .. 650 milliGRAM(s) Oral every 6 hours PRN Temp greater or equal to 38C (100.4F), Mild Pain (1 - 3)  albuterol    0.083% 2.5 milliGRAM(s) Nebulizer every 6 hours PRN Shortness of Breath and/or Wheezing  dextrose Oral Gel 15 Gram(s) Oral once PRN Blood Glucose LESS THAN 70 milliGRAM(s)/deciliter  melatonin 5 milliGRAM(s) Oral at bedtime PRN Insomnia      Allergies    No Known Allergies    Intolerances        Social History:  Tobacco: former , quit >30 years ago  EtOH: social drinker  Recreational drug use: denies   Lives with: Assisted Living Facility  Ambulates: without assistance  ADLs: needs assistance (20 Sep 2023 21:58)      REVIEW OF SYSTEMS: i want to go  CONSTITUTIONAL: No fever, No chills, No fatigue, No myalgia, No Body ache, No Weakness  EYES: No eye pain,  No visual disturbances, No discharge, NO Redness  ENMT:  No ear pain, No nose bleed, No vertigo; No sinus pain, NO throat pain, No Congestion  NECK: No pain, No stiffness  RESPIRATORY: No cough, NO wheezing, No  hemoptysis, NO  shortness of breath  CARDIOVASCULAR: No chest pain, palpitations  GASTROINTESTINAL: No abdominal pain, NO epigastric pain. No nausea, No vomiting; No diarrhea, No constipation. [x] BM  GENITOURINARY: No dysuria, No frequency, No urgency, No hematuria, NO incontinence  NEUROLOGICAL: No headaches, No dizziness, No numbness, No tingling, No tremors, No weakness  EXT: No Swelling, No Pain, No Edema  SKIN:  [x] No itching, burning, rashes, or lesions _+ Scab on Hands  MUSCULOSKELETAL: No joint pain ,No Jt swelling; No muscle pain, No back pain, No extremity pain  PSYCHIATRIC: No depression,  No anxiety,  No mood swings ,No difficulty sleeping at night  PAIN SCALE: [x  ] None  [  ] Other-  ROS Unable to obtain due to - [  ] Dementia  [  ] Lethargy [  ] Drowsy [  ] Sedated [  ] non verbal  REST OF REVIEW Of SYSTEM - [x] Normal     Vital Signs Last 24 Hrs  T(C): 36.6 (09 Oct 2023 04:58), Max: 36.6 (08 Oct 2023 20:29)  T(F): 97.8 (09 Oct 2023 04:58), Max: 97.8 (08 Oct 2023 20:29)  HR: 94 (09 Oct 2023 04:58) (83 - 94)  BP: 122/76 (09 Oct 2023 04:58) (94/60 - 122/76)  BP(mean): --  RR: 17 (09 Oct 2023 04:58) (17 - 18)  SpO2: 97% (09 Oct 2023 04:58) (91% - 97%)    Parameters below as of 09 Oct 2023 04:58  Patient On (Oxygen Delivery Method): room air      Finger Stick          PHYSICAL EXAM:  GENERAL:  [ x ] NAD , [x  ] well appearing, [  ] Agitated, [  ] Drowsy,  [  ] Lethargy, [  ] confused   HEAD:  [ x ] Normal, [  ] Other  EYES:  [ x ] EOMI, [ x ] PERRLA, [ x ] conjunctiva and sclera clear normal, [  ] Other,  [  ] Pallor,[  ] Discharge  ENMT:  [ x ] Normal, [x  ] Moist mucous membranes, [x  ] Good dentition, [x  ] No Thrush  NECK:  [ x ] Supple, [ x ] No JVD, [ x ] Normal thyroid, [  ] Lymphadenopathy [  ] Other  CHEST/LUNG:  [ x ] Clear to auscultation bilaterally, [ x ] Breath Sounds equal B/L / Decrease, [  x] poor effort  [x  ] No rales, [x  ] No rhonchi  [x  ]  No wheezing,   HEART:  [  ] Regular rate and rhythm, [  ] tachycardia, [  ] Bradycardia,  [  ] irregular  [x  ] No murmurs, No rubs, No gallops, [  ] PPM in place (Mfr:  )  ABDOMEN:  [x  ] Soft, [ x ] Nontender, [ x ] Nondistended, [  x] No mass, [x  ] Bowel sounds present, [x  ] obese  NERVOUS SYSTEM:  [ x ] Alert & Oriented X 2- 3, [ x ] Nonfocal  [  ] Confusion  [  ] Encephalopathic [  ] Sedated [  ] Unable to assess, [  ] Dementia [x  ] Other-Forgetful  EXTREMITIES: [  x] 2+ Peripheral Pulses, No clubbing, No cyanosis,  [x  ] Ankle very minimal pitting  edema B/L lower EXT. [ x ]  Severe PVD stasis skin changes B/L Lower EXT, [ x ] wound- B/L Hand -small multiple open wound-   LYMPH: No lymphadenopathy noted  SKIN:  [  ] No rashes or lesions, [  ] Pressure Ulcers, [ x ] ecchymosis- b/l upper Ext , [  ] Skin Tears, [ x ] Other-scabs -small -B/L lower Ext      DIET: Diet, Regular:   Consistent Carbohydrate Evening Snack  DASH/TLC Sodium & Cholesterol Restricted (09-20-23 @ 22:36)      LABS:          RADIOLOGY & ADDITIONAL TESTS:      HEALTH ISSUES - PROBLEM Dx:  Paranoia    HFrEF (heart failure with reduced ejection fraction)    HLD (hyperlipidemia)    Need for prophylactic measure    Medication management    DM2 (diabetes mellitus, type 2)    Anemia    Hypotension            Consultant(s) Notes Reviewed:  [ x ] YES     Care Discussed with [X] Consultants  [ x ] Patient  [  ] Family [  ] HCP [  ]   [ x ] Social Service  [ x ] RN, [  ] Physical Therapy,[  ] Palliative care team  DVT PPX: [x  ] Lovenox, [  ] S C Heparin, [  ] Coumadin, [  ] Xarelto, [  ] Eliquis, [  ] Pradaxa, [  ] IV Heparin drip, [  ] SCD [  ] Contraindication 2 to GI Bleed,[  ] Ambulation [  ] Contraindicated 2 to  bleed [  ] Contraindicated 2 to Brain Bleed  Advanced directive: [ x ] None, [  ] DNR/DNI

## 2023-10-09 NOTE — PROGRESS NOTE ADULT - NS ATTEND AMEND GEN_ALL_CORE FT
HFrEF, admitted with agitation/paranoia, now improved  called to evaluate for hypotension    - no symptoms at this time, despite low BP  - would ideally like to continue GDMT, with hold parameters  - on bb and entresto  - bp at times low still on midodrine. tolerating half tab entrestor q12.   - closely monitor bps.
HFrEF, admitted with agitation/paranoia, now improved  called to evaluate for hypotension    Appears compensated from HF POV.   - would ideally like to continue GDMT, with hold parameters  - on bb and entresto  - bp at times low still on midodrine but has improved with  half tab entrestor q12.   - closely monitor bps.  DC planning per primary team.
HFrEF, admitted with agitation/paranoia, now improved  called to evaluate for hypotension    - no symptoms at this time, despite low BP  - would ideally like to continue GDMT, with hold parameters  - agree with backing down on oral diuretic  - on bb and entresto  - bp at times low still on middorine. may need to take half tab entrestor q12.   - closely monitor bps.
HFrEF, admitted with agitation/paranoia, now improved  called to evaluate for hypotension    - no symptoms at this time, despite low BP  - would ideally like to continue GDMT, with hold parameters  - on bb and entresto  - bp at times low still on midodrine. tolerating half tab entrestor q12.   - closely monitor bps.  - resume low dose lasix
HFrEF, admitted with agitation/paranoia, now improved  called to evaluate for hypotension    - no symptoms at this time, despite low BP  - would ideally like to continue GDMT, with hold parameters  - agree with backing down on oral diuretic  - echocardiogram shows severe LV dysfunction  - check orthostatics  - cont to monitor BP closely

## 2023-10-09 NOTE — PROGRESS NOTE ADULT - PROBLEM SELECTOR PLAN 5
Discussed patient medication list with daughter (Arianne) who confirmed most medications found on surescripts recent dispensary history) however she is not sure of any changes that may have happened when he was hospitalized or when he went to the ED  - Patient has list with him, copy in chart however unclear if that list is up to date  - Current med rec completed based on recent dispensary history  - [facility] pharmacy called: additional medications from pharmacy include allopurinol 300qD, lisinopril 20mg qD, mirabegron 25mg qD, trazodone 50mg qD at bedtime. medications not mentioned by daughter. daughter notes pt has been to numerous different hospitals/ERs and has come back with numerous different medications with no follow up  - plan to hold these additional meds, per psych consult for use of trazodone  - RESOLVED

## 2023-10-09 NOTE — CAREGIVER ENGAGEMENT NOTE - CAREGIVER OUTREACH NOTES - FREE TEXT
Caregiver declined to speak with SW at this time as she was in a class. Advised to call pt son Toan. JOHN attempted to call pt son Toan without success; line went busy and unable to leave . Will re attempt at a later time. 
SW spoke with Arianne regarding plan for [facility] to p/u pt @ 2pm today. Spoke with Mariam @ [facility] and she reported that RN confirmed that pt medications are at facility. Pt room Is ready for today. SW to follow and remain available for any needs.
JOHN spoke with [facility]. She reports that pt room will be ready on Monday and can not accept any earlier. JOHN spoke with Josselyn and she understands that pt not able to d/c until Monday due to room not being available. SW to follow and remain available for any needs.

## 2023-10-09 NOTE — PROGRESS NOTE ADULT - PROBLEM SELECTOR PLAN 1
Acute paranoia in setting of hx of dementia, ?baseline mental disorder ? Psych Hx- ?Psychosis/ Agitation: unclear hx, stable at this time  - Previous hospitalization at [facility] for similar problem. Unknown if Pt was taking meds prior to admission.    - No infectious or metabolic concomitant disorder. Normal thyroid profile.   - Continue current Zyrexa 5 mg 2x day, Zyprexa 10 mg Q HS regimen as per psych.  - Continue Depakote 500mg BID as per Dr. Strange.    - Continue duloxetine 30 mg 2x day.  - STOP amitriptyline, as per psych recs  - Psych (Dr. Strange) - no decision making capacity, ok to dc  - SW consulted for d/c plan: approved, but plan for Monday given no availabilities.  -  - no acute agitation observed

## 2023-10-09 NOTE — DISCHARGE NOTE NURSING/CASE MANAGEMENT/SOCIAL WORK - NSDCPEFALRISK_GEN_ALL_CORE
For information on Fall & Injury Prevention, visit: [email or web address] OR  [email or web address] OR  https://www.cdc.gov/steadi/patient.html

## 2023-10-09 NOTE — PROGRESS NOTE ADULT - ASSESSMENT
82 y/o M PMH dementia, DM2, HFrEF s/p PPM, HTN, HLD presents with increased agitation.  Admitted for increased paranoia. Consulted for evaluation of hypotension.    Hypotension/HFrEF s/p ppm (8/2023)/HTN, HLD  - Previous TTE (8/2023): EF ~20%.  Repeat: EF 20%, reduced RVF, mild-mod MR  - BP remains low in 90s -120s but more stable  - Continue Toprol XL and ARNI  - Continue home Midodrine  - No obvious evidence of volume overload  - Goal is GDMT  - Resume Aldactone when SBP remains stable at ~110  - Would attempt to resume low dose lasix 20 mg if BP remains stable     - EKG shows Atrial sensed v-paced rhythm  - No anginal complaints  - Continue Lipitor     - Lovenox for DVT ppx   - Monitor and replete lytes, keep K>4, Mg>2.  - Will continue to follow.    Ct Warner, MS FNP, AGACNP  Nurse Practitioner- Cardiology   Please call on TEAMS

## 2023-10-09 NOTE — PROGRESS NOTE ADULT - REASON FOR ADMISSION
memea

## 2023-10-09 NOTE — PROGRESS NOTE ADULT - PROBLEM SELECTOR PLAN 2
Chronic Systolic CHF  s/p PPM (paced ~end of Aug 2023 at [facility] per daughter )  - TTE (8/2023) per daughter showed EF ~20%  - home meds: Entresto, metoprolol XL, spironolactone, Farxiga.   - d/c spironolactone and farxiga as per cardio  - start entresto 1/2 tabs BID tonight. and metoprolol XL w/ lower hold parameters to ensure Pt gets meds  - Lower extremity edema improving  - Dash/ TLC Diet  Dr Clifton  D/W ECHO, STOP Spironolactone, STOP Lasix , continue Toprol xl & Entresto  1/2 tab 2x day  Orthostasis VS. Chronic Systolic CHF  s/p PPM (paced ~end of Aug 2023 at [place] per daughter )  - TTE (8/2023) per daughter showed EF ~20%  - home meds: Entresto, metoprolol XL, spironolactone, Farxiga.   - d/c spironolactone and farxiga as per cardio  - start Entresto 1/2 tabs BID tonight. and metoprolol XL 25 mg daily  w/ lower hold parameters to ensure Pt gets meds  - Lower extremity edema improving  - Dash/ TLC Diet  Dr Clifton  D/W ECHO, STOP Spironolactone, STOP Lasix , continue Toprol xl & Entresto  1/2 tab 2x day  Orthostasis VS.

## 2023-10-09 NOTE — DISCHARGE NOTE NURSING/CASE MANAGEMENT/SOCIAL WORK - PATIENT PORTAL LINK FT
You can access the FollowMyHealth Patient Portal offered by [facility] by registering at the following website: [email or web address]. By joining [email or web address] FollowMyHealth portal, you will also be able to view your health information using other applications (apps) compatible with our system.

## 2023-10-09 NOTE — PROGRESS NOTE ADULT - SUBJECTIVE AND OBJECTIVE BOX
[facility] -- Nicolle Townsend Pannella, Patel, Savella, Goodger, Cohen: Office # [phone]    Follow Up:  Hypotension    Subjective/Observations: Patient awake, alert, OOB to chair. Denies chest pain, palpitations and dizziness. Denies any difficulty breathing. No orthopnea and PND. Tolerating room air.     REVIEW OF SYSTEMS: All other review of systems are negative unless indicated above    PAST MEDICAL & SURGICAL HISTORY:  HFrEF (heart failure with reduced ejection fraction)      HTN (hypertension)      HLD (hyperlipidemia)      Type 2 diabetes mellitus      Pacemaker          MEDICATIONS  (STANDING):  ammonium lactate 12% Lotion 1 Application(s) Topical two times a day  atorvastatin 40 milliGRAM(s) Oral at bedtime  bacitracin   Ointment 1 Application(s) Topical every 8 hours  budesonide  80 MICROgram(s)/formoterol 4.5 MICROgram(s) Inhaler 2 Puff(s) Inhalation two times a day  dextrose 5%. 1000 milliLiter(s) (50 mL/Hr) IV Continuous <Continuous>  dextrose 5%. 1000 milliLiter(s) (100 mL/Hr) IV Continuous <Continuous>  dextrose 50% Injectable 25 Gram(s) IV Push once  dextrose 50% Injectable 12.5 Gram(s) IV Push once  dextrose 50% Injectable 25 Gram(s) IV Push once  divalproex  milliGRAM(s) Oral two times a day  DULoxetine 30 milliGRAM(s) Oral <User Schedule>  enoxaparin Injectable 40 milliGRAM(s) SubCutaneous every 24 hours  fluticasone propionate 50 MICROgram(s)/spray Nasal Spray 1 Spray(s) Both Nostrils two times a day  gentamicin 0.3% Ophthalmic Solution 1 Drop(s) Both EYES five times a day  glucagon  Injectable 1 milliGRAM(s) IntraMuscular once  insulin lispro (ADMELOG) corrective regimen sliding scale   SubCutaneous at bedtime  insulin lispro (ADMELOG) corrective regimen sliding scale   SubCutaneous three times a day before meals  methyl salicylate 15%/menthol 10% Topical Cream 1 Application(s) Topical three times a day  metoprolol succinate ER 25 milliGRAM(s) Oral daily  midodrine. 7.5 milliGRAM(s) Oral three times a day  OLANZapine Disintegrating Tablet 5 milliGRAM(s) Oral two times a day  OLANZapine Disintegrating Tablet 10 milliGRAM(s) Oral at bedtime  pantoprazole    Tablet 40 milliGRAM(s) Oral before breakfast  sacubitril 24 mG/valsartan 26 mG 0.5 Tablet(s) Oral two times a day  sodium chloride 0.9%. 1000 milliLiter(s) (250 mL/Hr) IV Continuous <Continuous>  tiotropium 2.5 MICROgram(s) Inhaler 2 Puff(s) Inhalation daily    MEDICATIONS  (PRN):  acetaminophen     Tablet .. 650 milliGRAM(s) Oral every 6 hours PRN Temp greater or equal to 38C (100.4F), Mild Pain (1 - 3)  albuterol    0.083% 2.5 milliGRAM(s) Nebulizer every 6 hours PRN Shortness of Breath and/or Wheezing  dextrose Oral Gel 15 Gram(s) Oral once PRN Blood Glucose LESS THAN 70 milliGRAM(s)/deciliter  melatonin 5 milliGRAM(s) Oral at bedtime PRN Insomnia    Allergies    No Known Allergies    Intolerances      Vital Signs Last 24 Hrs  T(C): 36.6 (09 Oct 2023 04:58), Max: 36.6 (08 Oct 2023 20:29)  T(F): 97.8 (09 Oct 2023 04:58), Max: 97.8 (08 Oct 2023 20:29)  HR: 94 (09 Oct 2023 04:58) (83 - 94)  BP: 122/76 (09 Oct 2023 04:58) (94/60 - 122/76)  BP(mean): --  RR: 17 (09 Oct 2023 04:58) (17 - 18)  SpO2: 97% (09 Oct 2023 04:58) (91% - 97%)    Parameters below as of 09 Oct 2023 04:58  Patient On (Oxygen Delivery Method): room air      I&O's Summary        TELE: Not on telemetry   PHYSICAL EXAM:  Constitutional: NAD, awake and alert  HEENT: Moist Mucous Membranes, Anicteric  Pulmonary: Non-labored, breath sounds are clear bilaterally, No wheezing, rales or rhonchi  Cardiovascular: Regular, S1 and S2, + murmurs, No rubs, gallops or clicks  Gastrointestinal:  soft, nontender, nondistended   Lymph: trace peripheral edema. No lymphadenopathy.   Skin: No visible rashes or ulcers.  Psych:  Mood & affect appropriate      LABS: All Labs Reviewed:           Triiodothyronine, Total (T3 Total): 104 ng/dL (09-21-23 @ 06:55)    12 Lead ECG:   Ventricular Rate 97 BPM    Atrial Rate 97 BPM    P-R Interval 178 ms    QRS Duration 140 ms    Q-T Interval 396 ms    QTC Calculation(Bazett) 502 ms    P Axis 64 degrees    R Axis -75 degrees    T Axis 89 degrees    Diagnosis Line Atrial-sensed ventricular-paced rhythm  Biventricular pacemaker detected  Confirmed by SAGAR PEDERSON ([id number]) on 9/21/2023 12:10:56 PM (09-20-23 @ 19:35)      TRANSTHORACIC ECHOCARDIOGRAM REPORT  ________________________________________________________________________________                                      _______       Pt. Name:       ALESSANDRO HERNANDEZ Study Date:    10/4/2023  MRN:            [id number]         YOB: 1940  Accession #:    [id number]         Age:           83 years  Account#:       [id number]        Gender:        M  Heart Rate:                       Height:        70.08 in (178.00 cm)  Rhythm:     Weight:        189.59 lb (86.00 kg)  Blood Pressure: 84/52 mmHg        BSA/BMI:       2.04 m² / 27.14 kg/m²  ________________________________________________________________________________________  Referring Physician:    [id number] Andrés Bauer  Interpreting Physician: Magdalena Owens MD  Primary Sonographer:    Swati Faustin [facility]    CPT:               ECHO TTE WO CON COMP W DOPP - 96411.m  Indication(s):     Heart failure, unspecified - I50.9  Procedure:         Transthoracic echocardiogram with 2-D, M-mode and complete                     spectral and color flow Doppler.  Ordering Location: [facility]    _______________________________________________________________________________________     CONCLUSIONS:      1. Left ventricular cavity is moderately dilated. Left ventricular systolic function is severely decreased with an ejection fraction visually estimated at 20 %   2. Normal right ventricular cavity size and reduced systolic function.   3. Device lead is visualized in the right heart.   4. The aortic valve is calcified without severe valvular pathology.   5. Trace aortic regurgitation.   6. Mild to moderate mitral regurgitation.   7. Trace tricuspid regurgitation.    ________________________________________________________________________________________  FINDINGS:     Left Ventricle:  The left ventricular cavity is moderately dilated. Left ventricular systolic function is severely decreased with an ejection fraction visually estimated at 20%. Severely dilated left ventricular systolic cavity size. The left ventricular diastolic function is indeterminate.     Right Ventricle:  The right ventricular cavity is normal in size and reduced systolic function. A device lead is visualized in the right heart.     Left Atrium:  The left atrium is normal in size with an indexed volume of 33.15 ml/m².     Right Atrium:  The right atrium is normal in size with an indexed volume of 21.89 ml/m².     Aortic Valve:  The aortic valve is calcified without severe valvular pathology. There is trace aortic regurgitation.     Mitral Valve:  There is mitral valve thickening of the anterior and posterior leaflets. There is mild to moderate mitral regurgitation.     Tricuspid Valve:  Structurally normal tricuspid valve with normal leaflet excursion. There is trace tricuspid regurgitation. Estimated pulmonary artery systolic pressure is 18 mmHg.     Pulmonic Valve:  The pulmonic valve was not well visualized.     Aorta:  The aortic root at the sinuses of Valsalva is normal in size, measuring 3.30 cm (indexed 1.62 cm/m²).  ____________________________________________________________________  Quantitative Data:  Left Ventricle Measurements: (Indexed to BSA)     IVSd (2D):   1.0 cm  LVPWd (2D):  0.9 cm  LVIDd (2D):  6.4 cm  LVIDs (2D):  5.6 cm  LV Mass:     254 g  124.3 g/m²  Visualized LV EF%: 20%     MV E Vmax:    0.78 m/s  MV A Vmax:    1.13 m/s  MV E/A:       0.69  e' lateral:   5.66 cm/s  e' medial:    7.72 cm/s  E/e' lateral: 13.80  E/e' medial:  10.12  E/e' Average: 11.67  MV DT:        190 msec    Aorta Measurements: (normal range) (Indexed to BSA)     Sinuses of Valsalva: 3.30 cm (3.1 - 3.7 cm)       Left Atrium Measurements: (Indexed to BSA)  LA Diam 2D: 3.90 cm    Right Ventricle Measurements: Right Atrial Measurements:     RV Base (RVID1):  3.5 cm      RA Vol:       44.70 ml  RV Mid (RVID2):   2.7 cm      RA Vol Index: 21.89 ml/m²  RV Major (RVID3): 9.6 cm       LVOT / RVOT/ Qp/Qs Data: (Indexed to BSA)  LVOT Vmax: 1.09 m/s    Aortic Valve Measurements:  AV Vmax:          1.9 m/s  AV Peak Gradient: 14.0 mmHg    Mitral Valve Measurements:     MV E Vmax: 0.8 m/s         MR Vmax:          4.57 m/s  MV A Vmax: 1.1 m/s         MR Peak Gradient: 83.5 mmHg  MV E/A:    0.7       Tricuspid Valve Measurements:     TR Vmax:          1.9 m/s  TR Peak Gradient: 14.6 mmHg  PASP:             18 mmHg    ________________________________________________________________________________________  Electronically signed on 10/5/2023 at 8:26:35 AM by Magdalena Owens MD         *** Final ***

## 2023-10-26 ENCOUNTER — INPATIENT (INPATIENT)
Facility: HOSPITAL | Age: 83
LOS: 0 days | Discharge: NURSING, REHAB OR LONG-TERM CARE | DRG: 392 | End: 2023-10-27
Attending: STUDENT IN AN ORGANIZED HEALTH CARE EDUCATION/TRAINING PROGRAM | Admitting: HOSPITALIST
Payer: MEDICARE

## 2023-10-26 VITALS
RESPIRATION RATE: 18 BRPM | SYSTOLIC BLOOD PRESSURE: 101 MMHG | DIASTOLIC BLOOD PRESSURE: 64 MMHG | WEIGHT: 188.94 LBS | OXYGEN SATURATION: 93 % | HEART RATE: 103 BPM | HEIGHT: 69 IN | TEMPERATURE: 98 F

## 2023-10-26 DIAGNOSIS — Z95.0 PRESENCE OF CARDIAC PACEMAKER: Chronic | ICD-10-CM

## 2023-10-26 DIAGNOSIS — K59.00 CONSTIPATION, UNSPECIFIED: ICD-10-CM

## 2023-10-26 LAB
ALBUMIN SERPL ELPH-MCNC: 3.3 G/DL — SIGNIFICANT CHANGE UP (ref 3.3–5)
ALP SERPL-CCNC: 75 U/L — SIGNIFICANT CHANGE UP (ref 40–120)
ALT FLD-CCNC: 16 U/L — SIGNIFICANT CHANGE UP (ref 10–45)
ANION GAP SERPL CALC-SCNC: 9 MMOL/L — SIGNIFICANT CHANGE UP (ref 5–17)
APPEARANCE UR: CLEAR — SIGNIFICANT CHANGE UP
AST SERPL-CCNC: 15 U/L — SIGNIFICANT CHANGE UP (ref 10–40)
BACTERIA # UR AUTO: ABNORMAL /HPF
BASE EXCESS BLDV CALC-SCNC: 8.6 MMOL/L — HIGH (ref -2–3)
BASOPHILS # BLD AUTO: 0.05 K/UL — SIGNIFICANT CHANGE UP (ref 0–0.2)
BASOPHILS NFR BLD AUTO: 0.6 % — SIGNIFICANT CHANGE UP (ref 0–2)
BILIRUB SERPL-MCNC: 1 MG/DL — SIGNIFICANT CHANGE UP (ref 0.2–1.2)
BILIRUB UR-MCNC: ABNORMAL
BUN SERPL-MCNC: 15 MG/DL — SIGNIFICANT CHANGE UP (ref 7–23)
CALCIUM SERPL-MCNC: 9.6 MG/DL — SIGNIFICANT CHANGE UP (ref 8.4–10.5)
CHLORIDE SERPL-SCNC: 103 MMOL/L — SIGNIFICANT CHANGE UP (ref 96–108)
CO2 BLDV-SCNC: 35 MMOL/L — HIGH (ref 22–26)
CO2 SERPL-SCNC: 30 MMOL/L — SIGNIFICANT CHANGE UP (ref 22–31)
COLOR SPEC: SIGNIFICANT CHANGE UP
CREAT SERPL-MCNC: 1.02 MG/DL — SIGNIFICANT CHANGE UP (ref 0.5–1.3)
DIFF PNL FLD: NEGATIVE — SIGNIFICANT CHANGE UP
EGFR: 72 ML/MIN/1.73M2 — SIGNIFICANT CHANGE UP
EOSINOPHIL # BLD AUTO: 0.17 K/UL — SIGNIFICANT CHANGE UP (ref 0–0.5)
EOSINOPHIL NFR BLD AUTO: 1.9 % — SIGNIFICANT CHANGE UP (ref 0–6)
EPI CELLS # UR: 1 — SIGNIFICANT CHANGE UP
GAS PNL BLDV: SIGNIFICANT CHANGE UP
GLUCOSE BLDC GLUCOMTR-MCNC: 80 MG/DL — SIGNIFICANT CHANGE UP (ref 70–99)
GLUCOSE SERPL-MCNC: 93 MG/DL — SIGNIFICANT CHANGE UP (ref 70–99)
GLUCOSE UR QL: NEGATIVE MG/DL — SIGNIFICANT CHANGE UP
HCO3 BLDV-SCNC: 33 MMOL/L — HIGH (ref 22–29)
HCT VFR BLD CALC: 38.6 % — LOW (ref 39–50)
HGB BLD-MCNC: 12.7 G/DL — LOW (ref 13–17)
IMM GRANULOCYTES NFR BLD AUTO: 0.2 % — SIGNIFICANT CHANGE UP (ref 0–0.9)
KETONES UR-MCNC: ABNORMAL MG/DL
LEUKOCYTE ESTERASE UR-ACNC: ABNORMAL
LIDOCAIN IGE QN: 20 U/L — SIGNIFICANT CHANGE UP (ref 16–77)
LYMPHOCYTES # BLD AUTO: 1.79 K/UL — SIGNIFICANT CHANGE UP (ref 1–3.3)
LYMPHOCYTES # BLD AUTO: 20.3 % — SIGNIFICANT CHANGE UP (ref 13–44)
MCHC RBC-ENTMCNC: 32.4 PG — SIGNIFICANT CHANGE UP (ref 27–34)
MCHC RBC-ENTMCNC: 32.9 GM/DL — SIGNIFICANT CHANGE UP (ref 32–36)
MCV RBC AUTO: 98.5 FL — SIGNIFICANT CHANGE UP (ref 80–100)
MONOCYTES # BLD AUTO: 0.63 K/UL — SIGNIFICANT CHANGE UP (ref 0–0.9)
MONOCYTES NFR BLD AUTO: 7.1 % — SIGNIFICANT CHANGE UP (ref 2–14)
NEUTROPHILS # BLD AUTO: 6.16 K/UL — SIGNIFICANT CHANGE UP (ref 1.8–7.4)
NEUTROPHILS NFR BLD AUTO: 69.9 % — SIGNIFICANT CHANGE UP (ref 43–77)
NITRITE UR-MCNC: NEGATIVE — SIGNIFICANT CHANGE UP
NRBC # BLD: 0 /100 WBCS — SIGNIFICANT CHANGE UP (ref 0–0)
PCO2 BLDV: 51 MMHG — SIGNIFICANT CHANGE UP (ref 42–55)
PH BLDV: 7.42 — SIGNIFICANT CHANGE UP (ref 7.32–7.43)
PH UR: 5.5 — SIGNIFICANT CHANGE UP (ref 5–8)
PLATELET # BLD AUTO: 188 K/UL — SIGNIFICANT CHANGE UP (ref 150–400)
PO2 BLDV: <35 MMHG — SIGNIFICANT CHANGE UP (ref 25–45)
POTASSIUM SERPL-MCNC: 4 MMOL/L — SIGNIFICANT CHANGE UP (ref 3.5–5.3)
POTASSIUM SERPL-SCNC: 4 MMOL/L — SIGNIFICANT CHANGE UP (ref 3.5–5.3)
PROT SERPL-MCNC: 7.2 G/DL — SIGNIFICANT CHANGE UP (ref 6–8.3)
PROT UR-MCNC: SIGNIFICANT CHANGE UP MG/DL
RBC # BLD: 3.92 M/UL — LOW (ref 4.2–5.8)
RBC # FLD: 13.5 % — SIGNIFICANT CHANGE UP (ref 10.3–14.5)
RBC CASTS # UR COMP ASSIST: 0 /HPF — SIGNIFICANT CHANGE UP (ref 0–4)
SAO2 % BLDV: 42.9 % — LOW (ref 67–88)
SODIUM SERPL-SCNC: 142 MMOL/L — SIGNIFICANT CHANGE UP (ref 135–145)
SP GR SPEC: 1.02 — SIGNIFICANT CHANGE UP (ref 1–1.03)
UROBILINOGEN FLD QL: 2 MG/DL (ref 0.2–1)
WBC # BLD: 8.82 K/UL — SIGNIFICANT CHANGE UP (ref 3.8–10.5)
WBC # FLD AUTO: 8.82 K/UL — SIGNIFICANT CHANGE UP (ref 3.8–10.5)
WBC UR QL: 2 /HPF — SIGNIFICANT CHANGE UP (ref 0–5)

## 2023-10-26 PROCEDURE — 99285 EMERGENCY DEPT VISIT HI MDM: CPT | Mod: GC

## 2023-10-26 PROCEDURE — 99223 1ST HOSP IP/OBS HIGH 75: CPT

## 2023-10-26 RX ORDER — SACUBITRIL AND VALSARTAN 24; 26 MG/1; MG/1
1 TABLET, FILM COATED ORAL
Refills: 0 | Status: DISCONTINUED | OUTPATIENT
Start: 2023-10-26 | End: 2023-10-27

## 2023-10-26 RX ORDER — ACETAMINOPHEN 500 MG
650 TABLET ORAL EVERY 6 HOURS
Refills: 0 | Status: DISCONTINUED | OUTPATIENT
Start: 2023-10-26 | End: 2023-10-27

## 2023-10-26 RX ORDER — INSULIN LISPRO 100/ML
VIAL (ML) SUBCUTANEOUS
Refills: 0 | Status: DISCONTINUED | OUTPATIENT
Start: 2023-10-26 | End: 2023-10-27

## 2023-10-26 RX ORDER — SOD SULF/SODIUM/NAHCO3/KCL/PEG
1000 SOLUTION, RECONSTITUTED, ORAL ORAL ONCE
Refills: 0 | Status: COMPLETED | OUTPATIENT
Start: 2023-10-26 | End: 2023-10-26

## 2023-10-26 RX ORDER — SODIUM CHLORIDE 9 MG/ML
1000 INJECTION, SOLUTION INTRAVENOUS
Refills: 0 | Status: DISCONTINUED | OUTPATIENT
Start: 2023-10-26 | End: 2023-10-27

## 2023-10-26 RX ORDER — INSULIN LISPRO 100/ML
VIAL (ML) SUBCUTANEOUS AT BEDTIME
Refills: 0 | Status: DISCONTINUED | OUTPATIENT
Start: 2023-10-26 | End: 2023-10-27

## 2023-10-26 RX ORDER — ONDANSETRON 8 MG/1
4 TABLET, FILM COATED ORAL EVERY 8 HOURS
Refills: 0 | Status: DISCONTINUED | OUTPATIENT
Start: 2023-10-26 | End: 2023-10-27

## 2023-10-26 RX ORDER — LANOLIN ALCOHOL/MO/W.PET/CERES
3 CREAM (GRAM) TOPICAL AT BEDTIME
Refills: 0 | Status: DISCONTINUED | OUTPATIENT
Start: 2023-10-26 | End: 2023-10-27

## 2023-10-26 RX ORDER — DEXTROSE 50 % IN WATER 50 %
25 SYRINGE (ML) INTRAVENOUS ONCE
Refills: 0 | Status: DISCONTINUED | OUTPATIENT
Start: 2023-10-26 | End: 2023-10-27

## 2023-10-26 RX ORDER — DEXTROSE 50 % IN WATER 50 %
15 SYRINGE (ML) INTRAVENOUS ONCE
Refills: 0 | Status: DISCONTINUED | OUTPATIENT
Start: 2023-10-26 | End: 2023-10-27

## 2023-10-26 RX ORDER — TIOTROPIUM BROMIDE 18 UG/1
2 CAPSULE ORAL; RESPIRATORY (INHALATION) DAILY
Refills: 0 | Status: DISCONTINUED | OUTPATIENT
Start: 2023-10-26 | End: 2023-10-27

## 2023-10-26 RX ORDER — PANTOPRAZOLE SODIUM 20 MG/1
40 TABLET, DELAYED RELEASE ORAL
Refills: 0 | Status: DISCONTINUED | OUTPATIENT
Start: 2023-10-26 | End: 2023-10-27

## 2023-10-26 RX ORDER — DEXTROSE 50 % IN WATER 50 %
12.5 SYRINGE (ML) INTRAVENOUS ONCE
Refills: 0 | Status: DISCONTINUED | OUTPATIENT
Start: 2023-10-26 | End: 2023-10-27

## 2023-10-26 RX ORDER — BUDESONIDE AND FORMOTEROL FUMARATE DIHYDRATE 160; 4.5 UG/1; UG/1
2 AEROSOL RESPIRATORY (INHALATION)
Refills: 0 | Status: DISCONTINUED | OUTPATIENT
Start: 2023-10-26 | End: 2023-10-27

## 2023-10-26 RX ORDER — SODIUM CHLORIDE 9 MG/ML
1000 INJECTION INTRAMUSCULAR; INTRAVENOUS; SUBCUTANEOUS ONCE
Refills: 0 | Status: COMPLETED | OUTPATIENT
Start: 2023-10-26 | End: 2023-10-26

## 2023-10-26 RX ORDER — ACETAMINOPHEN 500 MG
1000 TABLET ORAL ONCE
Refills: 0 | Status: COMPLETED | OUTPATIENT
Start: 2023-10-26 | End: 2023-10-26

## 2023-10-26 RX ORDER — SODIUM CHLORIDE 9 MG/ML
1000 INJECTION INTRAMUSCULAR; INTRAVENOUS; SUBCUTANEOUS
Refills: 0 | Status: DISCONTINUED | OUTPATIENT
Start: 2023-10-26 | End: 2023-10-27

## 2023-10-26 RX ORDER — MULTIVIT WITH MIN/MFOLATE/K2 340-15/3 G
296 POWDER (GRAM) ORAL ONCE
Refills: 0 | Status: DISCONTINUED | OUTPATIENT
Start: 2023-10-26 | End: 2023-10-26

## 2023-10-26 RX ORDER — OLANZAPINE 15 MG/1
5 TABLET, FILM COATED ORAL
Refills: 0 | Status: DISCONTINUED | OUTPATIENT
Start: 2023-10-26 | End: 2023-10-27

## 2023-10-26 RX ORDER — OLANZAPINE 15 MG/1
10 TABLET, FILM COATED ORAL AT BEDTIME
Refills: 0 | Status: DISCONTINUED | OUTPATIENT
Start: 2023-10-26 | End: 2023-10-27

## 2023-10-26 RX ORDER — GLUCAGON INJECTION, SOLUTION 0.5 MG/.1ML
1 INJECTION, SOLUTION SUBCUTANEOUS ONCE
Refills: 0 | Status: DISCONTINUED | OUTPATIENT
Start: 2023-10-26 | End: 2023-10-27

## 2023-10-26 RX ORDER — ALBUTEROL 90 UG/1
2 AEROSOL, METERED ORAL EVERY 6 HOURS
Refills: 0 | Status: DISCONTINUED | OUTPATIENT
Start: 2023-10-26 | End: 2023-10-27

## 2023-10-26 RX ADMIN — OLANZAPINE 10 MILLIGRAM(S): 15 TABLET, FILM COATED ORAL at 21:07

## 2023-10-26 RX ADMIN — Medication 1000 MILLIGRAM(S): at 14:41

## 2023-10-26 RX ADMIN — SODIUM CHLORIDE 60 MILLILITER(S): 9 INJECTION INTRAMUSCULAR; INTRAVENOUS; SUBCUTANEOUS at 17:50

## 2023-10-26 RX ADMIN — Medication 1000 MILLIGRAM(S): at 14:26

## 2023-10-26 RX ADMIN — SODIUM CHLORIDE 1000 MILLILITER(S): 9 INJECTION INTRAMUSCULAR; INTRAVENOUS; SUBCUTANEOUS at 15:11

## 2023-10-26 RX ADMIN — Medication 1000 MILLILITER(S): at 19:16

## 2023-10-26 RX ADMIN — SODIUM CHLORIDE 60 MILLILITER(S): 9 INJECTION INTRAMUSCULAR; INTRAVENOUS; SUBCUTANEOUS at 21:07

## 2023-10-26 RX ADMIN — Medication 400 MILLIGRAM(S): at 14:11

## 2023-10-26 RX ADMIN — SODIUM CHLORIDE 1000 MILLILITER(S): 9 INJECTION INTRAMUSCULAR; INTRAVENOUS; SUBCUTANEOUS at 14:11

## 2023-10-26 NOTE — H&P ADULT - HISTORY OF PRESENT ILLNESS
84 yo man with history of paranoid schizophrenia, chronic HFrEF, and COPD comes to ED for abdominal pain. Patient reports not having BM for 5-6 days. He reports having rectal pain when trying to have a BM and lower abdominal pain when standing. He denies any other complaints.    In the ED, patient was given an enema and manual disimpaction was attempted but patient was having extreme pain. GI was consulted and and patient was admitted.   82 yo man with history of paranoid schizophrenia, chronic HFrEF, and COPD comes to ED for abdominal pain. Patient reports not having BM for 5-6 days. He reports having rectal pain when trying to have a BM and lower abdominal pain when standing. He denies any other complaints.    In the ED, patient was given an enema and manual disimpaction was attempted but patient was having extreme pain. GI was consulted and and patient was admitted.

## 2023-10-26 NOTE — H&P ADULT - NSHPPOAPULMEMBOLUS_GEN_A_CORE
Push fluids, rest.  Antibiotic as prescribed, take with food. Take medrol dose pack as prescribed-take with food. Tylenol  over the counter for discomfort. Return if symptoms worsen or do not improve in 2-3 days.
no

## 2023-10-26 NOTE — ED PROVIDER NOTE - CLINICAL SUMMARY MEDICAL DECISION MAKING FREE TEXT BOX
Pratik, PGY3 - 83-year-old man presenting from [place] due to constipation and left lower quadrant pain over the last 5 days, nontender on abdominal exam. LLQ pain most likely from colonic stool burden. Labs, meds for constipation, reassess.  Patient did not remember initially his history of heart failure, on the medication list was found to be taking Entresto so heart failure dx assumed.  Fluids discontinued at this time, might have received ~100cc max. *The above represents an initial assessment/impression. Please refer to progress notes for potential changes in patient clinical course* Pratik, PGY3 - 83-year-old man presenting from [place] due to constipation and left lower quadrant pain over the last 5 days, nontender on abdominal exam. LLQ pain most likely from colonic stool burden. Labs, meds for constipation, reassess.  Patient did not remember initially his history of heart failure, on the medication list was found to be taking Entresto so heart failure dx assumed.  Fluids discontinued at this time, might have received ~100cc max. *The above represents an initial assessment/impression. Please refer to progress notes for potential changes in patient clinical course* Pratik, PGY3 - 83-year-old man presenting from [place] due to constipation and left lower quadrant pain over the last 5 days, nontender on abdominal exam. LLQ pain most likely from colonic stool burden. Labs, meds for constipation, reassess.  Patient did not remember initially his history of heart failure, on the medication list was found to be taking Entresto so heart failure dx assumed.  Fluids discontinued at this time, might have received ~100cc max. *The above represents an initial assessment/impression. Please refer to progress notes for potential changes in patient clinical course* Lizette: 83-year-old man presenting from [place] due to constipation and left lower quadrant pain over the last 5 days, nontender on abdominal exam. LLQ pain most likely from colonic stool burden. Labs, meds for constipation, reassess.  Patient did not remember initially his history of heart failure, on the medication list was found to be taking Entresto so heart failure dx assumed.  pt given soap suds enemas x2 without no BM.  stool is stuck at the anus but pt cannot tolerate manual disimpaction, spoke with GI Dr. Fatima, recommends admission and will evaluate. Lizette: 83-year-old man presenting from [place] due to constipation and left lower quadrant pain over the last 5 days, nontender on abdominal exam. LLQ pain most likely from colonic stool burden. Labs, meds for constipation, reassess.  Patient did not remember initially his history of heart failure, on the medication list was found to be taking Entresto so heart failure dx assumed.  pt given soap suds enemas x2 without no BM.  stool is stuck at the anus but pt cannot tolerate manual disimpaction, spoke with GI Dr. Fatima, recommends admission and will evaluate. Lizette: 83-year-old man presenting from [place] due to constipation and left lower quadrant pain over the last 5 days, nontender on abdominal exam. LLQ pain most likely from colonic stool burden. Labs, meds for constipation, reassess.  Patient did not remember initially his history of heart failure, on the medication list was found to be taking Entresto so heart failure dx assumed.  pt given soap suds enemas x2 without no BM.  stool is stuck at the anus but pt cannot tolerate manual disimpaction, spoke with GI Dr. Fatima, recommends admission and will evaluate.

## 2023-10-26 NOTE — ED ADULT NURSE NOTE - NSFALLHARMRISKINTERV_ED_ALL_ED
Assistance OOB with selected safe patient handling equipment if applicable/Assistance with ambulation/Communicate risk of Fall with Harm to all staff, patient, and family/Monitor gait and stability/Provide patient with walking aids/Provide visual cue: red socks, yellow wristband, yellow gown, etc/Reinforce activity limits and safety measures with patient and family/Bed in lowest position, wheels locked, appropriate side rails in place/Call bell, personal items and telephone in reach/Instruct patient to call for assistance before getting out of bed/chair/stretcher/Non-slip footwear applied when patient is off stretcher/[place] to call system/Physically safe environment - no spills, clutter or unnecessary equipment/Purposeful Proactive Rounding/Room/bathroom lighting operational, light cord in reach Assistance OOB with selected safe patient handling equipment if applicable/Assistance with ambulation/Communicate risk of Fall with Harm to all staff, patient, and family/Monitor gait and stability/Provide patient with walking aids/Provide visual cue: red socks, yellow wristband, yellow gown, etc/Reinforce activity limits and safety measures with patient and family/Bed in lowest position, wheels locked, appropriate side rails in place/Call bell, personal items and telephone in reach/Instruct patient to call for assistance before getting out of bed/chair/stretcher/Non-slip footwear applied when patient is off stretcher/[place] to call system/Physically safe environment - no spills, clutter or unnecessary equipment/Purposeful Proactive Rounding/Room/bathroom lighting operational, light cord in reach Assistance OOB with selected safe patient handling equipment if applicable/Assistance with ambulation/Communicate risk of Fall with Harm to all staff, patient, and family/Monitor gait and stability/Provide patient with walking aids/Provide visual cue: red socks, yellow wristband, yellow gown, etc/Reinforce activity limits and safety measures with patient and family/Bed in lowest position, wheels locked, appropriate side rails in place/Call bell, personal items and telephone in reach/Instruct patient to call for assistance before getting out of bed/chair/stretcher/Non-slip footwear applied when patient is off stretcher/[place] to call system/Physically safe environment - no spills, clutter or unnecessary equipment/Purposeful Proactive Rounding/Room/bathroom lighting operational, light cord in reach

## 2023-10-26 NOTE — ED PROVIDER NOTE - OBJECTIVE STATEMENT
83-year-old man with PMH HTN, HLD, prior history of appendectomy, heart failure, presenting due to constipation and LLQ abdominal pain over the last 5 days.  Patient does not remember what his medications are. Denies fevers, chills, nausea, vomiting, diarrhea.  States that he has been limiting his p.o. intake due to not liking the quality of fluid at [place]. 83-year-old man with PMH HTN, HLD, prior history of appendectomy, heart failure, presenting due to constipation and LLQ abdominal pain over the last 5 days.  Patient does not remember what his medications are. Denies fevers, chills, nausea, vomiting, diarrhea.  States that he has been limiting his p.o. intake due to not liking the quality of fluid at [place]. 83-year-old man with PMH HTN, HLD, prior history of appendectomy, heart failure, presenting due to constipation and LLQ abdominal pain over the last 5 days.  Patient does not remember what his medications are. Denies fevers, chills, nausea, vomiting, diarrhea.  States that he has been limiting his p.o. intake due to not liking the quality of fluid at [place].

## 2023-10-26 NOTE — ED ADULT NURSE NOTE - OBJECTIVE STATEMENT
Pt came from home with c/o constipation and abdominal pain x 5 days. PMH HTN, HF and HLD. Pt stating that he recently moved into the [facility] and has been limiting po intake because he does not like the food at the [facility]. Pt came from home with c/o constipation and abdominal pain x 5 days. PMH HTN, HF and HLD. Pt stating that he recently moved into the [facility] and has been limiting po intake because he does not like the food at the [facility]. Pt came from home with c/o constipation and abdominal pain x 5 days. PMH HTN, HF and HLD. Pt stating that he recently moved into the [facility] and has been limiting po intake because he does not like the food at the [facility].

## 2023-10-26 NOTE — ED PROVIDER NOTE - PHYSICAL EXAMINATION
Gen: NAD, AOx3, able to make needs known, non-toxic  Head: NCAT  HEENT: EOMI, normal conjunctiva  Lung: CTAB, no respiratory distress, no wheezes/rhonchi/rales B/L, speaking in full sentences  CV: RRR, no M/R/G, pulses bilaterally   Abd: soft, NTND, no guarding, no CVA tenderness  Rectal: No lesions visualized over the anus. No external hemorrhoids visualized. Stool at the rectal opening. Chaperone: Dr. Bauer   MSK: no visible bony deformities  Neuro: No focal sensory or motor deficits  Skin: Warm, well perfused, no rash  Psych: normal affect

## 2023-10-26 NOTE — H&P ADULT - NSICDXPASTMEDICALHX_GEN_ALL_CORE_FT
PAST MEDICAL HISTORY:  Heart failure     HLD (hyperlipidemia)     HTN (hypertension)     Paranoid schizophrenia

## 2023-10-26 NOTE — ED ADULT TRIAGE NOTE - CHIEF COMPLAINT QUOTE
Pt BIBA from [facility] c/o constipation x 5 days Pt BIBA from [facility] c/o constipation x 5 days Pt BIBA from [facility] c/o constipation x 5 days

## 2023-10-26 NOTE — ED ADULT NURSE NOTE - CHIEF COMPLAINT QUOTE
Pt BIBA from [facility] c/o constipation x 5 days Pt BIBA from [email or web address] c/o constipation x 5 days Pt BIBA from [facility] c/o constipation x 5 days

## 2023-10-26 NOTE — H&P ADULT - ASSESSMENT
84 yo man with history of paranoid schizophrenia, chronic HFrEF, COPD and Hyperlipidemia comes to ED for abdominal pain from severe constipation.      #Obstipation  Failed manual disimpaction due to pain and no BMs with enema  MOVIPREP 1L x 1 ordered  Start gentle IVFs since history of CHF    #COPD  Continue Symbicort and Spiriva  albuterol HFA PRN    #Chronic HFrEF  Stable  Continue Entresto BID  PLEASE CALL [facility]. MEDICATION LIST INCOMPLETE. FIRST 2 PAGES MISSING FROM FACILITY. NO ANSWER DURING TIME OF ADMISSION    #Diabetes  check A1c  Hold home metformin. documents in ED incomplete. 1 paper reports metformin  Hypoglycemia protocol and insulin sliding scale at pre-meal and at night  Blood glucose goal 100-180  Monitor BUN/Cr and electrolytes    #Paranoid Schizophrenia   Continue olanzapine 10mg qhs and olanzapine 5mg BID    GI Prophylaxis with Protonix  DVT Prophylaxis with Lovenox    Updated son Jose, [phone], and aware of the plan 84 yo man with history of paranoid schizophrenia, chronic HFrEF, COPD and Hyperlipidemia comes to ED for abdominal pain from severe constipation.      #Obstipation  Failed manual disimpaction due to pain and no BMs with enema  MOVIPREP 1L x 1 ordered  Start gentle IVFs since history of CHF    #COPD  Continue Symbicort and Spiriva  albuterol HFA PRN    #Chronic HFrEF  Stable  Continue Entresto BID  PLEASE CALL [facility]. MEDICATION LIST INCOMPLETE. FIRST 2 PAGES MISSING FROM FACILITY. NO ANSWER DURING TIME OF ADMISSION    #Diabetes  check A1c  Hold home metformin. documents in ED incomplete. 1 paper reports metformin  Hypoglycemia protocol and insulin sliding scale at pre-meal and at night  Blood glucose goal 100-180  Monitor BUN/Cr and electrolytes    #Paranoid Schizophrenia   Continue olanzapine 10mg qhs and olanzapine 5mg BID    GI Prophylaxis with Protonix  DVT Prophylaxis with Lovenox    Updated son Jose, [phone], and aware of the plan 82 yo man with history of paranoid schizophrenia, chronic HFrEF, COPD and Hyperlipidemia comes to ED for abdominal pain from severe constipation.      #Obstipation  Failed manual disimpaction due to pain and no BMs with enema  MOVIPREP 1L x 1 ordered  Start gentle IVFs since history of CHF    #COPD  Continue Symbicort and Spiriva  albuterol HFA PRN    #Chronic HFrEF  Stable  Continue Entresto BID  PLEASE CALL [facility]. MEDICATION LIST INCOMPLETE. FIRST 2 PAGES MISSING FROM FACILITY. NO ANSWER DURING TIME OF ADMISSION    #Diabetes  check A1c  Hold home metformin. documents in ED incomplete. 1 paper reports metformin  Hypoglycemia protocol and insulin sliding scale at pre-meal and at night  Blood glucose goal 100-180  Monitor BUN/Cr and electrolytes    #Paranoid Schizophrenia   Continue olanzapine 10mg qhs and olanzapine 5mg BID    GI Prophylaxis with Protonix  DVT Prophylaxis with Lovenox    Updated son Jose, [phone], and aware of the plan 84 yo man with history of paranoid schizophrenia, chronic HFrEF, COPD and Hyperlipidemia comes to ED for abdominal pain from severe constipation.      #Obstipation  Failed manual disimpaction due to pain and no BMs with enema  MOVIPREP 1L x 1 ordered  Start gentle IVFs since history of CHF    #COPD -chronic  Stable  Continue Symbicort and Spiriva  Albuterol HFA PRN    #Chronic HFrEF  Stable  Continue Entresto BID  PLEASE CALL [facility]. MEDICATION LIST INCOMPLETE. FIRST 2 PAGES MISSING FROM FACILITY. NO ANSWER DURING TIME OF ADMISSION    #Diabetes  check A1c  Hold home metformin. documents in ED incomplete. 1 paper reports metformin  Hypoglycemia protocol and insulin sliding scale at pre-meal and at night  Blood glucose goal 100-180  Monitor BUN/Cr and electrolytes    #Paranoid Schizophrenia   Continue olanzapine 10mg qhs and olanzapine 5mg BID    GI Prophylaxis with Protonix  DVT Prophylaxis with Lovenox    Updated son Jose, [phone], and aware of the plan 84 yo man with history of paranoid schizophrenia, chronic HFrEF, COPD and Hyperlipidemia comes to ED for abdominal pain from severe constipation.      #Obstipation  Failed manual disimpaction due to pain and no BMs with enema  MOVIPREP 1L x 1 ordered  Start gentle IVFs since history of CHF    #COPD -chronic  Stable  Continue Symbicort and Spiriva  Albuterol HFA PRN    #Chronic HFrEF  Stable  Continue Entresto BID  PLEASE CALL [facility]. MEDICATION LIST INCOMPLETE. FIRST 2 PAGES MISSING FROM FACILITY. NO ANSWER DURING TIME OF ADMISSION    #Diabetes  check A1c  Hold home metformin. documents in ED incomplete. 1 paper reports metformin  Hypoglycemia protocol and insulin sliding scale at pre-meal and at night  Blood glucose goal 100-180  Monitor BUN/Cr and electrolytes    #Paranoid Schizophrenia   Continue olanzapine 10mg qhs and olanzapine 5mg BID    GI Prophylaxis with Protonix  DVT Prophylaxis with Lovenox    Updated son Jose, [phone], and aware of the plan 84 yo man with history of paranoid schizophrenia, chronic HFrEF, COPD and Hyperlipidemia comes to ED for abdominal pain from severe constipation.      #Obstipation  Failed manual disimpaction due to pain and no BMs with enema  MOVIPREP 1L x 1 ordered  Start gentle IVFs since history of CHF    #COPD -chronic  Stable  Continue Symbicort and Spiriva  Albuterol HFA PRN    #Chronic HFrEF  Stable  Continue Entresto BID  PLEASE CALL [facility]. MEDICATION LIST INCOMPLETE. FIRST 2 PAGES MISSING FROM FACILITY. NO ANSWER DURING TIME OF ADMISSION    #Diabetes  check A1c  Hold home metformin. documents in ED incomplete. 1 paper reports metformin  Hypoglycemia protocol and insulin sliding scale at pre-meal and at night  Blood glucose goal 100-180  Monitor BUN/Cr and electrolytes    #Paranoid Schizophrenia   Continue olanzapine 10mg qhs and olanzapine 5mg BID    GI Prophylaxis with Protonix  DVT Prophylaxis with Lovenox    Updated son Jose, [phone], and aware of the plan 82 yo man with history of paranoid schizophrenia, chronic HFrEF, COPD and Hyperlipidemia comes to ED for abdominal pain from severe constipation.      #Obstipation  Failed manual disimpaction due to pain and no BMs with enema  MOVIPREP 1L x 1 ordered  Start gentle IVFs since history of CHF    #COPD -chronic  Stable  Continue Symbicort and Spiriva  Albuterol HFA PRN    #Chronic HFrEF  Stable  Continue Entresto BID  PLEASE CALL [facility]. MEDICATION LIST INCOMPLETE. FIRST 2 PAGES MISSING FROM FACILITY. NO ANSWER DURING TIME OF ADMISSION    #Diabetes mellitus II  check A1c  Hold home metformin. documents in ED incomplete. 1 paper reports metformin  Hypoglycemia protocol and insulin sliding scale at pre-meal and at night  Blood glucose goal 100-180  Monitor BUN/Cr and electrolytes    #Paranoid Schizophrenia   Continue olanzapine 10mg qhs and olanzapine 5mg BID    GI Prophylaxis with Protonix  DVT Prophylaxis with Lovenox    Updated son Jose, [phone], and aware of the plan 84 yo man with history of paranoid schizophrenia, chronic HFrEF, COPD and Hyperlipidemia comes to ED for abdominal pain from severe constipation.      #Obstipation  Failed manual disimpaction due to pain and no BMs with enema  MOVIPREP 1L x 1 ordered  Start gentle IVFs since history of CHF    #COPD -chronic  Stable  Continue Symbicort and Spiriva  Albuterol HFA PRN    #Chronic HFrEF  Stable  Continue Entresto BID  PLEASE CALL [facility]. MEDICATION LIST INCOMPLETE. FIRST 2 PAGES MISSING FROM FACILITY. NO ANSWER DURING TIME OF ADMISSION    #Diabetes mellitus II  check A1c  Hold home metformin. documents in ED incomplete. 1 paper reports metformin  Hypoglycemia protocol and insulin sliding scale at pre-meal and at night  Blood glucose goal 100-180  Monitor BUN/Cr and electrolytes    #Paranoid Schizophrenia   Continue olanzapine 10mg qhs and olanzapine 5mg BID    GI Prophylaxis with Protonix  DVT Prophylaxis with Lovenox    Updated son Jose, [phone], and aware of the plan 84 yo man with history of paranoid schizophrenia, chronic HFrEF, COPD and Hyperlipidemia comes to ED for abdominal pain from severe constipation.      #Obstipation  Failed manual disimpaction due to pain and no BMs with enema  MOVIPREP 1L x 1 ordered  Start gentle IVFs since history of CHF    #COPD -chronic  Stable  Continue Symbicort and Spiriva  Albuterol HFA PRN    #Chronic HFrEF  Stable  Continue Entresto BID  PLEASE CALL [facility]. MEDICATION LIST INCOMPLETE. FIRST 2 PAGES MISSING FROM FACILITY. NO ANSWER DURING TIME OF ADMISSION    #Diabetes mellitus II  check A1c  Hold home metformin. documents in ED incomplete. 1 paper reports metformin  Hypoglycemia protocol and insulin sliding scale at pre-meal and at night  Blood glucose goal 100-180  Monitor BUN/Cr and electrolytes    #Paranoid Schizophrenia   Continue olanzapine 10mg qhs and olanzapine 5mg BID    GI Prophylaxis with Protonix  DVT Prophylaxis with Lovenox    Updated son Jose, [phone], and aware of the plan

## 2023-10-26 NOTE — H&P ADULT - NSHPPHYSICALEXAM_GEN_ALL_CORE
T(C): 36.6 (10-26-23 @ 13:37), Max: 36.6 (10-26-23 @ 13:37)  HR: 103 (10-26-23 @ 13:37) (103 - 103)  BP: 101/64 (10-26-23 @ 13:37) (101/64 - 101/64)  RR: 18 (10-26-23 @ 13:37) (18 - 18)  SpO2: 93% (10-26-23 @ 13:37) (93% - 93%)  Wt(kg): --Vital Signs Last 24 Hrs  T(C): 36.6 (26 Oct 2023 13:37), Max: 36.6 (26 Oct 2023 13:37)  T(F): 97.9 (26 Oct 2023 13:37), Max: 97.9 (26 Oct 2023 13:37)  HR: 103 (26 Oct 2023 13:37) (103 - 103)  BP: 101/64 (26 Oct 2023 13:37) (101/64 - 101/64)  BP(mean): --  RR: 18 (26 Oct 2023 13:37) (18 - 18)  SpO2: 93% (26 Oct 2023 13:37) (93% - 93%)    Parameters below as of 26 Oct 2023 13:37  Patient On (Oxygen Delivery Method): room air        PHYSICAL EXAM:  GENERAL: NAD  HENT:  Atraumatic, Normocephalic; No tonsillar erythema, exudates, or enlargement; Moist mucous membranes;   EYES: EOMI, PERRLA, conjunctiva and sclera clear, no lid-lag  NECK: Supple, No JVD, Normal thyroid  NERVOUS SYSTEM:  CN II - XII intact; Sensation intact; Motor Strength 5/5 B/L upper and lower extremities  CHEST/LUNG: Clear to percussion bilaterally; No rales, rhonchi, wheezing, or rubs; normal respiratory effort, no intercostal retractions; No pitting edema  HEART: Regular rate and rhythm; No murmurs, rubs, or gallops  ABDOMEN: Soft, Nontender, Nondistended; Bowel sounds present; No HSM  MUSCULOSKELETAL/EXTREMITIES:  2+ Peripheral Pulses, No clubbing, or digital cyanosis  SKIN: No rashes or lesions; normal texture and temperature  PSYCH: Appropriate affect, Alert & Oriented x 3  Rectal vault with stool

## 2023-10-27 ENCOUNTER — TRANSCRIPTION ENCOUNTER (OUTPATIENT)
Age: 83
End: 2023-10-27

## 2023-10-27 VITALS
SYSTOLIC BLOOD PRESSURE: 94 MMHG | DIASTOLIC BLOOD PRESSURE: 67 MMHG | TEMPERATURE: 98 F | OXYGEN SATURATION: 94 % | RESPIRATION RATE: 17 BRPM | HEART RATE: 88 BPM

## 2023-10-27 DIAGNOSIS — K59.00 CONSTIPATION, UNSPECIFIED: ICD-10-CM

## 2023-10-27 LAB
A1C WITH ESTIMATED AVERAGE GLUCOSE RESULT: 6.1 % — HIGH (ref 4–5.6)
CULTURE RESULTS: SIGNIFICANT CHANGE UP
ESTIMATED AVERAGE GLUCOSE: 128 MG/DL — HIGH (ref 68–114)
GLUCOSE BLDC GLUCOMTR-MCNC: 151 MG/DL — HIGH (ref 70–99)
GLUCOSE BLDC GLUCOMTR-MCNC: 91 MG/DL — SIGNIFICANT CHANGE UP (ref 70–99)
SPECIMEN SOURCE: SIGNIFICANT CHANGE UP

## 2023-10-27 PROCEDURE — 99239 HOSP IP/OBS DSCHRG MGMT >30: CPT

## 2023-10-27 RX ORDER — POLYETHYLENE GLYCOL 3350 17 G/17G
17 POWDER, FOR SOLUTION ORAL
Qty: 30 | Refills: 0
Start: 2023-10-27 | End: 2023-11-25

## 2023-10-27 RX ADMIN — PANTOPRAZOLE SODIUM 40 MILLIGRAM(S): 20 TABLET, DELAYED RELEASE ORAL at 06:09

## 2023-10-27 RX ADMIN — OLANZAPINE 5 MILLIGRAM(S): 15 TABLET, FILM COATED ORAL at 08:36

## 2023-10-27 RX ADMIN — TIOTROPIUM BROMIDE 2 PUFF(S): 18 CAPSULE ORAL; RESPIRATORY (INHALATION) at 09:06

## 2023-10-27 RX ADMIN — Medication 2: at 13:25

## 2023-10-27 RX ADMIN — SACUBITRIL AND VALSARTAN 1 TABLET(S): 24; 26 TABLET, FILM COATED ORAL at 06:09

## 2023-10-27 RX ADMIN — SODIUM CHLORIDE 60 MILLILITER(S): 9 INJECTION INTRAMUSCULAR; INTRAVENOUS; SUBCUTANEOUS at 08:39

## 2023-10-27 RX ADMIN — BUDESONIDE AND FORMOTEROL FUMARATE DIHYDRATE 2 PUFF(S): 160; 4.5 AEROSOL RESPIRATORY (INHALATION) at 09:06

## 2023-10-27 NOTE — ED ADULT NURSE REASSESSMENT NOTE - NS ED NURSE REASSESS COMMENT FT1
Pt received from [facility].  Pt finished with bedside commode at this time, had large BM x 1.  Bed bath performed.  Pt placed in new gown with new linens on hospital bed.  Pt tolerated well.  Denies any pain at this time.  Call bell placed within reach, safety maintained.  Will continue to monitor.

## 2023-10-27 NOTE — DISCHARGE NOTE PROVIDER - NSDCACTIVITY_GEN_ALL_CORE
No restrictions Plastic Surgeon Procedure Text (A): After obtaining clear surgical margins the patient was sent to plastics for surgical repair.  The patient understands they will receive post-surgical care and follow-up from the referring physician's office.

## 2023-10-27 NOTE — PROGRESS NOTE ADULT - ASSESSMENT
82 yo man with history of paranoid schizophrenia, chronic HFrEF, COPD and Hyperlipidemia comes to ED for abdominal pain from severe constipation.      #Obstipation  - resolved with movi-prep  -GI to eval    #COPD -chronic  Stable  Continue Symbicort and Spiriva  Albuterol HFA PRN    #Chronic HFrEF  Stable  Continue Entresto BID  PLEASE CALL [facility]. MEDICATION LIST INCOMPLETE. FIRST 2 PAGES MISSING FROM FACILITY. NO ANSWER DURING TIME OF ADMISSION    #Diabetes mellitus II  check A1c  Hold home metformin. documents in ED incomplete. 1 paper reports metformin  Hypoglycemia protocol and insulin sliding scale at pre-meal and at night  Blood glucose goal 100-180  Monitor BUN/Cr and electrolytes    #Paranoid Schizophrenia   Continue olanzapine 10mg qhs and olanzapine 5mg BID    GI Prophylaxis with Protonix  DVT Prophylaxis with Lovenox    Will update son Jose, [phone], and aware of the plan  DC back to assisted pending PT and GI evals 82 yo man with history of paranoid schizophrenia, chronic HFrEF, COPD and Hyperlipidemia comes to ED for abdominal pain from severe constipation.      #Obstipation  - resolved with movi-prep  -GI to eval    #COPD -chronic  Stable  Continue Symbicort and Spiriva  Albuterol HFA PRN    #Chronic HFrEF  Stable  Continue Entresto BID  PLEASE CALL [facility]. MEDICATION LIST INCOMPLETE. FIRST 2 PAGES MISSING FROM FACILITY. NO ANSWER DURING TIME OF ADMISSION    #Diabetes mellitus II  check A1c  Hold home metformin. documents in ED incomplete. 1 paper reports metformin  Hypoglycemia protocol and insulin sliding scale at pre-meal and at night  Blood glucose goal 100-180  Monitor BUN/Cr and electrolytes    #Paranoid Schizophrenia   Continue olanzapine 10mg qhs and olanzapine 5mg BID    GI Prophylaxis with Protonix  DVT Prophylaxis with Lovenox    Will update son Jose, [phone], and aware of the plan  DC back to [facility] pending PT and GI evals 84 yo man with history of paranoid schizophrenia, chronic HFrEF, COPD and Hyperlipidemia comes to ED for abdominal pain from severe constipation.      #Obstipation  - resolved with movi-prep  -GI to eval    #COPD -chronic  Stable  Continue Symbicort and Spiriva  Albuterol HFA PRN    #Chronic HFrEF  Stable  Continue Entresto BID  PLEASE CALL [facility]. MEDICATION LIST INCOMPLETE. FIRST 2 PAGES MISSING FROM FACILITY. NO ANSWER DURING TIME OF ADMISSION    #Diabetes mellitus II  check A1c  Hold home metformin. documents in ED incomplete. 1 paper reports metformin  Hypoglycemia protocol and insulin sliding scale at pre-meal and at night  Blood glucose goal 100-180  Monitor BUN/Cr and electrolytes    #Paranoid Schizophrenia   Continue olanzapine 10mg qhs and olanzapine 5mg BID    GI Prophylaxis with Protonix  DVT Prophylaxis with Lovenox    Will update son Jose, [phone], and aware of the plan  DC back to half-way pending PT and GI evals

## 2023-10-27 NOTE — CONSULT NOTE ADULT - PROBLEM SELECTOR RECOMMENDATION 9
s/p moviprep overnight- multiple BMs-   Miralax daily as outpatient  Ok to discharge back to [place] from GI stand point s/p moviprep overnight- multiple BMs-   Miralax daily as outpatient  Ok to discharge back to [place] from GI stand point s/p moviprep overnight- multiple BMs-   Miralax daily as outpatient  Ok to discharge back to [place] from GI stand point

## 2023-10-27 NOTE — PHYSICAL THERAPY INITIAL EVALUATION ADULT - LIVES WITH, PROFILE
from assisted living facility ([facility]) from assisted living facility ([facility]) from assisted living facility ([facility])

## 2023-10-27 NOTE — CONSULT NOTE ADULT - SUBJECTIVE AND OBJECTIVE BOX
INTERVAL HPI / OVERNIGHT EVENTS:  HPI:  Consult  84 yo man with history of paranoid schizophrenia, chronic HFrEF, and COPD comes to ED for abdominal pain. Patient reports not having BM for 5-6 days. He reports having rectal pain when trying to have a BM and lower abdominal pain when standing. He denies any other complaints.    In the ED, patient was given an enema and manual disimpaction was attempted but patient was having extreme pain. GI was consulted and and patient was admitted. (26 Oct 2023 17:31)        MEDICATIONS  (STANDING):  budesonide  80 MICROgram(s)/formoterol 4.5 MICROgram(s) Inhaler 2 Puff(s) Inhalation two times a day  dextrose 5%. 1000 milliLiter(s) (100 mL/Hr) IV Continuous <Continuous>  dextrose 5%. 1000 milliLiter(s) (50 mL/Hr) IV Continuous <Continuous>  dextrose 50% Injectable 12.5 Gram(s) IV Push once  dextrose 50% Injectable 25 Gram(s) IV Push once  dextrose 50% Injectable 25 Gram(s) IV Push once  glucagon  Injectable 1 milliGRAM(s) IntraMuscular once  insulin lispro (ADMELOG) corrective regimen sliding scale   SubCutaneous three times a day before meals  insulin lispro (ADMELOG) corrective regimen sliding scale   SubCutaneous at bedtime  OLANZapine 10 milliGRAM(s) Oral at bedtime  OLANZapine Disintegrating Tablet 5 milliGRAM(s) Oral two times a day  pantoprazole    Tablet 40 milliGRAM(s) Oral before breakfast  sacubitril 24 mG/valsartan 26 mG 1 Tablet(s) Oral two times a day  sodium chloride 0.9%. 1000 milliLiter(s) (60 mL/Hr) IV Continuous <Continuous>  tiotropium 2.5 MICROgram(s) Inhaler 2 Puff(s) Inhalation daily    MEDICATIONS  (PRN):  acetaminophen     Tablet .. 650 milliGRAM(s) Oral every 6 hours PRN Mild Pain (1 - 3)  albuterol    90 MICROgram(s) HFA Inhaler 2 Puff(s) Inhalation every 6 hours PRN for shortness of breath and/or wheezing  dextrose Oral Gel 15 Gram(s) Oral once PRN Blood Glucose LESS THAN 70 milliGRAM(s)/deciliter  melatonin 3 milliGRAM(s) Oral at bedtime PRN Insomnia  ondansetron Injectable 4 milliGRAM(s) IV Push every 8 hours PRN Nausea and/or Vomiting      Allergies    No Known Allergies    Intolerances        PAST MEDICAL & SURGICAL HISTORY:  HTN (hypertension)      HLD (hyperlipidemia)      Heart failure      Paranoid schizophrenia      Cardiac pacemaker    REVIEW OF SYSTEMS- See HPI    PHYSICAL EXAM:   Vital Signs:  Vital Signs Last 24 Hrs  T(C): 36.4 (27 Oct 2023 08:38), Max: 36.8 (27 Oct 2023 06:00)  T(F): 97.5 (27 Oct 2023 08:38), Max: 98.3 (27 Oct 2023 06:00)  HR: 90 (27 Oct 2023 08:38) (70 - 103)  BP: 100/62 (27 Oct 2023 08:38) (100/62 - 123/73)  BP(mean): 75 (27 Oct 2023 08:38) (75 - 75)  RR: 16 (27 Oct 2023 08:38) (16 - 18)  SpO2: 94% (27 Oct 2023 08:38) (93% - 95%)    Parameters below as of 27 Oct 2023 08:38  Patient On (Oxygen Delivery Method): room air      Daily Height in cm: 175.26 (26 Oct 2023 13:37)    Daily I&O's Summary      GENERAL:  Appears stated age, well-groomed, well-nourished, no distress  HEENT: Clear and moist sclera and conjuctivae  CHEST:  Full & symmetric excursion, no increased effort, breath sounds clear  HEART:  Regular rhythm, S1, S2  ABDOMEN:  Soft, non-tender, non-distended, normoactive bowel sounds,  no masses  EXTREMITIES:  no edema  SKIN:  No rash  NEURO:  Alert, oriented, no asterixis, no tremor, no encephalopathy      LABS:                        12.7   8.82  )-----------( 188      ( 26 Oct 2023 14:18 )             38.6     10-    142  |  103  |  15  ----------------------------<  93  4.0   |  30  |  1.02    Ca    9.6      26 Oct 2023 14:18    TPro  7.2  /  Alb  3.3  /  TBili  1.0  /  DBili  x   /  AST  15  /  ALT  16  /  AlkPhos  75  10-      Urinalysis Basic - ( 26 Oct 2023 15:10 )    Color: Dark Yellow / Appearance: Clear / S.023 / pH: x  Gluc: x / Ketone: Trace mg/dL  / Bili: Small / Urobili: 2.0 mg/dL   Blood: x / Protein: Trace mg/dL / Nitrite: Negative   Leuk Esterase: Trace / RBC: 0 /HPF / WBC 2 /HPF   Sq Epi: x / Non Sq Epi: x / Bacteria: Few /HPF      amylase   lipaseLipase: 20 U/L (10-26 @ 14:18)    RADIOLOGY & ADDITIONAL TESTS:   INTERVAL HPI / OVERNIGHT EVENTS:  HPI:  Consult  84 yo M seen and examined in [facility] came in with history of paranoid schizophrenia, chronic HFrEF, and COPD comes to ED for abdominal pain. Patient reports not having BM for 5-6 days. He reports having rectal pain when trying to have a BM and lower abdominal pain when standing. He denies any other complaints.    In the ED, patient was given an enema and manual disimpaction was attempted but patient was having extreme pain. GI was consulted and patient was admitted. (26 Oct 2023 17:31)    Currently, patient denies abdominal pain, admits to having the "runs" after "some medication" was given, denies n/v/d, or hematochezia. Patient tolerating diet      MEDICATIONS  (STANDING):  budesonide  80 MICROgram(s)/formoterol 4.5 MICROgram(s) Inhaler 2 Puff(s) Inhalation two times a day  dextrose 5%. 1000 milliLiter(s) (100 mL/Hr) IV Continuous <Continuous>  dextrose 5%. 1000 milliLiter(s) (50 mL/Hr) IV Continuous <Continuous>  dextrose 50% Injectable 12.5 Gram(s) IV Push once  dextrose 50% Injectable 25 Gram(s) IV Push once  dextrose 50% Injectable 25 Gram(s) IV Push once  glucagon  Injectable 1 milliGRAM(s) IntraMuscular once  insulin lispro (ADMELOG) corrective regimen sliding scale   SubCutaneous three times a day before meals  insulin lispro (ADMELOG) corrective regimen sliding scale   SubCutaneous at bedtime  OLANZapine 10 milliGRAM(s) Oral at bedtime  OLANZapine Disintegrating Tablet 5 milliGRAM(s) Oral two times a day  pantoprazole    Tablet 40 milliGRAM(s) Oral before breakfast  sacubitril 24 mG/valsartan 26 mG 1 Tablet(s) Oral two times a day  sodium chloride 0.9%. 1000 milliLiter(s) (60 mL/Hr) IV Continuous <Continuous>  tiotropium 2.5 MICROgram(s) Inhaler 2 Puff(s) Inhalation daily    MEDICATIONS  (PRN):  acetaminophen     Tablet .. 650 milliGRAM(s) Oral every 6 hours PRN Mild Pain (1 - 3)  albuterol    90 MICROgram(s) HFA Inhaler 2 Puff(s) Inhalation every 6 hours PRN for shortness of breath and/or wheezing  dextrose Oral Gel 15 Gram(s) Oral once PRN Blood Glucose LESS THAN 70 milliGRAM(s)/deciliter  melatonin 3 milliGRAM(s) Oral at bedtime PRN Insomnia  ondansetron Injectable 4 milliGRAM(s) IV Push every 8 hours PRN Nausea and/or Vomiting      Allergies    No Known Allergies    Intolerances        PAST MEDICAL & SURGICAL HISTORY:  HTN (hypertension)      HLD (hyperlipidemia)      Heart failure      Paranoid schizophrenia      Cardiac pacemaker    REVIEW OF SYSTEMS- See HPI    PHYSICAL EXAM:   Vital Signs:  Vital Signs Last 24 Hrs  T(C): 36.4 (27 Oct 2023 08:38), Max: 36.8 (27 Oct 2023 06:00)  T(F): 97.5 (27 Oct 2023 08:38), Max: 98.3 (27 Oct 2023 06:00)  HR: 90 (27 Oct 2023 08:38) (70 - 103)  BP: 100/62 (27 Oct 2023 08:38) (100/62 - 123/73)  BP(mean): 75 (27 Oct 2023 08:38) (75 - 75)  RR: 16 (27 Oct 2023 08:38) (16 - 18)  SpO2: 94% (27 Oct 2023 08:38) (93% - 95%)    Parameters below as of 27 Oct 2023 08:38  Patient On (Oxygen Delivery Method): room air      Daily Height in cm: 175.26 (26 Oct 2023 13:37)    Daily I&O's Summary    GENERAL:  Appears stated age, well-groomed, well-nourished, no distress  HEENT: Clear and moist sclera and conjunctivae  CHEST:  Full & symmetric excursion, no increased effort, breath sounds clear  HEART:  Regular rhythm, S1, S2  ABDOMEN:  Soft, non-tender, non-distended, normoactive bowel sounds, no masses  EXTREMITIES:  no edema  SKIN:  No rash  NEURO:  Alert, oriented, no tremor, no encephalopathy      LABS:                        12.7   8.82  )-----------( 188      ( 26 Oct 2023 14:18 )             38.6     10-26    142  |  103  |  15  ----------------------------<  93  4.0   |  30  |  1.02    Ca    9.6      26 Oct 2023 14:18    TPro  7.2  /  Alb  3.3  /  TBili  1.0  /  DBili  x   /  AST  15  /  ALT  16  /  AlkPhos  75  10-26      Urinalysis Basic - ( 26 Oct 2023 15:10 )    Color: Dark Yellow / Appearance: Clear / S.023 / pH: x  Gluc: x / Ketone: Trace mg/dL  / Bili: Small / Urobili: 2.0 mg/dL   Blood: x / Protein: Trace mg/dL / Nitrite: Negative   Leuk Esterase: Trace / RBC: 0 /HPF / WBC 2 /HPF   Sq Epi: x / Non Sq Epi: x / Bacteria: Few /HPF      amylase   lipase: 20 U/L (10-26 @ 14:18)

## 2023-10-27 NOTE — DISCHARGE NOTE NURSING/CASE MANAGEMENT/SOCIAL WORK - NSDCPEFALRISK_GEN_ALL_CORE
For information on Fall & Injury Prevention, visit: [email or web address] OR  [email or web address] OR  https://www.cdc.gov/steadi/patient.html For information on Fall & Injury Prevention, visit: [email or web address] OR  [email or web address] OR  https://www.cdc.gov/steadi/patient.html For information on Fall & Injury Prevention, visit: [email or web address]://[email or web address]/[email or web address]-[email or web address]-mobility OR  [email or web address]://[email or web address]/[email or web address]-[email or web address]-injury OR  https://www.cdc.gov/steadi/patient.html

## 2023-10-27 NOTE — DISCHARGE NOTE PROVIDER - NSDCCPCAREPLAN_GEN_ALL_CORE_FT
PRINCIPAL DISCHARGE DIAGNOSIS  Diagnosis: Constipation  Assessment and Plan of Treatment: You were admitted to the hospital for severe constipation. You responded to laxatives.  Please take miralax daily  Take all medications prior to hospital admission

## 2023-10-27 NOTE — PROGRESS NOTE ADULT - SUBJECTIVE AND OBJECTIVE BOX
Patient is a 83y old  Male who presents with a chief complaint of Obstipation (27 Oct 2023 09:26)    Patient seen and examined at bedside. No acute overnight events. Had BM yesterday and overnight. Denies abdominal pain/discomfort.     ALLERGIES:  No Known Allergies    MEDICATIONS  (STANDING):  budesonide  80 MICROgram(s)/formoterol 4.5 MICROgram(s) Inhaler 2 Puff(s) Inhalation two times a day  dextrose 5%. 1000 milliLiter(s) (50 mL/Hr) IV Continuous <Continuous>  dextrose 5%. 1000 milliLiter(s) (100 mL/Hr) IV Continuous <Continuous>  dextrose 50% Injectable 25 Gram(s) IV Push once  dextrose 50% Injectable 12.5 Gram(s) IV Push once  dextrose 50% Injectable 25 Gram(s) IV Push once  glucagon  Injectable 1 milliGRAM(s) IntraMuscular once  insulin lispro (ADMELOG) corrective regimen sliding scale   SubCutaneous three times a day before meals  insulin lispro (ADMELOG) corrective regimen sliding scale   SubCutaneous at bedtime  OLANZapine 10 milliGRAM(s) Oral at bedtime  OLANZapine Disintegrating Tablet 5 milliGRAM(s) Oral two times a day  pantoprazole    Tablet 40 milliGRAM(s) Oral before breakfast  sacubitril 24 mG/valsartan 26 mG 1 Tablet(s) Oral two times a day  sodium chloride 0.9%. 1000 milliLiter(s) (60 mL/Hr) IV Continuous <Continuous>  tiotropium 2.5 MICROgram(s) Inhaler 2 Puff(s) Inhalation daily    MEDICATIONS  (PRN):  acetaminophen     Tablet .. 650 milliGRAM(s) Oral every 6 hours PRN Mild Pain (1 - 3)  albuterol    90 MICROgram(s) HFA Inhaler 2 Puff(s) Inhalation every 6 hours PRN for shortness of breath and/or wheezing  dextrose Oral Gel 15 Gram(s) Oral once PRN Blood Glucose LESS THAN 70 milliGRAM(s)/deciliter  melatonin 3 milliGRAM(s) Oral at bedtime PRN Insomnia  ondansetron Injectable 4 milliGRAM(s) IV Push every 8 hours PRN Nausea and/or Vomiting    Vital Signs Last 24 Hrs  T(F): 97.5 (27 Oct 2023 08:38), Max: 98.3 (27 Oct 2023 06:00)  HR: 90 (27 Oct 2023 08:38) (70 - 103)  BP: 100/62 (27 Oct 2023 08:38) (100/62 - 123/73)  RR: 16 (27 Oct 2023 08:38) (16 - 18)  SpO2: 94% (27 Oct 2023 08:38) (93% - 95%)  I&O's Summary    BMI (kg/m2): 27.9 (10--23 @ 14:52)    PHYSICAL EXAM:  General: NAD, awake, alert. pleasant   ENT: MMM, no tonsilar exudate  Neck: Supple, No JVD  Lungs: Clear to auscultation bilaterally, no wheezes. Good air entry bilaterally   Cardio: RRR, S1/S2, No murmurs  Abdomen: Soft, Nontender, Nondistended; Bowel sounds present  Extremities: No calf tenderness, No pitting edema    LABS:                        12.7   8.82  )-----------( 188      ( 26 Oct 2023 14:18 )             38.6       10-26    142  |  103  |  15  ----------------------------<  93  4.0   |  30  |  1.02    Ca    9.6      26 Oct 2023 14:18    TPro  7.2  /  Alb  3.3  /  TBili  1.0  /  DBili  x   /  AST  15  /  ALT  16  /  AlkPhos  75  10-26     14:18 - VBG - pH: 7.42  | pCO2: 51    | pO2: <35   | Lactate:          POCT Blood Glucose.: 91 mg/dL (27 Oct 2023 08:31)  POCT Blood Glucose.: 80 mg/dL (26 Oct 2023 20:50)    Urinalysis Basic - ( 26 Oct 2023 15:10 )    Color: Dark Yellow / Appearance: Clear / S.023 / pH: x  Gluc: x / Ketone: Trace mg/dL  / Bili: Small / Urobili: 2.0 mg/dL   Blood: x / Protein: Trace mg/dL / Nitrite: Negative   Leuk Esterase: Trace / RBC: 0 /HPF / WBC 2 /HPF   Sq Epi: x / Non Sq Epi: x / Bacteria: Few /HPF    RADIOLOGY & ADDITIONAL TESTS:     Care Discussed with Consultants/Other Providers:

## 2023-10-27 NOTE — DISCHARGE NOTE NURSING/CASE MANAGEMENT/SOCIAL WORK - PATIENT PORTAL LINK FT
You can access the FollowMyHealth Patient Portal offered by [facility] by registering at the following website: [email or web address]. By joining [facility]’s FollowMyHealth portal, you will also be able to view your health information using other applications (apps) compatible with our system. You can access the FollowMyHealth Patient Portal offered by [facility] by registering at the following website: [email or web address]. By joining [facility]’s FollowMyHealth portal, you will also be able to view your health information using other applications (apps) compatible with our system. You can access the FollowMyHealth Patient Portal offered by [facility] by registering at the following website: [email or web address]. By joining [facility]’s FollowMyHealth portal, you will also be able to view your health information using other applications (apps) compatible with our system.

## 2023-10-27 NOTE — ED ADULT NURSE REASSESSMENT NOTE - NS ED NURSE REASSESS COMMENT FT1
Pt. with multiple episodes of loose BMs s/p moviprep and soapsud enemas. Pt. feels relief. Commode remains at bedside. Pt. hygiene performed and placed in clean gown and linens on stretcher. Safety maintained. Will continue to monitor.

## 2023-10-27 NOTE — DISCHARGE NOTE PROVIDER - NSDCMRMEDTOKEN_GEN_ALL_CORE_FT
Albuterol (Eqv-ProAir HFA) 90 mcg/inh inhalation aerosol: 2 puff(s) inhaled every 6 hours as needed for  shortness of breath and/or wheezing  Entresto 24 mg-26 mg oral tablet: 1 tab(s) orally 2 times a day  melatonin 5 mg oral tablet: 1 tab(s) orally once a day (at bedtime) as needed for  insomnia  metFORMIN 500 mg oral tablet: 1 tab(s) orally 2 times a day  OLANZapine 10 mg oral tablet: 1 tab(s) orally once a day (at bedtime)  Protonix 40 mg oral delayed release tablet: 1 tab(s) orally once a day  Robafen AC 10 mg-100 mg/5 mL oral syrup: 10 milliliter(s) orally 3 times a day as needed for  cough  Symbicort 80 mcg-4.5 mcg/inh inhalation aerosol: 2 puff(s) inhaled 2 times a day  tiotropium 2.5 mcg/inh inhalation aerosol: 2 puff(s) inhaled once a day  ZyPREXA Zydis 5 mg oral tablet, disintegratin tab(s) orally 2 times a day

## 2023-10-27 NOTE — PHYSICAL THERAPY INITIAL EVALUATION ADULT - MOTOR COORDINATION TRAINING, PT EVAL
pt will display no LOB with mobility and ability to catch self from fall within 1-2 visits for safe discharge to [facility]

## 2023-10-27 NOTE — DISCHARGE NOTE PROVIDER - CARE PROVIDER_API CALL
Malvin Burk.  [facility]  [address] [place] [address]  Phone: [phone]  Fax: [phone]  Follow Up Time:    Malvin Burk.  [facility]  [address] [place] [address]  Phone: [phone]  Fax: [phone]  Follow Up Time:    Malvin Burk.  [facility]  [address] [place] [address]  Phone: [phone]  Fax: [phone]  Follow Up Time:

## 2023-10-27 NOTE — CONSULT NOTE ADULT - ASSESSMENT
82 yo man with history of paranoid schizophrenia, chronic HFrEF, COPD and Hyperlipidemia comes to ED for abdominal pain from severe constipation.    Currently, no abdominal pain, no NVD, multiple BMs loose and semi soft overnight and today 10/27 as per Primary RN   GI: Constipation

## 2023-10-27 NOTE — DISCHARGE NOTE PROVIDER - HOSPITAL COURSE
Hospital Course:  Per admitting physician:   HPI:  82 yo man with history of paranoid schizophrenia, chronic HFrEF, and COPD comes to ED for abdominal pain. Patient reports not having BM for 5-6 days. He reports having rectal pain when trying to have a BM and lower abdominal pain when standing. He denies any other complaints.    In the ED, patient was given an enema and manual disimpaction was attempted but patient was having extreme pain. GI was consulted and and patient was admitted.   (26 Oct 2023 17:31)    Medical Floor Course:  ALESSANDRO HERNANDEZ was admitted to medical floor under the impression of obstipation.     Discharging Provider:  Frantz Carey MD  Contact Info: Cell [phone] - Please call with any questions or concerns.    Outpatient Provider: Dr. Burk Hospital Course:  Per admitting physician:   HPI:  82 yo man with history of paranoid schizophrenia, chronic HFrEF, and COPD comes to ED for abdominal pain. Patient reports not having BM for 5-6 days. He reports having rectal pain when trying to have a BM and lower abdominal pain when standing. He denies any other complaints.    In the ED, patient was given an enema and manual disimpaction was attempted but patient was having extreme pain. GI was consulted and and patient was admitted.   (26 Oct 2023 17:31)    Medical Floor Course:  ALESSANDRO HERNANDEZ was admitted to medical floor under the impression of obstipation.     Discharging Provider:  Frantz Carey MD  Contact Info: Cell [phone] - Please call with any questions or concerns.    Outpatient Provider: Dr. Burk Hospital Course:  Per admitting physician:   HPI:  82 yo man with history of paranoid schizophrenia, chronic HFrEF, and COPD comes to ED for abdominal pain. Patient reports not having BM for 5-6 days. He reports having rectal pain when trying to have a BM and lower abdominal pain when standing. He denies any other complaints.    In the ED, patient was given an enema and manual disimpaction was attempted but patient was having extreme pain. GI was consulted and and patient was admitted.   (26 Oct 2023 17:31)    Medical Floor Course:  ALESSANDRO HERNANDEZ was admitted to medical floor under the impression of obstipation.     Discharging Provider:  Frantz Carey MD  Contact Info: Cell [phone] - Please call with any questions or concerns.    Outpatient Provider: Dr. Burk

## 2023-10-27 NOTE — PHYSICAL THERAPY INITIAL EVALUATION ADULT - BALANCE TRAINING, PT EVAL
pt will display good balance with gait independently within 1-2 visits for safe discharge to detention pt will display good balance with gait independently within 1-2 visits for safe discharge to residential pt will display good balance with gait independently within 1-2 visits for safe discharge to assisted

## 2023-11-13 PROCEDURE — 83036 HEMOGLOBIN GLYCOSYLATED A1C: CPT

## 2023-11-13 PROCEDURE — 71045 X-RAY EXAM CHEST 1 VIEW: CPT

## 2023-11-13 PROCEDURE — 36415 COLL VENOUS BLD VENIPUNCTURE: CPT

## 2023-11-13 PROCEDURE — 84443 ASSAY THYROID STIM HORMONE: CPT

## 2023-11-13 PROCEDURE — 99285 EMERGENCY DEPT VISIT HI MDM: CPT | Mod: 25

## 2023-11-13 PROCEDURE — 96374 THER/PROPH/DIAG INJ IV PUSH: CPT

## 2023-11-13 PROCEDURE — 80053 COMPREHEN METABOLIC PANEL: CPT

## 2023-11-13 PROCEDURE — 97162 PT EVAL MOD COMPLEX 30 MIN: CPT

## 2023-11-13 PROCEDURE — 84480 ASSAY TRIIODOTHYRONINE (T3): CPT

## 2023-11-13 PROCEDURE — 83735 ASSAY OF MAGNESIUM: CPT

## 2023-11-13 PROCEDURE — 82962 GLUCOSE BLOOD TEST: CPT

## 2023-11-13 PROCEDURE — 80048 BASIC METABOLIC PNL TOTAL CA: CPT

## 2023-11-13 PROCEDURE — 85025 COMPLETE CBC W/AUTO DIFF WBC: CPT

## 2023-11-13 PROCEDURE — 94640 AIRWAY INHALATION TREATMENT: CPT

## 2023-11-13 PROCEDURE — 81001 URINALYSIS AUTO W/SCOPE: CPT

## 2023-11-13 PROCEDURE — 87635 SARS-COV-2 COVID-19 AMP PRB: CPT

## 2023-11-13 PROCEDURE — 80164 ASSAY DIPROPYLACETIC ACD TOT: CPT

## 2023-11-13 PROCEDURE — 85027 COMPLETE CBC AUTOMATED: CPT

## 2023-11-13 PROCEDURE — 82607 VITAMIN B-12: CPT

## 2023-11-13 PROCEDURE — 82803 BLOOD GASES ANY COMBINATION: CPT

## 2023-11-13 PROCEDURE — 96361 HYDRATE IV INFUSION ADD-ON: CPT

## 2023-11-13 PROCEDURE — 80307 DRUG TEST PRSMV CHEM ANLYZR: CPT

## 2023-11-13 PROCEDURE — 82746 ASSAY OF FOLIC ACID SERUM: CPT

## 2023-11-13 PROCEDURE — 97161 PT EVAL LOW COMPLEX 20 MIN: CPT

## 2023-11-13 PROCEDURE — 99285 EMERGENCY DEPT VISIT HI MDM: CPT

## 2023-11-13 PROCEDURE — 83690 ASSAY OF LIPASE: CPT

## 2023-11-13 PROCEDURE — 84436 ASSAY OF TOTAL THYROXINE: CPT

## 2023-11-13 PROCEDURE — 93005 ELECTROCARDIOGRAM TRACING: CPT

## 2023-11-13 PROCEDURE — 96372 THER/PROPH/DIAG INJ SC/IM: CPT

## 2023-11-13 PROCEDURE — 93306 TTE W/DOPPLER COMPLETE: CPT

## 2023-11-13 PROCEDURE — 86480 TB TEST CELL IMMUN MEASURE: CPT

## 2023-11-13 PROCEDURE — 87086 URINE CULTURE/COLONY COUNT: CPT

## 2024-01-09 PROBLEM — E11.9 TYPE 2 DIABETES MELLITUS WITHOUT COMPLICATIONS: Chronic | Status: ACTIVE | Noted: 2023-09-20

## 2024-01-09 PROBLEM — I10 ESSENTIAL (PRIMARY) HYPERTENSION: Chronic | Status: ACTIVE | Noted: 2023-09-20

## 2024-01-09 PROBLEM — I50.20 UNSPECIFIED SYSTOLIC (CONGESTIVE) HEART FAILURE: Chronic | Status: ACTIVE | Noted: 2023-09-20

## 2024-01-09 PROBLEM — E78.5 HYPERLIPIDEMIA, UNSPECIFIED: Chronic | Status: ACTIVE | Noted: 2023-09-20

## 2024-01-09 RX ORDER — CODEINE PHOSPHATE/GUAIFENESIN
10 SYRUP ORAL
Refills: 0 | DISCHARGE

## 2024-01-09 RX ORDER — METFORMIN HYDROCHLORIDE 850 MG/1
1 TABLET ORAL
Qty: 0 | Refills: 0 | DISCHARGE

## 2024-01-09 RX ORDER — OLANZAPINE 15 MG/1
1 TABLET, FILM COATED ORAL
Refills: 0 | DISCHARGE

## 2024-01-09 RX ORDER — PANTOPRAZOLE SODIUM 20 MG/1
1 TABLET, DELAYED RELEASE ORAL
Qty: 0 | Refills: 0 | DISCHARGE

## 2024-01-09 RX ORDER — LANOLIN ALCOHOL/MO/W.PET/CERES
1 CREAM (GRAM) TOPICAL
Refills: 0 | DISCHARGE

## 2024-01-09 RX ORDER — TIOTROPIUM BROMIDE 18 UG/1
2 CAPSULE ORAL; RESPIRATORY (INHALATION)
Refills: 0 | DISCHARGE

## 2024-01-09 RX ORDER — ALBUTEROL 90 UG/1
2 AEROSOL, METERED ORAL
Refills: 0 | DISCHARGE

## 2024-01-09 RX ORDER — SACUBITRIL AND VALSARTAN 24; 26 MG/1; MG/1
1 TABLET, FILM COATED ORAL
Qty: 0 | Refills: 0 | DISCHARGE

## 2024-01-09 RX ORDER — BUDESONIDE AND FORMOTEROL FUMARATE DIHYDRATE 160; 4.5 UG/1; UG/1
2 AEROSOL RESPIRATORY (INHALATION)
Qty: 0 | Refills: 0 | DISCHARGE

## 2024-01-15 ENCOUNTER — EMERGENCY (EMERGENCY)
Facility: HOSPITAL | Age: 84
LOS: 1 days | Discharge: HOME | End: 2024-01-15
Attending: EMERGENCY MEDICINE | Admitting: EMERGENCY MEDICINE
Payer: MEDICARE

## 2024-01-15 VITALS
WEIGHT: 190.92 LBS | OXYGEN SATURATION: 97 % | HEART RATE: 87 BPM | SYSTOLIC BLOOD PRESSURE: 133 MMHG | RESPIRATION RATE: 16 BRPM | TEMPERATURE: 98 F | DIASTOLIC BLOOD PRESSURE: 74 MMHG | HEIGHT: 70 IN

## 2024-01-15 DIAGNOSIS — Z95.0 PRESENCE OF CARDIAC PACEMAKER: Chronic | ICD-10-CM

## 2024-01-15 PROCEDURE — 99284 EMERGENCY DEPT VISIT MOD MDM: CPT | Mod: 25

## 2024-01-15 PROCEDURE — 70450 CT HEAD/BRAIN W/O DYE: CPT | Mod: 26,ME

## 2024-01-15 PROCEDURE — 70450 CT HEAD/BRAIN W/O DYE: CPT | Mod: ME

## 2024-01-15 PROCEDURE — G1004: CPT

## 2024-01-15 PROCEDURE — 70160 X-RAY EXAM OF NASAL BONES: CPT | Mod: 26

## 2024-01-15 PROCEDURE — 99284 EMERGENCY DEPT VISIT MOD MDM: CPT

## 2024-01-15 PROCEDURE — 70160 X-RAY EXAM OF NASAL BONES: CPT

## 2024-01-15 RX ORDER — ACETAMINOPHEN 500 MG
650 TABLET ORAL ONCE
Refills: 0 | Status: COMPLETED | OUTPATIENT
Start: 2024-01-15 | End: 2024-01-15

## 2024-01-15 RX ADMIN — Medication 650 MILLIGRAM(S): at 16:41

## 2024-01-15 RX ADMIN — Medication 650 MILLIGRAM(S): at 17:11

## 2024-01-15 NOTE — ED ADULT NURSE NOTE - NSFALLUNIVINTERV_ED_ALL_ED
Bed/Stretcher in lowest position, wheels locked, appropriate side rails in place/Call bell, personal items and telephone in reach/Instruct patient to call for assistance before getting out of bed/chair/stretcher/Non-slip footwear applied when patient is off stretcher/[place] to call system/Physically safe environment - no spills, clutter or unnecessary equipment/Purposeful proactive rounding/Room/bathroom lighting operational, light cord in reach

## 2024-01-15 NOTE — ED PROVIDER NOTE - NSFOLLOWUPINSTRUCTIONS_ED_ALL_ED_FT
Nasal Fracture    WHAT YOU NEED TO KNOW:    What is a nasal fracture? A nasal fracture is a crack or break in your nose. You may have a break in the upper nose (bridge), the side, or the septum. The septum is in the middle of the nose and divides your nostrils.    What are the signs and symptoms of a nasal fracture?    Pain and swelling    Nosebleed    Deformed nose    Crackling sound when you touch or move your nose    Bruising on your nose or under your eyes  How is a nasal fracture diagnosed? Your healthcare provider will ask you when, where, and how the injury occurred. You may need any of the following:    A nasal exam will be done to check your injury. You will be given pain medicine before your healthcare provider touches and looks at the outside and inside of your nose. Your provider will remove blood clots and check for hematomas (collections of blood).  Septal Hematoma       An x-ray or CT may show the nasal fracture. You may be given contrast liquid before the scan. Tell the healthcare provider if you have ever had an allergic reaction to contrast liquid.  How is a nasal fracture treated?    Medicine may be given to decrease pain or help prevent a bacterial infection. Ask how to take pain medicine safely. Medicine may also be given to decrease nasal swelling and help make breathing easier.    Wound care may help stop bleeding. If you have a hematoma inside your nose, it will be drained. Healthcare providers may place packing (gauze or other material) inside your nose to soak up blood.    Closed reduction may be done to put your nasal bones back into the correct position. Local or general anesthesia is used during this procedure. This procedure may be done right away or several days after your injury when the swelling has gone down. Surgery (open reduction) to put your bones back into place may be needed for severe fractures.    Splints or packing help keep your nose in place for 7 to 10 days after a reduction. Ask your healthcare provider how to care for your wounds, splint, or packing.  How do I care for my nasal fracture at home?    Apply ice on your nose for 15 to 20 minutes every hour or as directed. Use an ice pack, or put crushed ice in a plastic bag. Cover it with a towel. Ice helps prevent tissue damage and decreases swelling and pain.    Elevate your head when you lie down. This will help decrease swelling and pain. You may need to see a specialist 3 to 5 days later for tests or more treatment after swelling has gone down.  Elevate Head (Adult)      Protect your nose to prevent bleeding, bruising, or another fracture. Try not to bump your nose on anything. You may not be able to play sports for up to 6 weeks.  When should I seek immediate care?    You feel like one or both of your nasal passages are blocked and you have trouble breathing.    Clear fluid is leaking from your nose.    You have severe nose pain, even after you take medicine.    You have double vision or have problems moving your eyes.  When should I call my doctor?    You have a fever.    You continue to have nosebleeds.    You have a headache that gets worse, even after you take pain medicine.    Your splint or packing is loose.    You have questions or concerns about your condition or care.

## 2024-01-15 NOTE — ED PROVIDER NOTE - NSICDXPASTMEDICALHX_GEN_ALL_CORE_FT
PAST MEDICAL HISTORY:  Heart failure     HFrEF (heart failure with reduced ejection fraction)     HLD (hyperlipidemia)     HLD (hyperlipidemia)     HTN (hypertension)     HTN (hypertension)     Paranoid schizophrenia     Type 2 diabetes mellitus

## 2024-01-15 NOTE — ED ADULT TRIAGE NOTE - HEART RATE (BEATS/MIN)
The EMR was down for 1hour 45 minutes on 2/20/2019.    Veronica SAUNDERS was responsible for completing the paper charting during this time period.     The following information was re-entered into the system by Irma Cisse: none    The following information will remain in the paper chart: vital signs, medications, cares, and any further pertinent information during down time    Irma Cisse  2/20/2019  
87

## 2024-01-15 NOTE — ED PROVIDER NOTE - CLINICAL SUMMARY MEDICAL DECISION MAKING FREE TEXT BOX
84 yo M presents s/p mechanical fall with head strike, no LOC. CTH w/o bleed, remarkable for nasal bone fractures. Tylenol and outpatient fu with plastics.

## 2024-01-15 NOTE — ED PROVIDER NOTE - CONSTITUTIONAL NEGATIVE STATEMENT, MLM
Nursing Note by Patti Núñez at 09/06/18 04:04 PM     Author:  Patti Núñez Service:  (none) Author Type:  Admin Staff     Filed:  09/06/18 04:05 PM Encounter Date:  9/6/2018 Status:  Signed     :  Patti Núñez (Admin Staff)            Date Form Received by Disability:[DC1.1T] 9/ 6/ 2018[DC1.1M]   Authorization?[DC1.1T] yes[DC1.1M]   Date sent for auth:    Date auth returned:    Charge:[DC1.1T] no[DC1.1M]    Type of form:[DC1.1T] Lic. Placard/Medical report[DC1.1M]    Company:[DC1.1T] [employer]AC[employer][DC1.1M]   When form complete:[DC1.1T] Fax  SEC OF STATE[DC1.1M]   Comments[DC1.1T] SEE 05/07/18 CONFI ENC. DR DIAZ WOULD NOT SIGN   SENDING TO DR BLACK.[DC1.1M]          Revision History        User Key Date/Time User Provider Type Action    > DC1.1 09/06/18 04:05 PM Patti Núñez Admin Staff Sign    M - Manual, T - Template             no fever and no chills.

## 2024-01-15 NOTE — ED PROVIDER NOTE - ATTENDING APP SHARED VISIT CONTRIBUTION OF CARE
DT: I have personally performed a face to face diagnostic evaluation on this patient.  I have reviewed the PA's/resident's/PA student's/NP's note and agree with the history, exam, and plan of care, except as noted.  History and Exam by me shows 84 yo M presents s/p mechanical fall. Pt was at [facility] when he tripped with another resident's walker and fell hitting his head with the floor. Pt not on AC. Endorses a HA, no LOC..  Patient is NAD.  A n O x 3. Head NC/nasal bridge tenderness and gross deformity.  Ext- FROM actively,  ambulating s any difficulty.  CT and xr sig for nasal bone fx

## 2024-01-15 NOTE — ED PROVIDER NOTE - PATIENT PORTAL LINK FT
You can access the FollowMyHealth Patient Portal offered by [facility] by registering at the following website: [email or web address]. By joining [facility]’s FollowMyHealth portal, you will also be able to view your health information using other applications (apps) compatible with our system.

## 2024-01-15 NOTE — ED PROVIDER NOTE - CARE PROVIDER_API CALL
Malvin Burk  [facility]  [address] [place] [address]  Phone: [phone]  Fax: [phone]  Follow Up Time:     Josefina Mcguire  Plastic Surgery  [address] [place] [id number]  Phone: [phone]  Fax: [phone]  Follow Up Time:

## 2024-01-15 NOTE — ED PROVIDER NOTE - OBJECTIVE STATEMENT
84 yo M presents s/p mechanical fall. Pt was at [facility] when he tripped with another resident's walker and fell hitting his head with the floor. Pt not on AC. Endorses a HA, no LOC.

## 2024-01-15 NOTE — ED ADULT TRIAGE NOTE - CHIEF COMPLAINT QUOTE
Patient BIBEMS for mechanical trip and fall with head strike no LOC and no anticoagulation use. Patient noted to have epistaxis with nasal deformity.

## 2024-01-16 PROBLEM — I50.9 HEART FAILURE, UNSPECIFIED: Chronic | Status: ACTIVE | Noted: 2023-10-26

## 2024-01-16 PROBLEM — F20.0 PARANOID SCHIZOPHRENIA: Chronic | Status: ACTIVE | Noted: 2023-10-26

## 2024-01-16 PROBLEM — E78.5 HYPERLIPIDEMIA, UNSPECIFIED: Chronic | Status: ACTIVE | Noted: 2023-10-26

## 2024-01-16 PROBLEM — I10 ESSENTIAL (PRIMARY) HYPERTENSION: Chronic | Status: ACTIVE | Noted: 2023-10-26

## 2024-04-30 PROBLEM — Z00.00 ENCOUNTER FOR PREVENTIVE HEALTH EXAMINATION: Status: ACTIVE | Noted: 2024-04-30

## 2024-05-01 ENCOUNTER — APPOINTMENT (OUTPATIENT)
Dept: OTOLARYNGOLOGY | Facility: CLINIC | Age: 84
End: 2024-05-01

## 2024-08-15 NOTE — PROVIDER CONTACT NOTE (OTHER) - REASON
Biopsy Photograph Reviewed: Yes Consent Type: Consent 1 (Standard) Eye Shield Used: No Surgeon Performing Repair (Optional): Zhao Potter M.D. low bp Initial Size Of Lesion: 3.2 X Size Of Lesion In Cm (Optional): 2.6 Number Of Stages: 1 Primary Defect Length In Cm (Final Defect Size - Required For Flaps/Grafts): 3.5 Primary Defect Width In Cm (Final Defect Size - Required For Flaps/Grafts): 2.9 Primary Defect Depth In Cm (Optional But Required For Some Insurers): 0 Repair Type: Purse String Closure (Intermediate) Which Instrument Did You Use For Dermabrasion?: Wire Brush Which Eyelid Repair Cpt Are You Using?: 95570 Oculoplastic Surgeon Procedure Text (A): After obtaining clear surgical margins the patient was sent to oculoplastics for surgical repair.  The patient understands they will receive post-surgical care and follow-up from the referring physician's office. Otolaryngologist Procedure Text (A): After obtaining clear surgical margins the patient was sent to otolaryngology for surgical repair.  The patient understands they will receive post-surgical care and follow-up from the referring physician's office. Plastic Surgeon Procedure Text (A): After obtaining clear surgical margins the patient was sent to plastics for surgical repair.  The patient understands they will receive post-surgical care and follow-up from the referring physician's office. Mid-Level Procedure Text (A): After obtaining clear surgical margins the patient was sent to a mid-level provider for surgical repair.  The patient understands they will receive post-surgical care and follow-up from the mid-level provider. Provider Procedure Text (A): After obtaining clear surgical margins the defect was repaired by another provider. Asc Procedure Text (A): After obtaining clear surgical margins the patient was sent to an ASC for surgical repair.  The patient understands they will receive post-surgical care and follow-up from the ASC physician. Simple / Intermediate / Complex Repair - Final Wound Length In Cm: 9.1 Suturegard Retention Suture: 2-0 Nylon Retention Suture Bite Size: 3 mm Length To Time In Minutes Device Was In Place: 10 Undermining Type: Entire Wound Debridement Text: The wound edges were debrided prior to proceeding with the closure to facilitate wound healing. Helical Rim Text: The closure involved the helical rim. Vermilion Border Text: The closure involved the vermilion border. Nostril Rim Text: The closure involved the nostril rim. Retention Suture Text: Retention sutures were placed to support the closure and prevent dehiscence. Location Indication Override (Is Already Calculated Based On Selected Body Location): Area M Area H Indication Text: Tumors in this location are included in Area H (eyelids, eyebrows, nose, lips, chin, ear, pre-auricular, post-auricular, temple, genitalia, hands, feet, ankles and areola).  Tissue conservation is critical in these anatomic locations. Area M Indication Text: Tumors in this location are included in Area M (cheek, forehead, scalp, neck, jawline and pretibial skin).  Mohs surgery is indicated for tumors in these anatomic locations. Area L Indication Text: Tumors in this location are included in Area L (trunk and extremities).  Mohs surgery is indicated for larger tumors, or tumors with aggressive histologic features, in these anatomic locations. Tumor Debulked?: curette Depth Of Tumor Invasion (For Histology): tumor not visualized (deep and peripheral margins are clear of tumor) Perineural Invasion (For Histology - Be Specific If Possible): absent Stage 1: Number Of Blocks?: 2 Special Stains Stage 1 - Results: Base On Clearance Noted Above Stage 2: Additional Anesthesia Type: 1% lidocaine with epinephrine Include Tumor Staging In Mohs Note?: Please Select the Appropriate Response Staging Info: By selecting yes to the question above you will include information on AJCC 8 tumor staging in your Mohs note. Information on tumor staging will be automatically added for SCCs on the head and neck. AJCC 8 includes tumor size, tumor depth, perineural involvement and bone invasion. Tumor Depth: Less than 6mm from granular layer and no invasion beyond the subcutaneous fat Medical Necessity Statement: Based on my medical judgement, Mohs surgery is the most appropriate treatment for this cancer compared to other treatments. Alternatives Discussed Intro (Do Not Add Period): I discussed alternative treatments to Mohs surgery and specifically discussed the risks and benefits of Consent 1/Introductory Paragraph: The rationale for Mohs was explained to the patient and consent was obtained. The risks, benefits and alternatives to therapy were discussed in detail. Specifically, the risks of infection, scarring, bleeding, prolonged wound healing, incomplete removal, allergy to anesthesia, nerve injury and recurrence were addressed. Prior to the procedure, the treatment site was clearly identified and confirmed by the patient. All components of Universal Protocol/PAUSE Rule completed. Consent 2/Introductory Paragraph: Mohs surgery was explained to the patient and consent was obtained. The risks, benefits and alternatives to therapy were discussed in detail. Specifically, the risks of infection, scarring, bleeding, prolonged wound healing, incomplete removal, allergy to anesthesia, nerve injury and recurrence were addressed. Prior to the procedure, the treatment site was clearly identified and confirmed by the patient. All components of Universal Protocol/PAUSE Rule completed. Consent 3/Introductory Paragraph: I gave the patient a chance to ask questions they had about the procedure.  Following this I explained the Mohs procedure and consent was obtained. The risks, benefits and alternatives to therapy were discussed in detail. Specifically, the risks of infection, scarring, bleeding, prolonged wound healing, incomplete removal, allergy to anesthesia, nerve injury and recurrence were addressed. Prior to the procedure, the treatment site was clearly identified and confirmed by the patient. All components of Universal Protocol/PAUSE Rule completed. Consent (Temporal Branch)/Introductory Paragraph: The rationale for Mohs was explained to the patient and consent was obtained. The risks, benefits and alternatives to therapy were discussed in detail. Specifically, the risks of damage to the temporal branch of the facial nerve, infection, scarring, bleeding, prolonged wound healing, incomplete removal, allergy to anesthesia, and recurrence were addressed. Prior to the procedure, the treatment site was clearly identified and confirmed by the patient. All components of Universal Protocol/PAUSE Rule completed. Consent (Marginal Mandibular)/Introductory Paragraph: The rationale for Mohs was explained to the patient and consent was obtained. The risks, benefits and alternatives to therapy were discussed in detail. Specifically, the risks of damage to the marginal mandibular branch of the facial nerve, infection, scarring, bleeding, prolonged wound healing, incomplete removal, allergy to anesthesia, and recurrence were addressed. Prior to the procedure, the treatment site was clearly identified and confirmed by the patient. All components of Universal Protocol/PAUSE Rule completed. Consent (Spinal Accessory)/Introductory Paragraph: The rationale for Mohs was explained to the patient and consent was obtained. The risks, benefits and alternatives to therapy were discussed in detail. Specifically, the risks of damage to the spinal accessory nerve, infection, scarring, bleeding, prolonged wound healing, incomplete removal, allergy to anesthesia, and recurrence were addressed. Prior to the procedure, the treatment site was clearly identified and confirmed by the patient. All components of Universal Protocol/PAUSE Rule completed. Consent (Near Eyelid Margin)/Introductory Paragraph: The rationale for Mohs was explained to the patient and consent was obtained. The risks, benefits and alternatives to therapy were discussed in detail. Specifically, the risks of ectropion or eyelid deformity, infection, scarring, bleeding, prolonged wound healing, incomplete removal, allergy to anesthesia, nerve injury and recurrence were addressed. Prior to the procedure, the treatment site was clearly identified and confirmed by the patient. All components of Universal Protocol/PAUSE Rule completed. Consent (Ear)/Introductory Paragraph: The rationale for Mohs was explained to the patient and consent was obtained. The risks, benefits and alternatives to therapy were discussed in detail. Specifically, the risks of ear deformity, infection, scarring, bleeding, prolonged wound healing, incomplete removal, allergy to anesthesia, nerve injury and recurrence were addressed. Prior to the procedure, the treatment site was clearly identified and confirmed by the patient. All components of Universal Protocol/PAUSE Rule completed. Consent (Nose)/Introductory Paragraph: The rationale for Mohs was explained to the patient and consent was obtained. The risks, benefits and alternatives to therapy were discussed in detail. Specifically, the risks of nasal deformity, changes in the flow of air through the nose, infection, scarring, bleeding, prolonged wound healing, incomplete removal, allergy to anesthesia, nerve injury and recurrence were addressed. Prior to the procedure, the treatment site was clearly identified and confirmed by the patient. All components of Universal Protocol/PAUSE Rule completed. Consent (Lip)/Introductory Paragraph: The rationale for Mohs was explained to the patient and consent was obtained. The risks, benefits and alternatives to therapy were discussed in detail. Specifically, the risks of lip deformity, changes in the oral aperture, infection, scarring, bleeding, prolonged wound healing, incomplete removal, allergy to anesthesia, nerve injury and recurrence were addressed. Prior to the procedure, the treatment site was clearly identified and confirmed by the patient. All components of Universal Protocol/PAUSE Rule completed. Consent (Scalp)/Introductory Paragraph: The rationale for Mohs was explained to the patient and consent was obtained. The risks, benefits and alternatives to therapy were discussed in detail. Specifically, the risks of changes in hair growth pattern secondary to repair, infection, scarring, bleeding, prolonged wound healing, incomplete removal, allergy to anesthesia, nerve injury and recurrence were addressed. Prior to the procedure, the treatment site was clearly identified and confirmed by the patient. All components of Universal Protocol/PAUSE Rule completed. Detail Level: Detailed Postop Diagnosis: same Anesthesia Type: 1% lidocaine with 1:100,000 epinephrine and a 1:10 solution of 8.4% sodium bicarbonate Anesthesia Volume In Cc: 6 Hemostasis: Electrocautery Estimated Blood Loss (Cc): minimal Repair Anesthesia Method: local infiltration Anesthesia Volume In Cc: 5 Brow Lift Text: A midfrontal incision was made medially to the defect to allow access to the tissues just superior to the left eyebrow. Following careful dissection inferiorly in a supraperiosteal plane to the level of the left eyebrow, several 3-0 monocryl sutures were used to resuspend the eyebrow orbicularis oculi muscular unit to the superior frontal bone periosteum. This resulted in an appropriate reapproximation of static eyebrow symmetry and correction of the left brow ptosis. Deep Sutures: 3-0 Monocryl Epidermal Closure: purse string Suturegard Intro: Intraoperative tissue expansion was performed, utilizing the SUTUREGARD device, in order to reduce wound tension. Suturegard Body: The suture ends were repeatedly re-tightened and re-clamped to achieve the desired tissue expansion. Hemigard Intro: Due to skin fragility and wound tension, it was decided to use HEMIGARD adhesive retention suture devices to permit a linear closure. The skin was cleaned and dried for a 6cm distance away from the wound. Excessive hair, if present, was removed to allow for adhesion. Hemigard Postcare Instructions: The HEMIGARD strips are to remain completely dry for at least 5-7 days. Donor Site Anesthesia Type: same as repair anesthesia Epidermal Closure Graft Donor Site (Optional): simple interrupted Graft Donor Site Bandage (Optional-Leave Blank If You Don't Want In Note): Steri-strips and a pressure bandage were applied to the donor site. Closure 2 Information: This tab is for additional flaps and grafts, including complex repair and grafts and complex repair and flaps. You can also specify a different location for the additional defect, if the location is the same you do not need to select a new one. We will insert the automated text for the repair you select below just as we do for solitary flaps and grafts. Please note that at this time if you select a location with a different insurance zone you will need to override the ICD10 and CPT if appropriate. Closure 3 Information: This tab is for additional flaps and grafts above and beyond our usual structured repairs.  Please note if you enter information here it will not currently bill and you will need to add the billing information manually. Wound Care: Vaseline Dressing: pressure dressing with telfa Wound Care (No Sutures): Petrolatum Dressing (No Sutures): dry sterile dressing Wound Check: 7 days Unna Boot Text: An Unna boot was placed to help immobilize the limb and facilitate more rapid healing. Home Suture Removal Text: Patient was provided instructions on removing sutures and will remove their sutures at home.  If they have any questions or difficulties they will call the office. Post-Care Instructions: I reviewed with the patient in detail post-care instructions. Patient is not to engage in any heavy lifting, exercise, or swimming for the next 7 days. Should the patient develop any fevers, chills, bleeding, severe pain patient will contact the office immediately. Pain Refusal Text: I offered to prescribe pain medication but the patient refused to take this medication. Mauc Instructions: By selecting yes to the question below the MAUC number will be added into the note.  This will be calculated automatically based on the diagnosis chosen, the size entered, the body zone selected (H,M,L) and the specific indications you chose. You will also have the option to override the Mohs AUC if you disagree with the automatically calculated number and this option is found in the Case Summary tab. Asa Classification: II Time Everyone Was In The Room/Time Out: 8746 Time Procedure Ended: 1050 Time Patient Discharged: 1100 Where Do You Want The Question To Include Opioid Counseling Located?: Case Summary Tab Eye Protection Verbiage: Before proceeding with the stage, a plastic scleral shield was inserted. The globe was anesthetized with a few drops of 1% lidocaine with 1:100,000 epinephrine. Then, an appropriate sized scleral shield was chosen and coated with lacrilube ointment. The shield was gently inserted and left in place for the duration of each stage. After the stage was completed, the shield was gently removed. Mohs Method Verbiage: An incision at a 45 degree angle following the standard Mohs approach was done and the specimen was harvested as a microscopic controlled layer. Surgeon/Pathologist Verbiage (Will Incorporate Name Of Surgeon From Intro If Not Blank): operated in two distinct and integrated capacities as the surgeon and pathologist. Mohs Histo Method Verbiage: Each section was then chromacoded and processed in the Mohs lab using the Mohs protocol and submitted for frozen section. Subsequent Stages Histo Method Verbiage: Using a similar technique to that described above, a thin layer of tissue was removed from all areas where tumor was visible on the previous stage.  The tissue was again oriented, mapped, dyed, and processed as above. Mohs Rapid Report Verbiage: The area of clinically evident tumor was marked with skin marking ink and appropriately hatched.  The initial incision was made following the Mohs approach through the skin.  The specimen was taken to the lab, divided into the necessary number of pieces, chromacoded and processed according to the Mohs protocol.  This was repeated in successive stages until a tumor free defect was achieved. Complex Repair Preamble Text (Leave Blank If You Do Not Want): The defect edges were debeveled with a #15 scalpel blade. Given the location of the defect a complex repair was deemed most appropriate.  Using a sterile surgical marker, burrow triangles were drawn incorporating the defect and placing the expected incisions within the relaxed skin tension lines where possible.    The area thus outlined was incised to the appropriate tissue plane with a scalpel blade. Extensive wide undermining was performed in all directions to allow proper closure of the defect.  Hemostasis was obtained by cautery. Crescentic Complex Repair Preamble Text (Leave Blank If You Do Not Want): Extensive wide undermining was performed. Intermediate Repair Preamble Text (Leave Blank If You Do Not Want): Undermining was performed with blunt dissection. Graft Cartilage Fenestration Text: The cartilage was fenestrated with a 2mm punch biopsy to help facilitate graft survival and healing. Non-Graft Cartilage Fenestration Text: The cartilage was fenestrated with a 2mm punch biopsy to help facilitate healing. Secondary Intention Text (Leave Blank If You Do Not Want): The defect will heal with secondary intention. No Repair - Repaired With Adjacent Surgical Defect Text (Leave Blank If You Do Not Want): After obtaining clear surgical margins the defect was repaired concurrently with another surgical defect which was in close approximation. Unique Flap 1 Name: Tunneled Island Pedicle Flap Unique Flap 2 Name: East-West Double Advancement Flap Unique Flap 1 Text: The surgical defect and surrounding skin were prepped with antiseptic solution. The choice of the repair was performed because extensive undermining required, because there is insufficient tissue mobility to close the wound with local tissue, to avoid a deforming, depressed, and contracted scar, to avoid anatomic distortion and/or functional deficit in adjacent fixed structures, to avoid disruption to anatomic adjacent free margins, to reduce subcutaneous dead space to reduce risk of hematoma, to reduce tension to skin to allow closure, to reduce tension to reduce risk of skin necrosis, infection, and wound dehiscence, and to reduce tension to enhance both functional and cosmetic results. The defect edges were debeveled with a #15 scalpel blade.  Given the location of the defect, shape of the defect and the proximity to free margins a one-staged tunneled  island pedicle advancement flap was deemed most appropriate (Axial Flap based on the postauricular artery).  Using a sterile surgical marker, an appropriate island pedicle flap was drawn incorporating the defect, outlining the appropriate donor tissue and placing the expected incisions within the relaxed skin tension lines where possible.    The area thus outlined was incised deep to muscle with a #15 scalpel blade.  The skin margins were undermined to an appropriate distance in all directions around the primary defect and laterally outward around the island pedicle utilizing iris scissors.  There was minimal undermining beneath the pedicle flap. A window through the cartilage was created with a #15 blade, and a tunnel was created between the defect and the donor site. The flap was tunneled through to the anterior portion of the ear and sutured into place. The subcutaneous tissue and dermis were closed with 5-0 Vicryl.  Epidermal closure was achieved with 6-0 Ethilon (running).  The donor site was closed with 5-0 Vicryl. During the repair hemostasis was achieved with Electrocautery.  Vaseline + pressure dressing with telfa were applied. I reviewed with the patient in detail post-care instructions. Patient is not to engage in any heavy lifting, exercise, or swimming for the next 7 days. Should the patient develop any fevers, chills, bleeding, severe pain patient will contact the office immediately. Suture removal in 7 days. Unique Flap 2 Text: The surgical defect and surrounding skin were prepped with antiseptic solution. The choice of the repair was performed because extensive undermining required, because there is insufficient tissue mobility to close the wound with local tissue, to avoid a deforming, depressed, and contracted scar, to avoid anatomic distortion and/or functional deficit in adjacent fixed structures, to avoid disruption to anatomic adjacent free margins, to reduce subcutaneous dead space to reduce risk of hematoma, to reduce tension to skin to allow closure, to reduce tension to reduce risk of skin necrosis, infection, and wound dehiscence, and to reduce tension to enhance both functional and cosmetic results. The defect edges were debeveled with a #15 scalpel blade.  Given the location of the defect, shape of the defect and/or the proximity to free margins an East-West double advancement flap was performed.  Using a sterile surgical marker, an appropriate East-West advancement flap  was drawn incorporating the defect, outlining the appropriate donor tissue and placing the expected incisions within the relaxed skin tension lines where possible.    The area thus outlined was incised deep to muscle with a #15 scalpel blade.  The skin margins were undermined to an appropriate distance in all directions around the primary defect and subsequent flaps utilizing iris scissors. The flaps were advanced horizontally and sutured into place. The triangular tips were rounded to create a smoother, more contoured appearance. The subcutaneous tissue and dermis were closed with 5-0 Vicryl.  Epidermal closure was achieved with 6-0 Ethilon (running).  The donor site was closed with 5-0 Vicryl. During the repair hemostasis was achieved with Electrocautery.  Vaseline + pressure dressing with telfa were applied. I reviewed with the patient in detail post-care instructions. Patient is not to engage in any heavy lifting, exercise, or swimming for the next 7 days. Should the patient develop any fevers, chills, bleeding, severe pain patient will contact the office immediately. Suture removal in 7 days. Adjacent Tissue Transfer Text: The defect edges were debeveled with a #15 scalpel blade.  Given the location of the defect and the proximity to free margins an adjacent tissue transfer was deemed most appropriate.  Using a sterile surgical marker, an appropriate flap was drawn incorporating the defect and placing the expected incisions within the relaxed skin tension lines where possible.    The area thus outlined was incised deep to adipose tissue with a #15 scalpel blade.  The skin margins were undermined to an appropriate distance in all directions utilizing iris scissors. Following this, the designed flap was advanced and carried over into the primary defect and sutured into place. Advancement Flap (Single) Text: The defect edges were debeveled with a #15 scalpel blade.  Given the location of the defect and the proximity to free margins a single advancement flap was deemed most appropriate.  Using a sterile surgical marker, an appropriate advancement flap was drawn incorporating the defect and placing the expected incisions within the relaxed skin tension lines where possible.    The area thus outlined was incised deep to adipose tissue with a #15 scalpel blade.  The skin margins were undermined to an appropriate distance in all directions utilizing iris scissors. Following this, the designed flap was advanced and carried over into the primary defect and sutured into place. Advancement Flap (Double) Text: The defect edges were debeveled with a #15 scalpel blade.  Given the location of the defect and the proximity to free margins a double advancement flap was deemed most appropriate.  Using a sterile surgical marker, the appropriate advancement flaps were drawn incorporating the defect and placing the expected incisions within the relaxed skin tension lines where possible.    The area thus outlined was incised deep to adipose tissue with a #15 scalpel blade.  The skin margins were undermined to an appropriate distance in all directions utilizing iris scissors. Following this, the designed flap was advanced and carried over into the primary defect and sutured into place. Advancement-Rotation Flap Text: The defect edges were debeveled with a #15 scalpel blade.  Given the location of the defect, shape of the defect and the proximity to free margins an advancement-rotation flap was deemed most appropriate.  Using a sterile surgical marker, an appropriate flap was drawn incorporating the defect and placing the expected incisions within the relaxed skin tension lines where possible. The area thus outlined was incised deep to adipose tissue with a #15 scalpel blade.  The skin margins were undermined to an appropriate distance in all directions utilizing iris scissors. Alar Island Pedicle Flap Text: The defect edges were debeveled with a #15 scalpel blade.  Given the location of the defect, shape of the defect and the proximity to the alar rim an island pedicle advancement flap was deemed most appropriate.  Using a sterile surgical marker, an appropriate advancement flap was drawn incorporating the defect, outlining the appropriate donor tissue and placing the expected incisions within the nasal ala running parallel to the alar rim. The area thus outlined was incised with a #15 scalpel blade.  The skin margins were undermined minimally to an appropriate distance in all directions around the primary defect and laterally outward around the island pedicle utilizing iris scissors.  There was minimal undermining beneath the pedicle flap. A-T Advancement Flap Text: The defect edges were debeveled with a #15 scalpel blade.  Given the location of the defect, shape of the defect and the proximity to free margins an A-T advancement flap was deemed most appropriate.  Using a sterile surgical marker, an appropriate advancement flap was drawn incorporating the defect and placing the expected incisions within the relaxed skin tension lines where possible.    The area thus outlined was incised deep to adipose tissue with a #15 scalpel blade.  The skin margins were undermined to an appropriate distance in all directions utilizing iris scissors. Banner Transposition Flap Text: The defect edges were debeveled with a #15 scalpel blade.  Given the location of the defect and the proximity to free margins a Banner transposition flap was deemed most appropriate.  Using a sterile surgical marker, an appropriate flap drawn around the defect. The area thus outlined was incised deep to adipose tissue with a #15 scalpel blade.  The skin margins were undermined to an appropriate distance in all directions utilizing iris scissors. Bilateral Helical Rim Advancement Flap Text: The defect edges were debeveled with a #15 blade scalpel.  Given the location of the defect and the proximity to free margins (helical rim) a bilateral helical rim advancement flap was deemed most appropriate.  Using a sterile surgical marker, the appropriate advancement flaps were drawn incorporating the defect and placing the expected incisions between the helical rim and antihelix where possible.  The area thus outlined was incised through and through with a #15 scalpel blade.  With a skin hook and iris scissors, the flaps were gently and sharply undermined and freed up. Bilateral Rotation Flap Text: The defect edges were debeveled with a #15 scalpel blade. Given the location of the defect, shape of the defect and the proximity to free margins a bilateral rotation flap was deemed most appropriate. Using a sterile surgical marker, an appropriate rotation flap was drawn incorporating the defect and placing the expected incisions within the relaxed skin tension lines where possible. The area thus outlined was incised deep to adipose tissue with a #15 scalpel blade. The skin margins were undermined to an appropriate distance in all directions utilizing iris scissors. Following this, the designed flap was carried over into the primary defect and sutured into place.Following this, the designed flap was advanced and carried over into the primary defect and sutured into place. Bilobed Flap Text: The defect edges were debeveled with a #15 scalpel blade.  Given the location of the defect and the proximity to free margins a bilobe flap was deemed most appropriate.  Using a sterile surgical marker, an appropriate bilobe flap drawn around the defect.    The area thus outlined was incised deep to adipose tissue with a #15 scalpel blade.  The skin margins were undermined to an appropriate distance in all directions utilizing iris scissors. Following this, the designed flap was advanced and carried over into the primary defect and sutured into place. Bilobed Transposition Flap Text: The defect edges were debeveled with a #15 scalpel blade.  Given the location of the defect and the proximity to free margins a bilobed transposition flap was deemed most appropriate.  Using a sterile surgical marker, an appropriate bilobe flap drawn around the defect.    The area thus outlined was incised deep to adipose tissue with a #15 scalpel blade.  The skin margins were undermined to an appropriate distance in all directions utilizing iris scissors. Bi-Rhombic Flap Text: The defect edges were debeveled with a #15 scalpel blade.  Given the location of the defect and the proximity to free margins a bi-rhombic flap was deemed most appropriate.  Using a sterile surgical marker, an appropriate rhombic flap was drawn incorporating the defect. The area thus outlined was incised deep to adipose tissue with a #15 scalpel blade.  The skin margins were undermined to an appropriate distance in all directions utilizing iris scissors. Burow's Advancement Flap Text: The defect edges were debeveled with a #15 scalpel blade.  Given the location of the defect and the proximity to free margins a Burow's advancement flap was deemed most appropriate.  Using a sterile surgical marker, the appropriate advancement flap was drawn incorporating the defect and placing the expected incisions within the relaxed skin tension lines where possible.    The area thus outlined was incised deep to adipose tissue with a #15 scalpel blade.  The skin margins were undermined to an appropriate distance in all directions utilizing iris scissors. Chonodrocutaneous Helical Advancement Flap Text: The defect edges were debeveled with a #15 scalpel blade.  Given the location of the defect and the proximity to free margins a chondrocutaneous helical advancement flap was deemed most appropriate.  Using a sterile surgical marker, the appropriate advancement flap was drawn incorporating the defect and placing the expected incisions within the relaxed skin tension lines where possible.    The area thus outlined was incised deep to adipose tissue with a #15 scalpel blade.  The skin margins were undermined to an appropriate distance in all directions utilizing iris scissors. Crescentic Advancement Flap Text: The defect edges were debeveled with a #15 scalpel blade.  Given the location of the defect and the proximity to free margins a crescentic advancement flap was deemed most appropriate.  Using a sterile surgical marker, the appropriate advancement flap was drawn incorporating the defect and placing the expected incisions within the relaxed skin tension lines where possible.    The area thus outlined was incised deep to adipose tissue with a #15 scalpel blade.  The skin margins were undermined to an appropriate distance in all directions utilizing iris scissors. Following this, the designed flap was advanced and carried over into the primary defect and sutured into place. Dorsal Nasal Flap Text: The defect edges were debeveled with a #15 scalpel blade.  Given the location of the defect and the proximity to free margins a dorsal nasal flap was deemed most appropriate.  Using a sterile surgical marker, an appropriate dorsal nasal flap was drawn around the defect.    The area thus outlined was incised deep to adipose tissue with a #15 scalpel blade.  The skin margins were undermined to an appropriate distance in all directions utilizing iris scissors. Double Island Pedicle Flap Text: The defect edges were debeveled with a #15 scalpel blade.  Given the location of the defect, shape of the defect and the proximity to free margins a double island pedicle advancement flap was deemed most appropriate.  Using a sterile surgical marker, an appropriate advancement flap was drawn incorporating the defect, outlining the appropriate donor tissue and placing the expected incisions within the relaxed skin tension lines where possible.    The area thus outlined was incised deep to adipose tissue with a #15 scalpel blade.  The skin margins were undermined to an appropriate distance in all directions around the primary defect and laterally outward around the island pedicle utilizing iris scissors.  There was minimal undermining beneath the pedicle flap. Double O-Z Flap Text: The defect edges were debeveled with a #15 scalpel blade.  Given the location of the defect, shape of the defect and the proximity to free margins a Double O-Z flap was deemed most appropriate.  Using a sterile surgical marker, an appropriate transposition flap was drawn incorporating the defect and placing the expected incisions within the relaxed skin tension lines where possible. The area thus outlined was incised deep to adipose tissue with a #15 scalpel blade.  The skin margins were undermined to an appropriate distance in all directions utilizing iris scissors. Double O-Z Plasty Text: The defect edges were debeveled with a #15 scalpel blade.  Given the location of the defect, shape of the defect and the proximity to free margins a Double O-Z plasty (double transposition flap) was deemed most appropriate.  Using a sterile surgical marker, the appropriate transposition flaps were drawn incorporating the defect and placing the expected incisions within the relaxed skin tension lines where possible. The area thus outlined was incised deep to adipose tissue with a #15 scalpel blade.  The skin margins were undermined to an appropriate distance in all directions utilizing iris scissors.  Hemostasis was achieved with electrocautery.  The flaps were then transposed into place, one clockwise and the other counterclockwise, and anchored with interrupted buried subcutaneous sutures. Double Z Plasty Text: The lesion was extirpated to the level of the fat with a #15 scalpel blade. Given the location of the defect, shape of the defect and the proximity to free margins a double Z-plasty was deemed most appropriate for repair. Using a sterile surgical marker, the appropriate transposition arms of the double Z-plasty were drawn incorporating the defect and placing the expected incisions within the relaxed skin tension lines where possible. The area thus outlined was incised deep to adipose tissue with a #15 scalpel blade. The skin margins were undermined to an appropriate distance in all directions utilizing iris scissors. The opposing transposition arms were then transposed and carried over into place in opposite direction and anchored with interrupted buried subcutaneous sutures. Ear Star Wedge Flap Text: The defect edges were debeveled with a #15 blade scalpel.  Given the location of the defect and the proximity to free margins (helical rim) an ear star wedge flap was deemed most appropriate.  Using a sterile surgical marker, the appropriate flap was drawn incorporating the defect and placing the expected incisions between the helical rim and antihelix where possible.  The area thus outlined was incised through and through with a #15 scalpel blade.Following this, the designed flap was advanced and carried over into the primary defect and sutured into place. Flip-Flop Flap Text: The defect edges were debeveled with a #15 blade scalpel.  Given the location of the defect and the proximity to free margins a flip-flop flap was deemed most appropriate. Using a sterile surgical marker, the appropriate flap was drawn incorporating the defect and placing the expected incisions between the helical rim and antihelix where possible.  The area thus outlined was incised through and through with a #15 scalpel blade. Following this, the designed flap was carried over into the primary defect and sutured into place. Hatchet Flap Text: The defect edges were debeveled with a #15 scalpel blade.  Given the location of the defect, shape of the defect and the proximity to free margins a hatchet flap was deemed most appropriate.  Using a sterile surgical marker, an appropriate hatchet flap was drawn incorporating the defect and placing the expected incisions within the relaxed skin tension lines where possible.    The area thus outlined was incised deep to adipose tissue with a #15 scalpel blade.  The skin margins were undermined to an appropriate distance in all directions utilizing iris scissors. Helical Rim Advancement Flap Text: The defect edges were debeveled with a #15 blade scalpel.  Given the location of the defect and the proximity to free margins (helical rim) a double helical rim advancement flap was deemed most appropriate.  Using a sterile surgical marker, the appropriate advancement flaps were drawn incorporating the defect and placing the expected incisions between the helical rim and antihelix where possible.  The area thus outlined was incised through and through with a #15 scalpel blade.  With a skin hook and iris scissors, the flaps were gently and sharply undermined and freed up. Following this, the designed flap was advanced and carried over into the primary defect and sutured into place. H Plasty Text: Given the location of the defect, shape of the defect and the proximity to free margins a H-plasty was deemed most appropriate for repair.  Using a sterile surgical marker, the appropriate advancement arms of the H-plasty were drawn incorporating the defect and placing the expected incisions within the relaxed skin tension lines where possible. The area thus outlined was incised deep to adipose tissue with a #15 scalpel blade. The skin margins were undermined to an appropriate distance in all directions utilizing iris scissors.  The opposing advancement arms were then advanced into place in opposite direction and anchored with interrupted buried subcutaneous sutures. Island Pedicle Flap Text: The defect edges were debeveled with a #15 scalpel blade.  Given the location of the defect, shape of the defect and the proximity to free margins an island pedicle advancement flap was deemed most appropriate.  Using a sterile surgical marker, an appropriate advancement flap was drawn incorporating the defect, outlining the appropriate donor tissue and placing the expected incisions within the relaxed skin tension lines where possible.    The area thus outlined was incised deep to adipose tissue with a #15 scalpel blade.  The skin margins were undermined to an appropriate distance in all directions around the primary defect and laterally outward around the island pedicle utilizing iris scissors.  There was minimal undermining beneath the pedicle flap. Island Pedicle Flap With Canthal Suspension Text: The defect edges were debeveled with a #15 scalpel blade.  Given the location of the defect, shape of the defect and the proximity to free margins an island pedicle advancement flap was deemed most appropriate.  Using a sterile surgical marker, an appropriate advancement flap was drawn incorporating the defect, outlining the appropriate donor tissue and placing the expected incisions within the relaxed skin tension lines where possible. The area thus outlined was incised deep to adipose tissue with a #15 scalpel blade.  The skin margins were undermined to an appropriate distance in all directions around the primary defect and laterally outward around the island pedicle utilizing iris scissors.  There was minimal undermining beneath the pedicle flap. A suspension suture was placed in the canthal tendon to prevent tension and prevent ectropion. Island Pedicle Flap-Requiring Vessel Identification Text: The defect edges were debeveled with a #15 scalpel blade.  Given the location of the defect, shape of the defect and the proximity to free margins an island pedicle advancement flap was deemed most appropriate.  Using a sterile surgical marker, an appropriate advancement flap was drawn, based on the axial vessel mentioned above, incorporating the defect, outlining the appropriate donor tissue and placing the expected incisions within the relaxed skin tension lines where possible.    The area thus outlined was incised deep to adipose tissue with a #15 scalpel blade.  The skin margins were undermined to an appropriate distance in all directions around the primary defect and laterally outward around the island pedicle utilizing iris scissors.  There was minimal undermining beneath the pedicle flap. Keystone Flap Text: The defect edges were debeveled with a #15 scalpel blade.  Given the location of the defect, shape of the defect a keystone flap was deemed most appropriate.  Using a sterile surgical marker, an appropriate keystone flap was drawn incorporating the defect, outlining the appropriate donor tissue and placing the expected incisions within the relaxed skin tension lines where possible. The area thus outlined was incised deep to adipose tissue with a #15 scalpel blade.  The skin margins were undermined to an appropriate distance in all directions around the primary defect and laterally outward around the flap utilizing iris scissors. Melolabial Transposition Flap Text: The defect edges were debeveled with a #15 scalpel blade.  Given the location of the defect and the proximity to free margins a melolabial flap was deemed most appropriate.  Using a sterile surgical marker, an appropriate melolabial transposition flap was drawn incorporating the defect.    The area thus outlined was incised deep to adipose tissue with a #15 scalpel blade.  The skin margins were undermined to an appropriate distance in all directions utilizing iris scissors. Mercedes Flap Text: The defect edges were debeveled with a #15 scalpel blade.  Given the location of the defect, shape of the defect and the proximity to free margins a Mercedes flap was deemed most appropriate.  Using a sterile surgical marker, an appropriate advancement flap was drawn incorporating the defect and placing the expected incisions within the relaxed skin tension lines where possible. The area thus outlined was incised deep to adipose tissue with a #15 scalpel blade.  The skin margins were undermined to an appropriate distance in all directions utilizing iris scissors.Following this, the designed flap was advanced and carried over into the primary defect and sutured into place. Modified Advancement Flap Text: The defect edges were debeveled with a #15 scalpel blade.  Given the location of the defect, shape of the defect and the proximity to free margins a modified advancement flap was deemed most appropriate.  Using a sterile surgical marker, an appropriate advancement flap was drawn incorporating the defect and placing the expected incisions within the relaxed skin tension lines where possible.    The area thus outlined was incised deep to adipose tissue with a #15 scalpel blade.  The skin margins were undermined to an appropriate distance in all directions utilizing iris scissors.Following this, the designed flap was advanced and carried over into the primary defect and sutured into place. Mucosal Advancement Flap Text: Given the location of the defect, shape of the defect and the proximity to free margins a mucosal advancement flap was deemed most appropriate. Incisions were made with a 15 blade scalpel in the appropriate fashion along the cutaneous vermilion border and the mucosal lip. The remaining actinically damaged mucosal tissue was excised.  The mucosal advancement flap was then elevated to the gingival sulcus with care taken to preserve the neurovascular structures and advanced into the primary defect. Care was taken to ensure that precise realignment of the vermilion border was achieved.Following this, the designed flap was advanced and carried over into the primary defect and sutured into place. Muscle Hinge Flap Text: The defect edges were debeveled with a #15 scalpel blade.  Given the size, depth and location of the defect and the proximity to free margins a muscle hinge flap was deemed most appropriate.  Using a sterile surgical marker, an appropriate hinge flap was drawn incorporating the defect. The area thus outlined was incised with a #15 scalpel blade.  The skin margins were undermined to an appropriate distance in all directions utilizing iris scissors. Mustarde Flap Text: The defect edges were debeveled with a #15 scalpel blade.  Given the size, depth and location of the defect and the proximity to free margins a Mustarde flap was deemed most appropriate.  Using a sterile surgical marker, an appropriate flap was drawn incorporating the defect. The area thus outlined was incised with a #15 scalpel blade.  The skin margins were undermined to an appropriate distance in all directions utilizing iris scissors. Nasal Turnover Hinge Flap Text: The defect edges were debeveled with a #15 scalpel blade.  Given the size, depth, location of the defect and the defect being full thickness a nasal turnover hinge flap was deemed most appropriate.  Using a sterile surgical marker, an appropriate hinge flap was drawn incorporating the defect. The area thus outlined was incised with a #15 scalpel blade. The flap was designed to recreate the nasal mucosal lining and the alar rim. The skin margins were undermined to an appropriate distance in all directions utilizing iris scissors. Nasalis-Muscle-Based Myocutaneous Island Pedicle Flap Text: Using a #15 blade, an incision was made around the donor flap to the level of the nasalis muscle. Wide lateral undermining was then performed in both the subcutaneous plane above the nasalis muscle, and in a submuscular plane just above periosteum. This allowed the formation of a free nasalis muscle axial pedicle (based on the angular artery) which was still attached to the actual cutaneous flap, increasing its mobility and vascular viability. Hemostasis was obtained with pinpoint electrocoagulation. The flap was mobilized into position and the pivotal anchor points positioned and stabilized with buried interrupted sutures. Subcutaneous and dermal tissues were closed in a multilayered fashion with sutures. Tissue redundancies were excised, and the epidermal edges were apposed without significant tension and sutured with sutures. Nasalis Myocutaneous Flap Text: Using a #15 blade, an incision was made around the donor flap to the level of the nasalis muscle. Wide lateral undermining was then performed in both the subcutaneous plane above the nasalis muscle, and in a submuscular plane just above periosteum. This allowed the formation of a free nasalis muscle axial pedicle which was still attached to the actual cutaneous flap, increasing its mobility and vascular viability. Hemostasis was obtained with pinpoint electrocoagulation. The flap was mobilized into position and the pivotal anchor points positioned and stabilized with buried interrupted sutures. Subcutaneous and dermal tissues were closed in a multilayered fashion with sutures. Tissue redundancies were excised, and the epidermal edges were apposed without significant tension and sutured with sutures. Nasolabial Transposition Flap Text: The defect edges were debeveled with a #15 scalpel blade.  Given the size, depth and location of the defect and the proximity to free margins a nasolabial transposition flap was deemed most appropriate. Using a sterile surgical marker, an appropriate flap was drawn incorporating the defect. The area thus outlined was incised with a #15 scalpel blade. The skin margins were undermined to an appropriate distance in all directions utilizing iris scissors. Following this, the designed flap was carried into the primary defect and sutured into place. Orbicularis Oris Muscle Flap Text: The defect edges were debeveled with a #15 scalpel blade.  Given that the defect affected the competency of the oral sphincter an obicularis oris muscle flap was deemed most appropriate to restore this competency and normal muscle function.  Using a sterile surgical marker, an appropriate flap was drawn incorporating the defect. The area thus outlined was incised with a #15 scalpel blade. O-T Advancement Flap Text: The defect edges were debeveled with a #15 scalpel blade.  Given the location of the defect, shape of the defect and the proximity to free margins an O-T advancement flap was deemed most appropriate.  Using a sterile surgical marker, an appropriate advancement flap was drawn incorporating the defect and placing the expected incisions within the relaxed skin tension lines where possible.    The area thus outlined was incised deep to adipose tissue with a #15 scalpel blade.  The skin margins were undermined to an appropriate distance in all directions utilizing iris scissors. Following this, the designed flap was advanced and carried over into the primary defect and sutured into place. O-T Plasty Text: The defect edges were debeveled with a #15 scalpel blade.  Given the location of the defect, shape of the defect and the proximity to free margins an O-T plasty was deemed most appropriate.  Using a sterile surgical marker, an appropriate O-T plasty was drawn incorporating the defect and placing the expected incisions within the relaxed skin tension lines where possible.    The area thus outlined was incised deep to adipose tissue with a #15 scalpel blade.  The skin margins were undermined to an appropriate distance in all directions utilizing iris scissors. O-L Flap Text: The defect edges were debeveled with a #15 scalpel blade.  Given the location of the defect, shape of the defect and the proximity to free margins an O-L flap was deemed most appropriate.  Using a sterile surgical marker, an appropriate advancement flap was drawn incorporating the defect and placing the expected incisions within the relaxed skin tension lines where possible.    The area thus outlined was incised deep to adipose tissue with a #15 scalpel blade.  The skin margins were undermined to an appropriate distance in all directions utilizing iris scissors. O-Z Flap Text: The defect edges were debeveled with a #15 scalpel blade.  Given the location of the defect, shape of the defect and the proximity to free margins an O-Z flap was deemed most appropriate.  Using a sterile surgical marker, an appropriate transposition flap was drawn incorporating the defect and placing the expected incisions within the relaxed skin tension lines where possible. The area thus outlined was incised deep to adipose tissue with a #15 scalpel blade.  The skin margins were undermined to an appropriate distance in all directions utilizing iris scissors.Following this, the designed flap was advanced and carried over into the primary defect and sutured into place. O-Z Plasty Text: The defect edges were debeveled with a #15 scalpel blade.  Given the location of the defect, shape of the defect and the proximity to free margins an O-Z plasty (double transposition flap) was deemed most appropriate.  Using a sterile surgical marker, the appropriate transposition flaps were drawn incorporating the defect and placing the expected incisions within the relaxed skin tension lines where possible.    The area thus outlined was incised deep to adipose tissue with a #15 scalpel blade.  The skin margins were undermined to an appropriate distance in all directions utilizing iris scissors.  Hemostasis was achieved with electrocautery.  The flaps were then transposed into place, one clockwise and the other counterclockwise, and anchored with interrupted buried subcutaneous sutures. Peng Advancement Flap Text: The defect edges were debeveled with a #15 scalpel blade.  Given the location of the defect, shape of the defect and the proximity to free margins a Peng advancement flap was deemed most appropriate.  Using a sterile surgical marker, an appropriate advancement flap was drawn incorporating the defect and placing the expected incisions within the relaxed skin tension lines where possible. The area thus outlined was incised deep to adipose tissue with a #15 scalpel blade.  The skin margins were undermined to an appropriate distance in all directions utilizing iris scissors. Rectangular Flap Text: The defect edges were debeveled with a #15 scalpel blade. Given the location of the defect and the proximity to free margins a rectangular flap was deemed most appropriate. Using a sterile surgical marker, an appropriate rectangular flap was drawn incorporating the defect. The area thus outlined was incised deep to adipose tissue with a #15 scalpel blade. The skin margins were undermined to an appropriate distance in all directions utilizing iris scissors. Following this, the designed flap was carried over into the primary defect and sutured into place. Rhombic Flap Text: The defect edges were debeveled with a #15 scalpel blade.  Given the location of the defect and the proximity to free margins a rhombic flap was deemed most appropriate.  Using a sterile surgical marker, an appropriate rhombic flap was drawn incorporating the defect.    The area thus outlined was incised deep to adipose tissue with a #15 scalpel blade.  The skin margins were undermined to an appropriate distance in all directions utilizing iris scissors. Following this, the designed flap was advanced and carried over into the primary defect and sutured into place. Rhomboid Transposition Flap Text: The defect edges were debeveled with a #15 scalpel blade.  Given the location of the defect and the proximity to free margins a rhomboid transposition flap was deemed most appropriate.  Using a sterile surgical marker, an appropriate rhomboid flap was drawn incorporating the defect.    The area thus outlined was incised deep to adipose tissue with a #15 scalpel blade.  The skin margins were undermined to an appropriate distance in all directions utilizing iris scissors. Rotation Flap Text: The defect edges were debeveled with a #15 scalpel blade.  Given the location of the defect, shape of the defect and the proximity to free margins a rotation flap was deemed most appropriate.  Using a sterile surgical marker, an appropriate rotation flap was drawn incorporating the defect and placing the expected incisions within the relaxed skin tension lines where possible.    The area thus outlined was incised deep to adipose tissue with a #15 scalpel blade.  The skin margins were undermined to an appropriate distance in all directions utilizing iris scissors. Following this, the designed flap was advanced and carried over into the primary defect and sutured into place. Spiral Flap Text: The defect edges were debeveled with a #15 scalpel blade.  Given the location of the defect, shape of the defect and the proximity to free margins a spiral flap was deemed most appropriate.  Using a sterile surgical marker, an appropriate rotation flap was drawn incorporating the defect and placing the expected incisions within the relaxed skin tension lines where possible. The area thus outlined was incised deep to adipose tissue with a #15 scalpel blade.  The skin margins were undermined to an appropriate distance in all directions utilizing iris scissors. Staged Advancement Flap Text: The defect edges were debeveled with a #15 scalpel blade.  Given the location of the defect, shape of the defect and the proximity to free margins a staged advancement flap was deemed most appropriate.  Using a sterile surgical marker, an appropriate advancement flap was drawn incorporating the defect and placing the expected incisions within the relaxed skin tension lines where possible. The area thus outlined was incised deep to adipose tissue with a #15 scalpel blade.  The skin margins were undermined to an appropriate distance in all directions utilizing iris scissors. Star Wedge Flap Text: The defect edges were debeveled with a #15 scalpel blade.  Given the location of the defect, shape of the defect and the proximity to free margins a star wedge flap was deemed most appropriate.  Using a sterile surgical marker, an appropriate rotation flap was drawn incorporating the defect and placing the expected incisions within the relaxed skin tension lines where possible. The area thus outlined was incised deep to adipose tissue with a #15 scalpel blade.  The skin margins were undermined to an appropriate distance in all directions utilizing iris scissors. Following this, the designed flap was advanced and carried over into the primary defect and sutured into place. Transposition Flap Text: The defect edges were debeveled with a #15 scalpel blade.  Given the location of the defect and the proximity to free margins a transposition flap was deemed most appropriate.  Using a sterile surgical marker, an appropriate transposition flap was drawn incorporating the defect.    The area thus outlined was incised deep to adipose tissue with a #15 scalpel blade.  The skin margins were undermined to an appropriate distance in all directions utilizing iris scissors. Following this, the designed flap was advanced and carried over into the primary defect and sutured into place. Trilobed Flap Text: The defect edges were debeveled with a #15 scalpel blade.  Given the location of the defect and the proximity to free margins a trilobed flap was deemed most appropriate.  Using a sterile surgical marker, an appropriate trilobed flap drawn around the defect.    The area thus outlined was incised deep to adipose tissue with a #15 scalpel blade.  The skin margins were undermined to an appropriate distance in all directions utilizing iris scissors. Following this, the designed flap was advanced and carried over into the primary defect and sutured into place. V-Y Flap Text: The defect edges were debeveled with a #15 scalpel blade.  Given the location of the defect, shape of the defect and the proximity to free margins a V-Y flap was deemed most appropriate.  Using a sterile surgical marker, an appropriate advancement flap was drawn incorporating the defect and placing the expected incisions within the relaxed skin tension lines where possible.    The area thus outlined was incised deep to adipose tissue with a #15 scalpel blade.  The skin margins were undermined to an appropriate distance in all directions utilizing iris scissors. Following this, the designed flap was advanced and carried over into the primary defect and sutured into place. V-Y Plasty Text: The defect edges were debeveled with a #15 scalpel blade.  Given the location of the defect, shape of the defect and the proximity to free margins an V-Y advancement flap was deemed most appropriate.  Using a sterile surgical marker, an appropriate advancement flap was drawn incorporating the defect and placing the expected incisions within the relaxed skin tension lines where possible.    The area thus outlined was incised deep to adipose tissue with a #15 scalpel blade.  The skin margins were undermined to an appropriate distance in all directions utilizing iris scissors. W Plasty Text: The lesion was extirpated to the level of the fat with a #15 scalpel blade.  Given the location of the defect, shape of the defect and the proximity to free margins a W-plasty was deemed most appropriate for repair.  Using a sterile surgical marker, the appropriate transposition arms of the W-plasty were drawn incorporating the defect and placing the expected incisions within the relaxed skin tension lines where possible.    The area thus outlined was incised deep to adipose tissue with a #15 scalpel blade.  The skin margins were undermined to an appropriate distance in all directions utilizing iris scissors.  The opposing transposition arms were then transposed into place in opposite direction and anchored with interrupted buried subcutaneous sutures. Z Plasty Text: The lesion was extirpated to the level of the fat with a #15 scalpel blade.  Given the location of the defect, shape of the defect and the proximity to free margins a Z-plasty was deemed most appropriate for repair.  Using a sterile surgical marker, the appropriate transposition arms of the Z-plasty were drawn incorporating the defect and placing the expected incisions within the relaxed skin tension lines where possible.    The area thus outlined was incised deep to adipose tissue with a #15 scalpel blade.  The skin margins were undermined to an appropriate distance in all directions utilizing iris scissors.  The opposing transposition arms were then transposed into place in opposite direction and anchored with interrupted buried subcutaneous sutures. Zygomaticofacial Flap Text: Given the location of the defect, shape of the defect and the proximity to free margins a zygomaticofacial flap was deemed most appropriate for repair.  Using a sterile surgical marker, the appropriate flap was drawn incorporating the defect and placing the expected incisions within the relaxed skin tension lines where possible. The area thus outlined was incised deep to adipose tissue with a #15 scalpel blade with preservation of a vascular pedicle.  The skin margins were undermined to an appropriate distance in all directions utilizing iris scissors.  The flap was then placed into the defect and anchored with interrupted buried subcutaneous sutures. Abbe Flap (Lower To Upper Lip) Text: The defect of the upper lip was assessed and measured.  Given the location and size of the defect, an Abbe flap was deemed most appropriate.  Using a sterile surgical marker, an appropriate Abbe flap was measured and drawn on the lower lip. Local anesthesia was then infiltrated. A scalpel was then used to incise the upper lip through and through the skin, vermilion, muscle and mucosa, leaving the flap pedicled on the opposite side.  The flap was then rotated and transferred to the lower lip defect.  The flap was then sutured into place with a three layer technique, closing the orbicularis oris muscle layer with subcutaneous buried sutures, followed by a mucosal layer and an epidermal layer. Abbe Flap (Upper To Lower Lip) Text: The defect of the lower lip was assessed and measured.  Given the location and size of the defect, an Abbe flap was deemed most appropriate.  Using a sterile surgical marker, an appropriate Abbe flap was measured and drawn on the upper lip. Local anesthesia was then infiltrated.  A scalpel was then used to incise the upper lip through and through the skin, vermilion, muscle and mucosa, leaving the flap pedicled on the opposite side.  The flap was then rotated and transferred to the lower lip defect.  The flap was then sutured into place with a three layer technique, closing the orbicularis oris muscle layer with subcutaneous buried sutures, followed by a mucosal layer and an epidermal layer. Cheek Interpolation Flap Text: A decision was made to reconstruct the defect utilizing an interpolation axial flap and a staged reconstruction.  A telfa template was made of the defect.  This telfa template was then used to outline the Cheek Interpolation flap.  The donor area for the pedicle flap was then injected with anesthesia.  The flap was excised through the skin and subcutaneous tissue down to the layer of the underlying musculature.  The interpolation flap was carefully excised within this deep plane to maintain its blood supply.  The edges of the donor site were undermined.   The donor site was closed in a primary fashion.  The pedicle was then rotated into position and sutured.  Following this, the designed flap was advanced and carried over into the primary defect and sutured into place. Once the tube was sutured into place, adequate blood supply was confirmed with blanching and refill.  The pedicle was then wrapped with xeroform gauze and dressed appropriately with a telfa and gauze bandage to ensure continued blood supply and protect the attached pedicle. Cheek-To-Nose Interpolation Flap Text: A decision was made to reconstruct the defect utilizing an interpolation axial flap and a staged reconstruction.  A telfa template was made of the defect.  This telfa template was then used to outline the Cheek-To-Nose Interpolation flap.  The donor area for the pedicle flap was then injected with anesthesia.  The flap was excised through the skin and subcutaneous tissue down to the layer of the underlying musculature.  The interpolation flap was carefully excised within this deep plane to maintain its blood supply.  The edges of the donor site were undermined.   The donor site was closed in a primary fashion.  The pedicle was then rotated into position and sutured. Following this, the designed flap was advanced and carried over into the primary defect and sutured into place. Once the tube was sutured into place, adequate blood supply was confirmed with blanching and refill.  The pedicle was then wrapped with xeroform gauze and dressed appropriately with a telfa and gauze bandage to ensure continued blood supply and protect the attached pedicle. Estlander Flap (Lower To Upper Lip) Text: The defect of the lower lip was assessed and measured.  Given the location and size of the defect, an Estlander flap was deemed most appropriate.  Using a sterile surgical marker, an appropriate Estlander flap was measured and drawn on the upper lip. Local anesthesia was then infiltrated. A scalpel was then used to incise the lateral aspect of the flap, through skin, muscle and mucosa, leaving the flap pedicled medially.  The flap was then rotated and positioned to fill the lower lip defect.  The flap was then sutured into place with a three layer technique, closing the orbicularis oris muscle layer with subcutaneous buried sutures, followed by a mucosal layer and an epidermal layer. Interpolation Flap Text: A decision was made to reconstruct the defect utilizing an interpolation axial flap and a staged reconstruction.  A telfa template was made of the defect.  This telfa template was then used to outline the interpolation flap.  The donor area for the pedicle flap was then injected with anesthesia.  The flap was excised through the skin and subcutaneous tissue down to the layer of the underlying musculature.  The interpolation flap was carefully excised within this deep plane to maintain its blood supply.  The edges of the donor site were undermined.   The donor site was closed in a primary fashion.  The pedicle was then rotated into position and sutured. Following this, the designed flap was advanced and carried over into the primary defect and sutured into place. Once the tube was sutured into place, adequate blood supply was confirmed with blanching and refill.  The pedicle was then wrapped with xeroform gauze and dressed appropriately with a telfa and gauze bandage to ensure continued blood supply and protect the attached pedicle. Melolabial Interpolation Flap Text: A decision was made to reconstruct the defect utilizing an interpolation axial flap and a staged reconstruction.  A telfa template was made of the defect.  This telfa template was then used to outline the melolabial interpolation flap.  The donor area for the pedicle flap was then injected with anesthesia.  The flap was excised through the skin and subcutaneous tissue down to the layer of the underlying musculature.  The pedicle flap was carefully excised within this deep plane to maintain its blood supply.  The edges of the donor site were undermined.   The donor site was closed in a primary fashion.  The pedicle was then rotated into position and sutured.  Once the tube was sutured into place, adequate blood supply was confirmed with blanching and refill.  The pedicle was then wrapped with xeroform gauze and dressed appropriately with a telfa and gauze bandage to ensure continued blood supply and protect the attached pedicle. Mastoid Interpolation Flap Text: A decision was made to reconstruct the defect utilizing an interpolation axial flap and a staged reconstruction.  A telfa template was made of the defect.  This telfa template was then used to outline the mastoid interpolation flap.  The donor area for the pedicle flap was then injected with anesthesia.  The flap was excised through the skin and subcutaneous tissue down to the layer of the underlying musculature.  The pedicle flap was carefully excised within this deep plane to maintain its blood supply.  The edges of the donor site were undermined.   The donor site was closed in a primary fashion.  The pedicle was then rotated into position and sutured. Following this, the designed flap was advanced and carried over into the primary defect and sutured into place.  Once the tube was sutured into place, adequate blood supply was confirmed with blanching and refill.  The pedicle was then wrapped with xeroform gauze and dressed appropriately with a telfa and gauze bandage to ensure continued blood supply and protect the attached pedicle. Paramedian Forehead Flap Text: A decision was made to reconstruct the defect utilizing an interpolation axial flap and a staged reconstruction.  A telfa template was made of the defect.  This telfa template was then used to outline the paramedian forehead pedicle flap.  The donor area for the pedicle flap was then injected with anesthesia.  The flap was excised through the skin and subcutaneous tissue down to the layer of the underlying musculature.  The pedicle flap was carefully excised within this deep plane to maintain its blood supply.  The edges of the donor site were undermined.   The donor site was closed in a primary fashion.  The pedicle was then rotated into position and sutured. Following this, the designed flap was advanced and carried over into the primary defect and sutured into place.  Once the tube was sutured into place, adequate blood supply was confirmed with blanching and refill.  The pedicle was then wrapped with xeroform gauze and dressed appropriately with a telfa and gauze bandage to ensure continued blood supply and protect the attached pedicle. Posterior Auricular Interpolation Flap Text: A decision was made to reconstruct the defect utilizing an interpolation axial flap and a staged reconstruction.  A telfa template was made of the defect.  This telfa template was then used to outline the posterior auricular interpolation flap.  The donor area for the pedicle flap was then injected with anesthesia.  The flap was excised through the skin and subcutaneous tissue down to the layer of the underlying musculature.  The pedicle flap was carefully excised within this deep plane to maintain its blood supply.  The edges of the donor site were undermined.   The donor site was closed in a primary fashion.  The pedicle was then rotated into position and sutured.  Once the tube was sutured into place, adequate blood supply was confirmed with blanching and refill.  The pedicle was then wrapped with xeroform gauze and dressed appropriately with a telfa and gauze bandage to ensure continued blood supply and protect the attached pedicle. Cheiloplasty (Complex) Text: A decision was made to reconstruct the defect with a  cheiloplasty.  The defect was undermined extensively.  Additional obicularis oris muscle was excised with a 15 blade scalpel.  The defect was converted into a full thickness wedge to facilite a better cosmetic result.  Small vessels were then tied off with 5-0 monocyrl. The obicularis oris, superficial fascia, adipose and dermis were then reapproximated.  After the deeper layers were approximated the epidermis was reapproximated with particular care given to realign the vermillion border. Cheiloplasty (Less Than 50%) Text: A decision was made to reconstruct the defect with a  cheiloplasty.  The defect was undermined extensively.  Additional obicularis oris muscle was excised with a 15 blade scalpel.  The defect was converted into a full thickness wedge, of less than 50% of the vertical height of the lip, to facilite a better cosmetic result.  Small vessels were then tied off with 5-0 monocyrl. The obicularis oris, superficial fascia, adipose and dermis were then reapproximated.  After the deeper layers were approximated the epidermis was reapproximated with particular care given to realign the vermillion border. Ear Wedge Repair Text: A wedge excision was completed by carrying down an excision through the full thickness of the ear and cartilage with an inward facing Burow's triangle. The wound was then closed in a layered fashion. Full Thickness Lip Wedge Repair (Flap) Text: Given the location of the defect and the proximity to free margins a full thickness wedge repair was deemed most appropriate.  Using a sterile surgical marker, the appropriate repair was drawn incorporating the defect and placing the expected incisions perpendicular to the vermilion border.  The vermilion border was also meticulously outlined to ensure appropriate reapproximation during the repair.  The area thus outlined was incised through and through with a #15 scalpel blade.  The muscularis and dermis were reaproximated with deep sutures following hemostasis. Care was taken to realign the vermilion border before proceeding with the superficial closure.  Once the vermilion was realigned the superfical and mucosal closure was finished. Burow's Graft Text: The defect edges were debeveled with a #15 scalpel blade.  Given the location of the defect, shape of the defect, the proximity to free margins and the presence of a standing cone deformity a Burow's skin graft was deemed most appropriate. The standing cone was removed and this tissue was then trimmed to the shape of the primary defect. The adipose tissue was also removed until only dermis and epidermis were left.  The skin margins of the secondary defect were undermined to an appropriate distance in all directions utilizing iris scissors.  The secondary defect was closed with interrupted buried subcutaneous sutures.  The skin edges were then re-apposed with running  sutures.  The skin graft was then placed in the primary defect and oriented appropriately. Cartilage Graft Text: The defect edges were debeveled with a #15 scalpel blade.  Given the location of the defect, shape of the defect, the fact the defect involved a full thickness cartilage defect a cartilage graft was deemed most appropriate.  An appropriate donor site was identified, cleansed, and anesthetized. The cartilage graft was then harvested and transferred to the recipient site, oriented appropriately and then sutured into place.  The secondary defect was then repaired using a primary closure. Composite Graft Text: The defect edges were debeveled with a #15 scalpel blade.  Given the location of the defect, shape of the defect, the proximity to free margins and the fact the defect was full thickness a composite graft was deemed most appropriate.  The defect was outline and then transferred to the donor site.  A full thickness graft was then excised from the donor site. The graft was then placed in the primary defect, oriented appropriately and then sutured into place.  The secondary defect was then repaired using a primary closure. Epidermal Autograft Text: The defect edges were debeveled with a #15 scalpel blade.  Given the location of the defect, shape of the defect and the proximity to free margins an epidermal autograft was deemed most appropriate.  Using a sterile surgical marker, the primary defect shape was transferred to the donor site. The epidermal graft was then harvested.  The skin graft was then placed in the primary defect and oriented appropriately. Dermal Autograft Text: The defect edges were debeveled with a #15 scalpel blade.  Given the location of the defect, shape of the defect and the proximity to free margins a dermal autograft was deemed most appropriate.  Using a sterile surgical marker, the primary defect shape was transferred to the donor site. The area thus outlined was incised deep to adipose tissue with a #15 scalpel blade.  The harvested graft was then trimmed of adipose and epidermal tissue until only dermis was left.  The skin graft was then placed in the primary defect and oriented appropriately. Ftsg Text: The defect edges were debeveled with a #15 scalpel blade.  Given the location of the defect, shape of the defect and the proximity to free margins a full thickness skin graft was deemed most appropriate.  Using a sterile surgical marker, the primary defect shape was transferred to the donor site. The area thus outlined was incised deep to adipose tissue with a #15 scalpel blade.  The harvested graft was then trimmed of adipose tissue until only dermis and epidermis was left.  The skin margins of the secondary defect were undermined to an appropriate distance in all directions utilizing iris scissors.  The secondary defect was closed with interrupted buried subcutaneous sutures.  The skin edges were then re-apposed with running  sutures.  The skin graft was then placed in the primary defect and oriented appropriately. Pinch Graft Text: The defect edges were debeveled with a #15 scalpel blade. Given the location of the defect, shape of the defect and the proximity to free margins a pinch graft was deemed most appropriate. Using a sterile surgical marker, the primary defect shape was transferred to the donor site. The area thus outlined was incised deep to adipose tissue with a #15 scalpel blade.  The harvested graft was then trimmed of adipose tissue until only dermis and epidermis was left. The skin graft was then placed in the primary defect and oriented appropriately. Skin Substitute Text: The defect edges were debeveled with a #15 scalpel blade.  Given the location of the defect, shape of the defect and the proximity to free margins a skin substitute graft was deemed most appropriate.  The graft material was trimmed to fit the size of the defect. The graft was then placed in the primary defect and oriented appropriately. Split-Thickness Skin Graft Text: The defect edges were debeveled with a #15 scalpel blade.  Given the location of the defect, shape of the defect and the proximity to free margins a split thickness skin graft was deemed most appropriate.  Using a sterile surgical marker, the primary defect shape was transferred to the donor site. The split thickness graft was then harvested.  The skin graft was then placed in the primary defect and oriented appropriately. Tissue Cultured Epidermal Autograft Text: The defect edges were debeveled with a #15 scalpel blade.  Given the location of the defect, shape of the defect and the proximity to free margins a tissue cultured epidermal autograft was deemed most appropriate.  The graft was then trimmed to fit the size of the defect.  The graft was then placed in the primary defect and oriented appropriately. Xenograft Text: The defect edges were debeveled with a #15 scalpel blade.  Given the location of the defect, shape of the defect and the proximity to free margins a xenograft was deemed most appropriate.  The graft was then trimmed to fit the size of the defect.  The graft was then placed in the primary defect and oriented appropriately. Complex Repair And Flap Additional Text (Will Appearing After The Standard Complex Repair Text): The complex repair was not sufficient to completely close the primary defect. The remaining additional defect was repaired with the flap mentioned below. Complex Repair And Graft Additional Text (Will Appearing After The Standard Complex Repair Text): The complex repair was not sufficient to completely close the primary defect. The remaining additional defect was repaired with the graft mentioned below. Eyelid Full Thickness Repair - 44081: The eyelid defect was full thickness which required a wedge repair of the eyelid. Special care was taken to ensure that the eyelid margin was realligned when placing sutures. Eyelid Partial Thickness Repair - 96229: The eyelid defect was partial thickness which required a wedge repair of the eyelid. Special care was taken to ensure that the eyelid margin was realligned when placing sutures. Intermediate Repair And Flap Additional Text (Will Appearing After The Standard Complex Repair Text): The intermediate repair was not sufficient to completely close the primary defect. The remaining additional defect was repaired with the flap mentioned below. Intermediate Repair And Graft Additional Text (Will Appearing After The Standard Complex Repair Text): The intermediate repair was not sufficient to completely close the primary defect. The remaining additional defect was repaired with the graft mentioned below. Localized Dermabrasion With 15 Blade Text: The patient was draped in routine manner.  Localized dermabrasion using a 15 blade was performed in routine manner to papillary dermis. This spot dermabrasion is being performed to complete skin cancer reconstruction. It also will eliminate the other sun damaged precancerous cells that are known to be part of the regional effect of a lifetime's worth of sun exposure. This localized dermabrasion is therapeutic and should not be considered cosmetic in any regard. Localized Dermabrasion With Sand Papertext: The patient was draped in routine manner.  Localized dermabrasion using sterile sand paper was performed in routine manner to papillary dermis. This spot dermabrasion is being performed to complete skin cancer reconstruction. It also will eliminate the other sun damaged precancerous cells that are known to be part of the regional effect of a lifetime's worth of sun exposure. This localized dermabrasion is therapeutic and should not be considered cosmetic in any regard. Localized Dermabrasion With Wire Brush Text: The patient was draped in routine manner.  Localized dermabrasion using 3 x 17 mm wire brush was performed in routine manner to papillary dermis. This spot dermabrasion is being performed to complete skin cancer reconstruction. It also will eliminate the other sun damaged precancerous cells that are known to be part of the regional effect of a lifetime's worth of sun exposure. This localized dermabrasion is therapeutic and should not be considered cosmetic in any regard. Purse String (Simple) Text: Given the location of the defect and the characteristics of the surrounding skin a pursestring closure was deemed most appropriate.  Undermining was performed circumfirentially around the surgical defect.  A purstring suture was then placed and tightened. Purse String (Intermediate) Text: Given the location of the defect and the characteristics of the surrounding skin a pursestring intermediate closure was deemed most appropriate.  Undermining was performed circumfirentially around the surgical defect.  A purstring suture was then placed and tightened. Partial Purse String (Simple) Text: Given the location of the defect and the characteristics of the surrounding skin a simple purse string closure was deemed most appropriate.  Undermining was performed circumfirentially around the surgical defect.  A purse string suture was then placed and tightened. Wound tension only allowed a partial closure of the circular defect. Partial Purse String (Intermediate) Text: Given the location of the defect and the characteristics of the surrounding skin an intermediate purse string closure was deemed most appropriate.  Undermining was performed circumfirentially around the surgical defect.  A purse string suture was then placed and tightened. Wound tension only allowed a partial closure of the circular defect. Tarsorrhaphy Text: A tarsorrhaphy was performed using Frost sutures. Manual Repair Warning Statement: We plan on removing the manually selected variable below in favor of our much easier automatic structured text blocks found in the previous tab. We decided to do this to help make the flow better and give you the full power of structured data. Manual selection is never going to be ideal in our platform and I would encourage you to avoid using manual selection from this point on, especially since I will be sunsetting this feature. It is important that you do one of two things with the customized text below. First, you can save all of the text in a word file so you can have it for future reference. Second, transfer the text to the appropriate area in the Library tab. Lastly, if there is a flap or graft type which we do not have you need to let us know right away so I can add it in before the variable is hidden. No need to panic, we plan to give you roughly 6 months to make the change. Same Histology In Subsequent Stages Text: The pattern and morphology of the tumor is as described in the first stage. No Residual Tumor Seen Histology Text: There were no malignant cells seen in the sections examined. Inflammation Suggestive Of Cancer Camouflage Histology Text: There was a dense lymphocytic infiltrate which prevented adequate histologic evaluation of adjacent structures. Incidental Superficial Basal Cell Carcinoma Histology Text: Incidental Islands of basaloid cells with a peripheral palisade and surrounding retraction artifact. Incidental Actinic Keratosis Histology Text: Alternating pink, blue parakeratosis with some atypical keratinocytes Missing Epidermis Histology Text: Area of missing epidermis that was not able to be retrieved on the initial stage. A second layer of tissue was removed and processed as a continuation of the previous stage. Bcc Histology Text: Islands of basaloid cells with a peripheral palisade and surrounding retraction artifact. Bcc Infiltrative Histology Text: Strands of basaloid cells penetrating deeply among the collagen fascicles and demonstrating an infiltrative pattern. Bcc  Morpheaform/Sclerosing Histology Text: Strands of basaloid cells penetrating deeply among the collagen fascicles with an increased number of fibroblasts and the presence of fibrotic desmoplastic storms. Bcc  Nodulocystic Histology Text: Islands of basaloid cells with a peripheral palisade and surrounding retraction artifact, in addition to dilated cystic spaces within some of these nodules due to necrosis or mucin formation. Bcc Pigmented Histology Text: Islands of basaloid cells with a peripheral palisade and surrounding retraction artifact with associated melanin and melanocytes within the tumor. Bcc Superficial Histology Text: Islands of basaloid cells with a peripheral palisade and surrounding retraction artifact in close contact with the papillary dermis. Bcc Superficial Pigmented Histology Text: Islands of basaloid cells with a peripheral palisade and surrounding retraction artifact in close contact with the papillary dermis and increased melanin and melanocytes within the tumor. Mixed Superficial And Nodular Bcc Histology Text: Islands of basaloid cells with a peripheral palisade and surrounding retraction artifact in close contact with the papillary dermis, as well as basaloid nodules with peripheral palisading and retraction artifact in the dermis. Mixed Nodular And Infiltrative Bcc Histology Text: Solid basaloid tumor islands with peripheral palisading and retraction artifact intermixed with strands of basaloid cells penetrating deeply among the collagen fascicles and demonstrating an infiltrative pattern. Metatypical Bcc Histology Text: Islands of basaloid cells with a peripheral palisade and surrounding retraction artifact in close contact with the papillary dermis intermixed with islands of atypical keratinocytes present in the underlying dermis. Fibroepithelioma Of Pinkus Histology Text: Pink strands of squamous epithelium with blue basaloid buds embedded in a fibromyxoid storms. Scc Histology Text: Epidermis of variable thickness with disorganization and atypia of the basal and lower spinous layer keratinocytes with areas of full thickness atypia with buds of similar atypical keratinocytes extending into the underlying dermis. Islands of similar atypical keratinocytes are present in the underlying dermis. Scc Well Differentiated Histology Text: Glassy keratinocytes with some areas of full thickness squamous atypia and increased squamous eddies and keratin pearls. Buds of similar atypical keratinocytes also extend into the underlying dermis. Scc Moderately Differentiated Histology Text: Glassy keratinocytes with more abundant areas of full thickness squamous atypia and increased squamous eddies and keratin pearls. Buds of similar atypical keratinocytes also extend into the underlying dermis. Scc Poorly Differentiated Histology Text: Some glassy keratinocytes with areas of severe full thickness squamous atypia and fewer squamous eddies and keratin pearls. Buds of similar severely atypical keratinocytes also extend into the underlying dermis. Scc Ka Subtype Histology Text: Glassy islands of atypical squamous epithelium with neutrophilic microabcesses, eosinophils and elastic trapping. Scc Spindle Histology Text: Glassy keratinocytes with areas of full thickness squamous atypia, prominent spindle cells with buds of similar atypical spindled keratinocytes  extending into the underlying dermis. Scc In Situ Histology Text: Parakeratosis overlying an acanthotic epidermis with full thickness disorganization and atypia of the keratinocytes, and marked solar elastosis of the underlying dermis. Merkel Cell Carcinoma Histology Text: Dermal sheets and nests of cells with hyperchromatic nuclei, a high mitotic rate, and scant cytoplasm. Afx Histology Text: Spindle cell neoplasm with atypical spindle cells throughout the dermis. Basosquamous Cell Carcinoma Histology Text: Epidermis of variable thickness with disorganization and atypia of the basal and lower spinous layer keratinocytes with areas of full thickness atypia with buds of similar atypical keratinocytes extending into the underlying dermis with islands of basaloid epithelium with peripheral palisading and retraction artifact. Dfsp Histology Text: Storiform spindle cell proliferation with infiltration into the subcutaneous tissue. Desmoplastic Trichoepithelioma Histology Text: Orthokeratosis overlying an atrophic epidermis with the dermis containing a dense sclerotic stroma containing small, thin, basaloid islands of cells without retraction and rare horn cysts and calcifications. Extramammary Paget's Disease Histology Text: Atypical cells throughout the epidermis, focally crushing a normal basal layer. Microcystic Adnexal Carcinoma Histology Text: Multiple basaloid strands with small lumina and horn cysts Sebaceous Carcinoma Histology Text: Nodule of undifferentiated cells with areas of necrosis, many apoptotic cells and scattered mitotic figures with several areas of the tumor demonstrating sebaceous differentiation. Mart-1 - Positive Histology Text: MART-1 staining demonstrates areas of higher density and clustering of melanocytes with Pagetoid spread upwards within the epidermis. The surgical margins are positive for tumor cells. Mart-1 - Negative Histology Text: MART-1 staining demonstrates a normal density and pattern of melanocytes along the dermal-epidermal junction. The surgical margins are negative for tumor cells. Information: Selecting Yes will display possible errors in your note based on the variables you have selected. This validation is only offered as a suggestion for you. PLEASE NOTE THAT THE VALIDATION TEXT WILL BE REMOVED WHEN YOU FINALIZE YOUR NOTE. IF YOU WANT TO FAX A PRELIMINARY NOTE YOU WILL NEED TO TOGGLE THIS TO 'NO' IF YOU DO NOT WANT IT IN YOUR FAXED NOTE. Bill 59 Modifier?: No - Continue to Bill 79 Modifier

## 2024-09-09 ENCOUNTER — NON-APPOINTMENT (OUTPATIENT)
Age: 84
End: 2024-09-09

## 2024-09-10 ENCOUNTER — NON-APPOINTMENT (OUTPATIENT)
Age: 84
End: 2024-09-10

## 2024-09-10 ENCOUNTER — APPOINTMENT (OUTPATIENT)
Dept: OTOLARYNGOLOGY | Facility: CLINIC | Age: 84
End: 2024-09-10
Payer: MEDICARE

## 2024-09-10 VITALS
BODY MASS INDEX: 31.5 KG/M2 | DIASTOLIC BLOOD PRESSURE: 73 MMHG | SYSTOLIC BLOOD PRESSURE: 110 MMHG | HEART RATE: 89 BPM | HEIGHT: 70 IN | WEIGHT: 220 LBS

## 2024-09-10 DIAGNOSIS — K21.9 GASTRO-ESOPHAGEAL REFLUX DISEASE W/OUT ESOPHAGITIS: ICD-10-CM

## 2024-09-10 DIAGNOSIS — R07.0 PAIN IN THROAT: ICD-10-CM

## 2024-09-10 DIAGNOSIS — R05.9 COUGH, UNSPECIFIED: ICD-10-CM

## 2024-09-10 DIAGNOSIS — J31.0 CHRONIC RHINITIS: ICD-10-CM

## 2024-09-10 PROCEDURE — 31575 DIAGNOSTIC LARYNGOSCOPY: CPT

## 2024-09-10 PROCEDURE — 99204 OFFICE O/P NEW MOD 45 MIN: CPT | Mod: 25

## 2024-09-10 NOTE — REVIEW OF SYSTEMS
[Sneezing] : sneezing [Post Nasal Drip] : post nasal drip [Nasal Congestion] : nasal congestion [Problem Snoring] : problem snoring [Sinus Pain] : sinus pain [Sinus Pressure] : sinus pressure [Sense Of Smell Problem] : sense of smell problem [Throat Clearing] : throat clearing [Throat Pain] : throat pain [Throat Dryness] : throat dryness [Throat Itching] : throat itching [Wheezing] : wheezing [Cough] : cough [Heartburn] : heartburn [Easy Bruising] : tendency for easy bruising [Negative] : Endocrine [FreeTextEntry6] : noisey breathing [FreeTextEntry7] : rteflux [FreeTextEntry1] : skin rash /itching

## 2024-09-10 NOTE — ASSESSMENT
[FreeTextEntry1] : diet/lifestyle modification instructions given to pt  rx flonase, cefdinir f/u 1 month

## 2024-09-10 NOTE — PHYSICAL EXAM
[] : septum deviated bilaterally [Normal] : mucosa is normal [Midline] : trachea located in midline position [FreeTextEntry1] : multiple bandaids on nose throat, cheek and forehead s/p skin biopsies on 9/7/24 [de-identified] : CI on TM AD suctioned off

## 2024-09-10 NOTE — HISTORY OF PRESENT ILLNESS
[de-identified] : 84 yr old male c/o dry mouth, mucous in his throat, throat clearing, rhinorrhea for many weeks -dysphagia, hoarseness +discolored mucous +recent heartburn this month eval by his GP at the [facility], rec: ENT eval

## 2024-09-11 RX ORDER — CEFDINIR 300 MG/1
300 CAPSULE ORAL
Qty: 20 | Refills: 0 | Status: ACTIVE | COMMUNITY
Start: 2024-09-11 | End: 1900-01-01

## 2024-09-11 RX ORDER — FLUTICASONE PROPIONATE 50 UG/1
50 SPRAY, METERED NASAL
Qty: 1 | Refills: 5 | Status: ACTIVE | COMMUNITY
Start: 2024-09-11 | End: 1900-01-01

## 2024-10-04 NOTE — PROGRESS NOTE ADULT - PROBLEM SELECTOR PROBLEM 5
"Subjective   Patient ID: Kayleen Montano is a 10 y.o. female who presents with Momfor med check and Toe Injury (Left big toe).      HPI  She did get her toenail removed  in August.  Mom thinks her toenail is looking infected .  They have been soaking it.  She says it doesn't hurt.  He is active in sports and cheerleading.    She is on Journay 60mg on a daily basis.    Her focus for school seems to be good on it in the mornings are easier.  Mom is questioning if perhaps Kayleen has a problem with decreased serotonin.  Mom herself is on an SSRI now and says she feels much more animated and enjoys things more.  Mom says when Kayleen is doing something she enjoys she looks like she is not having a good time.  The  even asked her if she wanted to be there because she was still on animated during chair.  Kayleen says it is because although she enjoys the activities she is worried about things.  In soccer she is worried about the ball hitting her other things happening.  Mom does have an appointment scheduled with psychiatry at [facility] later this month.  Presently Kayleen is not seeing a counselor  Sleep is about  9 hours.  They are trying to eat healthier  Review of Systems  All other systems are reviewed and are negative.       Objective   /62 (BP Location: Right arm, Patient Position: Sitting)   Pulse 62   Ht 1.384 m (4' 6.5\")   Wt 34.2 kg   SpO2 97%   BMI 17.85 kg/m²   BSA: 1.15 meters squared  Growth percentiles: 35 %ile (Z= -0.40) based on CDC (Girls, 2-20 Years) Stature-for-age data based on Stature recorded on 10/4/2024. 44 %ile (Z= -0.16) based on CDC (Girls, 2-20 Years) weight-for-age data using data from 10/4/2024.     Physical Exam  CONSTITUTIONAL: She is well-developed and well-nourished.  She is pleasant and talkative to me she is not an overly animated or outgoing child.   HEAD AND FACE: Normal cepahlic, atraumatic.   EYES: Conjunctiva and lids normal, positive red reflex bilaterally " pupils equal and reactive to light.   EARS, NOSE, MOUTH, and THROAT: No nasal discharge. External without deformities. TM's normal color, normal landmarks, no fluid, non-retracted. External auditory canals without swelling, redness or tenderness. Pharyngeal mucosa normal. No erythema, exudate, or lesions. Mucous membranes moist.   NECK: Full range of motion. No significant adenopathy.    PULMONARY: No grunting, flaring or retractions. No rales or wheezing. Good air exchange.   CARDIOVASCULAR: Regular rate and rhythm. No significant murmur.  Heart rate 78  ABDOMEN: A soft and nontender no organomegaly no masses palpable.  EXTREMITIES: On her left big toe where they did excise the lateral part of her toenail there is a lot of redness and swelling.  It is tender to touch.  I cannot express any drainage.  Assessment/Plan   Diagnoses and all orders for this visit:  Cellulitis of toe of left foot  -     cephalexin (Keflex) 250 mg/5 mL suspension; Take 12.5 mL (625 mg) by mouth 2 times a day for 7 days.  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  Other orders  -     Flu vaccine, trivalent, preservative free, age 6 months and greater (Fluarix/Fluzone/Flulaval)  This point we are going to refill her present medication for 1 month.  Please let me know what psychiatry says as far as perhaps adding a medication for anxiety.  If they will be managing meds then you can follow-up with them.   DM2 (diabetes mellitus, type 2)

## 2024-10-06 NOTE — PROGRESS NOTE ADULT - PROBLEM/PLAN-4
one
DISPLAY PLAN FREE TEXT

## 2024-10-08 ENCOUNTER — APPOINTMENT (OUTPATIENT)
Dept: OTOLARYNGOLOGY | Facility: CLINIC | Age: 84
End: 2024-10-08
Payer: MEDICARE

## 2024-10-08 VITALS
WEIGHT: 223 LBS | BODY MASS INDEX: 31.92 KG/M2 | HEART RATE: 100 BPM | DIASTOLIC BLOOD PRESSURE: 74 MMHG | HEIGHT: 70 IN | SYSTOLIC BLOOD PRESSURE: 114 MMHG

## 2024-10-08 DIAGNOSIS — K21.9 GASTRO-ESOPHAGEAL REFLUX DISEASE W/OUT ESOPHAGITIS: ICD-10-CM

## 2024-10-08 PROCEDURE — 99214 OFFICE O/P EST MOD 30 MIN: CPT

## 2024-10-08 RX ORDER — OMEPRAZOLE 20 MG/1
20 CAPSULE, DELAYED RELEASE ORAL
Qty: 30 | Refills: 5 | Status: ACTIVE | COMMUNITY
Start: 2024-10-08 | End: 1900-01-01

## 2024-11-05 NOTE — ED ADULT NURSE NOTE - IN THE PAST 12 MONTHS HAVE YOU USED DRUGS OTHER THAN THOSE REQUIRED FOR MEDICAL REASON?
cc:  diabetes    Subjective:     Deo Beard is a 58 y.o. male presenting for diabetes        History of Present Illness  The patient is a 58-year-old male who presents to the office today for a follow-up regarding his diabetes.    He was started on Farxiga by his cardiologist and has since developed urinary symptoms. He reports experiencing itching and pain at the tip of his penis, which he suspects might be due to a yeast infection or urinary tract infection (UTI). He was previously informed that such symptoms could occur and was advised to seek medical attention if they did. However, when he sought help, he was directed to consult his primary care physician. He also mentions that he was prescribed Farxiga for weight loss.    He has undergone two colonoscopies in the past, with the first one being unsuccessful. He also had bypass surgery for weight loss.    He is currently on spironolactone, which was prescribed by his cardiologist. He has refilled all his medications. He is also taking Ozempic, which was prescribed by his cardiologist. He mentions that his cardiologist has a dedicated pharmacist who reviews all his medications to ensure everything is in order.       Review of systems:  See above.   Denies any symptoms unless previously indicated.        Current Outpatient Medications:     fluconazole (DIFLUCAN) 150 MG tablet, Take one tab weekly for 2 weeks., Disp: 2 Tablet, Rfl: 0    carvedilol (COREG) 25 MG Tab, Take 1 Tablet by mouth 2 times a day with meals., Disp: 180 Tablet, Rfl: 3    Semaglutide, 2 MG/DOSE, (OZEMPIC, 2 MG/DOSE,) 8 MG/3ML Solution Pen-injector, Inject 2 mg under the skin every 7 days., Disp: 9 mL, Rfl: 1    rosuvastatin (CRESTOR) 20 MG Tab, Take 1 Tablet by mouth every evening., Disp: 90 Tablet, Rfl: 3    Blood Glucose Monitoring Suppl (BLOOD GLUCOSE MONITOR SYSTEM) w/Device Kit, Test blood sugar as recommended by provider., Disp: 1 Kit, Rfl: 0    glucose blood strip, Use one strip  "to test blood sugar once daily early morning before first meal., Disp: 100 Strip, Rfl: 3    Lancets, Use one lancet to test blood sugar once daily early morning before first meal., Disp: 100 Each, Rfl: 3    Alcohol Swabs, Wipe site with prep pad prior to injection., Disp: 100 Each, Rfl: 3    diclofenac sodium (VOLTAREN) 1 % Gel, APPLY 2 GRAMS TOPICALLY 4 TIMES DAILY AS NEEDED FOR PAIN, Disp: 200 g, Rfl: 0    Cholecalciferol (VITAMIN D) 125 MCG (5000 UT) Cap, Take 1 Capsule by mouth every day., Disp: 90 Capsule, Rfl: 2    potassium chloride SA (KDUR) 20 MEQ Tab CR, Take 1 Tablet by mouth every day., Disp: 90 Tablet, Rfl: 2    furosemide (LASIX) 20 MG Tab, TAKE 1 TABLET BY MOUTH ONCE A DAY ONLY IF NEEDED FOR LEG SWELLING., Disp: 90 Tablet, Rfl: 2    DULoxetine (CYMBALTA) 30 MG Cap DR Particles, Take 1 cap(s) orally every day., Disp: 30 Capsule, Rfl: 0    spironolactone (ALDACTONE) 25 MG Tab, Take 1 Tablet by mouth every day., Disp: 90 Tablet, Rfl: 3    aspirin 81 MG EC tablet, Take 81 mg by mouth every day. CHECK WITH PRESCRIBING PHYSICIAN FOR INSTRUCTIONS FOR PROCEDURE 7/31/24, Disp: , Rfl:     NON SPECIFIED, Knee brace, Disp: 1 Each, Rfl: 0    Allergies, past medical history, past surgical history, family history, social history reviewed and updated    Objective:     Vitals: /78 (BP Location: Left arm, Patient Position: Sitting, BP Cuff Size: Large adult)   Pulse 71   Temp 36.2 °C (97.2 °F) (Temporal)   Resp 16   Ht 1.727 m (5' 8\")   Wt (!) 151 kg (332 lb 14.3 oz)   SpO2 94%   BMI 50.62 kg/m²   General: Alert, pleasant, NAD  EYES:   PERRL, EOMI, no icterus or pallor.  Conjunctivae and lids normal.   HENT:  Normocephalic.  External ears normal.  Neck supple.     Respiratory: Normal respiratory effort.    Abdomen: obese  Skin: Warm, dry, no rashes.  Musculoskeletal: Gait is normal.  Moves all extremities well.    Extremities: normal range of motion all extremities.   Neurological: No tremors, sensation " grossly intact,  CN2-12 intact.  Psych:  Affect/mood is normal, judgement is good, memory is intact, grooming is appropriate.      Results  Laboratory Studies  Urine test shows normal results. A1c was 5.9. Bad cholesterol is 64. Increased protein in urine but improved from 9 months ago.     Latest Reference Range & Units 06/08/24 07:43   Sodium 135 - 145 mmol/L 138   Potassium 3.6 - 5.5 mmol/L 4.1   Chloride 96 - 112 mmol/L 102   Co2 20 - 33 mmol/L 26   Anion Gap 7.0 - 16.0  10.0   Glucose 65 - 99 mg/dL 93   Bun 8 - 22 mg/dL 25 (H)   Creatinine 0.50 - 1.40 mg/dL 0.63   GFR (CKD-EPI) >60 mL/min/1.73 m 2 110   Calcium 8.5 - 10.5 mg/dL 8.8   Correct Calcium 8.5 - 10.5 mg/dL 9.0   AST(SGOT) 12 - 45 U/L 19   ALT(SGPT) 2 - 50 U/L 13   Alkaline Phosphatase 30 - 99 U/L 77   Total Bilirubin 0.1 - 1.5 mg/dL 0.6   Albumin 3.2 - 4.9 g/dL 3.8   Total Protein 6.0 - 8.2 g/dL 7.6   Globulin 1.9 - 3.5 g/dL 3.8 (H)   A-G Ratio g/dL 1.0   Glycohemoglobin 4.0 - 5.6 % 5.9 (H)   Estim. Avg Glu mg/dL 123   Cholesterol,Tot 100 - 199 mg/dL 120   Triglycerides 0 - 149 mg/dL 119   HDL >=40 mg/dL 32 !   LDL <100 mg/dL 64   Micro Alb Creat Ratio 0 - 30 mg/g 44 (H)   Creatinine, Urine mg/dL 199.13   Microalbumin, Urine Random mg/dL 8.8   (H): Data is abnormally high  !: Data is abnormal     Assessment/Plan:     Deo was seen today for diabetes follow-up and medication management.    Diagnoses and all orders for this visit:    UTI symptoms  -     POCT Urinalysis    Dysuria  -     fluconazole (DIFLUCAN) 150 MG tablet; Take one tab weekly for 2 weeks.  -     URINE CULTURE(NEW)    Type 2 diabetes mellitus without complication, without long-term current use of insulin ([facility])  -     Lipid Profile; Future  -     Comp Metabolic Panel; Future  -     HEMOGLOBIN A1C; Future    Ischemic cardiomyopathy  -     Lipid Profile; Future  -     Comp Metabolic Panel; Future    Congestive heart failure, unspecified HF chronicity, unspecified heart failure type  ([facility])  -     Lipid Profile; Future  -     Comp Metabolic Panel; Future    Coronary artery disease involving native coronary artery of native heart without angina pectoris    Chronic systolic heart failure ([facility])    Class 3 severe obesity due to excess calories with serious comorbidity and body mass index (BMI) of 50.0 to 59.9 in adult ([facility])    Prostate cancer screening  -     PROSTATE SPECIFIC AG SCREENING; Future        Assessment & Plan  1. Yeast infection.  The patient presents with itching and pain at the tip of the penis, which is likely a yeast infection, a common side effect of Farxiga. He has discontinued Farxiga. A prescription for Diflucan has been provided, with instructions to take one tablet weekly for 2 weeks. If the infection resolves after one dose, he does not need to take the second dose. If symptoms persist or recur, he is advised to contact via Upplication.    2. Type 2 diabetes.  His A1c from June was 5.9, indicating well-controlled diabetes. He is advised to continue his current management plan. Repeat labs have been ordered for February 2024.    3. Ischemic cardiomyopathy.  He will continue to follow up with cardiology for management. No changes to his current regimen were made during this visit.    4. Congestive heart failure.  He will continue to follow up with cardiology for management. No changes to his current regimen were made during this visit.    5. Coronary artery disease.  He will continue to follow up with cardiology for management. No changes to his current regimen were made during this visit.    6. Chronic systolic heart failure.  He will continue to follow up with cardiology for management. No changes to his current regimen were made during this visit.    7. Class III obesity with BMI of 50.62.  He is advised to continue his current weight management plan. No new interventions were discussed during this visit.    8. Increased protein in urine.  There was an improvement in protein  levels in his urine compared to 9 months ago. He is advised to continue his current diabetes management plan to maintain kidney health.    9. Health Maintenance.  A PSA test for prostate cancer screening has been ordered and should be completed with the next set of labs in February 2024.      No follow-ups on file.    Please note that this dictation was created using voice recognition software. I have made every reasonable attempt to correct obvious errors, but expect that there are errors of grammar and possible content that I did not discover before finalizing note.         No

## 2024-11-07 ENCOUNTER — APPOINTMENT (OUTPATIENT)
Dept: OTOLARYNGOLOGY | Facility: CLINIC | Age: 84
End: 2024-11-07
Payer: MEDICARE

## 2024-11-07 VITALS
WEIGHT: 220 LBS | HEART RATE: 91 BPM | SYSTOLIC BLOOD PRESSURE: 98 MMHG | DIASTOLIC BLOOD PRESSURE: 64 MMHG | BODY MASS INDEX: 31.5 KG/M2 | HEIGHT: 70 IN

## 2024-11-07 DIAGNOSIS — J31.0 CHRONIC RHINITIS: ICD-10-CM

## 2024-11-07 DIAGNOSIS — K21.9 GASTRO-ESOPHAGEAL REFLUX DISEASE W/OUT ESOPHAGITIS: ICD-10-CM

## 2024-11-07 PROCEDURE — 99214 OFFICE O/P EST MOD 30 MIN: CPT

## 2024-11-07 RX ORDER — FAMOTIDINE 20 MG/1
20 TABLET, FILM COATED ORAL TWICE DAILY
Qty: 180 | Refills: 3 | Status: ACTIVE | COMMUNITY
Start: 2024-11-07 | End: 1900-01-01

## 2025-03-03 ENCOUNTER — APPOINTMENT (OUTPATIENT)
Dept: OTOLARYNGOLOGY | Facility: CLINIC | Age: 85
End: 2025-03-03

## 2025-05-06 ENCOUNTER — RX RENEWAL (OUTPATIENT)
Age: 85
End: 2025-05-06

## 2025-06-02 NOTE — ED ADULT TRIAGE NOTE - ARRIVAL FROM
Pt's abdomen appeared flat and soft. IR tech and NP scanned pt's belly and pt didn't have enough fluid in abdomen for paracentesis. Procedure not performed.   Home